# Patient Record
Sex: FEMALE | Race: WHITE | Employment: FULL TIME | URBAN - NONMETROPOLITAN AREA
[De-identification: names, ages, dates, MRNs, and addresses within clinical notes are randomized per-mention and may not be internally consistent; named-entity substitution may affect disease eponyms.]

---

## 2018-02-10 ENCOUNTER — NURSE TRIAGE (OUTPATIENT)
Dept: ADMINISTRATIVE | Age: 53
End: 2018-02-10

## 2018-02-10 RX ORDER — ATORVASTATIN CALCIUM 20 MG/1
20 TABLET, FILM COATED ORAL DAILY
Status: ON HOLD | COMMUNITY
End: 2018-11-15 | Stop reason: HOSPADM

## 2018-02-10 RX ORDER — PANTOPRAZOLE SODIUM 40 MG/10ML
40 INJECTION, POWDER, LYOPHILIZED, FOR SOLUTION INTRAVENOUS DAILY
COMMUNITY
End: 2018-08-22 | Stop reason: CLARIF

## 2018-02-10 RX ORDER — AMIODARONE HYDROCHLORIDE 200 MG/1
200 TABLET ORAL DAILY
COMMUNITY
End: 2018-05-23 | Stop reason: ALTCHOICE

## 2018-02-10 RX ORDER — FUROSEMIDE 40 MG/1
40 TABLET ORAL DAILY
COMMUNITY
End: 2018-08-22 | Stop reason: SDUPTHER

## 2018-02-10 NOTE — TELEPHONE ENCOUNTER
Contacts      Type Contact Phone   02/10/2018 08:59 AM Phone (Incoming) Beckie Sethi (Self)    Advice Only John Nath is calling to say that she has not been feeling well. She is a heart patient with Dr. Lamont Nance. She has a cough and stuffy  nose and is not sure what she can safely take.)     Roberto Biswas RN routed conversation to You 4 minutes ago (9:05 AM)      Roberto Biswas RN routed conversation to You 5 minutes ago (9:04 AM)      Cadence Brice RN 9 minutes ago (8:59 AM)      Care Advice      No Care Advice given for this encounter.

## 2018-02-10 NOTE — TELEPHONE ENCOUNTER
Reason for Disposition   Cold with no complications (all triage questions negative)    Answer Assessment - Initial Assessment Questions  1. ONSET: \"When did the nasal discharge start? \"      During the night    2. AMOUNT: \"How much discharge is there? \"       None yet  3. COUGH: \"Do you have a cough? \" If yes, ask: \"Describe the color of your sputum\" (clear, white, yellow, green)    None productive   4. RESPIRATORY DISTRESS: \"Describe your breathing. \"       Normal moist cough from smoking- has cut down   5. FEVER: \"Do you have a fever? \" If so, ask: \"What is your temperature, how was it measured, and when did it start? \"       None   6. SEVERITY: \"Overall, how bad are you feeling right now? \" (e.g., doesn't interfere with normal activities, staying home from school/work, staying in bed)       Feels like she needs something for her suymptoms- is going to [facility] with people today, wearing mask and washing hands frequently7. OTHER SYMPTOMS: \"Do you have any other symptoms? \" (e.g., sore throat, earache, wheezing, vomiting)     no  8. PREGNANCY: \"Is there any chance you are pregnant? \" \"When was your last menstrual period? \"na     Na    Protocols used: COMMON COLD-ADULT-AH

## 2018-05-07 ENCOUNTER — OFFICE VISIT (OUTPATIENT)
Dept: CARDIOLOGY CLINIC | Age: 53
End: 2018-05-07
Payer: COMMERCIAL

## 2018-05-07 VITALS
HEIGHT: 66 IN | HEART RATE: 60 BPM | WEIGHT: 196 LBS | BODY MASS INDEX: 31.5 KG/M2 | DIASTOLIC BLOOD PRESSURE: 80 MMHG | SYSTOLIC BLOOD PRESSURE: 127 MMHG

## 2018-05-07 DIAGNOSIS — I42.0 DILATED CARDIOMYOPATHY (HCC): ICD-10-CM

## 2018-05-07 DIAGNOSIS — I50.32 CHRONIC DIASTOLIC CONGESTIVE HEART FAILURE (HCC): ICD-10-CM

## 2018-05-07 DIAGNOSIS — I48.0 PAF (PAROXYSMAL ATRIAL FIBRILLATION) (HCC): Primary | ICD-10-CM

## 2018-05-07 PROCEDURE — G8417 CALC BMI ABV UP PARAM F/U: HCPCS | Performed by: INTERNAL MEDICINE

## 2018-05-07 PROCEDURE — 93000 ELECTROCARDIOGRAM COMPLETE: CPT | Performed by: INTERNAL MEDICINE

## 2018-05-07 PROCEDURE — G8427 DOCREV CUR MEDS BY ELIG CLIN: HCPCS | Performed by: INTERNAL MEDICINE

## 2018-05-07 PROCEDURE — 4004F PT TOBACCO SCREEN RCVD TLK: CPT | Performed by: INTERNAL MEDICINE

## 2018-05-07 PROCEDURE — 3017F COLORECTAL CA SCREEN DOC REV: CPT | Performed by: INTERNAL MEDICINE

## 2018-05-07 PROCEDURE — 99204 OFFICE O/P NEW MOD 45 MIN: CPT | Performed by: INTERNAL MEDICINE

## 2018-05-07 RX ORDER — WARFARIN SODIUM 5 MG/1
2.5 TABLET ORAL
Status: ON HOLD | COMMUNITY
End: 2018-11-05

## 2018-05-07 RX ORDER — METOPROLOL SUCCINATE 50 MG/1
50 TABLET, EXTENDED RELEASE ORAL 2 TIMES DAILY
COMMUNITY
End: 2018-08-22 | Stop reason: SDUPTHER

## 2018-05-23 ENCOUNTER — PROCEDURE VISIT (OUTPATIENT)
Dept: CARDIOLOGY CLINIC | Age: 53
End: 2018-05-23
Payer: COMMERCIAL

## 2018-05-23 ENCOUNTER — OFFICE VISIT (OUTPATIENT)
Dept: CARDIOLOGY CLINIC | Age: 53
End: 2018-05-23
Payer: COMMERCIAL

## 2018-05-23 VITALS
HEIGHT: 66 IN | DIASTOLIC BLOOD PRESSURE: 70 MMHG | SYSTOLIC BLOOD PRESSURE: 102 MMHG | HEART RATE: 82 BPM | WEIGHT: 200 LBS | BODY MASS INDEX: 32.14 KG/M2

## 2018-05-23 DIAGNOSIS — I48.0 PAF (PAROXYSMAL ATRIAL FIBRILLATION) (HCC): Primary | Chronic | ICD-10-CM

## 2018-05-23 DIAGNOSIS — Z95.0 PACEMAKER: ICD-10-CM

## 2018-05-23 PROCEDURE — 99204 OFFICE O/P NEW MOD 45 MIN: CPT | Performed by: INTERNAL MEDICINE

## 2018-05-23 PROCEDURE — 93288 INTERROG EVL PM/LDLS PM IP: CPT | Performed by: INTERNAL MEDICINE

## 2018-05-23 PROCEDURE — G8417 CALC BMI ABV UP PARAM F/U: HCPCS | Performed by: INTERNAL MEDICINE

## 2018-05-23 PROCEDURE — 4004F PT TOBACCO SCREEN RCVD TLK: CPT | Performed by: INTERNAL MEDICINE

## 2018-05-23 PROCEDURE — 3017F COLORECTAL CA SCREEN DOC REV: CPT | Performed by: INTERNAL MEDICINE

## 2018-05-23 PROCEDURE — 93000 ELECTROCARDIOGRAM COMPLETE: CPT | Performed by: INTERNAL MEDICINE

## 2018-05-23 PROCEDURE — G8427 DOCREV CUR MEDS BY ELIG CLIN: HCPCS | Performed by: INTERNAL MEDICINE

## 2018-05-23 ASSESSMENT — ENCOUNTER SYMPTOMS
VOMITING: 0
BLURRED VISION: 0
SORE THROAT: 0
LEFT EYE: 0
DIARRHEA: 0
COUGH: 0
ABDOMINAL PAIN: 0
CONSTIPATION: 0
SHORTNESS OF BREATH: 0
NAUSEA: 0
RIGHT EYE: 0
BACK PAIN: 0
WHEEZING: 0
DOUBLE VISION: 0

## 2018-08-10 ENCOUNTER — TELEPHONE (OUTPATIENT)
Dept: CARDIOLOGY CLINIC | Age: 53
End: 2018-08-10

## 2018-08-21 ENCOUNTER — HOSPITAL ENCOUNTER (OUTPATIENT)
Dept: NON INVASIVE DIAGNOSTICS | Age: 53
Discharge: HOME | End: 2018-08-21
Payer: COMMERCIAL

## 2018-08-21 DIAGNOSIS — I48.0 PAF (PAROXYSMAL ATRIAL FIBRILLATION) (HCC): Chronic | ICD-10-CM

## 2018-08-21 LAB
LV EF: 25 %
LVEF MODALITY: NORMAL

## 2018-08-21 PROCEDURE — 93306 TTE W/DOPPLER COMPLETE: CPT

## 2018-08-22 ENCOUNTER — OFFICE VISIT (OUTPATIENT)
Dept: CARDIOLOGY CLINIC | Age: 53
End: 2018-08-22
Payer: COMMERCIAL

## 2018-08-22 VITALS
DIASTOLIC BLOOD PRESSURE: 70 MMHG | HEART RATE: 108 BPM | BODY MASS INDEX: 31.82 KG/M2 | HEIGHT: 66 IN | WEIGHT: 198 LBS | SYSTOLIC BLOOD PRESSURE: 115 MMHG

## 2018-08-22 DIAGNOSIS — I48.0 PAF (PAROXYSMAL ATRIAL FIBRILLATION) (HCC): Primary | Chronic | ICD-10-CM

## 2018-08-22 PROCEDURE — 99214 OFFICE O/P EST MOD 30 MIN: CPT | Performed by: INTERNAL MEDICINE

## 2018-08-22 PROCEDURE — 4004F PT TOBACCO SCREEN RCVD TLK: CPT | Performed by: INTERNAL MEDICINE

## 2018-08-22 PROCEDURE — G8417 CALC BMI ABV UP PARAM F/U: HCPCS | Performed by: INTERNAL MEDICINE

## 2018-08-22 PROCEDURE — G8427 DOCREV CUR MEDS BY ELIG CLIN: HCPCS | Performed by: INTERNAL MEDICINE

## 2018-08-22 PROCEDURE — 93000 ELECTROCARDIOGRAM COMPLETE: CPT | Performed by: INTERNAL MEDICINE

## 2018-08-22 PROCEDURE — 3017F COLORECTAL CA SCREEN DOC REV: CPT | Performed by: INTERNAL MEDICINE

## 2018-08-22 RX ORDER — PANTOPRAZOLE SODIUM 40 MG/1
40 TABLET, DELAYED RELEASE ORAL DAILY
Qty: 90 TABLET | Refills: 3 | Status: ON HOLD | OUTPATIENT
Start: 2018-08-22 | End: 2018-11-15

## 2018-08-22 RX ORDER — FUROSEMIDE 40 MG/1
40 TABLET ORAL DAILY
Qty: 90 TABLET | Refills: 3 | Status: ON HOLD | OUTPATIENT
Start: 2018-08-22 | End: 2018-11-15

## 2018-08-22 RX ORDER — METOPROLOL SUCCINATE 50 MG/1
50 TABLET, EXTENDED RELEASE ORAL 2 TIMES DAILY
Qty: 180 TABLET | Refills: 3 | Status: ON HOLD | OUTPATIENT
Start: 2018-08-22 | End: 2018-09-20

## 2018-08-22 RX ORDER — PANTOPRAZOLE SODIUM 40 MG/1
40 TABLET, DELAYED RELEASE ORAL DAILY
COMMUNITY
End: 2018-08-22 | Stop reason: SDUPTHER

## 2018-08-22 ASSESSMENT — ENCOUNTER SYMPTOMS
BLURRED VISION: 0
BACK PAIN: 0
ABDOMINAL PAIN: 0
LEFT EYE: 0
DIARRHEA: 0
WHEEZING: 0
SORE THROAT: 0
RIGHT EYE: 0
COUGH: 0
VOMITING: 0
DOUBLE VISION: 0
SHORTNESS OF BREATH: 0
CONSTIPATION: 0
NAUSEA: 0

## 2018-08-22 NOTE — PROGRESS NOTES
CARDIOLOGY - ELECTROPHYSIOLOGY  PROGRESS NOTE    Date:   8/22/2018  Patient name: Dwayne Julio  Date of admission:  No admission date for patient encounter. MRN:   [id number]  YOB: 1965  PCP: ALDAIR Rodriguez CNP    Subjective:  I am doing fine       Clinical Changes / Abnormalities:    HPI     05/23/2018: The patient is a 49 y/o female that has had atrial fibrillation for the past two years. She was followed by Dr. Sheryle Medal from [facility].  The patient's initial presentation was for a flu like illness and she was found to be in atrial fibrillation with RVR. The patient was started on amiodarone one year ago and has had multiple cardioversions. She was also started on anticoagulation for stroke risk reduction. The patient was referred to Dr. Kyle Sanchez for further evaluation for possible atrial fibrillation ablation because the patient had a tachycardia induced cardiomyopathy. The patient's ventricular rate has been better controlled since insertion of her pacemaker and with that her EF has improved. ECHO in March showed and EF of 40-45%. The patient was scheduled to have an ablation on 2/2/2018 but it was cancelled secondary to sludge seen in the ROMAN. They were planning on repeating a cardiac CT and if the sludge was gone then plan to do a PVI ablation. The patient and her  wanted to find care closer to home and transferred her care to   Dr. Piper Armstrong. She is being referred to me to discuss further management of her long standing persistent atrial fibrillation. 08/22/2018: The patient is doing well from a cardiac standpoint. The ECHO repeated yesterday shows her EF is now 25%. The patient denies having any heart failure symptoms or palpitations. Past Medical History:      Diagnosis Date    [employer] dual pacer 5/23/2018     Past Surgical History:      Procedure Laterality Date    CARDIAC SURGERY  1970    repair hole in heart pt states was 3 yrs old.     swelling. Left hip: Normal. She exhibits normal range of motion and no swelling. Right knee: Normal. She exhibits normal range of motion and no swelling. Left knee: Normal. She exhibits normal range of motion and no swelling. Right upper arm: Normal.        Left upper arm: Normal.        Right upper leg: Normal.        Left upper leg: Normal.        Right lower leg: Normal.        Left lower leg: Normal.   Lymphadenopathy:        Head (right side): No submandibular adenopathy present. Head (left side): No submandibular adenopathy present. Neurological: She is alert and oriented to person, place, and time. She has normal strength. She displays normal reflexes. No cranial nerve deficit or sensory deficit. Coordination and gait normal.   Skin: Skin is warm and dry. No lesion and no rash noted. She is not diaphoretic. No cyanosis. Nails show no clubbing. Psychiatric: Her speech is normal and behavior is normal. Judgment and thought content normal. Her mood appears not anxious. Her affect is not angry. Cognition and memory are normal. She does not exhibit a depressed mood. Nursing note and vitals reviewed. DIAGNOSTIC STUDIES & LABORATORY DATA:     I have personally reviewed and interpreted the results of the following diagnostic testing  CARDIAC HISTORY:     ECHO:   08/21/2018  Summary   Ejection fraction is visually estimated at 25%.   There was severe global hypokinesis of the left ventricle.   Left Ventricular size is Mildly increased .   Moderately dilated left atrium.   Mild to moderate mitral regurgitation is present. 03/28/2018  3/28/2018 THOMAS:   1. Biatrial enlargment. No LA or RA thrombus. 2. LA appears severely enlarged. Mild spontaneous echo contrast is  present in the left atrial appendage. The left atrial appendage velocity  is severely reduced, 26.8 cm/sec. 3. Left atrial appendage sludge is visualized.  This finding is  independently associated with

## 2018-08-30 ENCOUNTER — OFFICE VISIT (OUTPATIENT)
Dept: CARDIOLOGY CLINIC | Age: 53
End: 2018-08-30
Payer: COMMERCIAL

## 2018-08-30 VITALS
SYSTOLIC BLOOD PRESSURE: 134 MMHG | HEART RATE: 84 BPM | WEIGHT: 197 LBS | DIASTOLIC BLOOD PRESSURE: 72 MMHG | HEIGHT: 66 IN | BODY MASS INDEX: 31.66 KG/M2

## 2018-08-30 DIAGNOSIS — Z95.0 PACEMAKER: ICD-10-CM

## 2018-08-30 DIAGNOSIS — I48.0 PAF (PAROXYSMAL ATRIAL FIBRILLATION) (HCC): Chronic | ICD-10-CM

## 2018-08-30 DIAGNOSIS — I50.32 CHRONIC DIASTOLIC CONGESTIVE HEART FAILURE (HCC): ICD-10-CM

## 2018-08-30 DIAGNOSIS — I10 ESSENTIAL HYPERTENSION: ICD-10-CM

## 2018-08-30 DIAGNOSIS — I48.20 CHRONIC ATRIAL FIBRILLATION (HCC): Primary | ICD-10-CM

## 2018-08-30 DIAGNOSIS — I42.0 DILATED CARDIOMYOPATHY (HCC): ICD-10-CM

## 2018-08-30 PROCEDURE — G8427 DOCREV CUR MEDS BY ELIG CLIN: HCPCS | Performed by: NUCLEAR MEDICINE

## 2018-08-30 PROCEDURE — 4004F PT TOBACCO SCREEN RCVD TLK: CPT | Performed by: NUCLEAR MEDICINE

## 2018-08-30 PROCEDURE — G8417 CALC BMI ABV UP PARAM F/U: HCPCS | Performed by: NUCLEAR MEDICINE

## 2018-08-30 PROCEDURE — 99214 OFFICE O/P EST MOD 30 MIN: CPT | Performed by: NUCLEAR MEDICINE

## 2018-08-30 PROCEDURE — 3017F COLORECTAL CA SCREEN DOC REV: CPT | Performed by: NUCLEAR MEDICINE

## 2018-08-30 RX ORDER — LOSARTAN POTASSIUM 25 MG/1
25 TABLET ORAL DAILY
Qty: 30 TABLET | Refills: 3 | Status: ON HOLD | OUTPATIENT
Start: 2018-08-30 | End: 2018-11-05

## 2018-09-05 ENCOUNTER — OFFICE VISIT (OUTPATIENT)
Dept: CARDIOTHORACIC SURGERY | Age: 53
End: 2018-09-05
Payer: COMMERCIAL

## 2018-09-05 VITALS
HEIGHT: 66 IN | SYSTOLIC BLOOD PRESSURE: 112 MMHG | DIASTOLIC BLOOD PRESSURE: 82 MMHG | HEART RATE: 64 BPM | WEIGHT: 197.8 LBS | BODY MASS INDEX: 31.79 KG/M2

## 2018-09-05 DIAGNOSIS — I42.0 DILATED CARDIOMYOPATHY (HCC): Primary | ICD-10-CM

## 2018-09-05 DIAGNOSIS — I48.19 PERSISTENT ATRIAL FIBRILLATION (HCC): ICD-10-CM

## 2018-09-05 DIAGNOSIS — I34.0 NON-RHEUMATIC MITRAL REGURGITATION: ICD-10-CM

## 2018-09-05 PROCEDURE — 4004F PT TOBACCO SCREEN RCVD TLK: CPT | Performed by: THORACIC SURGERY (CARDIOTHORACIC VASCULAR SURGERY)

## 2018-09-05 PROCEDURE — 99205 OFFICE O/P NEW HI 60 MIN: CPT | Performed by: THORACIC SURGERY (CARDIOTHORACIC VASCULAR SURGERY)

## 2018-09-05 PROCEDURE — G8417 CALC BMI ABV UP PARAM F/U: HCPCS | Performed by: THORACIC SURGERY (CARDIOTHORACIC VASCULAR SURGERY)

## 2018-09-05 PROCEDURE — G8427 DOCREV CUR MEDS BY ELIG CLIN: HCPCS | Performed by: THORACIC SURGERY (CARDIOTHORACIC VASCULAR SURGERY)

## 2018-09-05 PROCEDURE — 3017F COLORECTAL CA SCREEN DOC REV: CPT | Performed by: THORACIC SURGERY (CARDIOTHORACIC VASCULAR SURGERY)

## 2018-09-05 ASSESSMENT — ENCOUNTER SYMPTOMS
ABDOMINAL DISTENTION: 0
EYE DISCHARGE: 0
APNEA: 0
SHORTNESS OF BREATH: 1
ABDOMINAL PAIN: 0
COLOR CHANGE: 0

## 2018-09-05 NOTE — PROGRESS NOTES
[address] [place] [address]  Dept: [phone]  Dept Fax: [phone]: [phone]    Visit Date: 9/5/2018    Ms. Jason Dominguez is a 48 y.o. female  who presented for:  Chief Complaint   Patient presents with    New Patient     Referral from Dr. Malena Rosas Other     PAF    Follow Up After Procedure     ECHO 8/21/18       HPI:   48year old female with presumptive non-ischemic tachycardiomyopathy due to persistent atrial fibrillation with rapid ventricular response, complicated by moderate mitral regurgitation. Symptomatic with severe fatigue and dyspnea on minimal exertion. Denies thromboembolic sequelae. Previously scheduled for percutaneous ablation, but aborted due to spontaneous contrast in left atrium. Note, patient had open ASD repair as a child via a full sternotomy. Current Outpatient Prescriptions:     losartan (COZAAR) 25 MG tablet, Take 1 tablet by mouth daily, Disp: 30 tablet, Rfl: 3    pantoprazole (PROTONIX) 40 MG tablet, Take 1 tablet by mouth daily, Disp: 90 tablet, Rfl: 3    metoprolol succinate (TOPROL XL) 50 MG extended release tablet, Take 1 tablet by mouth 2 times daily, Disp: 180 tablet, Rfl: 3    furosemide (LASIX) 40 MG tablet, Take 1 tablet by mouth daily, Disp: 90 tablet, Rfl: 3    warfarin (COUMADIN) 5 MG tablet, Take 2.5 mg by mouth , Disp: , Rfl:     atorvastatin (LIPITOR) 20 MG tablet, Take 20 mg by mouth daily, Disp: , Rfl:     No Known Allergies    Past Medical History  Isabel Ramirez  has a past medical history of [employer] dual pacer. Social History  Isabel Ramirez  reports that she has been smoking Cigarettes. She started smoking about 24 years ago. She has been smoking about 0.25 packs per day. She has never used smokeless tobacco. She reports that she does not drink alcohol or use drugs. Family History  Isabel Ramirez family history is not on file.     There is no ESV    RV Diastolic Dimension: 3 cm                   Index: 149 ml/73 m^2                   EF Calculated: 23.6 %     LA/Aorta: 1.37      LV Area         LV Length: 7.1 cm         LA volume/Index: 74.7 ml /95J^7   Diastolic: 79.3   cm^2   LV Area   Systolic: 97.3   cm^2     Doppler Measurements & Calculations      MV Peak E-Wave: 115 cm/s AV Peak Velocity: 124  LVOT Peak Velocity: 86.8                            cm/s                   cm/s   MV Peak Gradient: 5.29   AV Peak Gradient: 6.15 LVOT Peak Gradient: 3 mmHg   mmHg                     mmHg                                                   TV Peak E-Wave: 43.2 cm/s   MV Deceleration Time:   183 msec                                        TV Peak Gradient: 0.75                                                   mmHg                            IVRT: 81 msec          TR Velocity:271 cm/s                                                   TR Gradient:29.38 mmHg                                                   PV Peak Velocity: 73.7                            AV DVI (Vmax):0.7      cm/s   MR Velocity: 475 cm/s                           PV Peak Gradient: 2.17                                                   mmHg     [email or web address]  [id number]       Assessment/Plan     1. Dilated cardiomyopathy (HCC)-tachymediated    2. Persistent atrial fibrillation ([address]    3. Non-rheumatic mitral regurgitation      Discussed with patient and father in detail that she unfortunately is not a candidate for a thoracoscopic maze, due to her previous open ASD repair. I proposed an open maze (Gutierrez maze IV, with ablation of both anterior and posterior mitral trigones), mitral valve repair, and placement of LV epicardial lead, via redo sternotomy. Patient desires to proceed. Will non-contrast CT chest, and LHC (scheduled for 9/20). Will schedule case thereafter.     The

## 2018-09-12 NOTE — PROGRESS NOTES
PAT call attempted patient unavailable left message with instructions    NPO after midnight  Bring insurance info and drivers license  Wear comfortable clean clothing  Do not bring jewelry   Shower night before and morning of surgery with a liquid antibacterial soap  Bring medications in original bottles  Follow all instructions give by your physician   needed at discharge  Call [phone] for any questions

## 2018-09-19 ENCOUNTER — PREP FOR PROCEDURE (OUTPATIENT)
Dept: CARDIOLOGY | Age: 53
End: 2018-09-19

## 2018-09-19 ENCOUNTER — TELEPHONE (OUTPATIENT)
Dept: CARDIOLOGY CLINIC | Age: 53
End: 2018-09-19

## 2018-09-19 DIAGNOSIS — Z01.818 PREOP EXAMINATION: Primary | ICD-10-CM

## 2018-09-19 RX ORDER — ASPIRIN 325 MG
325 TABLET ORAL ONCE
Status: CANCELLED | OUTPATIENT
Start: 2018-09-19 | End: 2018-09-19

## 2018-09-19 RX ORDER — SODIUM CHLORIDE 0.9 % (FLUSH) 0.9 %
10 SYRINGE (ML) INJECTION EVERY 12 HOURS SCHEDULED
Status: CANCELLED | OUTPATIENT
Start: 2018-09-19 | End: 2019-09-19

## 2018-09-19 RX ORDER — NITROGLYCERIN 0.4 MG/1
0.4 TABLET SUBLINGUAL EVERY 5 MIN PRN
Status: CANCELLED | OUTPATIENT
Start: 2018-09-19 | End: 2018-09-21

## 2018-09-19 RX ORDER — SODIUM CHLORIDE 9 MG/ML
INJECTION, SOLUTION INTRAVENOUS CONTINUOUS
Status: CANCELLED | OUTPATIENT
Start: 2018-09-19 | End: 2019-09-19

## 2018-09-19 RX ORDER — SODIUM CHLORIDE 0.9 % (FLUSH) 0.9 %
10 SYRINGE (ML) INJECTION PRN
Status: CANCELLED | OUTPATIENT
Start: 2018-09-19 | End: 2019-09-19

## 2018-09-20 ENCOUNTER — HOSPITAL ENCOUNTER (OUTPATIENT)
Dept: INPATIENT UNIT | Age: 53
Discharge: HOME | End: 2018-09-20
Attending: NUCLEAR MEDICINE | Admitting: NUCLEAR MEDICINE
Payer: COMMERCIAL

## 2018-09-20 VITALS
SYSTOLIC BLOOD PRESSURE: 103 MMHG | OXYGEN SATURATION: 96 % | HEIGHT: 66 IN | RESPIRATION RATE: 19 BRPM | TEMPERATURE: 98.1 F | HEART RATE: 87 BPM | BODY MASS INDEX: 31.66 KG/M2 | WEIGHT: 197 LBS | DIASTOLIC BLOOD PRESSURE: 78 MMHG

## 2018-09-20 DIAGNOSIS — I48.0 PAF (PAROXYSMAL ATRIAL FIBRILLATION) (HCC): Chronic | ICD-10-CM

## 2018-09-20 LAB
ABO: NORMAL
ANION GAP SERPL CALCULATED.3IONS-SCNC: 12 MEQ/L (ref 8–16)
ANTIBODY SCREEN: NORMAL
APTT: 43 SECONDS (ref 22–38)
BUN BLDV-MCNC: 19 MG/DL (ref 7–22)
CALCIUM SERPL-MCNC: 9.1 MG/DL (ref 8.5–10.5)
CHLORIDE BLD-SCNC: 102 MEQ/L (ref 98–111)
CO2: 25 MEQ/L (ref 23–33)
CREAT SERPL-MCNC: 1.1 MG/DL (ref 0.4–1.2)
EKG ATRIAL RATE: 178 BPM
EKG Q-T INTERVAL: 450 MS
EKG QRS DURATION: 138 MS
EKG QTC CALCULATION (BAZETT): 541 MS
EKG R AXIS: -19 DEGREES
EKG T AXIS: 114 DEGREES
EKG VENTRICULAR RATE: 87 BPM
ERYTHROCYTE [DISTWIDTH] IN BLOOD BY AUTOMATED COUNT: 15.8 % (ref 11.5–14.5)
ERYTHROCYTE [DISTWIDTH] IN BLOOD BY AUTOMATED COUNT: 50 FL (ref 35–45)
GFR SERPL CREATININE-BSD FRML MDRD: 52 ML/MIN/1.73M2
GLUCOSE BLD-MCNC: 98 MG/DL (ref 70–108)
HCT VFR BLD CALC: 36 % (ref 37–47)
HEMOGLOBIN: 11.1 GM/DL (ref 12–16)
INR BLD: 2.7 (ref 0.85–1.13)
MCH RBC QN AUTO: 26.9 PG (ref 26–33)
MCHC RBC AUTO-ENTMCNC: 30.8 GM/DL (ref 32.2–35.5)
MCV RBC AUTO: 87.2 FL (ref 81–99)
PLATELET # BLD: 225 THOU/MM3 (ref 130–400)
PMV BLD AUTO: 10.7 FL (ref 9.4–12.4)
POTASSIUM REFLEX MAGNESIUM: 3.8 MEQ/L (ref 3.5–5.2)
RBC # BLD: 4.13 MILL/MM3 (ref 4.2–5.4)
RH FACTOR: NORMAL
SODIUM BLD-SCNC: 139 MEQ/L (ref 135–145)
WBC # BLD: 5.9 THOU/MM3 (ref 4.8–10.8)

## 2018-09-20 PROCEDURE — 86850 RBC ANTIBODY SCREEN: CPT

## 2018-09-20 PROCEDURE — 93005 ELECTROCARDIOGRAM TRACING: CPT | Performed by: PHYSICIAN ASSISTANT

## 2018-09-20 PROCEDURE — 93458 L HRT ARTERY/VENTRICLE ANGIO: CPT | Performed by: NUCLEAR MEDICINE

## 2018-09-20 PROCEDURE — 2500000003 HC RX 250 WO HCPCS

## 2018-09-20 PROCEDURE — 36415 COLL VENOUS BLD VENIPUNCTURE: CPT

## 2018-09-20 PROCEDURE — 85610 PROTHROMBIN TIME: CPT

## 2018-09-20 PROCEDURE — 86900 BLOOD TYPING SEROLOGIC ABO: CPT

## 2018-09-20 PROCEDURE — 86901 BLOOD TYPING SEROLOGIC RH(D): CPT

## 2018-09-20 PROCEDURE — 6360000004 HC RX CONTRAST MEDICATION: Performed by: NUCLEAR MEDICINE

## 2018-09-20 PROCEDURE — 85027 COMPLETE CBC AUTOMATED: CPT

## 2018-09-20 PROCEDURE — C1894 INTRO/SHEATH, NON-LASER: HCPCS

## 2018-09-20 PROCEDURE — 99152 MOD SED SAME PHYS/QHP 5/>YRS: CPT | Performed by: NUCLEAR MEDICINE

## 2018-09-20 PROCEDURE — 6360000002 HC RX W HCPCS

## 2018-09-20 PROCEDURE — 80048 BASIC METABOLIC PNL TOTAL CA: CPT

## 2018-09-20 PROCEDURE — C1769 GUIDE WIRE: HCPCS

## 2018-09-20 PROCEDURE — 2709999900 HC NON-CHARGEABLE SUPPLY

## 2018-09-20 PROCEDURE — 85730 THROMBOPLASTIN TIME PARTIAL: CPT

## 2018-09-20 PROCEDURE — 2580000003 HC RX 258: Performed by: PHYSICIAN ASSISTANT

## 2018-09-20 RX ORDER — HYDROXYZINE HYDROCHLORIDE 25 MG/1
25 TABLET, FILM COATED ORAL NIGHTLY PRN
COMMUNITY
End: 2018-11-26 | Stop reason: ALTCHOICE

## 2018-09-20 RX ORDER — ONDANSETRON 2 MG/ML
4 INJECTION INTRAMUSCULAR; INTRAVENOUS EVERY 6 HOURS PRN
Status: DISCONTINUED | OUTPATIENT
Start: 2018-09-20 | End: 2018-09-20 | Stop reason: HOSPADM

## 2018-09-20 RX ORDER — SODIUM CHLORIDE 9 MG/ML
INJECTION, SOLUTION INTRAVENOUS CONTINUOUS
Status: DISCONTINUED | OUTPATIENT
Start: 2018-09-20 | End: 2018-09-20 | Stop reason: HOSPADM

## 2018-09-20 RX ORDER — SODIUM CHLORIDE 9 MG/ML
INJECTION, SOLUTION INTRAVENOUS CONTINUOUS
Status: DISCONTINUED | OUTPATIENT
Start: 2018-09-20 | End: 2018-09-20

## 2018-09-20 RX ORDER — SODIUM CHLORIDE 0.9 % (FLUSH) 0.9 %
10 SYRINGE (ML) INJECTION EVERY 12 HOURS SCHEDULED
Status: DISCONTINUED | OUTPATIENT
Start: 2018-09-20 | End: 2018-09-20

## 2018-09-20 RX ORDER — SODIUM CHLORIDE 0.9 % (FLUSH) 0.9 %
10 SYRINGE (ML) INJECTION PRN
Status: DISCONTINUED | OUTPATIENT
Start: 2018-09-20 | End: 2018-09-20

## 2018-09-20 RX ORDER — SODIUM CHLORIDE 0.9 % (FLUSH) 0.9 %
10 SYRINGE (ML) INJECTION EVERY 12 HOURS SCHEDULED
Status: DISCONTINUED | OUTPATIENT
Start: 2018-09-20 | End: 2018-09-20 | Stop reason: HOSPADM

## 2018-09-20 RX ORDER — SODIUM CHLORIDE 0.9 % (FLUSH) 0.9 %
10 SYRINGE (ML) INJECTION PRN
Status: DISCONTINUED | OUTPATIENT
Start: 2018-09-20 | End: 2018-09-20 | Stop reason: HOSPADM

## 2018-09-20 RX ORDER — ATROPINE SULFATE 0.4 MG/ML
0.5 AMPUL (ML) INJECTION
Status: DISCONTINUED | OUTPATIENT
Start: 2018-09-20 | End: 2018-09-20 | Stop reason: HOSPADM

## 2018-09-20 RX ORDER — METOPROLOL SUCCINATE 50 MG/1
TABLET, EXTENDED RELEASE ORAL
Qty: 180 TABLET | Refills: 3 | Status: ON HOLD | OUTPATIENT
Start: 2018-09-20 | End: 2018-11-05

## 2018-09-20 RX ORDER — NITROGLYCERIN 0.4 MG/1
0.4 TABLET SUBLINGUAL EVERY 5 MIN PRN
Status: DISCONTINUED | OUTPATIENT
Start: 2018-09-20 | End: 2018-09-20 | Stop reason: HOSPADM

## 2018-09-20 RX ORDER — ASPIRIN 325 MG
325 TABLET ORAL ONCE
Status: DISCONTINUED | OUTPATIENT
Start: 2018-09-20 | End: 2018-09-20 | Stop reason: HOSPADM

## 2018-09-20 RX ORDER — PHENOL 1.4 %
1 AEROSOL, SPRAY (ML) MUCOUS MEMBRANE NIGHTLY PRN
COMMUNITY
End: 2018-12-20 | Stop reason: SDUPTHER

## 2018-09-20 RX ORDER — ACETAMINOPHEN 325 MG/1
650 TABLET ORAL EVERY 4 HOURS PRN
Status: DISCONTINUED | OUTPATIENT
Start: 2018-09-20 | End: 2018-09-20 | Stop reason: HOSPADM

## 2018-09-20 RX ADMIN — IOPAMIDOL 75 ML: 755 INJECTION, SOLUTION INTRAVENOUS at 15:26

## 2018-09-20 RX ADMIN — SODIUM CHLORIDE: 9 INJECTION, SOLUTION INTRAVENOUS at 13:40

## 2018-09-20 ASSESSMENT — PAIN SCALES - GENERAL
PAINLEVEL_OUTOF10: 0
PAINLEVEL_OUTOF10: 0

## 2018-09-20 NOTE — PROGRESS NOTES
Assumed care from 2-E staff. Patient awake, alert, and denies discomfort. IV 0.9 NS infusing. Right radial site intact with vasc band, armboard, pillow support. No swelling, firmness, or drainage noted. See post procedure flow sheet.

## 2018-09-20 NOTE — CONSULTS
3: Severe systemic disease or disturbance  Class 4: Severe systemic disorders that are already life threatening. Class 5: Moribund pt with little chances of survival, for more than 24 hours. Mallampati I Airway Classification:   []1 [x]2 []3 []4    [x]Pre-procedure diagnostic studies complete and results available. Comment:    [x]Previous sedation/anesthesia experiences assessed. Comment:    [x]The patient is an appropriate candidate to undergo the planned procedure sedation and anesthesia. (Refer to nursing sedation/analgesia documentation record)  [x]Formulation and discussion of sedation/procedure plan, risks, and expectations with patient and/or responsible adult completed. [x]Patient examined immediately prior to the procedure.  (Refer to nursing sedation/analgesia documentation record)    Ragini Howard MD [facility]  Electronically signed 9/20/2018 at 2:38 PM

## 2018-09-20 NOTE — PLAN OF CARE
Problem: Discharge Planning:  Goal: Discharged to appropriate level of care  Discharged to appropriate level of care  Outcome: Ongoing      Problem: Pain - Acute:  Goal: Pain level will decrease  Pain level will decrease  Outcome: Ongoing      Problem: Tissue Perfusion - Cardiopulmonary, Altered:  Goal: Circulatory function within specified parameters  Circulatory function within specified parameters  Outcome: Ongoing    Goal: Absence of angina  Absence of angina  Outcome: Ongoing    Goal: Hemodynamic stability will improve  Hemodynamic stability will improve  Outcome: Ongoing      Comments: Care plan reviewed with patient and family. Patient and family verbalize understanding of the plan of care and contribute to goal setting.

## 2018-09-21 NOTE — TELEPHONE ENCOUNTER
PT CALLED THE OFFICE.  THE COMPANY IS SENDING HER A NEW COMMUNICATOR AND WHEN SHE GETS IT SHE IS GOING TO SEND

## 2018-09-21 NOTE — TELEPHONE ENCOUNTER
LM to send download or to call office to be checked. She can bring monitor in and we can help her trouble shoot.   Consider dismissal if no download and/or no appt

## 2018-09-24 ENCOUNTER — TELEPHONE (OUTPATIENT)
Dept: CARDIOTHORACIC SURGERY | Age: 53
End: 2018-09-24

## 2018-09-26 ENCOUNTER — OFFICE VISIT (OUTPATIENT)
Dept: CARDIOTHORACIC SURGERY | Age: 53
End: 2018-09-26
Payer: COMMERCIAL

## 2018-09-26 VITALS
OXYGEN SATURATION: 98 % | HEART RATE: 92 BPM | HEIGHT: 66 IN | SYSTOLIC BLOOD PRESSURE: 112 MMHG | DIASTOLIC BLOOD PRESSURE: 60 MMHG | BODY MASS INDEX: 32.11 KG/M2 | WEIGHT: 199.8 LBS

## 2018-09-26 DIAGNOSIS — I48.19 PERSISTENT ATRIAL FIBRILLATION (HCC): Primary | ICD-10-CM

## 2018-09-26 DIAGNOSIS — I42.0 DILATED CARDIOMYOPATHY (HCC): ICD-10-CM

## 2018-09-26 DIAGNOSIS — I34.0 NON-RHEUMATIC MITRAL REGURGITATION: ICD-10-CM

## 2018-09-26 PROCEDURE — G8417 CALC BMI ABV UP PARAM F/U: HCPCS | Performed by: THORACIC SURGERY (CARDIOTHORACIC VASCULAR SURGERY)

## 2018-09-26 PROCEDURE — 3017F COLORECTAL CA SCREEN DOC REV: CPT | Performed by: THORACIC SURGERY (CARDIOTHORACIC VASCULAR SURGERY)

## 2018-09-26 PROCEDURE — G8428 CUR MEDS NOT DOCUMENT: HCPCS | Performed by: THORACIC SURGERY (CARDIOTHORACIC VASCULAR SURGERY)

## 2018-09-26 PROCEDURE — 1036F TOBACCO NON-USER: CPT | Performed by: THORACIC SURGERY (CARDIOTHORACIC VASCULAR SURGERY)

## 2018-09-26 PROCEDURE — 99215 OFFICE O/P EST HI 40 MIN: CPT | Performed by: THORACIC SURGERY (CARDIOTHORACIC VASCULAR SURGERY)

## 2018-09-26 ASSESSMENT — ENCOUNTER SYMPTOMS
SHORTNESS OF BREATH: 1
ABDOMINAL PAIN: 0
COLOR CHANGE: 0
EYE DISCHARGE: 0

## 2018-09-26 NOTE — PROGRESS NOTES
[address] [place] [address]  Dept: [phone]  Dept Fax: [phone]: [phone]    Visit Date: 9/26/2018    Ms. Erik Garcia is a 48 y.o. female  who presented for:  Chief Complaint   Patient presents with    Consultation     afib, discuss MAZE       HPI:   48year old female, s/p open ASD repair as child, presents with dilated cardiomyopathy associated with persistent atrial fibrillation with rapid ventricular response, and resultant moderate mitral regurgitation. Previously evaluated for percutaneous ablation, but found to have ROMAN sludge. Returns post-[facility] showing no CAD. Reports ongoing fatigue and dyspnea on minimal exertion. Current Outpatient Prescriptions:     hydrOXYzine (ATARAX) 25 MG tablet, Take 25 mg by mouth nightly as needed for Itching 1/2-2 tabs, Disp: , Rfl:     Melatonin 10 MG TABS, Take 1 tablet by mouth nightly as needed, Disp: , Rfl:     metoprolol succinate (TOPROL XL) 50 MG extended release tablet, 75mg po bid, Disp: 180 tablet, Rfl: 3    losartan (COZAAR) 25 MG tablet, Take 1 tablet by mouth daily, Disp: 30 tablet, Rfl: 3    pantoprazole (PROTONIX) 40 MG tablet, Take 1 tablet by mouth daily, Disp: 90 tablet, Rfl: 3    furosemide (LASIX) 40 MG tablet, Take 1 tablet by mouth daily, Disp: 90 tablet, Rfl: 3    warfarin (COUMADIN) 5 MG tablet, Take 2.5 mg by mouth , Disp: , Rfl:     atorvastatin (LIPITOR) 20 MG tablet, Take 20 mg by mouth daily, Disp: , Rfl:     No Known Allergies    Past Medical History  Gustavo Moulton  has a past medical history of [employer] dual pacer. Social History  Gustavo Moulton  reports that she quit smoking 10 days ago. Her smoking use included Cigarettes. She started smoking about 24 years ago. She has a 8.75 pack-year smoking history. She has never used smokeless tobacco. She reports that she does not drink alcohol or use drugs.     Family Abdominal: Soft. Bowel sounds are normal. There is no tenderness. Musculoskeletal: Normal range of motion. She exhibits no edema or deformity. Neurological: She is alert and oriented to person, place, and time. She has normal reflexes. Skin: Skin is warm and dry. Psychiatric: She has a normal mood and affect.        Lab Results   Component Value Date    WBC 5.9 09/20/2018    RBC 4.13 09/20/2018    HGB 11.1 09/20/2018    HCT 36.0 09/20/2018    MCV 87.2 09/20/2018    MCH 26.9 09/20/2018    MCHC 30.8 09/20/2018     09/20/2018    MPV 10.7 09/20/2018       Lab Results   Component Value Date     09/20/2018    K 3.8 09/20/2018     09/20/2018    CO2 25 09/20/2018    BUN 19 09/20/2018    CREATININE 1.1 09/20/2018    CALCIUM 9.1 09/20/2018    LABGLOM 52 09/20/2018    GLUCOSE 98 09/20/2018       No results found for: ALKPHOS, ALT, AST, PROT, BILITOT, BILIDIR, IBILI, LABALBU    No results found for: MG    Lab Results   Component Value Date    INR 2.70 (H) 09/20/2018    INR 3.0 (H) 09/19/2018         No results found for: LABA1C    No results found for: TRIG, HDL, LDLCALC, LDLDIRECT, LABVLDL    No results found for: TSH      Testing Reviewed:      I have individually reviewed the below cardiac tests    ECHO:   Results for orders placed during the hospital encounter of 08/21/18   Echo 2D w doppler w color complete    Narrative Transthoracic Echocardiography Report (TTE)     Demographics      Patient Name     Cynthia Chandler Gender                Female      MR #             [id number]    Race                                                      Ethnicity      Account #        [de-identified]    Room Number      Accession Number [id number]    Date of Study         08/21/2018      Date of Birth    1965   Referring Physician   MD Rina Rubin NP      Age              48 year(s)   Sonographer           Freddy Casanova MALGORZATA Interpreting          Cosme Burk MD                                 Physician     Procedure    Type of Study      TTE procedure:ECHOCARDIOGRAM COMPLETE 2D W DOPPLER W COLOR. Procedure Date  Date: 08/21/2018 Start: 03:04 PM    Study Location: Echo Lab  Technical Quality: Adequate visualization    Indications:Atrial fibrillation. Additional Medical History:Cardiomyopathy, Pacemaker, Tobacco use,  Paroxysmal atrial fib, CHF    Patient Status: Routine    Height: 65.75 inches Weight: 208.01 pounds BSA: 2.03 m^2 BMI: 33.83 kg/m^2    BP: 152/92 mmHg     Conclusions      Summary   Ejection fraction is visually estimated at 25%. There was severe global hypokinesis of the left ventricle. Left Ventricular size is Mildly increased . Moderately dilated left atrium. Mild to moderate mitral regurgitation is present. Signature      ----------------------------------------------------------------   Electronically signed by Cosme Burk MD (Interpreting   physician) on 08/21/2018 at 04:14 PM   ----------------------------------------------------------------      Findings      Mitral Valve   Mild to moderate mitral regurgitation is present. Aortic Valve   The aortic valve was trileaflet with normal thickness and cuspal   separation. DOPPLER: Transaortic velocity was within the normal range with   no evidence of aortic stenosis. There was no evidence of aortic   regurgitation. Tricuspid Valve   Mild tricuspid regurgitation visualized. Pulmonic Valve   The pulmonic valve was not well visualized . Trivial pulmonic regurgitation visualized. Left Atrium   Moderately dilated left atrium. Left Ventricle   Ejection fraction is visually estimated at 25%. There was severe global hypokinesis of the left ventricle. Left Ventricular size is Mildly increased .       Right Atrium   Right atrial size was normal.      Right Ventricle   The right ventricular size was normal with normal systolic function and   wall thickness. Pericardial Effusion   The pericardium was normal in appearance with no evidence of a pericardial   effusion. Pleural Effusion   No evidence of pleural effusion. Aorta / Great Vessels   -Aortic root dimension within normal limits.   -The Pulmonary artery is within normal limits. -IVC size is within normal limits with normal respiratory phasic changes.      M-Mode/2D Measurements & Calculations      LV Diastolic    LV Systolic Dimension:    AV Cusp Separation: 2 cmLA   Dimension: 5.8  5.3 cm                    Dimension: 4.1 cmAO Root   cm              LV Volume Diastolic: 377  Dimension: 3 cmLA Area: 23.1   LV FS:8.6 %     ml                        cm^2   LV PW           LV Volume Systolic: 490   Diastolic: 1 cm ml   Septum          LV EDV/LV EDV Index: 375   Diastolic: 1 cm BAYRON/27 M^9VT ESV/LV ESV    RV Diastolic Dimension: 3 cm                   Index: 149 ml/73 m^2                   EF Calculated: 23.6 %     LA/Aorta: 1.37      LV Area         LV Length: 7.1 cm         LA volume/Index: 74.7 ml /62U^4   Diastolic: 75.9   cm^2   LV Area   Systolic: 35.4   cm^2     Doppler Measurements & Calculations      MV Peak E-Wave: 115 cm/s AV Peak Velocity: 124  LVOT Peak Velocity: 86.8                            cm/s                   cm/s   MV Peak Gradient: 5.29   AV Peak Gradient: 6.15 LVOT Peak Gradient: 3 mmHg   mmHg                     mmHg                                                   TV Peak E-Wave: 43.2 cm/s   MV Deceleration Time:   183 msec                                        TV Peak Gradient: 0.75                                                   mmHg                            IVRT: 81 msec          TR Velocity:271 cm/s                                                   TR Gradient:29.38 mmHg                                                   PV Peak Velocity: 73.7                            AV DVI (Vmax):0.7      cm/s   MR Velocity:

## 2018-09-27 ENCOUNTER — PREP FOR PROCEDURE (OUTPATIENT)
Dept: CARDIOLOGY CLINIC | Age: 53
End: 2018-09-27

## 2018-09-27 DIAGNOSIS — Z01.818 PREOP EXAMINATION: Primary | ICD-10-CM

## 2018-09-27 RX ORDER — CHLORHEXIDINE GLUCONATE 0.12 MG/ML
15 RINSE ORAL ONCE
Status: CANCELLED | OUTPATIENT
Start: 2018-09-27 | End: 2018-09-27

## 2018-09-27 RX ORDER — SODIUM CHLORIDE 0.9 % (FLUSH) 0.9 %
10 SYRINGE (ML) INJECTION PRN
Status: CANCELLED | OUTPATIENT
Start: 2018-09-27 | End: 2019-09-27

## 2018-09-27 RX ORDER — SODIUM CHLORIDE 0.9 % (FLUSH) 0.9 %
10 SYRINGE (ML) INJECTION EVERY 12 HOURS SCHEDULED
Status: CANCELLED | OUTPATIENT
Start: 2018-09-27 | End: 2019-09-27

## 2018-09-27 RX ORDER — CHLORHEXIDINE GLUCONATE 4 G/100ML
SOLUTION TOPICAL ONCE
Status: CANCELLED | OUTPATIENT
Start: 2018-09-27 | End: 2018-09-27

## 2018-09-27 RX ORDER — AMIODARONE HYDROCHLORIDE 200 MG/1
200 TABLET ORAL ONCE
Status: CANCELLED | OUTPATIENT
Start: 2018-09-27 | End: 2018-09-27

## 2018-10-01 ENCOUNTER — TELEPHONE (OUTPATIENT)
Dept: CARDIOLOGY CLINIC | Age: 53
End: 2018-10-01

## 2018-10-10 ENCOUNTER — HOSPITAL ENCOUNTER (OUTPATIENT)
Dept: PREADMISSION TESTING | Age: 53
Discharge: HOME | End: 2018-10-14
Payer: COMMERCIAL

## 2018-10-10 ENCOUNTER — HOSPITAL ENCOUNTER (OUTPATIENT)
Dept: GENERAL RADIOLOGY | Age: 53
Discharge: HOME | End: 2018-10-10
Payer: COMMERCIAL

## 2018-10-10 ENCOUNTER — HOSPITAL ENCOUNTER (OUTPATIENT)
Dept: CT IMAGING | Age: 53
Discharge: HOME | End: 2018-10-10
Payer: COMMERCIAL

## 2018-10-10 VITALS
HEART RATE: 102 BPM | BODY MASS INDEX: 31.58 KG/M2 | OXYGEN SATURATION: 98 % | WEIGHT: 196.5 LBS | TEMPERATURE: 96.1 F | HEIGHT: 66 IN | RESPIRATION RATE: 18 BRPM

## 2018-10-10 DIAGNOSIS — Z01.818 PREOP EXAMINATION: ICD-10-CM

## 2018-10-10 DIAGNOSIS — I48.19 PERSISTENT ATRIAL FIBRILLATION (HCC): ICD-10-CM

## 2018-10-10 LAB
ABO: NORMAL
AMORPHOUS: ABNORMAL
ANTIBODY SCREEN: NORMAL
AVERAGE GLUCOSE: 117 MG/DL (ref 70–126)
BACTERIA: ABNORMAL
BILIRUBIN URINE: NEGATIVE
BLOOD, URINE: ABNORMAL
CASTS: ABNORMAL /LPF
CASTS: ABNORMAL /LPF
CHARACTER, URINE: ABNORMAL
COLOR: YELLOW
CRYSTALS: ABNORMAL
EPITHELIAL CELLS, UA: ABNORMAL /HPF
ERYTHROCYTE [DISTWIDTH] IN BLOOD BY AUTOMATED COUNT: 16.5 % (ref 11.5–14.5)
ERYTHROCYTE [DISTWIDTH] IN BLOOD BY AUTOMATED COUNT: 51.7 FL (ref 35–45)
GLUCOSE, URINE: NEGATIVE MG/DL
HBA1C MFR BLD: 5.9 % (ref 4.4–6.4)
HCT VFR BLD CALC: 34.6 % (ref 37–47)
HEMOGLOBIN: 10.7 GM/DL (ref 12–16)
INR BLD: 1.54 (ref 0.85–1.13)
KETONES, URINE: NEGATIVE
LEUKOCYTE EST, POC: ABNORMAL
MCH RBC QN AUTO: 26.8 PG (ref 26–33)
MCHC RBC AUTO-ENTMCNC: 30.9 GM/DL (ref 32.2–35.5)
MCV RBC AUTO: 86.7 FL (ref 81–99)
MISCELLANEOUS LAB TEST RESULT: ABNORMAL
MRSA SCREEN RT-PCR: NEGATIVE
MUCUS: ABNORMAL
NITRITE, URINE: NEGATIVE
PH UA: 5.5
PLATELET # BLD: 245 THOU/MM3 (ref 130–400)
PMV BLD AUTO: 10.9 FL (ref 9.4–12.4)
PROTEIN UA: ABNORMAL MG/DL
RBC # BLD: 3.99 MILL/MM3 (ref 4.2–5.4)
RBC URINE: ABNORMAL /HPF
RENAL EPITHELIAL, UA: ABNORMAL
RH FACTOR: NORMAL
SPECIFIC GRAVITY UA: 1.03 (ref 1–1.03)
UROBILINOGEN, URINE: 0.2 EU/DL
WBC # BLD: 6.9 THOU/MM3 (ref 4.8–10.8)
WBC UA: ABNORMAL /HPF
YEAST: ABNORMAL

## 2018-10-10 PROCEDURE — 85610 PROTHROMBIN TIME: CPT

## 2018-10-10 PROCEDURE — 86850 RBC ANTIBODY SCREEN: CPT

## 2018-10-10 PROCEDURE — 71046 X-RAY EXAM CHEST 2 VIEWS: CPT

## 2018-10-10 PROCEDURE — 87081 CULTURE SCREEN ONLY: CPT

## 2018-10-10 PROCEDURE — 87641 MR-STAPH DNA AMP PROBE: CPT

## 2018-10-10 PROCEDURE — 86901 BLOOD TYPING SEROLOGIC RH(D): CPT

## 2018-10-10 PROCEDURE — 6360000004 HC RX CONTRAST MEDICATION: Performed by: THORACIC SURGERY (CARDIOTHORACIC VASCULAR SURGERY)

## 2018-10-10 PROCEDURE — 36415 COLL VENOUS BLD VENIPUNCTURE: CPT

## 2018-10-10 PROCEDURE — 86923 COMPATIBILITY TEST ELECTRIC: CPT

## 2018-10-10 PROCEDURE — 83036 HEMOGLOBIN GLYCOSYLATED A1C: CPT

## 2018-10-10 PROCEDURE — 86900 BLOOD TYPING SEROLOGIC ABO: CPT

## 2018-10-10 PROCEDURE — 85027 COMPLETE CBC AUTOMATED: CPT

## 2018-10-10 PROCEDURE — 81001 URINALYSIS AUTO W/SCOPE: CPT

## 2018-10-10 PROCEDURE — 71260 CT THORAX DX C+: CPT

## 2018-10-10 RX ADMIN — IOPAMIDOL 85 ML: 755 INJECTION, SOLUTION INTRAVENOUS at 09:05

## 2018-10-11 ENCOUNTER — TELEPHONE (OUTPATIENT)
Dept: CARDIOLOGY CLINIC | Age: 53
End: 2018-10-11

## 2018-10-12 LAB — VRE CULTURE: NORMAL

## 2018-10-14 LAB — MRSA SCREEN: NORMAL

## 2018-10-15 ENCOUNTER — ANESTHESIA (OUTPATIENT)
Dept: OPERATING ROOM | Age: 53
DRG: 163 | End: 2018-10-15
Payer: COMMERCIAL

## 2018-10-15 ENCOUNTER — HOSPITAL ENCOUNTER (INPATIENT)
Age: 53
LOS: 21 days | Discharge: NURSING, REHAB OR LONG-TERM CARE | DRG: 163 | End: 2018-11-05
Attending: THORACIC SURGERY (CARDIOTHORACIC VASCULAR SURGERY) | Admitting: THORACIC SURGERY (CARDIOTHORACIC VASCULAR SURGERY)
Payer: COMMERCIAL

## 2018-10-15 ENCOUNTER — ANESTHESIA EVENT (OUTPATIENT)
Dept: OPERATING ROOM | Age: 53
DRG: 163 | End: 2018-10-15
Payer: COMMERCIAL

## 2018-10-15 ENCOUNTER — APPOINTMENT (OUTPATIENT)
Dept: GENERAL RADIOLOGY | Age: 53
DRG: 163 | End: 2018-10-15
Attending: THORACIC SURGERY (CARDIOTHORACIC VASCULAR SURGERY)
Payer: COMMERCIAL

## 2018-10-15 VITALS
SYSTOLIC BLOOD PRESSURE: 96 MMHG | RESPIRATION RATE: 16 BRPM | TEMPERATURE: 98.6 F | DIASTOLIC BLOOD PRESSURE: 54 MMHG | OXYGEN SATURATION: 100 %

## 2018-10-15 DIAGNOSIS — I48.19 PERSISTENT ATRIAL FIBRILLATION (HCC): Primary | ICD-10-CM

## 2018-10-15 LAB
ACTIVATED CLOTTING TIME: 107 SECONDS (ref 99–130)
ACTIVATED CLOTTING TIME: 145 SECONDS (ref 99–130)
ACTIVATED CLOTTING TIME: 420 SECONDS (ref 99–130)
ACTIVATED CLOTTING TIME: 549 SECONDS (ref 99–130)
ACTIVATED CLOTTING TIME: 644 SECONDS (ref 99–130)
ACTIVATED CLOTTING TIME: 648 SECONDS (ref 99–130)
ACTIVATED CLOTTING TIME: 673 SECONDS (ref 99–130)
ACTIVATED CLOTTING TIME: 688 SECONDS (ref 99–130)
ALLEN TEST: POSITIVE
ALLEN TEST: POSITIVE
ANION GAP SERPL CALCULATED.3IONS-SCNC: 13 MEQ/L (ref 8–16)
APTT: 30.5 SECONDS (ref 22–38)
BASE EXCESS (CALCULATED): -1.8 MMOL/L (ref -2.5–2.5)
BASE EXCESS (CALCULATED): -1.8 MMOL/L (ref -2.5–2.5)
BASE EXCESS (CALCULATED): -8.9 MMOL/L (ref -2.5–2.5)
BASE EXCESS (CALCULATED): 0.9 MMOL/L (ref -2.5–2.5)
BASE EXCESS (CALCULATED): 3 MMOL/L (ref -2.5–2.5)
BASE EXCESS (CALCULATED): 4.3 MMOL/L (ref -2.5–2.5)
BASE EXCESS (CALCULATED): 5.5 MMOL/L (ref -2.5–2.5)
BASE EXCESS MIXED: -3.2 MMOL/L (ref -2–3)
BASE EXCESS MIXED: 2.8 MMOL/L (ref -2–3)
BUN BLDV-MCNC: 17 MG/DL (ref 7–22)
CALCIUM IONIZED SERUM: 0.95 MMOL/L (ref 1.12–1.32)
CALCIUM IONIZED SERUM: 0.96 MMOL/L (ref 1.12–1.32)
CALCIUM IONIZED SERUM: 0.96 MMOL/L (ref 1.12–1.32)
CALCIUM IONIZED SERUM: 0.98 MMOL/L (ref 1.12–1.32)
CALCIUM IONIZED SERUM: 1.15 MMOL/L (ref 1.12–1.32)
CALCIUM IONIZED SERUM: 1.22 MMOL/L (ref 1.12–1.32)
CALCIUM IONIZED: 1.08 MMOL/L (ref 1.12–1.32)
CALCIUM SERPL-MCNC: 7.1 MG/DL (ref 8.5–10.5)
CARTRIDGE COLOR: NORMAL
CHLORIDE BLD-SCNC: 112 MEQ/L (ref 98–111)
CO2: 18 MEQ/L (ref 23–33)
COLLECTED BY:: ABNORMAL
COMMENT: ABNORMAL
CREAT SERPL-MCNC: 1.3 MG/DL (ref 0.4–1.2)
DEVICE: ABNORMAL
ERYTHROCYTE [DISTWIDTH] IN BLOOD BY AUTOMATED COUNT: 16.1 % (ref 11.5–14.5)
ERYTHROCYTE [DISTWIDTH] IN BLOOD BY AUTOMATED COUNT: 16.5 % (ref 11.5–14.5)
ERYTHROCYTE [DISTWIDTH] IN BLOOD BY AUTOMATED COUNT: 51.5 FL (ref 35–45)
ERYTHROCYTE [DISTWIDTH] IN BLOOD BY AUTOMATED COUNT: 51.9 FL (ref 35–45)
FIBRINOGEN: 206 MG/100ML (ref 155–475)
GFR SERPL CREATININE-BSD FRML MDRD: 43 ML/MIN/1.73M2
GLUCOSE BLD-MCNC: 164 MG/DL (ref 70–108)
GLUCOSE, WHOLE BLOOD: 109 MG/DL (ref 70–108)
GLUCOSE, WHOLE BLOOD: 115 MG/DL (ref 70–108)
GLUCOSE, WHOLE BLOOD: 121 MG/DL (ref 70–108)
GLUCOSE, WHOLE BLOOD: 127 MG/DL (ref 70–108)
GLUCOSE, WHOLE BLOOD: 127 MG/DL (ref 70–108)
GLUCOSE, WHOLE BLOOD: 135 MG/DL (ref 70–108)
GLUCOSE, WHOLE BLOOD: 139 MG/DL (ref 70–108)
GLUCOSE, WHOLE BLOOD: 145 MG/DL (ref 70–108)
GLUCOSE, WHOLE BLOOD: 161 MG/DL (ref 70–108)
GLUCOSE, WHOLE BLOOD: 164 MG/DL (ref 70–108)
GLUCOSE, WHOLE BLOOD: 166 MG/DL (ref 70–108)
GLUCOSE, WHOLE BLOOD: 229 MG/DL (ref 70–108)
GLUCOSE, WHOLE BLOOD: 96 MG/DL (ref 70–108)
HCO3, MIXED: 23 MMOL/L (ref 23–28)
HCO3, MIXED: 27 MMOL/L (ref 23–28)
HCO3: 19 MMOL/L (ref 23–28)
HCO3: 23 MMOL/L (ref 23–28)
HCO3: 26 MMOL/L (ref 23–28)
HCO3: 26 MMOL/L (ref 23–28)
HCO3: 27 MMOL/L (ref 23–28)
HCO3: 28 MMOL/L (ref 23–28)
HCO3: 29 MMOL/L (ref 23–28)
HCT VFR BLD CALC: 21.7 % (ref 37–47)
HCT VFR BLD CALC: 24.5 % (ref 37–47)
HCT VFR BLD CALC: 28.6 % (ref 37–47)
HCT VFR BLD CALC: 29.5 % (ref 37–47)
HEMOGLOBIN FINGERSTICK, POC: 6.1 G/DL (ref 12–16)
HEMOGLOBIN FINGERSTICK, POC: 6.8 G/DL (ref 12–16)
HEMOGLOBIN FINGERSTICK, POC: 7.5 G/DL (ref 12–16)
HEMOGLOBIN FINGERSTICK, POC: 7.5 G/DL (ref 12–16)
HEMOGLOBIN: 7 GM/DL (ref 12–16)
HEMOGLOBIN: 7.7 GM/DL (ref 12–16)
HEMOGLOBIN: 8.8 GM/DL (ref 12–16)
HEMOGLOBIN: 9.4 GM/DL (ref 12–16)
IFIO2: 60
IFIO2: 80
INR BLD: 1.46 (ref 0.85–1.13)
MAGNESIUM: 3.1 MG/DL (ref 1.6–2.4)
MCH RBC QN AUTO: 27.2 PG (ref 26–33)
MCH RBC QN AUTO: 27.4 PG (ref 26–33)
MCHC RBC AUTO-ENTMCNC: 30.8 GM/DL (ref 32.2–35.5)
MCHC RBC AUTO-ENTMCNC: 31.4 GM/DL (ref 32.2–35.5)
MCV RBC AUTO: 86.6 FL (ref 81–99)
MCV RBC AUTO: 89.1 FL (ref 81–99)
MODE: ABNORMAL
MODE: ABNORMAL
O2 SAT, MIXED: 44 %
O2 SAT, MIXED: 77 %
O2 SATURATION: 100 %
PATIENT BOLUS: NORMAL
PATIENT HEPARIN CONCENTRATION: 0
PATIENT HEPARIN CONCENTRATION: 3
PATIENT HEPARIN CONCENTRATION: 4
PCO2, MIXED VENOUS: 40 MMHG (ref 41–51)
PCO2, MIXED VENOUS: 46 MMHG (ref 41–51)
PCO2: 33 MMHG (ref 35–45)
PCO2: 39 MMHG (ref 35–45)
PCO2: 39 MMHG (ref 35–45)
PCO2: 42 MMHG (ref 35–45)
PCO2: 45 MMHG (ref 35–45)
PCO2: 47 MMHG (ref 35–45)
PCO2: 61 MMHG (ref 35–45)
PH BLOOD GAS: 7.21 (ref 7.35–7.45)
PH BLOOD GAS: 7.23 (ref 7.35–7.45)
PH BLOOD GAS: 7.38 (ref 7.35–7.45)
PH BLOOD GAS: 7.4 (ref 7.35–7.45)
PH BLOOD GAS: 7.4 (ref 7.35–7.45)
PH BLOOD GAS: 7.48 (ref 7.35–7.45)
PH BLOOD GAS: 7.52 (ref 7.35–7.45)
PH, MIXED: 7.3 (ref 7.31–7.41)
PH, MIXED: 7.44 (ref 7.31–7.41)
PLATELET # BLD: 101 THOU/MM3 (ref 130–400)
PLATELET # BLD: 125 THOU/MM3 (ref 130–400)
PLATELET # BLD: 167 THOU/MM3 (ref 130–400)
PLATELET # BLD: 65 THOU/MM3 (ref 130–400)
PMV BLD AUTO: 10 FL (ref 9.4–12.4)
PMV BLD AUTO: 9.9 FL (ref 9.4–12.4)
PO2 MIXED: 27 MMHG (ref 25–40)
PO2 MIXED: 40 MMHG (ref 25–40)
PO2: 215 MMHG (ref 71–104)
PO2: 263 MMHG (ref 71–104)
PO2: 274 MMHG (ref 71–104)
PO2: 281 MMHG (ref 71–104)
PO2: 303 MMHG (ref 71–104)
PO2: 387 MMHG (ref 71–104)
PO2: 541 MMHG (ref 71–104)
POTASSIUM SERPL-SCNC: 3.4 MEQ/L (ref 3.5–5.2)
POTASSIUM SERPL-SCNC: 3.8 MEQ/L (ref 3.5–5.2)
POTASSIUM, WHOLE BLOOD: 3.4 MEQ/L (ref 3.5–4.9)
POTASSIUM, WHOLE BLOOD: 3.6 MEQ/L (ref 3.5–4.9)
POTASSIUM, WHOLE BLOOD: 3.6 MEQ/L (ref 3.5–4.9)
POTASSIUM, WHOLE BLOOD: 4.8 MEQ/L (ref 3.5–4.9)
POTASSIUM, WHOLE BLOOD: 5 MEQ/L (ref 3.5–4.9)
POTASSIUM, WHOLE BLOOD: 5.7 MEQ/L (ref 3.5–4.9)
PROJECTED HEPARIN CONCENTATION: 3
RANGE: NORMAL
RBC # BLD: 2.83 MILL/MM3 (ref 4.2–5.4)
RBC # BLD: 3.21 MILL/MM3 (ref 4.2–5.4)
SET PEEP: 5 MMHG
SET PEEP: 5 MMHG
SET PRESS SUPP: 10 CMH2O
SET RESPIRATORY RATE: 16 BPM
SET RESPIRATORY RATE: 16 BPM
SET TIDAL VOLUME: 470 ML
SITE: ABNORMAL
SODIUM BLD-SCNC: 143 MEQ/L (ref 135–145)
SODIUM, WHOLE BLOOD: 143 MEQ/L (ref 138–146)
SODIUM, WHOLE BLOOD: 145 MEQ/L (ref 138–146)
SODIUM, WHOLE BLOOD: 146 MEQ/L (ref 138–146)
SOURCE, BLOOD GAS: ABNORMAL
WBC # BLD: 10.6 THOU/MM3 (ref 4.8–10.8)
WBC # BLD: 5.6 THOU/MM3 (ref 4.8–10.8)

## 2018-10-15 PROCEDURE — 51702 INSERT TEMP BLADDER CATH: CPT

## 2018-10-15 PROCEDURE — 6360000002 HC RX W HCPCS: Performed by: THORACIC SURGERY (CARDIOTHORACIC VASCULAR SURGERY)

## 2018-10-15 PROCEDURE — 33259 ABLATE ATRIA W/BYPASS ADD-ON: CPT | Performed by: THORACIC SURGERY (CARDIOTHORACIC VASCULAR SURGERY)

## 2018-10-15 PROCEDURE — 2500000003 HC RX 250 WO HCPCS: Performed by: THORACIC SURGERY (CARDIOTHORACIC VASCULAR SURGERY)

## 2018-10-15 PROCEDURE — 2780000006 HC MISC HEART VALVE: Performed by: THORACIC SURGERY (CARDIOTHORACIC VASCULAR SURGERY)

## 2018-10-15 PROCEDURE — 2780000010 HC IMPLANT OTHER: Performed by: THORACIC SURGERY (CARDIOTHORACIC VASCULAR SURGERY)

## 2018-10-15 PROCEDURE — 3700000000 HC ANESTHESIA ATTENDED CARE: Performed by: THORACIC SURGERY (CARDIOTHORACIC VASCULAR SURGERY)

## 2018-10-15 PROCEDURE — 85520 HEPARIN ASSAY: CPT

## 2018-10-15 PROCEDURE — 2709999900 HC NON-CHARGEABLE SUPPLY

## 2018-10-15 PROCEDURE — 85018 HEMOGLOBIN: CPT

## 2018-10-15 PROCEDURE — 84295 ASSAY OF SERUM SODIUM: CPT

## 2018-10-15 PROCEDURE — 33970 AORTIC CIRCULATION ASSIST: CPT | Performed by: THORACIC SURGERY (CARDIOTHORACIC VASCULAR SURGERY)

## 2018-10-15 PROCEDURE — 02L70ZK OCCLUSION OF LEFT ATRIAL APPENDAGE, OPEN APPROACH: ICD-10-PCS | Performed by: THORACIC SURGERY (CARDIOTHORACIC VASCULAR SURGERY)

## 2018-10-15 PROCEDURE — 2700000000 HC OXYGEN THERAPY PER DAY

## 2018-10-15 PROCEDURE — 02UG0JZ SUPPLEMENT MITRAL VALVE WITH SYNTHETIC SUBSTITUTE, OPEN APPROACH: ICD-10-PCS | Performed by: THORACIC SURGERY (CARDIOTHORACIC VASCULAR SURGERY)

## 2018-10-15 PROCEDURE — 84132 ASSAY OF SERUM POTASSIUM: CPT

## 2018-10-15 PROCEDURE — 2580000003 HC RX 258: Performed by: NURSE PRACTITIONER

## 2018-10-15 PROCEDURE — 3700000001 HC ADD 15 MINUTES (ANESTHESIA): Performed by: THORACIC SURGERY (CARDIOTHORACIC VASCULAR SURGERY)

## 2018-10-15 PROCEDURE — 36600 WITHDRAWAL OF ARTERIAL BLOOD: CPT

## 2018-10-15 PROCEDURE — P9045 ALBUMIN (HUMAN), 5%, 250 ML: HCPCS | Performed by: THORACIC SURGERY (CARDIOTHORACIC VASCULAR SURGERY)

## 2018-10-15 PROCEDURE — 37799 UNLISTED PX VASCULAR SURGERY: CPT

## 2018-10-15 PROCEDURE — C1898 LEAD, PMKR, OTHER THAN TRANS: HCPCS | Performed by: THORACIC SURGERY (CARDIOTHORACIC VASCULAR SURGERY)

## 2018-10-15 PROCEDURE — P9045 ALBUMIN (HUMAN), 5%, 250 ML: HCPCS | Performed by: NURSE ANESTHETIST, CERTIFIED REGISTERED

## 2018-10-15 PROCEDURE — 6370000000 HC RX 637 (ALT 250 FOR IP): Performed by: THORACIC SURGERY (CARDIOTHORACIC VASCULAR SURGERY)

## 2018-10-15 PROCEDURE — 82330 ASSAY OF CALCIUM: CPT

## 2018-10-15 PROCEDURE — C1773 RET DEV, INSERTABLE: HCPCS | Performed by: THORACIC SURGERY (CARDIOTHORACIC VASCULAR SURGERY)

## 2018-10-15 PROCEDURE — 6360000002 HC RX W HCPCS: Performed by: NURSE ANESTHETIST, CERTIFIED REGISTERED

## 2018-10-15 PROCEDURE — 6360000002 HC RX W HCPCS: Performed by: NURSE PRACTITIONER

## 2018-10-15 PROCEDURE — 33259 ABLATE ATRIA W/BYPASS ADD-ON: CPT | Performed by: PHYSICIAN ASSISTANT

## 2018-10-15 PROCEDURE — 2580000003 HC RX 258: Performed by: THORACIC SURGERY (CARDIOTHORACIC VASCULAR SURGERY)

## 2018-10-15 PROCEDURE — 36415 COLL VENOUS BLD VENIPUNCTURE: CPT

## 2018-10-15 PROCEDURE — C1892 INTRO/SHEATH,FIXED,PEEL-AWAY: HCPCS | Performed by: THORACIC SURGERY (CARDIOTHORACIC VASCULAR SURGERY)

## 2018-10-15 PROCEDURE — 71045 X-RAY EXAM CHEST 1 VIEW: CPT

## 2018-10-15 PROCEDURE — 80048 BASIC METABOLIC PNL TOTAL CA: CPT

## 2018-10-15 PROCEDURE — 32551 INSERTION OF CHEST TUBE: CPT

## 2018-10-15 PROCEDURE — 85385 FIBRINOGEN ANTIGEN: CPT

## 2018-10-15 PROCEDURE — 2500000003 HC RX 250 WO HCPCS

## 2018-10-15 PROCEDURE — 2709999900 HC NON-CHARGEABLE SUPPLY: Performed by: THORACIC SURGERY (CARDIOTHORACIC VASCULAR SURGERY)

## 2018-10-15 PROCEDURE — 5A1221Z PERFORMANCE OF CARDIAC OUTPUT, CONTINUOUS: ICD-10-PCS | Performed by: THORACIC SURGERY (CARDIOTHORACIC VASCULAR SURGERY)

## 2018-10-15 PROCEDURE — 6370000000 HC RX 637 (ALT 250 FOR IP): Performed by: NURSE PRACTITIONER

## 2018-10-15 PROCEDURE — 83735 ASSAY OF MAGNESIUM: CPT

## 2018-10-15 PROCEDURE — 2500000003 HC RX 250 WO HCPCS: Performed by: NURSE ANESTHETIST, CERTIFIED REGISTERED

## 2018-10-15 PROCEDURE — 33970 AORTIC CIRCULATION ASSIST: CPT | Performed by: PHYSICIAN ASSISTANT

## 2018-10-15 PROCEDURE — 36620 INSERTION CATHETER ARTERY: CPT

## 2018-10-15 PROCEDURE — P9047 ALBUMIN (HUMAN), 25%, 50ML: HCPCS

## 2018-10-15 PROCEDURE — 2720000010 HC SURG SUPPLY STERILE: Performed by: THORACIC SURGERY (CARDIOTHORACIC VASCULAR SURGERY)

## 2018-10-15 PROCEDURE — 94002 VENT MGMT INPAT INIT DAY: CPT

## 2018-10-15 PROCEDURE — 85730 THROMBOPLASTIN TIME PARTIAL: CPT

## 2018-10-15 PROCEDURE — 2000000000 HC ICU R&B

## 2018-10-15 PROCEDURE — 02580ZZ DESTRUCTION OF CONDUCTION MECHANISM, OPEN APPROACH: ICD-10-PCS | Performed by: THORACIC SURGERY (CARDIOTHORACIC VASCULAR SURGERY)

## 2018-10-15 PROCEDURE — 3600000008 HC SURGERY OHS BASE: Performed by: THORACIC SURGERY (CARDIOTHORACIC VASCULAR SURGERY)

## 2018-10-15 PROCEDURE — 3600000018 HC SURGERY OHS ADDTL 15MIN: Performed by: THORACIC SURGERY (CARDIOTHORACIC VASCULAR SURGERY)

## 2018-10-15 PROCEDURE — 6360000002 HC RX W HCPCS

## 2018-10-15 PROCEDURE — 93503 INSERT/PLACE HEART CATHETER: CPT

## 2018-10-15 PROCEDURE — 85027 COMPLETE CBC AUTOMATED: CPT

## 2018-10-15 PROCEDURE — 33426 REPAIR OF MITRAL VALVE: CPT | Performed by: PHYSICIAN ASSISTANT

## 2018-10-15 PROCEDURE — 85610 PROTHROMBIN TIME: CPT

## 2018-10-15 PROCEDURE — S0028 INJECTION, FAMOTIDINE, 20 MG: HCPCS | Performed by: THORACIC SURGERY (CARDIOTHORACIC VASCULAR SURGERY)

## 2018-10-15 PROCEDURE — 33426 REPAIR OF MITRAL VALVE: CPT | Performed by: THORACIC SURGERY (CARDIOTHORACIC VASCULAR SURGERY)

## 2018-10-15 PROCEDURE — 6370000000 HC RX 637 (ALT 250 FOR IP): Performed by: NURSE ANESTHETIST, CERTIFIED REGISTERED

## 2018-10-15 PROCEDURE — 2580000003 HC RX 258

## 2018-10-15 PROCEDURE — P9016 RBC LEUKOCYTES REDUCED: HCPCS

## 2018-10-15 PROCEDURE — 2580000003 HC RX 258: Performed by: NURSE ANESTHETIST, CERTIFIED REGISTERED

## 2018-10-15 PROCEDURE — 5A02210 ASSISTANCE WITH CARDIAC OUTPUT USING BALLOON PUMP, CONTINUOUS: ICD-10-PCS | Performed by: THORACIC SURGERY (CARDIOTHORACIC VASCULAR SURGERY)

## 2018-10-15 PROCEDURE — 82803 BLOOD GASES ANY COMBINATION: CPT

## 2018-10-15 PROCEDURE — 82947 ASSAY GLUCOSE BLOOD QUANT: CPT

## 2018-10-15 DEVICE — IMPLANTABLE DEVICE: Type: IMPLANTABLE DEVICE | Site: HEART | Status: FUNCTIONAL

## 2018-10-15 RX ORDER — ATORVASTATIN CALCIUM 20 MG/1
20 TABLET, FILM COATED ORAL NIGHTLY
Status: DISCONTINUED | OUTPATIENT
Start: 2018-10-16 | End: 2018-11-05 | Stop reason: HOSPADM

## 2018-10-15 RX ORDER — PROPOFOL 10 MG/ML
INJECTION, EMULSION INTRAVENOUS PRN
Status: DISCONTINUED | OUTPATIENT
Start: 2018-10-15 | End: 2018-10-15 | Stop reason: SDUPTHER

## 2018-10-15 RX ORDER — PROTAMINE SULFATE 10 MG/ML
INJECTION, SOLUTION INTRAVENOUS PRN
Status: DISCONTINUED | OUTPATIENT
Start: 2018-10-15 | End: 2018-10-15 | Stop reason: SDUPTHER

## 2018-10-15 RX ORDER — DEXTROSE MONOHYDRATE 50 MG/ML
100 INJECTION, SOLUTION INTRAVENOUS PRN
Status: DISCONTINUED | OUTPATIENT
Start: 2018-10-15 | End: 2018-10-31

## 2018-10-15 RX ORDER — BISACODYL 10 MG
10 SUPPOSITORY, RECTAL RECTAL DAILY PRN
Status: DISCONTINUED | OUTPATIENT
Start: 2018-10-15 | End: 2018-11-05 | Stop reason: HOSPADM

## 2018-10-15 RX ORDER — POTASSIUM CHLORIDE 29.8 MG/ML
20 INJECTION INTRAVENOUS
Status: DISPENSED | OUTPATIENT
Start: 2018-10-15 | End: 2018-10-15

## 2018-10-15 RX ORDER — SODIUM CHLORIDE 0.9 % (FLUSH) 0.9 %
10 SYRINGE (ML) INJECTION PRN
Status: DISCONTINUED | OUTPATIENT
Start: 2018-10-15 | End: 2018-10-15 | Stop reason: HOSPADM

## 2018-10-15 RX ORDER — M-VIT,TX,IRON,MINS/CALC/FOLIC 27MG-0.4MG
1 TABLET ORAL
Status: DISCONTINUED | OUTPATIENT
Start: 2018-10-16 | End: 2018-10-20

## 2018-10-15 RX ORDER — AMIODARONE HYDROCHLORIDE 200 MG/1
200 TABLET ORAL 2 TIMES DAILY
Status: DISCONTINUED | OUTPATIENT
Start: 2018-10-15 | End: 2018-10-18

## 2018-10-15 RX ORDER — SODIUM CHLORIDE 9 MG/ML
INJECTION, SOLUTION INTRAVENOUS CONTINUOUS PRN
Status: DISCONTINUED | OUTPATIENT
Start: 2018-10-15 | End: 2018-10-15 | Stop reason: SDUPTHER

## 2018-10-15 RX ORDER — SODIUM CHLORIDE 0.9 % (FLUSH) 0.9 %
10 SYRINGE (ML) INJECTION EVERY 12 HOURS SCHEDULED
Status: DISCONTINUED | OUTPATIENT
Start: 2018-10-15 | End: 2018-10-15 | Stop reason: HOSPADM

## 2018-10-15 RX ORDER — CHLORHEXIDINE GLUCONATE 4 G/100ML
SOLUTION TOPICAL ONCE
Status: COMPLETED | OUTPATIENT
Start: 2018-10-15 | End: 2018-10-15

## 2018-10-15 RX ORDER — CHLORHEXIDINE GLUCONATE 0.12 MG/ML
15 RINSE ORAL ONCE
Status: COMPLETED | OUTPATIENT
Start: 2018-10-15 | End: 2018-10-15

## 2018-10-15 RX ORDER — ALBUMIN, HUMAN INJ 5% 5 %
SOLUTION INTRAVENOUS PRN
Status: DISCONTINUED | OUTPATIENT
Start: 2018-10-15 | End: 2018-10-15 | Stop reason: SDUPTHER

## 2018-10-15 RX ORDER — POTASSIUM CHLORIDE 29.8 MG/ML
20 INJECTION INTRAVENOUS PRN
Status: DISCONTINUED | OUTPATIENT
Start: 2018-10-16 | End: 2018-10-31

## 2018-10-15 RX ORDER — FAMOTIDINE 20 MG/1
20 TABLET, FILM COATED ORAL 2 TIMES DAILY
Status: DISCONTINUED | OUTPATIENT
Start: 2018-10-17 | End: 2018-10-16

## 2018-10-15 RX ORDER — METOPROLOL TARTRATE 5 MG/5ML
2.5 INJECTION INTRAVENOUS EVERY 10 MIN PRN
Status: DISCONTINUED | OUTPATIENT
Start: 2018-10-15 | End: 2018-10-31

## 2018-10-15 RX ORDER — ENALAPRILAT 2.5 MG/2ML
0.62 INJECTION INTRAVENOUS
Status: DISCONTINUED | OUTPATIENT
Start: 2018-10-15 | End: 2018-10-31

## 2018-10-15 RX ORDER — SODIUM CHLORIDE 0.9 % (FLUSH) 0.9 %
10 SYRINGE (ML) INJECTION PRN
Status: DISCONTINUED | OUTPATIENT
Start: 2018-10-15 | End: 2018-10-31

## 2018-10-15 RX ORDER — SENNOSIDES 8.8 MG/5ML
10 LIQUID ORAL DAILY PRN
Status: DISCONTINUED | OUTPATIENT
Start: 2018-10-15 | End: 2018-11-05 | Stop reason: HOSPADM

## 2018-10-15 RX ORDER — NICOTINE POLACRILEX 4 MG
15 LOZENGE BUCCAL PRN
Status: DISCONTINUED | OUTPATIENT
Start: 2018-10-15 | End: 2018-10-31

## 2018-10-15 RX ORDER — POTASSIUM CHLORIDE 29.8 MG/ML
20 INJECTION INTRAVENOUS
Status: COMPLETED | OUTPATIENT
Start: 2018-10-15 | End: 2018-10-15

## 2018-10-15 RX ORDER — FENTANYL CITRATE 50 UG/ML
INJECTION, SOLUTION INTRAMUSCULAR; INTRAVENOUS PRN
Status: DISCONTINUED | OUTPATIENT
Start: 2018-10-15 | End: 2018-10-15 | Stop reason: SDUPTHER

## 2018-10-15 RX ORDER — AMIODARONE HYDROCHLORIDE 200 MG/1
200 TABLET ORAL ONCE
Status: COMPLETED | OUTPATIENT
Start: 2018-10-15 | End: 2018-10-15

## 2018-10-15 RX ORDER — ALBUMIN, HUMAN INJ 5% 5 %
25 SOLUTION INTRAVENOUS PRN
Status: DISCONTINUED | OUTPATIENT
Start: 2018-10-15 | End: 2018-10-31

## 2018-10-15 RX ORDER — SODIUM CHLORIDE 9 MG/ML
INJECTION, SOLUTION INTRAVENOUS ONCE
Status: COMPLETED | OUTPATIENT
Start: 2018-10-15 | End: 2018-10-15

## 2018-10-15 RX ORDER — ALBUTEROL SULFATE 90 UG/1
2 AEROSOL, METERED RESPIRATORY (INHALATION) EVERY 6 HOURS PRN
Status: DISCONTINUED | OUTPATIENT
Start: 2018-10-15 | End: 2018-10-16

## 2018-10-15 RX ORDER — SODIUM CHLORIDE 0.9 % (FLUSH) 0.9 %
10 SYRINGE (ML) INJECTION EVERY 12 HOURS SCHEDULED
Status: DISCONTINUED | OUTPATIENT
Start: 2018-10-15 | End: 2018-11-05 | Stop reason: HOSPADM

## 2018-10-15 RX ORDER — DOCUSATE SODIUM 100 MG/1
100 CAPSULE, LIQUID FILLED ORAL 2 TIMES DAILY
Status: DISCONTINUED | OUTPATIENT
Start: 2018-10-16 | End: 2018-10-19 | Stop reason: SDUPTHER

## 2018-10-15 RX ORDER — ACETAMINOPHEN 325 MG/1
650 TABLET ORAL EVERY 4 HOURS PRN
Status: DISCONTINUED | OUTPATIENT
Start: 2018-10-15 | End: 2018-10-21 | Stop reason: SDUPTHER

## 2018-10-15 RX ORDER — OXYCODONE HYDROCHLORIDE 5 MG/1
5 TABLET ORAL EVERY 4 HOURS PRN
Status: DISCONTINUED | OUTPATIENT
Start: 2018-10-15 | End: 2018-11-05 | Stop reason: HOSPADM

## 2018-10-15 RX ORDER — MIDAZOLAM HYDROCHLORIDE 1 MG/ML
INJECTION INTRAMUSCULAR; INTRAVENOUS PRN
Status: DISCONTINUED | OUTPATIENT
Start: 2018-10-15 | End: 2018-10-15 | Stop reason: SDUPTHER

## 2018-10-15 RX ORDER — PROTAMINE SULFATE 10 MG/ML
50 INJECTION, SOLUTION INTRAVENOUS
Status: ACTIVE | OUTPATIENT
Start: 2018-10-15 | End: 2018-10-15

## 2018-10-15 RX ORDER — DEXTROSE MONOHYDRATE 25 G/50ML
12.5 INJECTION, SOLUTION INTRAVENOUS PRN
Status: DISCONTINUED | OUTPATIENT
Start: 2018-10-15 | End: 2018-10-31

## 2018-10-15 RX ORDER — ONDANSETRON 2 MG/ML
4 INJECTION INTRAMUSCULAR; INTRAVENOUS EVERY 6 HOURS PRN
Status: DISCONTINUED | OUTPATIENT
Start: 2018-10-15 | End: 2018-11-05 | Stop reason: HOSPADM

## 2018-10-15 RX ORDER — ACETAMINOPHEN 650 MG/1
650 SUPPOSITORY RECTAL EVERY 4 HOURS PRN
Status: DISCONTINUED | OUTPATIENT
Start: 2018-10-15 | End: 2018-10-31

## 2018-10-15 RX ORDER — SODIUM CHLORIDE 9 MG/ML
INJECTION, SOLUTION INTRAVENOUS CONTINUOUS
Status: DISCONTINUED | OUTPATIENT
Start: 2018-10-15 | End: 2018-10-22

## 2018-10-15 RX ORDER — HEPARIN SODIUM 1000 [USP'U]/ML
INJECTION, SOLUTION INTRAVENOUS; SUBCUTANEOUS PRN
Status: DISCONTINUED | OUTPATIENT
Start: 2018-10-15 | End: 2018-10-15 | Stop reason: SDUPTHER

## 2018-10-15 RX ORDER — MORPHINE SULFATE 2 MG/ML
2 INJECTION, SOLUTION INTRAMUSCULAR; INTRAVENOUS
Status: DISCONTINUED | OUTPATIENT
Start: 2018-10-15 | End: 2018-10-31

## 2018-10-15 RX ORDER — ROCURONIUM BROMIDE 10 MG/ML
INJECTION, SOLUTION INTRAVENOUS PRN
Status: DISCONTINUED | OUTPATIENT
Start: 2018-10-15 | End: 2018-10-15 | Stop reason: SDUPTHER

## 2018-10-15 RX ORDER — OXYCODONE HYDROCHLORIDE 5 MG/1
10 TABLET ORAL EVERY 4 HOURS PRN
Status: DISCONTINUED | OUTPATIENT
Start: 2018-10-15 | End: 2018-11-05 | Stop reason: HOSPADM

## 2018-10-15 RX ADMIN — MIDAZOLAM HYDROCHLORIDE 2 MG: 1 INJECTION, SOLUTION INTRAMUSCULAR; INTRAVENOUS at 07:56

## 2018-10-15 RX ADMIN — METOPROLOL TARTRATE 25 MG: 25 TABLET ORAL at 06:37

## 2018-10-15 RX ADMIN — AMINOCAPROIC ACID 5 G: 250 INJECTION, SOLUTION INTRAVENOUS at 08:46

## 2018-10-15 RX ADMIN — ROCURONIUM BROMIDE 30 MG: 10 INJECTION INTRAVENOUS at 09:37

## 2018-10-15 RX ADMIN — SODIUM BICARBONATE 50 MEQ: 84 INJECTION, SOLUTION INTRAVENOUS at 15:16

## 2018-10-15 RX ADMIN — ROCURONIUM BROMIDE 50 MG: 10 INJECTION INTRAVENOUS at 14:00

## 2018-10-15 RX ADMIN — SODIUM CHLORIDE 0.12 MCG/KG/MIN: 9 INJECTION, SOLUTION INTRAVENOUS at 20:09

## 2018-10-15 RX ADMIN — ROCURONIUM BROMIDE 20 MG: 10 INJECTION INTRAVENOUS at 08:42

## 2018-10-15 RX ADMIN — Medication 2 G: at 19:14

## 2018-10-15 RX ADMIN — SODIUM BICARBONATE 50 MEQ: 84 INJECTION, SOLUTION INTRAVENOUS at 15:14

## 2018-10-15 RX ADMIN — MIDAZOLAM HYDROCHLORIDE 2 MG: 1 INJECTION, SOLUTION INTRAMUSCULAR; INTRAVENOUS at 14:27

## 2018-10-15 RX ADMIN — SODIUM CHLORIDE: 9 INJECTION, SOLUTION INTRAVENOUS at 07:58

## 2018-10-15 RX ADMIN — ALBUMIN (HUMAN) 25 G: 12.5 INJECTION, SOLUTION INTRAVENOUS at 15:00

## 2018-10-15 RX ADMIN — CEFAZOLIN 2000 MG: 1 INJECTION, POWDER, FOR SOLUTION INTRAMUSCULAR; INTRAVENOUS; PARENTERAL at 08:20

## 2018-10-15 RX ADMIN — ALBUMIN (HUMAN) 12.5 G: 12.5 INJECTION, SOLUTION INTRAVENOUS at 13:53

## 2018-10-15 RX ADMIN — HEPARIN SODIUM 32000 UNITS: 1000 INJECTION, SOLUTION INTRAVENOUS; SUBCUTANEOUS at 08:45

## 2018-10-15 RX ADMIN — ROCURONIUM BROMIDE 50 MG: 10 INJECTION INTRAVENOUS at 10:17

## 2018-10-15 RX ADMIN — POTASSIUM CHLORIDE 20 MEQ: 400 INJECTION, SOLUTION INTRAVENOUS at 21:29

## 2018-10-15 RX ADMIN — AMINOCAPROIC ACID 5 G: 250 INJECTION, SOLUTION INTRAVENOUS at 13:07

## 2018-10-15 RX ADMIN — Medication 4 UNITS: at 13:17

## 2018-10-15 RX ADMIN — FENTANYL CITRATE 150 MCG: 50 INJECTION INTRAMUSCULAR; INTRAVENOUS at 08:43

## 2018-10-15 RX ADMIN — Medication 2 UNITS/HR: at 11:46

## 2018-10-15 RX ADMIN — ALBUMIN (HUMAN) 25 G: 12.5 INJECTION, SOLUTION INTRAVENOUS at 16:58

## 2018-10-15 RX ADMIN — FENTANYL CITRATE 150 MCG: 50 INJECTION INTRAMUSCULAR; INTRAVENOUS at 09:15

## 2018-10-15 RX ADMIN — Medication 2 UNITS: at 12:19

## 2018-10-15 RX ADMIN — SODIUM CHLORIDE: 9 INJECTION, SOLUTION INTRAVENOUS at 07:02

## 2018-10-15 RX ADMIN — PHENYLEPHRINE HYDROCHLORIDE 100 MCG: 10 INJECTION INTRAVENOUS at 09:47

## 2018-10-15 RX ADMIN — FENTANYL CITRATE 250 MCG: 50 INJECTION INTRAMUSCULAR; INTRAVENOUS at 14:20

## 2018-10-15 RX ADMIN — FENTANYL CITRATE 250 MCG: 50 INJECTION INTRAMUSCULAR; INTRAVENOUS at 10:17

## 2018-10-15 RX ADMIN — FENTANYL CITRATE 100 MCG: 50 INJECTION INTRAMUSCULAR; INTRAVENOUS at 07:57

## 2018-10-15 RX ADMIN — AMIODARONE HYDROCHLORIDE 200 MG: 200 TABLET ORAL at 06:37

## 2018-10-15 RX ADMIN — HEPARIN SODIUM 10000 UNITS: 1000 INJECTION, SOLUTION INTRAVENOUS; SUBCUTANEOUS at 09:14

## 2018-10-15 RX ADMIN — SODIUM CHLORIDE: 9 INJECTION, SOLUTION INTRAVENOUS at 15:20

## 2018-10-15 RX ADMIN — CHLORHEXIDINE GLUCONATE: 4 SOLUTION TOPICAL at 06:20

## 2018-10-15 RX ADMIN — SODIUM CHLORIDE: 9 INJECTION, SOLUTION INTRAVENOUS at 08:13

## 2018-10-15 RX ADMIN — ROCURONIUM BROMIDE 50 MG: 10 INJECTION INTRAVENOUS at 08:00

## 2018-10-15 RX ADMIN — Medication 15 ML: at 06:38

## 2018-10-15 RX ADMIN — SODIUM CHLORIDE: 9 INJECTION, SOLUTION INTRAVENOUS at 08:45

## 2018-10-15 RX ADMIN — EPINEPHRINE 5 MCG/MIN: 1 INJECTION, SOLUTION INTRAMUSCULAR; INTRAVENOUS; SUBCUTANEOUS at 12:10

## 2018-10-15 RX ADMIN — POTASSIUM CHLORIDE 20 MEQ: 400 INJECTION, SOLUTION INTRAVENOUS at 18:10

## 2018-10-15 RX ADMIN — PROPOFOL 120 MG: 10 INJECTION, EMULSION INTRAVENOUS at 08:00

## 2018-10-15 RX ADMIN — MORPHINE SULFATE 2 MG: 2 INJECTION, SOLUTION INTRAMUSCULAR; INTRAVENOUS at 23:13

## 2018-10-15 RX ADMIN — FENTANYL CITRATE 100 MCG: 50 INJECTION INTRAMUSCULAR; INTRAVENOUS at 09:08

## 2018-10-15 RX ADMIN — MIDAZOLAM HYDROCHLORIDE 1 MG: 1 INJECTION, SOLUTION INTRAMUSCULAR; INTRAVENOUS at 08:11

## 2018-10-15 RX ADMIN — SODIUM CHLORIDE: 9 INJECTION, SOLUTION INTRAVENOUS at 14:00

## 2018-10-15 RX ADMIN — AMINOCAPROIC ACID 1 G/HR: 250 INJECTION, SOLUTION INTRAVENOUS at 13:36

## 2018-10-15 RX ADMIN — ALBUMIN (HUMAN) 12.5 G: 12.5 INJECTION, SOLUTION INTRAVENOUS at 13:13

## 2018-10-15 RX ADMIN — AMIODARONE HYDROCHLORIDE 200 MG: 200 TABLET ORAL at 20:18

## 2018-10-15 RX ADMIN — FAMOTIDINE 20 MG: 10 INJECTION, SOLUTION INTRAVENOUS at 20:17

## 2018-10-15 RX ADMIN — MIDAZOLAM HYDROCHLORIDE 5 MG: 1 INJECTION, SOLUTION INTRAMUSCULAR; INTRAVENOUS at 10:17

## 2018-10-15 RX ADMIN — PROTAMINE SULFATE 250 MG: 10 INJECTION, SOLUTION INTRAVENOUS at 12:56

## 2018-10-15 RX ADMIN — POTASSIUM CHLORIDE 20 MEQ: 400 INJECTION, SOLUTION INTRAVENOUS at 22:35

## 2018-10-15 RX ADMIN — CEFAZOLIN 2000 MG: 1 INJECTION, POWDER, FOR SOLUTION INTRAMUSCULAR; INTRAVENOUS; PARENTERAL at 12:20

## 2018-10-15 RX ADMIN — POTASSIUM CHLORIDE 20 MEQ: 400 INJECTION, SOLUTION INTRAVENOUS at 15:49

## 2018-10-15 ASSESSMENT — PULMONARY FUNCTION TESTS
PIF_VALUE: 23
PIF_VALUE: 27
PIF_VALUE: 22
PIF_VALUE: 24
PIF_VALUE: 1
PIF_VALUE: 1
PIF_VALUE: 19
PIF_VALUE: 1
PIF_VALUE: 25
PIF_VALUE: 23
PIF_VALUE: 22
PIF_VALUE: 22
PIF_VALUE: 1
PIF_VALUE: 25
PIF_VALUE: 1
PIF_VALUE: 1
PIF_VALUE: 6
PIF_VALUE: 21
PIF_VALUE: 22
PIF_VALUE: 26
PIF_VALUE: 1
PIF_VALUE: 22
PIF_VALUE: 25
PIF_VALUE: 21
PIF_VALUE: 21
PIF_VALUE: 1
PIF_VALUE: 27
PIF_VALUE: 24
PIF_VALUE: 20
PIF_VALUE: 21
PIF_VALUE: 21
PIF_VALUE: 20
PIF_VALUE: 25
PIF_VALUE: 22
PIF_VALUE: 19
PIF_VALUE: 27
PIF_VALUE: 21
PIF_VALUE: 23
PIF_VALUE: 24
PIF_VALUE: 20
PIF_VALUE: 26
PIF_VALUE: 4
PIF_VALUE: 24
PIF_VALUE: 1
PIF_VALUE: 26
PIF_VALUE: 28
PIF_VALUE: 20
PIF_VALUE: 22
PIF_VALUE: 1
PIF_VALUE: 1
PIF_VALUE: 21
PIF_VALUE: 22
PIF_VALUE: 1
PIF_VALUE: 27
PIF_VALUE: 25
PIF_VALUE: 1
PIF_VALUE: 27
PIF_VALUE: 1
PIF_VALUE: 22
PIF_VALUE: 27
PIF_VALUE: 30
PIF_VALUE: 3
PIF_VALUE: 18
PIF_VALUE: 1
PIF_VALUE: 1
PIF_VALUE: 26
PIF_VALUE: 24
PIF_VALUE: 1
PIF_VALUE: 12
PIF_VALUE: 27
PIF_VALUE: 25
PIF_VALUE: 23
PIF_VALUE: 1
PIF_VALUE: 23
PIF_VALUE: 26
PIF_VALUE: 1
PIF_VALUE: 1
PIF_VALUE: 18
PIF_VALUE: 31
PIF_VALUE: 20
PIF_VALUE: 22
PIF_VALUE: 1
PIF_VALUE: 22
PIF_VALUE: 1
PIF_VALUE: 23
PIF_VALUE: 21
PIF_VALUE: 23
PIF_VALUE: 27
PIF_VALUE: 22
PIF_VALUE: 21
PIF_VALUE: 25
PIF_VALUE: 21
PIF_VALUE: 1
PIF_VALUE: 27
PIF_VALUE: 5
PIF_VALUE: 27
PIF_VALUE: 1
PIF_VALUE: 1
PIF_VALUE: 32
PIF_VALUE: 1
PIF_VALUE: 22
PIF_VALUE: 1
PIF_VALUE: 1
PIF_VALUE: 23
PIF_VALUE: 1
PIF_VALUE: 23
PIF_VALUE: 22
PIF_VALUE: 27
PIF_VALUE: 25
PIF_VALUE: 21
PIF_VALUE: 22
PIF_VALUE: 20
PIF_VALUE: 1
PIF_VALUE: 24
PIF_VALUE: 1
PIF_VALUE: 19
PIF_VALUE: 27
PIF_VALUE: 24
PIF_VALUE: 23
PIF_VALUE: 1
PIF_VALUE: 1
PIF_VALUE: 26
PIF_VALUE: 20
PIF_VALUE: 1
PIF_VALUE: 1
PIF_VALUE: 22
PIF_VALUE: 26
PIF_VALUE: 23
PIF_VALUE: 26
PIF_VALUE: 1
PIF_VALUE: 19
PIF_VALUE: 22
PIF_VALUE: 21
PIF_VALUE: 1
PIF_VALUE: 27
PIF_VALUE: 19
PIF_VALUE: 1
PIF_VALUE: 25
PIF_VALUE: 20
PIF_VALUE: 22
PIF_VALUE: 1
PIF_VALUE: 1
PIF_VALUE: 22
PIF_VALUE: 1
PIF_VALUE: 22
PIF_VALUE: 23
PIF_VALUE: 22
PIF_VALUE: 20
PIF_VALUE: 24
PIF_VALUE: 1
PIF_VALUE: 22
PIF_VALUE: 23
PIF_VALUE: 22
PIF_VALUE: 1
PIF_VALUE: 22
PIF_VALUE: 1
PIF_VALUE: 2
PIF_VALUE: 1
PIF_VALUE: 25
PIF_VALUE: 23
PIF_VALUE: 21
PIF_VALUE: 20
PIF_VALUE: 1
PIF_VALUE: 25
PIF_VALUE: 22
PIF_VALUE: 1
PIF_VALUE: 1
PIF_VALUE: 26
PIF_VALUE: 22
PIF_VALUE: 1
PIF_VALUE: 1
PIF_VALUE: 23
PIF_VALUE: 22
PIF_VALUE: 1
PIF_VALUE: 4
PIF_VALUE: 23
PIF_VALUE: 27
PIF_VALUE: 1
PIF_VALUE: 25
PIF_VALUE: 20
PIF_VALUE: 27
PIF_VALUE: 20
PIF_VALUE: 20
PIF_VALUE: 22
PIF_VALUE: 30
PIF_VALUE: 21
PIF_VALUE: 23
PIF_VALUE: 22
PIF_VALUE: 19
PIF_VALUE: 1
PIF_VALUE: 25
PIF_VALUE: 27
PIF_VALUE: 1
PIF_VALUE: 27
PIF_VALUE: 1
PIF_VALUE: 21
PIF_VALUE: 19
PIF_VALUE: 1
PIF_VALUE: 20
PIF_VALUE: 19
PIF_VALUE: 1
PIF_VALUE: 22
PIF_VALUE: 20
PIF_VALUE: 24
PIF_VALUE: 1
PIF_VALUE: 18
PIF_VALUE: 27
PIF_VALUE: 19
PIF_VALUE: 4
PIF_VALUE: 1
PIF_VALUE: 19
PIF_VALUE: 28
PIF_VALUE: 1
PIF_VALUE: 1
PIF_VALUE: 21
PIF_VALUE: 1
PIF_VALUE: 19
PIF_VALUE: 23
PIF_VALUE: 21
PIF_VALUE: 1
PIF_VALUE: 24
PIF_VALUE: 21
PIF_VALUE: 6
PIF_VALUE: 19
PIF_VALUE: 1
PIF_VALUE: 25
PIF_VALUE: 24
PIF_VALUE: 24
PIF_VALUE: 19
PIF_VALUE: 22
PIF_VALUE: 30
PIF_VALUE: 25
PIF_VALUE: 22
PIF_VALUE: 25
PIF_VALUE: 20
PIF_VALUE: 30
PIF_VALUE: 20
PIF_VALUE: 26
PIF_VALUE: 22
PIF_VALUE: 1
PIF_VALUE: 27
PIF_VALUE: 26
PIF_VALUE: 23
PIF_VALUE: 1
PIF_VALUE: 25
PIF_VALUE: 1
PIF_VALUE: 25
PIF_VALUE: 22
PIF_VALUE: 1
PIF_VALUE: 19
PIF_VALUE: 21
PIF_VALUE: 1
PIF_VALUE: 1
PIF_VALUE: 25
PIF_VALUE: 21
PIF_VALUE: 21
PIF_VALUE: 22
PIF_VALUE: 19
PIF_VALUE: 1
PIF_VALUE: 29
PIF_VALUE: 1
PIF_VALUE: 19
PIF_VALUE: 1
PIF_VALUE: 23
PIF_VALUE: 1
PIF_VALUE: 1
PIF_VALUE: 29
PIF_VALUE: 28
PIF_VALUE: 19
PIF_VALUE: 2
PIF_VALUE: 1
PIF_VALUE: 22
PIF_VALUE: 26
PIF_VALUE: 23
PIF_VALUE: 1
PIF_VALUE: 19
PIF_VALUE: 23
PIF_VALUE: 1
PIF_VALUE: 1
PIF_VALUE: 19
PIF_VALUE: 19
PIF_VALUE: 25
PIF_VALUE: 20
PIF_VALUE: 23
PIF_VALUE: 3
PIF_VALUE: 27
PIF_VALUE: 20
PIF_VALUE: 1
PIF_VALUE: 22
PIF_VALUE: 22
PIF_VALUE: 1
PIF_VALUE: 22
PIF_VALUE: 1
PIF_VALUE: 25
PIF_VALUE: 20
PIF_VALUE: 21
PIF_VALUE: 22
PIF_VALUE: 24
PIF_VALUE: 1
PIF_VALUE: 21
PIF_VALUE: 1
PIF_VALUE: 24
PIF_VALUE: 1
PIF_VALUE: 19
PIF_VALUE: 29
PIF_VALUE: 1
PIF_VALUE: 1
PIF_VALUE: 31
PIF_VALUE: 24
PIF_VALUE: 23
PIF_VALUE: 22
PIF_VALUE: 19
PIF_VALUE: 21
PIF_VALUE: 22
PIF_VALUE: 25
PIF_VALUE: 1
PIF_VALUE: 20
PIF_VALUE: 1
PIF_VALUE: 1
PIF_VALUE: 21
PIF_VALUE: 20
PIF_VALUE: 22
PIF_VALUE: 21
PIF_VALUE: 1
PIF_VALUE: 20
PIF_VALUE: 22
PIF_VALUE: 1
PIF_VALUE: 1
PIF_VALUE: 25
PIF_VALUE: 21
PIF_VALUE: 26
PIF_VALUE: 22
PIF_VALUE: 22
PIF_VALUE: 20
PIF_VALUE: 23
PIF_VALUE: 22
PIF_VALUE: 25
PIF_VALUE: 26
PIF_VALUE: 19
PIF_VALUE: 23
PIF_VALUE: 2
PIF_VALUE: 21
PIF_VALUE: 23
PIF_VALUE: 1
PIF_VALUE: 20
PIF_VALUE: 1
PIF_VALUE: 20
PIF_VALUE: 1
PIF_VALUE: 22
PIF_VALUE: 20
PIF_VALUE: 27
PIF_VALUE: 22
PIF_VALUE: 1
PIF_VALUE: 1
PIF_VALUE: 20
PIF_VALUE: 22
PIF_VALUE: 31
PIF_VALUE: 1
PIF_VALUE: 22
PIF_VALUE: 24
PIF_VALUE: 1
PIF_VALUE: 20
PIF_VALUE: 1
PIF_VALUE: 20
PIF_VALUE: 24
PIF_VALUE: 26
PIF_VALUE: 26
PIF_VALUE: 22
PIF_VALUE: 1
PIF_VALUE: 23
PIF_VALUE: 23
PIF_VALUE: 20
PIF_VALUE: 23
PIF_VALUE: 28
PIF_VALUE: 22
PIF_VALUE: 23
PIF_VALUE: 21
PIF_VALUE: 21
PIF_VALUE: 22
PIF_VALUE: 24
PIF_VALUE: 2
PIF_VALUE: 1
PIF_VALUE: 26
PIF_VALUE: 22
PIF_VALUE: 23
PIF_VALUE: 23
PIF_VALUE: 26
PIF_VALUE: 1
PIF_VALUE: 20
PIF_VALUE: 20
PIF_VALUE: 21
PIF_VALUE: 1
PIF_VALUE: 1
PIF_VALUE: 27
PIF_VALUE: 20
PIF_VALUE: 24
PIF_VALUE: 24
PIF_VALUE: 27

## 2018-10-15 ASSESSMENT — PAIN SCALES - GENERAL: PAINLEVEL_OUTOF10: 6

## 2018-10-15 ASSESSMENT — PAIN - FUNCTIONAL ASSESSMENT: PAIN_FUNCTIONAL_ASSESSMENT: 0-10

## 2018-10-15 NOTE — ANESTHESIA PROCEDURE NOTES
Procedure Performed: THOMAS     Start Time:        End Time:      Preanesthesia Checklist:  Patient identified, IV assessed, risks and benefits discussed, monitors and equipment assessed, procedure being performed at surgeon's request and anesthesia consent obtained. General Procedure Information  Diagnostic Indications for Echo:  assessment of ascending aorta, assessment of surgical repair, defect repair evaluation, hemodynamic monitoring and assessment of valve function  Physician Requesting Echo: Caitlin Chapman  CPT Code:  [id number]  Location performed:  OR  Intubated  Bite block placed  Heart visualized  Probe Insertion:  Easy  Probe Type:  3D  Modalities:  2D only, color flow mapping, continuous wave Doppler, pulse wave Doppler and M-mode    Echocardiographic and Doppler Measurements    Ventricles    Right Ventricle:  Cavity size dilated. Global function severely impaired. Left Ventricle:  Cavity size dilated. Global Function severely impaired. Ejection Fraction 25%. Ventricular Regional Function:  1- Basal Anteroseptal:  hypokinetic  2- Basal Anterior:  hypokinetic  3- Basal Anterolateral:  hypokinetic  4- Basal Inferolateral:  hypokinetic  5- Basal Inferior:  hypokinetic  6- Basal Inferoseptal:  hypokinetic  7- Mid Anteroseptal:  hypokinetic  8- Mid Anterior:  hypokinetic  9- Mid Anterolateral:  hypokinetic  10- Mid Inferolateral:  hypokinetic  11- Mid Inferior:  hypokinetic  12- Mid Inferoseptal:  hypokinetic  13- Apical Anterior:  hypokinetic  14- Apical Lateral:  hypokinetic  15- Apical Inferior:  hypokinetic  16- Apical Septal:  hypokinetic  17- Mears:  hypokinetic      Valves    Aortic Valve: Annulus normal.  Stenosis not present. Regurgitation none. Leaflets normal.  Leaflet motions normal.      Mitral Valve: Annulus normal.  Stenosis not present. Regurgitation moderate. Leaflets normal.  Leaflet motions normal.      Tricuspid Valve: Annulus normal.  Stenosis not present.

## 2018-10-15 NOTE — OP NOTE
the annuloplasty band. The band was seated, and the annuloplasty sutures were tied. Note that the posterior annulus was verified as completely thawed prior to tying down the corresponding sutures. The valve was verified to be competent. The atriotomy was closed with 4-0 Prolene. A Valsalva maneuver with the right heart filled was performed for de-airing prior to the suture line being tied down. The patient was placed in steep Trendelenburg position with the aortic root vent turned on full. Retrograde cardioplegia was infused and Valsalva maneuvers were administered to assist in de-airing the left heart. The crossclamp was removed. Atrial and ventricular pacing wires were placed. The apex was gently elevated just enough to expose the posterolateral LV. Several attempts were made to place an epicardial LV lead. However, the epicardium was extremely friable, with the sutures pulling through and small hematoma formation under the lead. Given also the successful restoration of sinus rhythm, the attempt to place a LV lead was abandoned. The patient weaned fairly readily from cardiopulmonary bypass with baseline contractility. THOMAS confirmed complete de-airing of the left heart, as well as no residual mitral regurgitation. Given the need for moderate inotropic support, we proceed with placement of an intra-aortic balloon pump via the right common femoral artery, percutaneously using Seldinger technique, with THOMAS verification of final placement. Protamine was administered. The patient was de-cannulated. All sites were verified as hemostatic. Chest tubes were placed in the mediastinum and the pleural cavities. The chest was closed using #7 wire for the sternum, followed by the usual 3-layer soft tissue closure. The left groin was closed in the usual three layers. The patient transferred to the ICU in stable condition, and in normal sinus rhythm without pacing.

## 2018-10-16 ENCOUNTER — APPOINTMENT (OUTPATIENT)
Dept: GENERAL RADIOLOGY | Age: 53
DRG: 163 | End: 2018-10-16
Attending: THORACIC SURGERY (CARDIOTHORACIC VASCULAR SURGERY)
Payer: COMMERCIAL

## 2018-10-16 LAB
ALLEN TEST: ABNORMAL
ALLEN TEST: ABNORMAL
ANION GAP SERPL CALCULATED.3IONS-SCNC: 10 MEQ/L (ref 8–16)
BASE EXCESS (CALCULATED): -3.4 MMOL/L (ref -2.5–2.5)
BASE EXCESS (CALCULATED): -3.8 MMOL/L (ref -2.5–2.5)
BASE EXCESS (CALCULATED): -8 MMOL/L (ref -2.5–2.5)
BUN BLDV-MCNC: 20 MG/DL (ref 7–22)
CALCIUM IONIZED: 1.1 MMOL/L (ref 1.12–1.32)
CALCIUM SERPL-MCNC: 7.4 MG/DL (ref 8.5–10.5)
CHLORIDE BLD-SCNC: 117 MEQ/L (ref 98–111)
CO2: 20 MEQ/L (ref 23–33)
COLLECTED BY:: ABNORMAL
CREAT SERPL-MCNC: 1.5 MG/DL (ref 0.4–1.2)
DEVICE: ABNORMAL
DEVICE: ABNORMAL
EKG ATRIAL RATE: 85 BPM
EKG P AXIS: 90 DEGREES
EKG P-R INTERVAL: 164 MS
EKG Q-T INTERVAL: 486 MS
EKG QRS DURATION: 120 MS
EKG QTC CALCULATION (BAZETT): 578 MS
EKG R AXIS: -7 DEGREES
EKG T AXIS: 131 DEGREES
EKG VENTRICULAR RATE: 85 BPM
ERYTHROCYTE [DISTWIDTH] IN BLOOD BY AUTOMATED COUNT: 17 % (ref 11.5–14.5)
ERYTHROCYTE [DISTWIDTH] IN BLOOD BY AUTOMATED COUNT: 53.1 FL (ref 35–45)
GFR SERPL CREATININE-BSD FRML MDRD: 37 ML/MIN/1.73M2
GLUCOSE BLD-MCNC: 154 MG/DL (ref 70–108)
GLUCOSE, WHOLE BLOOD: 122 MG/DL (ref 70–108)
GLUCOSE, WHOLE BLOOD: 136 MG/DL (ref 70–108)
GLUCOSE, WHOLE BLOOD: 136 MG/DL (ref 70–108)
GLUCOSE, WHOLE BLOOD: 137 MG/DL (ref 70–108)
GLUCOSE, WHOLE BLOOD: 139 MG/DL (ref 70–108)
GLUCOSE, WHOLE BLOOD: 142 MG/DL (ref 70–108)
GLUCOSE, WHOLE BLOOD: 143 MG/DL (ref 70–108)
GLUCOSE, WHOLE BLOOD: 144 MG/DL (ref 70–108)
GLUCOSE, WHOLE BLOOD: 153 MG/DL (ref 70–108)
GLUCOSE, WHOLE BLOOD: 164 MG/DL (ref 70–108)
GLUCOSE, WHOLE BLOOD: 188 MG/DL (ref 70–108)
GLUCOSE, WHOLE BLOOD: 196 MG/DL (ref 70–108)
GLUCOSE, WHOLE BLOOD: 197 MG/DL (ref 70–108)
GLUCOSE, WHOLE BLOOD: 202 MG/DL (ref 70–108)
HCO3: 18 MMOL/L (ref 23–28)
HCO3: 22 MMOL/L (ref 23–28)
HCO3: 22 MMOL/L (ref 23–28)
HCT VFR BLD CALC: 23.7 % (ref 37–47)
HEMOGLOBIN: 7.4 GM/DL (ref 12–16)
IFIO2: 40
IFIO2: 40
MAGNESIUM: 2.6 MG/DL (ref 1.6–2.4)
MCH RBC QN AUTO: 27 PG (ref 26–33)
MCHC RBC AUTO-ENTMCNC: 31.2 GM/DL (ref 32.2–35.5)
MCV RBC AUTO: 86.5 FL (ref 81–99)
MODE: ABNORMAL
O2 SATURATION: 90 %
O2 SATURATION: 93 %
O2 SATURATION: 96 %
PCO2: 36 MMHG (ref 35–45)
PCO2: 38 MMHG (ref 35–45)
PCO2: 44 MMHG (ref 35–45)
PH BLOOD GAS: 7.3 (ref 7.35–7.45)
PH BLOOD GAS: 7.31 (ref 7.35–7.45)
PH BLOOD GAS: 7.37 (ref 7.35–7.45)
PLATELET # BLD: 71 THOU/MM3 (ref 130–400)
PMV BLD AUTO: 11.7 FL (ref 9.4–12.4)
PO2: 63 MMHG (ref 71–104)
PO2: 73 MMHG (ref 71–104)
PO2: 85 MMHG (ref 71–104)
POTASSIUM SERPL-SCNC: 4.1 MEQ/L (ref 3.5–5.2)
POTASSIUM SERPL-SCNC: 4.7 MEQ/L (ref 3.5–5.2)
RBC # BLD: 2.74 MILL/MM3 (ref 4.2–5.4)
SCAN OF BLOOD SMEAR: NORMAL
SET PEEP: 10 MMHG
SET PEEP: 5 MMHG
SET PRESS SUPP: 10 CMH2O
SODIUM BLD-SCNC: 147 MEQ/L (ref 135–145)
SOURCE, BLOOD GAS: ABNORMAL
WBC # BLD: 24.9 THOU/MM3 (ref 4.8–10.8)

## 2018-10-16 PROCEDURE — 99999 PR OFFICE/OUTPT VISIT,PROCEDURE ONLY: CPT | Performed by: PHYSICIAN ASSISTANT

## 2018-10-16 PROCEDURE — 80048 BASIC METABOLIC PNL TOTAL CA: CPT

## 2018-10-16 PROCEDURE — 71045 X-RAY EXAM CHEST 1 VIEW: CPT

## 2018-10-16 PROCEDURE — 93005 ELECTROCARDIOGRAM TRACING: CPT | Performed by: THORACIC SURGERY (CARDIOTHORACIC VASCULAR SURGERY)

## 2018-10-16 PROCEDURE — 2500000003 HC RX 250 WO HCPCS: Performed by: THORACIC SURGERY (CARDIOTHORACIC VASCULAR SURGERY)

## 2018-10-16 PROCEDURE — 94660 CPAP INITIATION&MGMT: CPT

## 2018-10-16 PROCEDURE — 94799 UNLISTED PULMONARY SVC/PX: CPT

## 2018-10-16 PROCEDURE — 6370000000 HC RX 637 (ALT 250 FOR IP): Performed by: THORACIC SURGERY (CARDIOTHORACIC VASCULAR SURGERY)

## 2018-10-16 PROCEDURE — 83735 ASSAY OF MAGNESIUM: CPT

## 2018-10-16 PROCEDURE — 2580000003 HC RX 258: Performed by: THORACIC SURGERY (CARDIOTHORACIC VASCULAR SURGERY)

## 2018-10-16 PROCEDURE — 84132 ASSAY OF SERUM POTASSIUM: CPT

## 2018-10-16 PROCEDURE — 93010 ELECTROCARDIOGRAM REPORT: CPT | Performed by: NUCLEAR MEDICINE

## 2018-10-16 PROCEDURE — 82803 BLOOD GASES ANY COMBINATION: CPT

## 2018-10-16 PROCEDURE — A4614 HAND-HELD PEFR METER: HCPCS

## 2018-10-16 PROCEDURE — 82330 ASSAY OF CALCIUM: CPT

## 2018-10-16 PROCEDURE — 94003 VENT MGMT INPAT SUBQ DAY: CPT

## 2018-10-16 PROCEDURE — 2000000000 HC ICU R&B

## 2018-10-16 PROCEDURE — 82947 ASSAY GLUCOSE BLOOD QUANT: CPT

## 2018-10-16 PROCEDURE — 2709999900 HC NON-CHARGEABLE SUPPLY

## 2018-10-16 PROCEDURE — 6360000002 HC RX W HCPCS: Performed by: THORACIC SURGERY (CARDIOTHORACIC VASCULAR SURGERY)

## 2018-10-16 PROCEDURE — 85027 COMPLETE CBC AUTOMATED: CPT

## 2018-10-16 PROCEDURE — 94640 AIRWAY INHALATION TREATMENT: CPT

## 2018-10-16 PROCEDURE — 2700000000 HC OXYGEN THERAPY PER DAY

## 2018-10-16 PROCEDURE — 37799 UNLISTED PX VASCULAR SURGERY: CPT

## 2018-10-16 PROCEDURE — 36415 COLL VENOUS BLD VENIPUNCTURE: CPT

## 2018-10-16 RX ORDER — FUROSEMIDE 10 MG/ML
40 INJECTION INTRAMUSCULAR; INTRAVENOUS 2 TIMES DAILY
Status: DISCONTINUED | OUTPATIENT
Start: 2018-10-16 | End: 2018-10-17

## 2018-10-16 RX ORDER — IPRATROPIUM BROMIDE AND ALBUTEROL SULFATE 2.5; .5 MG/3ML; MG/3ML
1 SOLUTION RESPIRATORY (INHALATION)
Status: DISCONTINUED | OUTPATIENT
Start: 2018-10-16 | End: 2018-10-19

## 2018-10-16 RX ORDER — FAMOTIDINE 20 MG/1
20 TABLET, FILM COATED ORAL 2 TIMES DAILY
Status: DISCONTINUED | OUTPATIENT
Start: 2018-10-16 | End: 2018-10-18

## 2018-10-16 RX ORDER — POTASSIUM CHLORIDE 29.8 MG/ML
20 INJECTION INTRAVENOUS ONCE
Status: COMPLETED | OUTPATIENT
Start: 2018-10-16 | End: 2018-10-16

## 2018-10-16 RX ORDER — IPRATROPIUM BROMIDE AND ALBUTEROL SULFATE 2.5; .5 MG/3ML; MG/3ML
1 SOLUTION RESPIRATORY (INHALATION) EVERY 4 HOURS PRN
Status: DISCONTINUED | OUTPATIENT
Start: 2018-10-16 | End: 2018-10-19

## 2018-10-16 RX ADMIN — SODIUM CHLORIDE 4.9 UNITS/HR: 9 INJECTION, SOLUTION INTRAVENOUS at 23:54

## 2018-10-16 RX ADMIN — Medication 2 G: at 21:24

## 2018-10-16 RX ADMIN — MULTIPLE VITAMINS W/ MINERALS TAB 1 TABLET: TAB at 18:12

## 2018-10-16 RX ADMIN — Medication 10 ML: at 08:37

## 2018-10-16 RX ADMIN — AMIODARONE HYDROCHLORIDE 200 MG: 200 TABLET ORAL at 21:40

## 2018-10-16 RX ADMIN — IPRATROPIUM BROMIDE AND ALBUTEROL SULFATE 1 AMPULE: .5; 3 SOLUTION RESPIRATORY (INHALATION) at 17:27

## 2018-10-16 RX ADMIN — ATORVASTATIN CALCIUM 20 MG: 20 TABLET, FILM COATED ORAL at 21:40

## 2018-10-16 RX ADMIN — FUROSEMIDE 40 MG: 10 INJECTION, SOLUTION INTRAMUSCULAR; INTRAVENOUS at 17:29

## 2018-10-16 RX ADMIN — METOPROLOL TARTRATE 25 MG: 25 TABLET ORAL at 21:40

## 2018-10-16 RX ADMIN — DOCUSATE SODIUM 100 MG: 100 CAPSULE, LIQUID FILLED ORAL at 21:40

## 2018-10-16 RX ADMIN — IPRATROPIUM BROMIDE AND ALBUTEROL SULFATE 1 AMPULE: .5; 3 SOLUTION RESPIRATORY (INHALATION) at 20:07

## 2018-10-16 RX ADMIN — ASPIRIN 325 MG: 325 TABLET, DELAYED RELEASE ORAL at 11:39

## 2018-10-16 RX ADMIN — AMIODARONE HYDROCHLORIDE 150 MG: 1.5 INJECTION, SOLUTION INTRAVENOUS at 16:19

## 2018-10-16 RX ADMIN — Medication 2 G: at 03:56

## 2018-10-16 RX ADMIN — Medication 10 ML: at 21:41

## 2018-10-16 RX ADMIN — FAMOTIDINE 20 MG: 20 TABLET ORAL at 21:40

## 2018-10-16 RX ADMIN — ACETAMINOPHEN 650 MG: 325 TABLET ORAL at 14:59

## 2018-10-16 RX ADMIN — ACETAMINOPHEN 650 MG: 325 TABLET ORAL at 08:26

## 2018-10-16 RX ADMIN — POTASSIUM CHLORIDE 20 MEQ: 400 INJECTION, SOLUTION INTRAVENOUS at 02:44

## 2018-10-16 RX ADMIN — OXYCODONE HYDROCHLORIDE 5 MG: 5 TABLET ORAL at 14:59

## 2018-10-16 RX ADMIN — AMIODARONE HYDROCHLORIDE 0.5 MG/MIN: 1.8 INJECTION, SOLUTION INTRAVENOUS at 22:33

## 2018-10-16 RX ADMIN — OXYCODONE HYDROCHLORIDE 10 MG: 5 TABLET ORAL at 08:26

## 2018-10-16 RX ADMIN — DOCUSATE SODIUM 100 MG: 100 CAPSULE, LIQUID FILLED ORAL at 08:26

## 2018-10-16 RX ADMIN — FAMOTIDINE 20 MG: 20 TABLET ORAL at 08:40

## 2018-10-16 RX ADMIN — ENOXAPARIN SODIUM 40 MG: 40 INJECTION SUBCUTANEOUS at 11:40

## 2018-10-16 RX ADMIN — OXYCODONE HYDROCHLORIDE 10 MG: 5 TABLET ORAL at 02:07

## 2018-10-16 RX ADMIN — EPINEPHRINE 4 MCG/MIN: 1 INJECTION, SOLUTION, CONCENTRATE INTRAVENOUS at 12:34

## 2018-10-16 RX ADMIN — AMIODARONE HYDROCHLORIDE 1 MG/MIN: 1.8 INJECTION, SOLUTION INTRAVENOUS at 16:30

## 2018-10-16 RX ADMIN — AMIODARONE HYDROCHLORIDE 200 MG: 200 TABLET ORAL at 11:40

## 2018-10-16 RX ADMIN — Medication 2 G: at 11:43

## 2018-10-16 ASSESSMENT — PULMONARY FUNCTION TESTS: PIF_VALUE: 16

## 2018-10-16 ASSESSMENT — PAIN DESCRIPTION - DESCRIPTORS: DESCRIPTORS: DISCOMFORT;SHARP

## 2018-10-16 ASSESSMENT — PAIN DESCRIPTION - FREQUENCY: FREQUENCY: OTHER (COMMENT)

## 2018-10-16 ASSESSMENT — PAIN SCALES - GENERAL
PAINLEVEL_OUTOF10: 4
PAINLEVEL_OUTOF10: 4
PAINLEVEL_OUTOF10: 3
PAINLEVEL_OUTOF10: 10
PAINLEVEL_OUTOF10: 5
PAINLEVEL_OUTOF10: 6
PAINLEVEL_OUTOF10: 6
PAINLEVEL_OUTOF10: 4
PAINLEVEL_OUTOF10: 9

## 2018-10-16 ASSESSMENT — PAIN DESCRIPTION - ORIENTATION: ORIENTATION: MID

## 2018-10-16 ASSESSMENT — PAIN DESCRIPTION - ONSET: ONSET: SUDDEN

## 2018-10-16 ASSESSMENT — PAIN DESCRIPTION - LOCATION: LOCATION: STERNUM

## 2018-10-16 NOTE — PLAN OF CARE
[place] [address]  PHYSICAL THERAPY MISSED TREATMENT NOTE  ACUTE CARE    Date: 10/16/2018  Patient Name: Kori Sargent        MRN: [id number]   : 1965  (48 y.o.)  Gender: female                REASON FOR MISSED TREATMENT:  Missed Treat. Pt cont on bedrest at 1400 per nursing. Pt just had IABP removed ~7 am and per RN needs to remain on bedrest X6-8 hours. Will check back 10-17-18.

## 2018-10-16 NOTE — PROGRESS NOTES
No indications  Volume Expansion Goal: Prevent atelectasis, Absence or improvement in signs of atelectasis, improve respiratory muscle performance  [] Preoperative screening of patients at risk for post-operative complications to obtain baseline flow or volume. [] Restrictive lung defect associated with a dysfunctional diaphragm or involving the respiratory musculature. Example: Patients with inspiratory capacity < 2.5L, patients with neuromuscular disease and patients with spinal cord injury. [x] Presence of pulmonary atelectasis or conditions predisposing to the development of pulmonary atelectasis. Example: Upper/lower abdominal surgery, thoracic surgery, surgery in COPD patients, prolonged bedrest, lack of pain control, presence of thoracic or abdominal binders.   [] Acute chest syndrome in patients with sickle cell disease   [] No indications  Inhaled Medications Goal: Improve respiratory functions in patients with airway disease and decrease WOB  Albuterol Indications  [] Wheezing, diminished, or absent breath sounds associated with a pulmonary disorder  [] Known COPD  [] Peak flow < 80% predicted or personal best for known asthma patients  [] Documented obstructive defect on pulmonary function testing which is reversible   [] Home regimen  [] With a mucolytic to prevent bronchospasm  [x] No indications  Ipratropium Bromide Indications   [] Maintenance treatment associated with COPD, including chronic bronchitis and emphysema  [] Home regimen  Other Medications  [] Home regimen   Oxygenation  Goal: Reverse hypoxemia and improve tissue oxygenation (See oxygen protocol order)  [x] SpO2 < 92% (Check order for SpO2 goal)        [] Home oxygen   [] Cyanosis        [] Chest pain  [] Dyspnea      [] Altered level of consciousness        [] No indications    THERAPIES SELECTED BASED ON ALGORITHMS  Bronchial Hygiene  []Vest  []PD&P []Suction or [place] cough  []Acapella []Metaneb [x]No therapy recommended  []Mechanical Insufflation exsufflation  Volume expansion  [x]Incentive Spirometry  []EZPAP  []Metaneb  []No therapy recommended  Inhaled Medication  []HHN  []MDI  []Two hour continuous  []Metaneb   [x] No therapy recommended  Oxygenation  [x]Nasal cannula  []Mask    []CPAP  []High flow   [] No therapy recommended                  BRONCHODILATOR ASSESSMENT SCORE  Score 0 1 2 3 4   Breath Sounds []  Normal or no wheezing with diminished bases []  End expiratory wheeze or slightly diminished []  Pronounced exp. wheeze []  Insp. & Exp. wheeze []  Absent or near absent   Dyspnea and level of distress   []  None, respiratory rate   12-20, regular pattern []  Only on exertion or increased respiratory rate 21-25 []  Mild dyspnea at rest, irregular breathing pattern respiratory rate 26-30 []  Moderate  use of accessory muscles, prolonged expiration respiratory rate 31-35 []  Severe    use of accessory muscles, respiratory rate   > 35   Peak flow []  > 80% []  70-80% []  60-69% []  50-59% []  <50%  or unable to perform   Total Score []  0-1 []  2-5 []  6-8 []  9-10 []  11-12   Frequency  Every 4 hours PRN for wheezing or increased respiratory distress Three times a day and Q4 PRN for wheezing or increased   respiratory distress Four times a day and Q 4 PRN  for wheezing or increased   respiratory distress Q 4 hours  Contact physician for a continuous treatment and  Iprotropium Bromide if indicated   Reassess Score No need  Every day Every day Every day  After treatment     ASSESSMENT OUTCOMES   Bronchial Hygiene   [] An increased (or decreased) volume of expectorated sputum    [] Improved chest x-ray  [] Patients subjective response (easier clearance of secretions)      [] Improved breath sounds  [] Improvement in gas exchange       [] Return of patient to home regime   [] Improvement in ventilator variables    [] Improvement in patient reported symptoms, such as dyspnea  [] Other: Willy Fear     Volume Expansion  [] Decrease respiratory rate   [] Resolution of fever    [] Normal pulse rate    [] Improved breath sounds   [] Improved chest x-ray     [] Improved arterial oxygenation  [] Return of IC to 75% of preop/predicted goal or an increase to 15 ml/kg of ideal body weight   [] Other:  . Inhaled Medication  [] Improved assessment score   [] Return of patient to home regime  [] Other: . Oxygenation  [] SpO2 greater than or equal to 92%/SpO2 goal based on physician order. [] Reversed signs of hypoxemia and improvement in WOB   [] Return of patient to home regime  [] Other:  . Action Plan Based on Assessment:   []Continue plan as ordered   []Change therapy based on algorithm   []Consult with physician  []Discontinue therapy and RAP (indications no longer present)  []Not enough information available, reassess in 24 hours  []Other: . Comments: Pt placed on CPAP 10 due to not achieving 33% of predicted value on incentive spirometry, continue to encourage IS, wean oxygen as patient tolerates to keep sats greater than 92%.

## 2018-10-16 NOTE — PROGRESS NOTES
[facility] RESPIRATORY ASSESSMENT PROGRAM (RAP)  30 kg and over      Patient Name: Tarik Hayden Room#: 4D-05/005-A : 1965     Admitting diagnosis: Persistent atrial fibrillation (HCC) [I48.1]      ASSESSMENT     Vitals  Pulse: 93   Resp:   24  BP:  109/59  SpO2: 97 %  Temp: 98.4 °F (36.9 °C)  Breath Sounds:  Faint expiratory wheezes, clear and diminished throughout all lung fields  Comment: Will encourage patient to do IS Q1-2WAna    PEF   Predicted: na  Personal Best: na     Inspiratory Capacity:   Preoperative/predictive value: 2400 ml   33% of value: 792 ml      75% of value: 1800 ml   10 ml/kg of IBW: 590 ml   Patient's Actual Inspiratory Capacity: 250 ml    CARE PLAN  All Care Plan selections must be based on the approved algorithms located on line in the Policy and Procedures under Respiratory Assessment Adult Protocol Handbook    INDICATIONS FOR THERAPY BASED ON HISTORY AND ASSESSMENT  Bronchial Hygiene Goal: Improvement in sputum mobilization in patients with ineffective airway clearance. Reverse the atelectasis. [] Atelectasis caused by mucus plugging     [] Chronic mucucillary clearance disorder (example, cystic fibrosis, bronchiectasis, chronic bronchitis)  [] Improve cough effectiveness (example neuromuscular disease, spinal cord injury, etc.)  [] Audible secretions unable to clear on own   [x] No indications  Volume Expansion Goal: Prevent atelectasis, Absence or improvement in signs of atelectasis, improve respiratory muscle performance  [x] Preoperative screening of patients at risk for post-operative complications to obtain baseline flow or volume. [] Restrictive lung defect associated with a dysfunctional diaphragm or involving the respiratory musculature. Example: Patients with inspiratory capacity < 2.5L, patients with neuromuscular disease and patients with spinal cord injury. [] Presence of pulmonary atelectasis or conditions predisposing to the development of pulmonary atelectasis.

## 2018-10-16 NOTE — PLAN OF CARE
membranes. Outcome: Met This Shift  Patient has no apparent skin breakdown this shift. Turn q2h and PRN. Low air loss mattress in place. Will continue to monitor. Comments: Care plan reviewed with patient and daughter. Patient and daughter verbalize understanding of the plan of care and contribute to goal setting.

## 2018-10-16 NOTE — PROGRESS NOTES
Pt extubated and placed on CPAP 10, pt unable to do incentive spirometer at this time and states \"just put the mask on me. \"

## 2018-10-16 NOTE — PLAN OF CARE
Problem: Discharge Planning:  Goal: Discharged to appropriate level of care  Discharged to appropriate level of care   Outcome: Ongoing  IABP removed and primacore weaned off. Pt tolerated well. Pt continues on epi gtt      Problem: Anxiety:  Goal: Level of anxiety will decrease  Level of anxiety will decrease   Outcome: Ongoing  Pt becomes very anxious with any new event such as incentive spirometry, IABP removal, coughing and deep breathing, up in chair. Pt needs much encouragement    Problem: Cardiac Output - Decreased:  Goal: Cardiac output within specified parameters  Cardiac output within specified parameters   Outcome: Ongoing  IABP removed and primacore weaned off. Pt tolerated well. Pt continues on epi gtt    Goal: Hemodynamic stability will improve  Hemodynamic stability will improve   Outcome: Ongoing  IABP removed and primacore weaned off. Pt tolerated well. Pt continues on epi gtt      Problem: Fluid Volume - Imbalance:  Goal: Ability to achieve a balanced intake and output will improve  Ability to achieve a balanced intake and output will improve   Outcome: Ongoing  Pt with adequate urine output however hourly output had decreased throughout the day. Lasix ordered per Dr Lindsey Chaudhry when notified. Goal: Chest tube drainage is within specified parameters  Chest tube drainage is within specified parameters   Outcome: Ongoing  Pt with minimal to moderate chest tube output. Problem: Pain:  Goal: Pain level will decrease  Pain level will decrease    Outcome: Met This Shift  Pt initially c/o of  pain with every movement or reposition, coughing and deep breathing . Pt with decrease in pain after medications. Problem: Pain:  Goal: Pain level will decrease  Pain level will decrease    Outcome: Met This Shift  Pt initially c/o of  pain with every movement or reposition, coughing and deep breathing . Pt with decrease in pain after medications.     Problem: Risk for Impaired Skin Integrity  Goal: Tissue integrity - skin and mucous membranes  Structural intactness and normal physiological function of skin and  mucous membranes. Outcome: Ongoing  Pt with dry mucous membranes. Pt tolerating water intake well and able to take oral supplement this evening. Problem: Nutrition  Goal: Optimal nutrition therapy  Outcome: Ongoing  Pt tolerating water intake well and able to take oral supplement this evening. Problem: DISCHARGE BARRIERS  Goal: Patient's continuum of care needs are met  Outcome: Ongoing  Pt's adl's met. Pt plans to go home with her father and have other family members help with care. Comments: Care plan reviewed with patient and sister. Patient's sister verbalizes understanding of the plan of care and pt contributes to goal setting. Plan of care needs to be reinforced to pt.

## 2018-10-16 NOTE — PROGRESS NOTES
Patient/Family Education    See Adult Nutrition Doc Flowsheet for more detail.      Electronically signed by Joe Carmona RD, LD on 10/16/18 at 2:02 PM    Contact Number: [phone]

## 2018-10-17 ENCOUNTER — APPOINTMENT (OUTPATIENT)
Dept: GENERAL RADIOLOGY | Age: 53
DRG: 163 | End: 2018-10-17
Attending: THORACIC SURGERY (CARDIOTHORACIC VASCULAR SURGERY)
Payer: COMMERCIAL

## 2018-10-17 LAB
ANION GAP SERPL CALCULATED.3IONS-SCNC: 13 MEQ/L (ref 8–16)
ANION GAP SERPL CALCULATED.3IONS-SCNC: 19 MEQ/L (ref 8–16)
BUN BLDV-MCNC: 32 MG/DL (ref 7–22)
BUN BLDV-MCNC: 35 MG/DL (ref 7–22)
CALCIUM IONIZED: 0.97 MMOL/L (ref 1.12–1.32)
CALCIUM SERPL-MCNC: 7.4 MG/DL (ref 8.5–10.5)
CALCIUM SERPL-MCNC: 7.7 MG/DL (ref 8.5–10.5)
CHLORIDE BLD-SCNC: 100 MEQ/L (ref 98–111)
CHLORIDE BLD-SCNC: 103 MEQ/L (ref 98–111)
CO2: 14 MEQ/L (ref 23–33)
CO2: 20 MEQ/L (ref 23–33)
CREAT SERPL-MCNC: 1.9 MG/DL (ref 0.4–1.2)
CREAT SERPL-MCNC: 2.3 MG/DL (ref 0.4–1.2)
EKG ATRIAL RATE: 90 BPM
EKG ATRIAL RATE: 96 BPM
EKG P-R INTERVAL: 240 MS
EKG Q-T INTERVAL: 420 MS
EKG Q-T INTERVAL: 468 MS
EKG QRS DURATION: 142 MS
EKG QRS DURATION: 144 MS
EKG QTC CALCULATION (BAZETT): 513 MS
EKG QTC CALCULATION (BAZETT): 563 MS
EKG R AXIS: -29 DEGREES
EKG R AXIS: -33 DEGREES
EKG T AXIS: 86 DEGREES
EKG T AXIS: 97 DEGREES
EKG VENTRICULAR RATE: 87 BPM
EKG VENTRICULAR RATE: 90 BPM
ERYTHROCYTE [DISTWIDTH] IN BLOOD BY AUTOMATED COUNT: 17.5 % (ref 11.5–14.5)
ERYTHROCYTE [DISTWIDTH] IN BLOOD BY AUTOMATED COUNT: 17.7 % (ref 11.5–14.5)
ERYTHROCYTE [DISTWIDTH] IN BLOOD BY AUTOMATED COUNT: 54.9 FL (ref 35–45)
ERYTHROCYTE [DISTWIDTH] IN BLOOD BY AUTOMATED COUNT: 59.5 FL (ref 35–45)
GFR SERPL CREATININE-BSD FRML MDRD: 22 ML/MIN/1.73M2
GFR SERPL CREATININE-BSD FRML MDRD: 28 ML/MIN/1.73M2
GLUCOSE BLD-MCNC: 100 MG/DL (ref 70–108)
GLUCOSE BLD-MCNC: 105 MG/DL (ref 70–108)
GLUCOSE BLD-MCNC: 211 MG/DL (ref 70–108)
GLUCOSE, WHOLE BLOOD: 100 MG/DL (ref 70–108)
GLUCOSE, WHOLE BLOOD: 117 MG/DL (ref 70–108)
GLUCOSE, WHOLE BLOOD: 121 MG/DL (ref 70–108)
GLUCOSE, WHOLE BLOOD: 125 MG/DL (ref 70–108)
GLUCOSE, WHOLE BLOOD: 133 MG/DL (ref 70–108)
GLUCOSE, WHOLE BLOOD: 171 MG/DL (ref 70–108)
GLUCOSE, WHOLE BLOOD: 172 MG/DL (ref 70–108)
GLUCOSE, WHOLE BLOOD: 178 MG/DL (ref 70–108)
GLUCOSE, WHOLE BLOOD: 180 MG/DL (ref 70–108)
GLUCOSE, WHOLE BLOOD: 191 MG/DL (ref 70–108)
GLUCOSE, WHOLE BLOOD: 41 MG/DL (ref 70–108)
GLUCOSE, WHOLE BLOOD: 44 MG/DL (ref 70–108)
GLUCOSE, WHOLE BLOOD: 81 MG/DL (ref 70–108)
GLUCOSE, WHOLE BLOOD: 91 MG/DL (ref 70–108)
GLUCOSE, WHOLE BLOOD: 98 MG/DL (ref 70–108)
HCT VFR BLD CALC: 25.7 % (ref 37–47)
HCT VFR BLD CALC: 27.2 % (ref 37–47)
HEMOGLOBIN: 8 GM/DL (ref 12–16)
HEMOGLOBIN: 8.2 GM/DL (ref 12–16)
MAGNESIUM: 2.5 MG/DL (ref 1.6–2.4)
MAGNESIUM: 3.1 MG/DL (ref 1.6–2.4)
MCH RBC QN AUTO: 27.2 PG (ref 26–33)
MCH RBC QN AUTO: 27.6 PG (ref 26–33)
MCHC RBC AUTO-ENTMCNC: 29.4 GM/DL (ref 32.2–35.5)
MCHC RBC AUTO-ENTMCNC: 31.9 GM/DL (ref 32.2–35.5)
MCV RBC AUTO: 86.5 FL (ref 81–99)
MCV RBC AUTO: 92.5 FL (ref 81–99)
PLATELET # BLD: 58 THOU/MM3 (ref 130–400)
PLATELET # BLD: 62 THOU/MM3 (ref 130–400)
PMV BLD AUTO: 12.4 FL (ref 9.4–12.4)
PMV BLD AUTO: 12.6 FL (ref 9.4–12.4)
POTASSIUM SERPL-SCNC: 4.5 MEQ/L (ref 3.5–5.2)
POTASSIUM SERPL-SCNC: 5 MEQ/L (ref 3.5–5.2)
POTASSIUM SERPL-SCNC: 6.3 MEQ/L (ref 3.5–5.2)
RBC # BLD: 2.94 MILL/MM3 (ref 4.2–5.4)
RBC # BLD: 2.97 MILL/MM3 (ref 4.2–5.4)
SODIUM BLD-SCNC: 133 MEQ/L (ref 135–145)
SODIUM BLD-SCNC: 136 MEQ/L (ref 135–145)
WBC # BLD: 18.4 THOU/MM3 (ref 4.8–10.8)
WBC # BLD: 25.8 THOU/MM3 (ref 4.8–10.8)

## 2018-10-17 PROCEDURE — 2500000003 HC RX 250 WO HCPCS: Performed by: THORACIC SURGERY (CARDIOTHORACIC VASCULAR SURGERY)

## 2018-10-17 PROCEDURE — 82330 ASSAY OF CALCIUM: CPT

## 2018-10-17 PROCEDURE — 93010 ELECTROCARDIOGRAM REPORT: CPT | Performed by: NUCLEAR MEDICINE

## 2018-10-17 PROCEDURE — C1751 CATH, INF, PER/CENT/MIDLINE: HCPCS

## 2018-10-17 PROCEDURE — 2580000003 HC RX 258: Performed by: THORACIC SURGERY (CARDIOTHORACIC VASCULAR SURGERY)

## 2018-10-17 PROCEDURE — 31500 INSERT EMERGENCY AIRWAY: CPT | Performed by: INTERNAL MEDICINE

## 2018-10-17 PROCEDURE — 6370000000 HC RX 637 (ALT 250 FOR IP): Performed by: THORACIC SURGERY (CARDIOTHORACIC VASCULAR SURGERY)

## 2018-10-17 PROCEDURE — 2709999900 HC NON-CHARGEABLE SUPPLY

## 2018-10-17 PROCEDURE — 2580000003 HC RX 258: Performed by: INTERNAL MEDICINE

## 2018-10-17 PROCEDURE — 36415 COLL VENOUS BLD VENIPUNCTURE: CPT

## 2018-10-17 PROCEDURE — 83735 ASSAY OF MAGNESIUM: CPT

## 2018-10-17 PROCEDURE — 93005 ELECTROCARDIOGRAM TRACING: CPT | Performed by: THORACIC SURGERY (CARDIOTHORACIC VASCULAR SURGERY)

## 2018-10-17 PROCEDURE — 5A1955Z RESPIRATORY VENTILATION, GREATER THAN 96 CONSECUTIVE HOURS: ICD-10-PCS | Performed by: THORACIC SURGERY (CARDIOTHORACIC VASCULAR SURGERY)

## 2018-10-17 PROCEDURE — 99291 CRITICAL CARE FIRST HOUR: CPT | Performed by: INTERNAL MEDICINE

## 2018-10-17 PROCEDURE — 82947 ASSAY GLUCOSE BLOOD QUANT: CPT

## 2018-10-17 PROCEDURE — 97166 OT EVAL MOD COMPLEX 45 MIN: CPT

## 2018-10-17 PROCEDURE — 85027 COMPLETE CBC AUTOMATED: CPT

## 2018-10-17 PROCEDURE — 6360000002 HC RX W HCPCS: Performed by: INTERNAL MEDICINE

## 2018-10-17 PROCEDURE — G8987 SELF CARE CURRENT STATUS: HCPCS

## 2018-10-17 PROCEDURE — 80048 BASIC METABOLIC PNL TOTAL CA: CPT

## 2018-10-17 PROCEDURE — 2000000000 HC ICU R&B

## 2018-10-17 PROCEDURE — 32551 INSERTION OF CHEST TUBE: CPT

## 2018-10-17 PROCEDURE — 0BH18EZ INSERTION OF ENDOTRACHEAL AIRWAY INTO TRACHEA, VIA NATURAL OR ARTIFICIAL OPENING ENDOSCOPIC: ICD-10-PCS | Performed by: THORACIC SURGERY (CARDIOTHORACIC VASCULAR SURGERY)

## 2018-10-17 PROCEDURE — 94660 CPAP INITIATION&MGMT: CPT

## 2018-10-17 PROCEDURE — 2500000003 HC RX 250 WO HCPCS: Performed by: INTERNAL MEDICINE

## 2018-10-17 PROCEDURE — 71045 X-RAY EXAM CHEST 1 VIEW: CPT

## 2018-10-17 PROCEDURE — 6360000002 HC RX W HCPCS: Performed by: THORACIC SURGERY (CARDIOTHORACIC VASCULAR SURGERY)

## 2018-10-17 PROCEDURE — 36592 COLLECT BLOOD FROM PICC: CPT

## 2018-10-17 PROCEDURE — 36620 INSERTION CATHETER ARTERY: CPT

## 2018-10-17 PROCEDURE — 94640 AIRWAY INHALATION TREATMENT: CPT

## 2018-10-17 PROCEDURE — 6370000000 HC RX 637 (ALT 250 FOR IP): Performed by: INTERNAL MEDICINE

## 2018-10-17 PROCEDURE — 93005 ELECTROCARDIOGRAM TRACING: CPT | Performed by: INTERNAL MEDICINE

## 2018-10-17 PROCEDURE — 82948 REAGENT STRIP/BLOOD GLUCOSE: CPT

## 2018-10-17 PROCEDURE — 99292 CRITICAL CARE ADDL 30 MIN: CPT | Performed by: INTERNAL MEDICINE

## 2018-10-17 PROCEDURE — G8988 SELF CARE GOAL STATUS: HCPCS

## 2018-10-17 PROCEDURE — 97110 THERAPEUTIC EXERCISES: CPT

## 2018-10-17 PROCEDURE — 84132 ASSAY OF SERUM POTASSIUM: CPT

## 2018-10-17 PROCEDURE — 2700000000 HC OXYGEN THERAPY PER DAY

## 2018-10-17 PROCEDURE — 94003 VENT MGMT INPAT SUBQ DAY: CPT

## 2018-10-17 RX ORDER — HALOPERIDOL 5 MG/ML
1 INJECTION INTRAMUSCULAR ONCE
Status: COMPLETED | OUTPATIENT
Start: 2018-10-17 | End: 2018-10-17

## 2018-10-17 RX ORDER — SODIUM POLYSTYRENE SULFONATE 15 G/60ML
30 SUSPENSION ORAL; RECTAL ONCE
Status: COMPLETED | OUTPATIENT
Start: 2018-10-17 | End: 2018-10-17

## 2018-10-17 RX ORDER — MIDODRINE HYDROCHLORIDE 10 MG/1
10 TABLET ORAL
Status: DISCONTINUED | OUTPATIENT
Start: 2018-10-17 | End: 2018-10-29

## 2018-10-17 RX ORDER — CHLORHEXIDINE GLUCONATE 0.12 MG/ML
15 RINSE ORAL 2 TIMES DAILY
Status: DISCONTINUED | OUTPATIENT
Start: 2018-10-17 | End: 2018-10-24

## 2018-10-17 RX ORDER — FUROSEMIDE 10 MG/ML
60 INJECTION INTRAMUSCULAR; INTRAVENOUS ONCE
Status: COMPLETED | OUTPATIENT
Start: 2018-10-17 | End: 2018-10-17

## 2018-10-17 RX ADMIN — ACETAMINOPHEN 650 MG: 325 TABLET ORAL at 00:21

## 2018-10-17 RX ADMIN — VASOPRESSIN 0.04 UNITS/MIN: 20 INJECTION INTRAVENOUS at 11:01

## 2018-10-17 RX ADMIN — ACETAMINOPHEN 650 MG: 325 TABLET ORAL at 05:58

## 2018-10-17 RX ADMIN — EPINEPHRINE 9 MCG/MIN: 1 INJECTION, SOLUTION, CONCENTRATE INTRAVENOUS at 14:19

## 2018-10-17 RX ADMIN — IPRATROPIUM BROMIDE AND ALBUTEROL SULFATE 1 AMPULE: .5; 3 SOLUTION RESPIRATORY (INHALATION) at 17:25

## 2018-10-17 RX ADMIN — MIDODRINE HYDROCHLORIDE 10 MG: 10 TABLET ORAL at 14:54

## 2018-10-17 RX ADMIN — ONDANSETRON HYDROCHLORIDE 4 MG: 2 SOLUTION INTRAMUSCULAR; INTRAVENOUS at 08:48

## 2018-10-17 RX ADMIN — Medication 15 ML: at 20:25

## 2018-10-17 RX ADMIN — DOCUSATE SODIUM 100 MG: 100 CAPSULE, LIQUID FILLED ORAL at 08:49

## 2018-10-17 RX ADMIN — SODIUM CHLORIDE 0.12 MCG/KG/MIN: 9 INJECTION, SOLUTION INTRAVENOUS at 19:49

## 2018-10-17 RX ADMIN — FAMOTIDINE 20 MG: 20 TABLET ORAL at 22:16

## 2018-10-17 RX ADMIN — ACETAMINOPHEN 650 MG: 325 TABLET ORAL at 17:29

## 2018-10-17 RX ADMIN — DEXTROSE MONOHYDRATE 12 G: 500 INJECTION PARENTERAL at 08:46

## 2018-10-17 RX ADMIN — MIDODRINE HYDROCHLORIDE 10 MG: 10 TABLET ORAL at 17:31

## 2018-10-17 RX ADMIN — CALCIUM GLUCONATE 2 G: 98 INJECTION, SOLUTION INTRAVENOUS at 14:14

## 2018-10-17 RX ADMIN — AMIODARONE HYDROCHLORIDE 200 MG: 200 TABLET ORAL at 20:25

## 2018-10-17 RX ADMIN — VASOPRESSIN 0.04 UNITS/MIN: 20 INJECTION INTRAVENOUS at 17:46

## 2018-10-17 RX ADMIN — ATORVASTATIN CALCIUM 20 MG: 20 TABLET, FILM COATED ORAL at 20:25

## 2018-10-17 RX ADMIN — SODIUM POLYSTYRENE SULFONATE 30 G: 15 SUSPENSION ORAL; RECTAL at 14:14

## 2018-10-17 RX ADMIN — SODIUM BICARBONATE: 84 INJECTION, SOLUTION INTRAVENOUS at 17:10

## 2018-10-17 RX ADMIN — OXYCODONE HYDROCHLORIDE 5 MG: 5 TABLET ORAL at 05:58

## 2018-10-17 RX ADMIN — Medication 10 ML: at 08:48

## 2018-10-17 RX ADMIN — IPRATROPIUM BROMIDE AND ALBUTEROL SULFATE 1 AMPULE: .5; 3 SOLUTION RESPIRATORY (INHALATION) at 10:35

## 2018-10-17 RX ADMIN — MULTIPLE VITAMINS W/ MINERALS TAB 1 TABLET: TAB at 08:53

## 2018-10-17 RX ADMIN — IPRATROPIUM BROMIDE AND ALBUTEROL SULFATE 1 AMPULE: .5; 3 SOLUTION RESPIRATORY (INHALATION) at 20:38

## 2018-10-17 RX ADMIN — IPRATROPIUM BROMIDE AND ALBUTEROL SULFATE 1 AMPULE: .5; 3 SOLUTION RESPIRATORY (INHALATION) at 14:02

## 2018-10-17 RX ADMIN — IPRATROPIUM BROMIDE AND ALBUTEROL SULFATE 1 AMPULE: .5; 3 SOLUTION RESPIRATORY (INHALATION) at 05:45

## 2018-10-17 RX ADMIN — Medication 15 ML: at 17:27

## 2018-10-17 RX ADMIN — SODIUM CHLORIDE: 9 INJECTION, SOLUTION INTRAVENOUS at 14:21

## 2018-10-17 RX ADMIN — HALOPERIDOL LACTATE 1 MG: 5 INJECTION INTRAMUSCULAR at 22:07

## 2018-10-17 RX ADMIN — OXYCODONE HYDROCHLORIDE 5 MG: 5 TABLET ORAL at 00:21

## 2018-10-17 RX ADMIN — EPINEPHRINE 7 MCG/MIN: 1 INJECTION, SOLUTION, CONCENTRATE INTRAVENOUS at 01:14

## 2018-10-17 RX ADMIN — FUROSEMIDE 60 MG: 10 INJECTION, SOLUTION INTRAMUSCULAR; INTRAVENOUS at 20:25

## 2018-10-17 RX ADMIN — AMIODARONE HYDROCHLORIDE 200 MG: 200 TABLET ORAL at 08:53

## 2018-10-17 RX ADMIN — FAMOTIDINE 20 MG: 20 TABLET ORAL at 08:49

## 2018-10-17 RX ADMIN — Medication 2 G: at 04:03

## 2018-10-17 RX ADMIN — Medication 10 ML: at 20:25

## 2018-10-17 RX ADMIN — Medication 3 UNITS: at 17:37

## 2018-10-17 ASSESSMENT — PAIN SCALES - WONG BAKER
WONGBAKER_NUMERICALRESPONSE: 4
WONGBAKER_NUMERICALRESPONSE: 4

## 2018-10-17 ASSESSMENT — PAIN SCALES - GENERAL
PAINLEVEL_OUTOF10: 6
PAINLEVEL_OUTOF10: 3
PAINLEVEL_OUTOF10: 4
PAINLEVEL_OUTOF10: 6
PAINLEVEL_OUTOF10: 4
PAINLEVEL_OUTOF10: 6
PAINLEVEL_OUTOF10: 5
PAINLEVEL_OUTOF10: 5
PAINLEVEL_OUTOF10: 6

## 2018-10-17 ASSESSMENT — PAIN DESCRIPTION - ORIENTATION
ORIENTATION: MID
ORIENTATION: MID

## 2018-10-17 ASSESSMENT — PAIN DESCRIPTION - ONSET: ONSET: ON-GOING

## 2018-10-17 ASSESSMENT — PAIN DESCRIPTION - LOCATION
LOCATION: STERNUM
LOCATION: CHEST;STERNUM

## 2018-10-17 ASSESSMENT — PULMONARY FUNCTION TESTS
PIF_VALUE: 23
PIF_VALUE: 21
PIF_VALUE: 22
PIF_VALUE: 17
PIF_VALUE: 24

## 2018-10-17 ASSESSMENT — PAIN DESCRIPTION - DESCRIPTORS: DESCRIPTORS: ACHING;CONSTANT;SHARP

## 2018-10-17 ASSESSMENT — PAIN DESCRIPTION - FREQUENCY: FREQUENCY: OTHER (COMMENT)

## 2018-10-17 NOTE — PROGRESS NOTES
[facility]  ICU 4D  EVALUATION    Time:  Time In: 0730  Time Out: 0800  Timed Code Treatment Minutes: 15 Minutes  Minutes: 30          Date: 10/17/2018  Patient Name: Juan Padgett,   Gender: female      MRN: [id number]  : 1965  (48 y.o.)  Referring Practitioner: Dr. Jaime Maya  Diagnosis: persistant A-fib  Additional Pertinent Hx: 48y.o. year-old female who presents for redo sternotomy, mitral valve repair, maze procedure, placement of left ventricular epicardial lead. sx on 10/15/18    Restrictions/Precautions:  Surgical Protocols, Fall Risk         Sternal Precautions: No Pushing, No Pulling, 5# Lifting Restrictions  Other position/activity restrictions: s/predo sternotomy, mitral valve repair, maze procedure, placement of left ventricular epicardial lead on 10/15/18; chest tubes, de león arterial line       Past Medical History:   Diagnosis Date    [employer] dual pacer 2018    Hyperlipidemia      Past Surgical History:   Procedure Laterality Date    CARDIAC CATHETERIZATION  10/2018    CARDIAC SURGERY  1970    repair hole in heart pt states was 3 yrs old.     COLONOSCOPY      PACEMAKER INSERTION  2017    TUBAL LIGATION             Subjective  Chart Reviewed: Yes (orders, progress notes)  Patient assessed for rehabilitation services?: Yes  Family / Caregiver Present: No    Subjective: encouragement required    General:       Vision: Within Functional Limits    Hearing: Within functional limits         Pain:  Pain Assessment  Patient Currently in Pain: Yes (incision)  Pain Level: 6       Social/Functional History:  Lives With: Family (will be staying at dad's house )  Type of Home: House  Home Layout: Two level (only bathroom is on 2nd level)  Home Access: Stairs to enter with rails (2)  Home Equipment:  (BSC)     Bathroom Toilet: Bedside commode       ADL Assistance: Independent  Homemaking Assistance: Independent       Ambulation limited or restricted  AM-PAC Inpatient Daily Activity Raw Score: 12  AM-PAC Inpatient ADL T-Scale Score : 30.6  ADL Inpatient CMS 0-100% Score: 66.57  ADL Inpatient CMS G-Code Modifier : CL

## 2018-10-17 NOTE — CONSULTS
Assessment and Plan:        1. Acute respiratory failure: Patient required intubation 10/17/18 secondary to cardiomyopathy with poor cerebral perfusion and altered mental status. She was intubated without difficulty. Continue with mechanical ventilator support with lung protection strategies. 2. Pulmonary edema: Diurese. Treat cardiomyopathy. 3. Cardiomyopathy: Ejection fraction 25%. Currently weaning epinephrine to off. On vasopressin. Look to initiate milrinone as tolerated. Diurese. Eventual ACE inhibitor therapy. 4. Acute renal failure: Creatinine elevating associated with increasing metabolic acidosis. Initiate bicarbonate drip to buffer the acidemia. This should also help with the hyperkalemia. Try and wean off epinephrine to preserve renal function. Presently holding on ACE inhibitor therapy. 5. Atrial fibrillation: Status post maze procedure. Positive pacemaker. Currently in sinus rhythm. Chronic right bundle branch block. On amiodarone. Post atrial clipping. Intermittently paced. 6. Mitral regurgitation: Was severe to very severe. Status post mitral valve repair 10/15/18.  7. Anion gap positive metabolic acidosis: Trending. Bicarbonate drip. 8. Hyperkalemia: Bumex. Bicarbonate drip. Kayexalate. 9. Thrombocytopenia: Trending upward. Continue to monitor. 10. Hyperglycemia: Sliding scale insulin. CC:  Respiratory failure  HPI: Patient is a 53-year-old white female reformed smoker since September 2018. She carries a diagnosis of COPD though has no pulmonary function tests available. She has a history of dual pacemaker placement remotely. She has a history of atrial fibrillation with rapid ventricular response and moderate mitral regurgitation with dilated left atrium. She underwent cardiac ablation remotely but had persistent atrial fibrillation given the enlarged atrium. She underwent cardiac catheterization 9/20/18 which showed no coronary artery disease.   She had echocardiogram completed August 21, 2018 which showed an ejection fraction of 25% with severe global hypokinesis and left atrial enlargement. She had moderate mitral regurgitation. Patient was admitted on 10/15/18 and underwent redo sternotomy, mitral valve repair, maze procedure, and placement of left ventricular epicardial lead. Given her cardiomyopathy, she temporarily required femoral intra-aortic balloon pump. Patient was extubated 10/16/18. On 10/17/18, patient was sitting in a chair after working with physical therapy and occupational therapy. She had done very well with therapy with marching in place. Upon sitting in the chair, she became very lethargic and unarousable. She was lifted from the chair and placed in the bed by the medical staff including me. She was very lethargic and had a very faint pulse. Given her altered mental status, she was intubated 10/17/18. She was continued on epinephrine drip and vasopressin was added. Blood pressure did respond to pressure intervention. She was given a dose of Bumex secondary to hyperkalemia. For further details, please review the assessment and plan. ROS: Awake, mechanical ventilator. Complains of chest pain with coughing. Complains of groin tenderness. No fever. No shortness of breath. Positive anxiety. PMH:  Per HPI  SHX:  Reformed smoker since September 16, 2018. FHX: Hyperlipidemia  Allergies: No known drug allergies  Medications:  Amiodarone 200 mg twice a day. Aspirin 325 mg a day. Atorvastatin 20 mg daily at bedtime. Lovenox 40 mg subcutaneous once a day. Epinephrine drip. Pepcid 20 mg twice a day. Sliding scale insulin. Midodrine 10 mg 3 times a day. Milrinone. Vasopressin. Vital Signs: T: 98.8 P: 88 RR: 19 B/P: 148/76: FiO2: 90%: O2 Sat: 100%: I/O: 1020/345. Pulmonary artery diastolic pressure 26. Mean pulmonary artery pressure 33. CVP 21. Cardiac output 3.9. Cardiac index 2. SVR 3029.   General:   [place]

## 2018-10-18 ENCOUNTER — APPOINTMENT (OUTPATIENT)
Dept: GENERAL RADIOLOGY | Age: 53
DRG: 163 | End: 2018-10-18
Attending: THORACIC SURGERY (CARDIOTHORACIC VASCULAR SURGERY)
Payer: COMMERCIAL

## 2018-10-18 ENCOUNTER — APPOINTMENT (OUTPATIENT)
Dept: CT IMAGING | Age: 53
DRG: 163 | End: 2018-10-18
Attending: THORACIC SURGERY (CARDIOTHORACIC VASCULAR SURGERY)
Payer: COMMERCIAL

## 2018-10-18 LAB
ABO: NORMAL
ALLEN TEST: NORMAL
ANION GAP SERPL CALCULATED.3IONS-SCNC: 14 MEQ/L (ref 8–16)
ANION GAP SERPL CALCULATED.3IONS-SCNC: 15 MEQ/L (ref 8–16)
ANION GAP SERPL CALCULATED.3IONS-SCNC: 16 MEQ/L (ref 8–16)
ANTIBODY SCREEN: NORMAL
BASE EXCESS (CALCULATED): -0.3 MMOL/L (ref -2.5–2.5)
BASE EXCESS MIXED: -2.6 MMOL/L (ref -2–3)
BASOPHILIA: ABNORMAL
BASOPHILIA: ABNORMAL
BASOPHILS # BLD: 0 %
BASOPHILS # BLD: 0 %
BASOPHILS ABSOLUTE: 0 THOU/MM3 (ref 0–0.1)
BASOPHILS ABSOLUTE: 0 THOU/MM3 (ref 0–0.1)
BUN BLDV-MCNC: 52 MG/DL (ref 7–22)
BUN BLDV-MCNC: 54 MG/DL (ref 7–22)
BUN BLDV-MCNC: 55 MG/DL (ref 7–22)
CALCIUM IONIZED: 0.96 MMOL/L (ref 1.12–1.32)
CALCIUM IONIZED: 1.02 MMOL/L (ref 1.12–1.32)
CALCIUM SERPL-MCNC: 7.4 MG/DL (ref 8.5–10.5)
CALCIUM SERPL-MCNC: 7.6 MG/DL (ref 8.5–10.5)
CALCIUM SERPL-MCNC: 7.8 MG/DL (ref 8.5–10.5)
CHLORIDE BLD-SCNC: 97 MEQ/L (ref 98–111)
CHLORIDE BLD-SCNC: 98 MEQ/L (ref 98–111)
CHLORIDE BLD-SCNC: 98 MEQ/L (ref 98–111)
CO2: 22 MEQ/L (ref 23–33)
CO2: 22 MEQ/L (ref 23–33)
CO2: 23 MEQ/L (ref 23–33)
COLLECTED BY:: ABNORMAL
COLLECTED BY:: NORMAL
CREAT SERPL-MCNC: 2.8 MG/DL (ref 0.4–1.2)
CREAT SERPL-MCNC: 3 MG/DL (ref 0.4–1.2)
CREAT SERPL-MCNC: 3.5 MG/DL (ref 0.4–1.2)
CRENATED RBC'S: ABNORMAL
DEVICE: ABNORMAL
DEVICE: NORMAL
DIFFERENTIAL TYPE: ABNORMAL
EKG ATRIAL RATE: 107 BPM
EKG ATRIAL RATE: 134 BPM
EKG P-R INTERVAL: 88 MS
EKG Q-T INTERVAL: 394 MS
EKG Q-T INTERVAL: 398 MS
EKG QRS DURATION: 138 MS
EKG QRS DURATION: 144 MS
EKG QTC CALCULATION (BAZETT): 526 MS
EKG QTC CALCULATION (BAZETT): 588 MS
EKG R AXIS: -19 DEGREES
EKG R AXIS: -29 DEGREES
EKG T AXIS: 78 DEGREES
EKG T AXIS: 84 DEGREES
EKG VENTRICULAR RATE: 105 BPM
EKG VENTRICULAR RATE: 134 BPM
EOSINOPHIL # BLD: 0 %
EOSINOPHIL # BLD: 2 %
EOSINOPHILS ABSOLUTE: 0 THOU/MM3 (ref 0–0.4)
EOSINOPHILS ABSOLUTE: 0.2 THOU/MM3 (ref 0–0.4)
ERYTHROCYTE [DISTWIDTH] IN BLOOD BY AUTOMATED COUNT: 16.2 % (ref 11.5–14.5)
ERYTHROCYTE [DISTWIDTH] IN BLOOD BY AUTOMATED COUNT: 17.1 % (ref 11.5–14.5)
ERYTHROCYTE [DISTWIDTH] IN BLOOD BY AUTOMATED COUNT: 47.8 FL (ref 35–45)
ERYTHROCYTE [DISTWIDTH] IN BLOOD BY AUTOMATED COUNT: 52.3 FL (ref 35–45)
FIO2, MIXED VENOUS: 70
GFR SERPL CREATININE-BSD FRML MDRD: 14 ML/MIN/1.73M2
GFR SERPL CREATININE-BSD FRML MDRD: 16 ML/MIN/1.73M2
GFR SERPL CREATININE-BSD FRML MDRD: 18 ML/MIN/1.73M2
GLUCOSE BLD-MCNC: 128 MG/DL (ref 70–108)
GLUCOSE BLD-MCNC: 145 MG/DL (ref 70–108)
GLUCOSE BLD-MCNC: 161 MG/DL (ref 70–108)
GLUCOSE, WHOLE BLOOD: 116 MG/DL (ref 70–108)
GLUCOSE, WHOLE BLOOD: 119 MG/DL (ref 70–108)
GLUCOSE, WHOLE BLOOD: 144 MG/DL (ref 70–108)
HCO3, MIXED: 22 MMOL/L (ref 23–28)
HCO3: 24 MMOL/L (ref 23–28)
HCT VFR BLD CALC: 17.8 % (ref 37–47)
HCT VFR BLD CALC: 26.7 % (ref 37–47)
HCT VFR BLD CALC: 27.5 % (ref 37–47)
HEMOGLOBIN: 5.7 GM/DL (ref 12–16)
HEMOGLOBIN: 9.4 GM/DL (ref 12–16)
HEMOGLOBIN: 9.5 GM/DL (ref 12–16)
IFIO2: 70
LV EF: 23 %
LVEF MODALITY: NORMAL
LYMPHOCYTES # BLD: 4 %
LYMPHOCYTES # BLD: 6 %
LYMPHOCYTES ABSOLUTE: 0.5 THOU/MM3 (ref 1–4.8)
LYMPHOCYTES ABSOLUTE: 0.7 THOU/MM3 (ref 1–4.8)
MAGNESIUM: 2.6 MG/DL (ref 1.6–2.4)
MCH RBC QN AUTO: 26.8 PG (ref 26–33)
MCH RBC QN AUTO: 28 PG (ref 26–33)
MCHC RBC AUTO-ENTMCNC: 32 GM/DL (ref 32.2–35.5)
MCHC RBC AUTO-ENTMCNC: 34.5 GM/DL (ref 32.2–35.5)
MCV RBC AUTO: 81.1 FL (ref 81–99)
MCV RBC AUTO: 83.6 FL (ref 81–99)
METAMYELOCYTES: 2 %
MODE: ABNORMAL
MODE: NORMAL
MONOCYTES # BLD: 0 %
MONOCYTES # BLD: 2 %
MONOCYTES ABSOLUTE: 0 THOU/MM3 (ref 0.4–1.3)
MONOCYTES ABSOLUTE: 0.2 THOU/MM3 (ref 0.4–1.3)
NUCLEATED RED BLOOD CELLS: 0 /100 WBC
NUCLEATED RED BLOOD CELLS: 0 /100 WBC
O2 SAT, MIXED: 44 %
O2 SATURATION: 97 %
PATHOLOGIST REVIEW: ABNORMAL
PATHOLOGIST REVIEW: ABNORMAL
PCO2, MIXED VENOUS: 39 MMHG (ref 41–51)
PCO2: 38 MMHG (ref 35–45)
PH BLOOD GAS: 7.42 (ref 7.35–7.45)
PH, MIXED: 7.37 (ref 7.31–7.41)
PIP: 27 CMH2O
PIP: 27 CMH2O
PLATELET # BLD: 43 THOU/MM3 (ref 130–400)
PLATELET # BLD: 48 THOU/MM3 (ref 130–400)
PMV BLD AUTO: 12.6 FL (ref 9.4–12.4)
PMV BLD AUTO: 12.9 FL (ref 9.4–12.4)
PO2 MIXED: 25 MMHG (ref 25–40)
PO2: 84 MMHG (ref 71–104)
POTASSIUM SERPL-SCNC: 3.9 MEQ/L (ref 3.5–5.2)
POTASSIUM SERPL-SCNC: 4.7 MEQ/L (ref 3.5–5.2)
POTASSIUM SERPL-SCNC: 4.7 MEQ/L (ref 3.5–5.2)
RBC # BLD: 2.13 MILL/MM3 (ref 4.2–5.4)
RBC # BLD: 3.39 MILL/MM3 (ref 4.2–5.4)
RH FACTOR: NORMAL
SEG NEUTROPHILS: 92 %
SEG NEUTROPHILS: 92 %
SEGMENTED NEUTROPHILS ABSOLUTE COUNT: 11.4 THOU/MM3 (ref 1.8–7.7)
SEGMENTED NEUTROPHILS ABSOLUTE COUNT: 11.4 THOU/MM3 (ref 1.8–7.7)
SET PEEP: 12 MMHG
SET PEEP: 12 MMHG
SET RESPIRATORY RATE: 12 BPM
SET RESPIRATORY RATE: 12 BPM
SITE: ABNORMAL
SODIUM BLD-SCNC: 134 MEQ/L (ref 135–145)
SODIUM BLD-SCNC: 135 MEQ/L (ref 135–145)
SODIUM BLD-SCNC: 136 MEQ/L (ref 135–145)
SOURCE, BLOOD GAS: NORMAL
TOXIC GRANULATION: PRESENT
WBC # BLD: 12.4 THOU/MM3 (ref 4.8–10.8)
WBC # BLD: 12.4 THOU/MM3 (ref 4.8–10.8)

## 2018-10-18 PROCEDURE — 36592 COLLECT BLOOD FROM PICC: CPT

## 2018-10-18 PROCEDURE — 2709999900 HC NON-CHARGEABLE SUPPLY

## 2018-10-18 PROCEDURE — 94640 AIRWAY INHALATION TREATMENT: CPT

## 2018-10-18 PROCEDURE — 2500000003 HC RX 250 WO HCPCS

## 2018-10-18 PROCEDURE — 6360000002 HC RX W HCPCS

## 2018-10-18 PROCEDURE — 6360000002 HC RX W HCPCS: Performed by: THORACIC SURGERY (CARDIOTHORACIC VASCULAR SURGERY)

## 2018-10-18 PROCEDURE — 99291 CRITICAL CARE FIRST HOUR: CPT | Performed by: INTERNAL MEDICINE

## 2018-10-18 PROCEDURE — 94770 HC ETCO2 MONITOR DAILY: CPT

## 2018-10-18 PROCEDURE — 2500000003 HC RX 250 WO HCPCS: Performed by: INTERNAL MEDICINE

## 2018-10-18 PROCEDURE — 93005 ELECTROCARDIOGRAM TRACING: CPT | Performed by: THORACIC SURGERY (CARDIOTHORACIC VASCULAR SURGERY)

## 2018-10-18 PROCEDURE — 93010 ELECTROCARDIOGRAM REPORT: CPT | Performed by: NUCLEAR MEDICINE

## 2018-10-18 PROCEDURE — 82947 ASSAY GLUCOSE BLOOD QUANT: CPT

## 2018-10-18 PROCEDURE — 36415 COLL VENOUS BLD VENIPUNCTURE: CPT

## 2018-10-18 PROCEDURE — 36430 TRANSFUSION BLD/BLD COMPNT: CPT

## 2018-10-18 PROCEDURE — 2500000003 HC RX 250 WO HCPCS: Performed by: THORACIC SURGERY (CARDIOTHORACIC VASCULAR SURGERY)

## 2018-10-18 PROCEDURE — P9016 RBC LEUKOCYTES REDUCED: HCPCS

## 2018-10-18 PROCEDURE — 83735 ASSAY OF MAGNESIUM: CPT

## 2018-10-18 PROCEDURE — 37799 UNLISTED PX VASCULAR SURGERY: CPT

## 2018-10-18 PROCEDURE — 86900 BLOOD TYPING SEROLOGIC ABO: CPT

## 2018-10-18 PROCEDURE — 2000000000 HC ICU R&B

## 2018-10-18 PROCEDURE — 93306 TTE W/DOPPLER COMPLETE: CPT

## 2018-10-18 PROCEDURE — 6360000002 HC RX W HCPCS: Performed by: INTERNAL MEDICINE

## 2018-10-18 PROCEDURE — 2580000003 HC RX 258

## 2018-10-18 PROCEDURE — 94003 VENT MGMT INPAT SUBQ DAY: CPT

## 2018-10-18 PROCEDURE — 2580000003 HC RX 258: Performed by: INTERNAL MEDICINE

## 2018-10-18 PROCEDURE — 86022 PLATELET ANTIBODIES: CPT

## 2018-10-18 PROCEDURE — 85014 HEMATOCRIT: CPT

## 2018-10-18 PROCEDURE — 85025 COMPLETE CBC W/AUTO DIFF WBC: CPT

## 2018-10-18 PROCEDURE — 82803 BLOOD GASES ANY COMBINATION: CPT

## 2018-10-18 PROCEDURE — 2580000003 HC RX 258: Performed by: THORACIC SURGERY (CARDIOTHORACIC VASCULAR SURGERY)

## 2018-10-18 PROCEDURE — 74176 CT ABD & PELVIS W/O CONTRAST: CPT

## 2018-10-18 PROCEDURE — 86923 COMPATIBILITY TEST ELECTRIC: CPT

## 2018-10-18 PROCEDURE — 82330 ASSAY OF CALCIUM: CPT

## 2018-10-18 PROCEDURE — 6360000002 HC RX W HCPCS: Performed by: NURSE PRACTITIONER

## 2018-10-18 PROCEDURE — 85018 HEMOGLOBIN: CPT

## 2018-10-18 PROCEDURE — 71045 X-RAY EXAM CHEST 1 VIEW: CPT

## 2018-10-18 PROCEDURE — 86850 RBC ANTIBODY SCREEN: CPT

## 2018-10-18 PROCEDURE — 6370000000 HC RX 637 (ALT 250 FOR IP): Performed by: THORACIC SURGERY (CARDIOTHORACIC VASCULAR SURGERY)

## 2018-10-18 PROCEDURE — 80048 BASIC METABOLIC PNL TOTAL CA: CPT

## 2018-10-18 PROCEDURE — 86901 BLOOD TYPING SEROLOGIC RH(D): CPT

## 2018-10-18 PROCEDURE — 2700000000 HC OXYGEN THERAPY PER DAY

## 2018-10-18 PROCEDURE — 2500000003 HC RX 250 WO HCPCS: Performed by: NURSE PRACTITIONER

## 2018-10-18 PROCEDURE — 93005 ELECTROCARDIOGRAM TRACING: CPT | Performed by: INTERNAL MEDICINE

## 2018-10-18 PROCEDURE — 6370000000 HC RX 637 (ALT 250 FOR IP): Performed by: INTERNAL MEDICINE

## 2018-10-18 RX ORDER — BUMETANIDE 0.25 MG/ML
2 INJECTION, SOLUTION INTRAMUSCULAR; INTRAVENOUS ONCE
Status: COMPLETED | OUTPATIENT
Start: 2018-10-18 | End: 2018-10-18

## 2018-10-18 RX ORDER — 0.9 % SODIUM CHLORIDE 0.9 %
250 INTRAVENOUS SOLUTION INTRAVENOUS ONCE
Status: COMPLETED | OUTPATIENT
Start: 2018-10-18 | End: 2018-10-18

## 2018-10-18 RX ORDER — FUROSEMIDE 10 MG/ML
40 INJECTION INTRAMUSCULAR; INTRAVENOUS ONCE
Status: COMPLETED | OUTPATIENT
Start: 2018-10-18 | End: 2018-10-18

## 2018-10-18 RX ORDER — 0.9 % SODIUM CHLORIDE 0.9 %
250 INTRAVENOUS SOLUTION INTRAVENOUS ONCE
Status: DISCONTINUED | OUTPATIENT
Start: 2018-10-18 | End: 2018-10-29

## 2018-10-18 RX ORDER — DIPHENHYDRAMINE HYDROCHLORIDE 50 MG/ML
50 INJECTION INTRAMUSCULAR; INTRAVENOUS NIGHTLY PRN
Status: DISCONTINUED | OUTPATIENT
Start: 2018-10-18 | End: 2018-11-05 | Stop reason: HOSPADM

## 2018-10-18 RX ORDER — POTASSIUM CHLORIDE 29.8 MG/ML
40 INJECTION INTRAVENOUS ONCE
Status: DISCONTINUED | OUTPATIENT
Start: 2018-10-18 | End: 2018-10-18 | Stop reason: SDUPTHER

## 2018-10-18 RX ORDER — POTASSIUM CHLORIDE 29.8 MG/ML
20 INJECTION INTRAVENOUS
Status: COMPLETED | OUTPATIENT
Start: 2018-10-18 | End: 2018-10-18

## 2018-10-18 RX ORDER — FAMOTIDINE 20 MG/1
20 TABLET, FILM COATED ORAL DAILY
Status: DISCONTINUED | OUTPATIENT
Start: 2018-10-18 | End: 2018-11-05 | Stop reason: HOSPADM

## 2018-10-18 RX ORDER — LORAZEPAM 2 MG/ML
2 INJECTION INTRAMUSCULAR
Status: DISCONTINUED | OUTPATIENT
Start: 2018-10-18 | End: 2018-10-18

## 2018-10-18 RX ORDER — FENTANYL CITRATE 50 UG/ML
INJECTION, SOLUTION INTRAMUSCULAR; INTRAVENOUS
Status: COMPLETED
Start: 2018-10-18 | End: 2018-10-18

## 2018-10-18 RX ORDER — POTASSIUM CHLORIDE 29.8 MG/ML
20 INJECTION INTRAVENOUS
Status: DISCONTINUED | OUTPATIENT
Start: 2018-10-18 | End: 2018-10-18 | Stop reason: SINTOL

## 2018-10-18 RX ORDER — DIPHENHYDRAMINE HYDROCHLORIDE 50 MG/ML
50 INJECTION INTRAMUSCULAR; INTRAVENOUS ONCE
Status: COMPLETED | OUTPATIENT
Start: 2018-10-18 | End: 2018-10-18

## 2018-10-18 RX ORDER — BUMETANIDE 0.25 MG/ML
INJECTION, SOLUTION INTRAMUSCULAR; INTRAVENOUS
Status: COMPLETED
Start: 2018-10-18 | End: 2018-10-18

## 2018-10-18 RX ORDER — FENTANYL CITRATE 50 UG/ML
100 INJECTION, SOLUTION INTRAMUSCULAR; INTRAVENOUS ONCE
Status: COMPLETED | OUTPATIENT
Start: 2018-10-18 | End: 2018-10-18

## 2018-10-18 RX ORDER — SODIUM CHLORIDE 0.9 % (FLUSH) 0.9 %
10 SYRINGE (ML) INJECTION PRN
Status: DISCONTINUED | OUTPATIENT
Start: 2018-10-18 | End: 2018-11-05 | Stop reason: HOSPADM

## 2018-10-18 RX ORDER — LORAZEPAM 2 MG/ML
INJECTION INTRAMUSCULAR
Status: COMPLETED
Start: 2018-10-18 | End: 2018-10-18

## 2018-10-18 RX ORDER — FENTANYL CITRATE 50 UG/ML
50 INJECTION, SOLUTION INTRAMUSCULAR; INTRAVENOUS
Status: DISCONTINUED | OUTPATIENT
Start: 2018-10-18 | End: 2018-10-31

## 2018-10-18 RX ORDER — LORAZEPAM 2 MG/ML
2 INJECTION INTRAMUSCULAR
Status: DISCONTINUED | OUTPATIENT
Start: 2018-10-18 | End: 2018-11-05 | Stop reason: HOSPADM

## 2018-10-18 RX ADMIN — AMIODARONE HYDROCHLORIDE 150 MG: 1.5 INJECTION, SOLUTION INTRAVENOUS at 04:19

## 2018-10-18 RX ADMIN — DIPHENHYDRAMINE HYDROCHLORIDE 50 MG: 50 INJECTION, SOLUTION INTRAMUSCULAR; INTRAVENOUS at 04:02

## 2018-10-18 RX ADMIN — SODIUM BICARBONATE: 84 INJECTION, SOLUTION INTRAVENOUS at 07:54

## 2018-10-18 RX ADMIN — Medication 10 ML: at 22:55

## 2018-10-18 RX ADMIN — FENTANYL CITRATE 100 MCG: 50 INJECTION, SOLUTION INTRAMUSCULAR; INTRAVENOUS at 11:30

## 2018-10-18 RX ADMIN — IPRATROPIUM BROMIDE AND ALBUTEROL SULFATE 1 AMPULE: .5; 3 SOLUTION RESPIRATORY (INHALATION) at 04:15

## 2018-10-18 RX ADMIN — DEXMEDETOMIDINE 1.4 MCG/KG/HR: 100 INJECTION, SOLUTION, CONCENTRATE INTRAVENOUS at 12:17

## 2018-10-18 RX ADMIN — MIDODRINE HYDROCHLORIDE 10 MG: 10 TABLET ORAL at 12:20

## 2018-10-18 RX ADMIN — FUROSEMIDE 40 MG: 10 INJECTION, SOLUTION INTRAMUSCULAR; INTRAVENOUS at 07:10

## 2018-10-18 RX ADMIN — POTASSIUM CHLORIDE 20 MEQ: 400 INJECTION, SOLUTION INTRAVENOUS at 09:12

## 2018-10-18 RX ADMIN — EPINEPHRINE 10 MCG/MIN: 1 INJECTION, SOLUTION, CONCENTRATE INTRAVENOUS at 06:51

## 2018-10-18 RX ADMIN — DEXMEDETOMIDINE 1.4 MCG/KG/HR: 100 INJECTION, SOLUTION, CONCENTRATE INTRAVENOUS at 15:50

## 2018-10-18 RX ADMIN — BUMETANIDE 2 MG: 0.25 INJECTION INTRAMUSCULAR; INTRAVENOUS at 11:48

## 2018-10-18 RX ADMIN — Medication 10 ML: at 22:57

## 2018-10-18 RX ADMIN — BUMETANIDE 0.2 MG/HR: 0.25 INJECTION INTRAMUSCULAR; INTRAVENOUS at 20:02

## 2018-10-18 RX ADMIN — LORAZEPAM 2 MG: 2 INJECTION INTRAMUSCULAR; INTRAVENOUS at 22:51

## 2018-10-18 RX ADMIN — IPRATROPIUM BROMIDE AND ALBUTEROL SULFATE 1 AMPULE: .5; 3 SOLUTION RESPIRATORY (INHALATION) at 20:41

## 2018-10-18 RX ADMIN — MORPHINE SULFATE 2 MG: 2 INJECTION, SOLUTION INTRAMUSCULAR; INTRAVENOUS at 18:07

## 2018-10-18 RX ADMIN — IPRATROPIUM BROMIDE AND ALBUTEROL SULFATE 1 AMPULE: .5; 3 SOLUTION RESPIRATORY (INHALATION) at 08:38

## 2018-10-18 RX ADMIN — AMIODARONE HYDROCHLORIDE 0.5 MG/MIN: 1.8 INJECTION, SOLUTION INTRAVENOUS at 20:29

## 2018-10-18 RX ADMIN — FENTANYL CITRATE 100 MCG: 50 INJECTION INTRAMUSCULAR; INTRAVENOUS at 11:30

## 2018-10-18 RX ADMIN — DEXMEDETOMIDINE 0.8 MCG/HR: 100 INJECTION, SOLUTION, CONCENTRATE INTRAVENOUS at 08:23

## 2018-10-18 RX ADMIN — DEXMEDETOMIDINE 1.4 MCG/KG/HR: 100 INJECTION, SOLUTION, CONCENTRATE INTRAVENOUS at 22:30

## 2018-10-18 RX ADMIN — ATORVASTATIN CALCIUM 20 MG: 20 TABLET, FILM COATED ORAL at 21:30

## 2018-10-18 RX ADMIN — INSULIN LISPRO 2 UNITS: 100 INJECTION, SOLUTION INTRAVENOUS; SUBCUTANEOUS at 00:23

## 2018-10-18 RX ADMIN — AMIODARONE HYDROCHLORIDE 0.5 MG/MIN: 1.8 INJECTION, SOLUTION INTRAVENOUS at 09:19

## 2018-10-18 RX ADMIN — FENTANYL CITRATE 50 MCG: 50 INJECTION INTRAMUSCULAR; INTRAVENOUS at 22:50

## 2018-10-18 RX ADMIN — IPRATROPIUM BROMIDE AND ALBUTEROL SULFATE 1 AMPULE: .5; 3 SOLUTION RESPIRATORY (INHALATION) at 22:42

## 2018-10-18 RX ADMIN — DEXMEDETOMIDINE 0.2 MCG/KG/HR: 100 INJECTION, SOLUTION, CONCENTRATE INTRAVENOUS at 00:19

## 2018-10-18 RX ADMIN — Medication 10 ML: at 20:00

## 2018-10-18 RX ADMIN — VASOPRESSIN 0.04 UNITS/MIN: 20 INJECTION INTRAVENOUS at 03:55

## 2018-10-18 RX ADMIN — Medication 15 ML: at 21:26

## 2018-10-18 RX ADMIN — IPRATROPIUM BROMIDE AND ALBUTEROL SULFATE 1 AMPULE: .5; 3 SOLUTION RESPIRATORY (INHALATION) at 16:29

## 2018-10-18 RX ADMIN — VASOPRESSIN 0.04 UNITS/MIN: 20 INJECTION INTRAVENOUS at 11:38

## 2018-10-18 RX ADMIN — FENTANYL CITRATE 50 MCG: 50 INJECTION INTRAMUSCULAR; INTRAVENOUS at 19:53

## 2018-10-18 RX ADMIN — BUMETANIDE 2 MG: 0.25 INJECTION, SOLUTION INTRAMUSCULAR; INTRAVENOUS at 11:48

## 2018-10-18 RX ADMIN — FAMOTIDINE 20 MG: 20 TABLET ORAL at 07:53

## 2018-10-18 RX ADMIN — MORPHINE SULFATE 2 MG: 2 INJECTION, SOLUTION INTRAMUSCULAR; INTRAVENOUS at 04:02

## 2018-10-18 RX ADMIN — FENTANYL CITRATE 50 MCG: 50 INJECTION INTRAMUSCULAR; INTRAVENOUS at 13:19

## 2018-10-18 RX ADMIN — POTASSIUM CHLORIDE 20 MEQ: 400 INJECTION, SOLUTION INTRAVENOUS at 08:31

## 2018-10-18 RX ADMIN — MIDODRINE HYDROCHLORIDE 10 MG: 10 TABLET ORAL at 07:49

## 2018-10-18 RX ADMIN — DEXMEDETOMIDINE 1.4 MCG/KG/HR: 100 INJECTION, SOLUTION, CONCENTRATE INTRAVENOUS at 18:26

## 2018-10-18 RX ADMIN — Medication 15 ML: at 07:53

## 2018-10-18 RX ADMIN — IPRATROPIUM BROMIDE AND ALBUTEROL SULFATE 1 AMPULE: .5; 3 SOLUTION RESPIRATORY (INHALATION) at 13:15

## 2018-10-18 RX ADMIN — EPINEPHRINE 5 MCG/MIN: 1 INJECTION, SOLUTION, CONCENTRATE INTRAVENOUS at 21:43

## 2018-10-18 RX ADMIN — MULTIPLE VITAMINS W/ MINERALS TAB 1 TABLET: TAB at 07:49

## 2018-10-18 RX ADMIN — MIDODRINE HYDROCHLORIDE 10 MG: 10 TABLET ORAL at 16:50

## 2018-10-18 RX ADMIN — FENTANYL CITRATE 50 MCG: 50 INJECTION INTRAMUSCULAR; INTRAVENOUS at 16:47

## 2018-10-18 RX ADMIN — CALCIUM GLUCONATE 1.5 G: 98 INJECTION, SOLUTION INTRAVENOUS at 16:52

## 2018-10-18 RX ADMIN — LORAZEPAM 2 MG: 2 INJECTION INTRAMUSCULAR; INTRAVENOUS at 20:13

## 2018-10-18 RX ADMIN — SODIUM CHLORIDE 250 ML: 9 INJECTION, SOLUTION INTRAVENOUS at 06:53

## 2018-10-18 ASSESSMENT — PULMONARY FUNCTION TESTS
PIF_VALUE: 33
PIF_VALUE: 34
PIF_VALUE: 14
PIF_VALUE: 34
PIF_VALUE: 31
PIF_VALUE: 33
PIF_VALUE: 23
PIF_VALUE: 33
PIF_VALUE: 33
PIF_VALUE: 34
PIF_VALUE: 15
PIF_VALUE: 34
PIF_VALUE: 31
PIF_VALUE: 32
PIF_VALUE: 32
PIF_VALUE: 19

## 2018-10-18 ASSESSMENT — PAIN SCALES - WONG BAKER
WONGBAKER_NUMERICALRESPONSE: 4

## 2018-10-18 ASSESSMENT — PAIN SCALES - GENERAL
PAINLEVEL_OUTOF10: 4
PAINLEVEL_OUTOF10: 9
PAINLEVEL_OUTOF10: 6
PAINLEVEL_OUTOF10: 4
PAINLEVEL_OUTOF10: 10

## 2018-10-18 NOTE — PLAN OF CARE
Problem: Discharge Planning:  Goal: Discharged to appropriate level of care  Discharged to appropriate level of care   Outcome: Not Met This Shift  Pt continues appropriate for ICU care      Problem: Anxiety:  Goal: Level of anxiety will decrease  Level of anxiety will decrease   Outcome: Met This Shift  Pt is calm and cooperative with vent    Problem: Cardiac Output - Decreased:  Goal: Cardiac output within specified parameters  Cardiac output within specified parameters   Outcome: Ongoing  Pt with decreased urine output. Goal: Hemodynamic stability will improve  Hemodynamic stability will improve   Outcome: Ongoing  Urine output decreased, bowel sounds hypoactive    Problem: Fluid Volume - Imbalance:  Goal: Ability to achieve a balanced intake and output will improve  Ability to achieve a balanced intake and output will improve   Outcome: Ongoing  Urine output low  Goal: Chest tube drainage is within specified parameters  Chest tube drainage is within specified parameters   Outcome: Met This Shift  Chest tube output 50ml per each tube during day shift    Problem: Pain:  Goal: Pain level will decrease  Pain level will decrease    Outcome: Met This Shift  Pt  With minimal pain that decreased with tylenol given. Problem: Pain:  Goal: Pain level will decrease  Pain level will decrease    Outcome: Met This Shift  Pt  With minimal pain that decreased with tylenol given. Problem: Risk for Impaired Skin Integrity  Goal: Tissue integrity - skin and mucous membranes  Structural intactness and normal physiological function of skin and  mucous membranes. Outcome: Ongoing  Pt with limited movement at times during day shift due to procedures and instability. No new skin breakdown noted. Problem: Nutrition  Goal: Optimal nutrition therapy  Outcome: Ongoing  Bowel sounds hypoactive. Tube feeding started at 1730 this evening.     Problem: DISCHARGE BARRIERS  Goal: Patient's continuum of care needs are met  Outcome:

## 2018-10-18 NOTE — PROGRESS NOTES
High    Nutrition Interventions:   Continue NPO, Continue current Tube Feeding  Continued Inpatient Monitoring, Education Needed, Coordination of Care (plan cardiac education)    Nutrition Evaluation:   · Evaluation: Progressing toward goals   · Goals: TF will provide % nutrient needs while intubated   · Monitoring: NPO Status, TF Intake, TF Tolerance, Skin Integrity, Wound Healing, Weight, Pertinent Labs, Patient/Family Education    See Adult Nutrition Doc Flowsheet for more detail.      Electronically signed by Josie Murrieta RD, LD on 10/18/18 at 2:13 PM    Contact Number: [phone]

## 2018-10-18 NOTE — PROGRESS NOTES
Pharmacy Renal Adjustment    Eric Chang is a 48 y.o. female. Pharmacy renally adjust the following medications per P&T approved policy: Pepcid and Lovenox    Recent Labs      10/17/18   1020  10/18/18   0405   BUN  35*  52*       Recent Labs      10/17/18   1020  10/18/18   0405   CREATININE  2.3*  2.8*       Estimated Creatinine Clearance: 28 mL/min (A) (based on SCr of 2.8 mg/dL (H)).     Height:   Ht Readings from Last 1 Encounters:   10/15/18 5' 6\" (1.676 m)     Weight:  Wt Readings from Last 1 Encounters:   10/17/18 220 lb 6.4 oz (100 kg)         Plan: Adjustments based on renal function:  -Decrease Pepcid 20 mg BID to Pepcid 20 mg daily  -Decrease Lovenox 40 mg daily to Lovenox 30 mg daily    Sanket ShoemakerD, BCPS  10/18/2018 7:34 AM

## 2018-10-18 NOTE — PLAN OF CARE
Problem: Nutrition  Goal: Optimal nutrition therapy  Outcome: Ongoing  Nutrition Problem: Inadequate oral intake  Intervention: Food and/or Nutrient Delivery: Continue NPO, Continue current Tube Feeding  Nutritional Goals: TF will provide % nutrient needs while intubated

## 2018-10-19 ENCOUNTER — APPOINTMENT (OUTPATIENT)
Dept: GENERAL RADIOLOGY | Age: 53
DRG: 163 | End: 2018-10-19
Attending: THORACIC SURGERY (CARDIOTHORACIC VASCULAR SURGERY)
Payer: COMMERCIAL

## 2018-10-19 ENCOUNTER — ANESTHESIA EVENT (OUTPATIENT)
Dept: ICU | Age: 53
DRG: 163 | End: 2018-10-19
Payer: COMMERCIAL

## 2018-10-19 ENCOUNTER — ANESTHESIA (OUTPATIENT)
Dept: ICU | Age: 53
DRG: 163 | End: 2018-10-19
Payer: COMMERCIAL

## 2018-10-19 LAB
ANION GAP SERPL CALCULATED.3IONS-SCNC: 15 MEQ/L (ref 8–16)
ANION GAP SERPL CALCULATED.3IONS-SCNC: 15 MEQ/L (ref 8–16)
ANION GAP SERPL CALCULATED.3IONS-SCNC: 16 MEQ/L (ref 8–16)
BUN BLDV-MCNC: 56 MG/DL (ref 7–22)
BUN BLDV-MCNC: 57 MG/DL (ref 7–22)
BUN BLDV-MCNC: 57 MG/DL (ref 7–22)
CALCIUM IONIZED: 1.01 MMOL/L (ref 1.12–1.32)
CALCIUM SERPL-MCNC: 7.8 MG/DL (ref 8.5–10.5)
CALCIUM SERPL-MCNC: 8 MG/DL (ref 8.5–10.5)
CALCIUM SERPL-MCNC: 8 MG/DL (ref 8.5–10.5)
CHLORIDE BLD-SCNC: 95 MEQ/L (ref 98–111)
CHLORIDE BLD-SCNC: 96 MEQ/L (ref 98–111)
CHLORIDE BLD-SCNC: 97 MEQ/L (ref 98–111)
CO2: 23 MEQ/L (ref 23–33)
CO2: 24 MEQ/L (ref 23–33)
CO2: 26 MEQ/L (ref 23–33)
CREAT SERPL-MCNC: 3.3 MG/DL (ref 0.4–1.2)
CREAT SERPL-MCNC: 3.3 MG/DL (ref 0.4–1.2)
CREAT SERPL-MCNC: 3.4 MG/DL (ref 0.4–1.2)
EKG ATRIAL RATE: 108 BPM
EKG Q-T INTERVAL: 410 MS
EKG QRS DURATION: 134 MS
EKG QTC CALCULATION (BAZETT): 549 MS
EKG R AXIS: -6 DEGREES
EKG T AXIS: 60 DEGREES
EKG VENTRICULAR RATE: 108 BPM
ERYTHROCYTE [DISTWIDTH] IN BLOOD BY AUTOMATED COUNT: 16.5 % (ref 11.5–14.5)
ERYTHROCYTE [DISTWIDTH] IN BLOOD BY AUTOMATED COUNT: 48.5 FL (ref 35–45)
GFR SERPL CREATININE-BSD FRML MDRD: 14 ML/MIN/1.73M2
GFR SERPL CREATININE-BSD FRML MDRD: 15 ML/MIN/1.73M2
GFR SERPL CREATININE-BSD FRML MDRD: 15 ML/MIN/1.73M2
GLUCOSE BLD-MCNC: 124 MG/DL (ref 70–108)
GLUCOSE BLD-MCNC: 128 MG/DL (ref 70–108)
GLUCOSE BLD-MCNC: 159 MG/DL (ref 70–108)
GLUCOSE, WHOLE BLOOD: 124 MG/DL (ref 70–108)
GLUCOSE, WHOLE BLOOD: 125 MG/DL (ref 70–108)
GLUCOSE, WHOLE BLOOD: 138 MG/DL (ref 70–108)
GLUCOSE, WHOLE BLOOD: 154 MG/DL (ref 70–108)
HCT VFR BLD CALC: 25.6 % (ref 37–47)
HCT VFR BLD CALC: 26.5 % (ref 37–47)
HEMOGLOBIN: 8.9 GM/DL (ref 12–16)
HEMOGLOBIN: 9.1 GM/DL (ref 12–16)
MAGNESIUM: 2.5 MG/DL (ref 1.6–2.4)
MCH RBC QN AUTO: 28.3 PG (ref 26–33)
MCHC RBC AUTO-ENTMCNC: 34.8 GM/DL (ref 32.2–35.5)
MCV RBC AUTO: 81.3 FL (ref 81–99)
PLATELET # BLD: 35 THOU/MM3 (ref 130–400)
PMV BLD AUTO: 12.4 FL (ref 9.4–12.4)
POTASSIUM SERPL-SCNC: 4.5 MEQ/L (ref 3.5–5.2)
POTASSIUM SERPL-SCNC: 4.5 MEQ/L (ref 3.5–5.2)
POTASSIUM SERPL-SCNC: 4.8 MEQ/L (ref 3.5–5.2)
RBC # BLD: 3.15 MILL/MM3 (ref 4.2–5.4)
SODIUM BLD-SCNC: 135 MEQ/L (ref 135–145)
SODIUM BLD-SCNC: 136 MEQ/L (ref 135–145)
SODIUM BLD-SCNC: 136 MEQ/L (ref 135–145)
WBC # BLD: 10.9 THOU/MM3 (ref 4.8–10.8)

## 2018-10-19 PROCEDURE — 99291 CRITICAL CARE FIRST HOUR: CPT | Performed by: INTERNAL MEDICINE

## 2018-10-19 PROCEDURE — 2500000003 HC RX 250 WO HCPCS: Performed by: INTERNAL MEDICINE

## 2018-10-19 PROCEDURE — 85014 HEMATOCRIT: CPT

## 2018-10-19 PROCEDURE — 6360000002 HC RX W HCPCS: Performed by: THORACIC SURGERY (CARDIOTHORACIC VASCULAR SURGERY)

## 2018-10-19 PROCEDURE — 31500 INSERT EMERGENCY AIRWAY: CPT | Performed by: ANESTHESIOLOGY

## 2018-10-19 PROCEDURE — 2000000000 HC ICU R&B

## 2018-10-19 PROCEDURE — 93010 ELECTROCARDIOGRAM REPORT: CPT | Performed by: NUCLEAR MEDICINE

## 2018-10-19 PROCEDURE — 82330 ASSAY OF CALCIUM: CPT

## 2018-10-19 PROCEDURE — 6370000000 HC RX 637 (ALT 250 FOR IP): Performed by: THORACIC SURGERY (CARDIOTHORACIC VASCULAR SURGERY)

## 2018-10-19 PROCEDURE — 82947 ASSAY GLUCOSE BLOOD QUANT: CPT

## 2018-10-19 PROCEDURE — 2580000003 HC RX 258: Performed by: INTERNAL MEDICINE

## 2018-10-19 PROCEDURE — 36415 COLL VENOUS BLD VENIPUNCTURE: CPT

## 2018-10-19 PROCEDURE — 2500000003 HC RX 250 WO HCPCS: Performed by: THORACIC SURGERY (CARDIOTHORACIC VASCULAR SURGERY)

## 2018-10-19 PROCEDURE — 6360000002 HC RX W HCPCS: Performed by: INTERNAL MEDICINE

## 2018-10-19 PROCEDURE — 83735 ASSAY OF MAGNESIUM: CPT

## 2018-10-19 PROCEDURE — 80048 BASIC METABOLIC PNL TOTAL CA: CPT

## 2018-10-19 PROCEDURE — 92960 CARDIOVERSION ELECTRIC EXT: CPT | Performed by: INTERNAL MEDICINE

## 2018-10-19 PROCEDURE — 2709999900 HC NON-CHARGEABLE SUPPLY

## 2018-10-19 PROCEDURE — 36592 COLLECT BLOOD FROM PICC: CPT

## 2018-10-19 PROCEDURE — 6370000000 HC RX 637 (ALT 250 FOR IP): Performed by: INTERNAL MEDICINE

## 2018-10-19 PROCEDURE — 2500000003 HC RX 250 WO HCPCS: Performed by: NURSE PRACTITIONER

## 2018-10-19 PROCEDURE — 36620 INSERTION CATHETER ARTERY: CPT

## 2018-10-19 PROCEDURE — 2580000003 HC RX 258: Performed by: THORACIC SURGERY (CARDIOTHORACIC VASCULAR SURGERY)

## 2018-10-19 PROCEDURE — 71045 X-RAY EXAM CHEST 1 VIEW: CPT

## 2018-10-19 PROCEDURE — 2700000000 HC OXYGEN THERAPY PER DAY

## 2018-10-19 PROCEDURE — 94770 HC ETCO2 MONITOR DAILY: CPT

## 2018-10-19 PROCEDURE — 85018 HEMOGLOBIN: CPT

## 2018-10-19 PROCEDURE — 94640 AIRWAY INHALATION TREATMENT: CPT

## 2018-10-19 PROCEDURE — 85027 COMPLETE CBC AUTOMATED: CPT

## 2018-10-19 PROCEDURE — 6360000002 HC RX W HCPCS

## 2018-10-19 PROCEDURE — 5A2204Z RESTORATION OF CARDIAC RHYTHM, SINGLE: ICD-10-PCS | Performed by: INTERNAL MEDICINE

## 2018-10-19 PROCEDURE — 93005 ELECTROCARDIOGRAM TRACING: CPT | Performed by: INTERNAL MEDICINE

## 2018-10-19 PROCEDURE — 94003 VENT MGMT INPAT SUBQ DAY: CPT

## 2018-10-19 RX ORDER — MIDAZOLAM HYDROCHLORIDE 1 MG/ML
INJECTION INTRAMUSCULAR; INTRAVENOUS
Status: COMPLETED
Start: 2018-10-19 | End: 2018-10-19

## 2018-10-19 RX ORDER — ADENOSINE 3 MG/ML
6 INJECTION, SOLUTION INTRAVENOUS ONCE
Status: COMPLETED | OUTPATIENT
Start: 2018-10-19 | End: 2018-10-19

## 2018-10-19 RX ORDER — ADENOSINE 3 MG/ML
INJECTION, SOLUTION INTRAVENOUS
Status: COMPLETED
Start: 2018-10-19 | End: 2018-10-19

## 2018-10-19 RX ORDER — MIDAZOLAM HYDROCHLORIDE 1 MG/ML
5 INJECTION INTRAMUSCULAR; INTRAVENOUS ONCE
Status: COMPLETED | OUTPATIENT
Start: 2018-10-19 | End: 2018-10-19

## 2018-10-19 RX ADMIN — MIDAZOLAM HYDROCHLORIDE 5 MG: 1 INJECTION INTRAMUSCULAR; INTRAVENOUS at 08:36

## 2018-10-19 RX ADMIN — AMIODARONE HYDROCHLORIDE 0.5 MG/MIN: 1.8 INJECTION, SOLUTION INTRAVENOUS at 08:46

## 2018-10-19 RX ADMIN — IPRATROPIUM BROMIDE 0.5 MG: 0.5 SOLUTION RESPIRATORY (INHALATION) at 16:42

## 2018-10-19 RX ADMIN — LORAZEPAM 2 MG: 2 INJECTION INTRAMUSCULAR; INTRAVENOUS at 14:00

## 2018-10-19 RX ADMIN — FAMOTIDINE 20 MG: 20 TABLET ORAL at 08:22

## 2018-10-19 RX ADMIN — MIDAZOLAM HYDROCHLORIDE 5 MG: 2 INJECTION, SOLUTION INTRAMUSCULAR; INTRAVENOUS at 08:36

## 2018-10-19 RX ADMIN — MULTIPLE VITAMINS W/ MINERALS TAB 1 TABLET: TAB at 11:58

## 2018-10-19 RX ADMIN — ENOXAPARIN SODIUM 30 MG: 30 INJECTION SUBCUTANEOUS at 08:21

## 2018-10-19 RX ADMIN — FENTANYL CITRATE 50 MCG: 50 INJECTION INTRAMUSCULAR; INTRAVENOUS at 14:44

## 2018-10-19 RX ADMIN — Medication 17.6 MG: at 22:04

## 2018-10-19 RX ADMIN — ATORVASTATIN CALCIUM 20 MG: 20 TABLET, FILM COATED ORAL at 21:38

## 2018-10-19 RX ADMIN — DOCUSATE SODIUM 100 MG: 50 LIQUID ORAL at 22:04

## 2018-10-19 RX ADMIN — Medication 15 ML: at 21:38

## 2018-10-19 RX ADMIN — SODIUM BICARBONATE: 84 INJECTION, SOLUTION INTRAVENOUS at 08:52

## 2018-10-19 RX ADMIN — FENTANYL CITRATE 50 MCG: 50 INJECTION INTRAMUSCULAR; INTRAVENOUS at 08:22

## 2018-10-19 RX ADMIN — INSULIN LISPRO 3 UNITS: 100 INJECTION, SOLUTION INTRAVENOUS; SUBCUTANEOUS at 06:13

## 2018-10-19 RX ADMIN — OXYCODONE HYDROCHLORIDE 10 MG: 5 TABLET ORAL at 21:38

## 2018-10-19 RX ADMIN — DEXMEDETOMIDINE 1.4 MCG/KG/HR: 100 INJECTION, SOLUTION, CONCENTRATE INTRAVENOUS at 18:32

## 2018-10-19 RX ADMIN — DEXMEDETOMIDINE 1.4 MCG/KG/HR: 100 INJECTION, SOLUTION, CONCENTRATE INTRAVENOUS at 01:51

## 2018-10-19 RX ADMIN — IPRATROPIUM BROMIDE AND ALBUTEROL SULFATE 1 AMPULE: .5; 3 SOLUTION RESPIRATORY (INHALATION) at 12:18

## 2018-10-19 RX ADMIN — ACETAMINOPHEN 650 MG: 325 TABLET ORAL at 21:38

## 2018-10-19 RX ADMIN — DEXMEDETOMIDINE 1.4 MCG/KG/HR: 100 INJECTION, SOLUTION, CONCENTRATE INTRAVENOUS at 21:37

## 2018-10-19 RX ADMIN — MIDODRINE HYDROCHLORIDE 10 MG: 10 TABLET ORAL at 11:59

## 2018-10-19 RX ADMIN — FENTANYL CITRATE 50 MCG: 50 INJECTION INTRAMUSCULAR; INTRAVENOUS at 02:00

## 2018-10-19 RX ADMIN — ADENOSINE 6 MG: 3 INJECTION, SOLUTION INTRAVENOUS at 12:45

## 2018-10-19 RX ADMIN — FENTANYL CITRATE 50 MCG: 50 INJECTION INTRAMUSCULAR; INTRAVENOUS at 11:41

## 2018-10-19 RX ADMIN — FENTANYL CITRATE 50 MCG: 50 INJECTION INTRAMUSCULAR; INTRAVENOUS at 04:33

## 2018-10-19 RX ADMIN — AMIODARONE HYDROCHLORIDE 150 MG: 1.5 INJECTION, SOLUTION INTRAVENOUS at 13:22

## 2018-10-19 RX ADMIN — Medication 10 ML: at 04:34

## 2018-10-19 RX ADMIN — Medication 15 ML: at 08:21

## 2018-10-19 RX ADMIN — METOPROLOL TARTRATE 2.5 MG: 1 INJECTION, SOLUTION INTRAVENOUS at 13:02

## 2018-10-19 RX ADMIN — IPRATROPIUM BROMIDE AND ALBUTEROL SULFATE 1 AMPULE: .5; 3 SOLUTION RESPIRATORY (INHALATION) at 03:53

## 2018-10-19 RX ADMIN — DEXMEDETOMIDINE 1.4 MCG/KG/HR: 100 INJECTION, SOLUTION, CONCENTRATE INTRAVENOUS at 08:45

## 2018-10-19 RX ADMIN — Medication 10 ML: at 21:38

## 2018-10-19 RX ADMIN — IPRATROPIUM BROMIDE 0.5 MG: 0.5 SOLUTION RESPIRATORY (INHALATION) at 19:55

## 2018-10-19 RX ADMIN — ASPIRIN 325 MG: 325 TABLET, DELAYED RELEASE ORAL at 08:22

## 2018-10-19 RX ADMIN — SODIUM CHLORIDE 0.25 MCG/KG/MIN: 9 INJECTION, SOLUTION INTRAVENOUS at 08:45

## 2018-10-19 RX ADMIN — Medication 10 ML: at 02:00

## 2018-10-19 RX ADMIN — DEXMEDETOMIDINE 1.4 MCG/KG/HR: 100 INJECTION, SOLUTION, CONCENTRATE INTRAVENOUS at 05:09

## 2018-10-19 RX ADMIN — MIDODRINE HYDROCHLORIDE 10 MG: 10 TABLET ORAL at 16:47

## 2018-10-19 RX ADMIN — DEXMEDETOMIDINE 1.4 MCG/KG/HR: 100 INJECTION, SOLUTION, CONCENTRATE INTRAVENOUS at 15:35

## 2018-10-19 RX ADMIN — Medication 10 ML: at 02:04

## 2018-10-19 RX ADMIN — DEXMEDETOMIDINE 1.4 MCG/KG/HR: 100 INJECTION, SOLUTION, CONCENTRATE INTRAVENOUS at 11:59

## 2018-10-19 RX ADMIN — SODIUM CHLORIDE: 9 INJECTION, SOLUTION INTRAVENOUS at 07:31

## 2018-10-19 RX ADMIN — EPINEPHRINE 3 MCG/MIN: 1 INJECTION, SOLUTION, CONCENTRATE INTRAVENOUS at 18:18

## 2018-10-19 RX ADMIN — IPRATROPIUM BROMIDE AND ALBUTEROL SULFATE 1 AMPULE: .5; 3 SOLUTION RESPIRATORY (INHALATION) at 07:46

## 2018-10-19 RX ADMIN — MIDODRINE HYDROCHLORIDE 10 MG: 10 TABLET ORAL at 09:32

## 2018-10-19 RX ADMIN — AMIODARONE HYDROCHLORIDE 0.5 MG/MIN: 1.8 INJECTION, SOLUTION INTRAVENOUS at 21:42

## 2018-10-19 ASSESSMENT — PULMONARY FUNCTION TESTS
PIF_VALUE: 5
PIF_VALUE: 5
PIF_VALUE: 26
PIF_VALUE: 25
PIF_VALUE: 5
PIF_VALUE: 27
PIF_VALUE: 30
PIF_VALUE: 31
PIF_VALUE: 5
PIF_VALUE: 26
PIF_VALUE: 22
PIF_VALUE: 29
PIF_VALUE: 22

## 2018-10-19 ASSESSMENT — PAIN SCALES - GENERAL
PAINLEVEL_OUTOF10: 7
PAINLEVEL_OUTOF10: 4
PAINLEVEL_OUTOF10: 7
PAINLEVEL_OUTOF10: 4
PAINLEVEL_OUTOF10: 7

## 2018-10-19 NOTE — PROGRESS NOTES
[id number] received from previous RN. Pt alert/anxious/restless, states \"yes\" to pain, also states \"I'm going to die\" repeatedly. Wide-eyed and appears terrified. TLC given, daughter here reassuring pt as well. Face red, gagging and coughing when gets worked up trying to talk around her ETT. Precedex cont at 1.4 mcgs, medicated with Fentanyl prn. Will reassess for use of Ativan if ineffective. Able to follow commands x4 exts prior to medicating. 2013-Medicated with Ativan prn d/t cont agitation and anxiety. Cont with coughing and gagging, and resultant facial reddening, PA pressures elevated with all of this as well. 2240-Pt again restless and fearful, shakes head \"yes\" to pain, much gagging on ETT, not redirectable with verbal cues or reassurance, medicated for pain and agitation with Fentanyl and Ativan. Precedex cont at 1.4 mcgs as well.

## 2018-10-19 NOTE — PROGRESS NOTES
epinephrine, midodrine:      Reviewed with ICU Staff     Seen with multidisciplinary ICU team     Rebeca Rutherford MD

## 2018-10-19 NOTE — FLOWSHEET NOTE
1419 Dr. Cecile Thornton at bedside for cardioversion. 5mg Versed given. 50J synchronized cardioverted. HR decreased to 108. EKG ordered.

## 2018-10-19 NOTE — PROGRESS NOTES
CT/CV Surgery Progress Note    10/19/2018 10:22 AM    Surgeon:  Dr. Fariba Razo     Procedure:    Redo sternotomy, Mitral valve repair, Left atrial Maze procedure          POD #:     4      Remains intubated,does arouse and follow commands  Precedex gtt  Epi 5mcq/min  Amio 0.5mg/min  Milrinone 0.25mcq/kg/min  Aflutter at 130-140, cardioverted yesterday to a-fib rate of 100  Bumex gtt  Hgb stable  Vent FiO2 60%, Peep 12, not weaning vent at this time        Vital Signs: /68   Pulse 106   Temp 98.8 °F (37.1 °C) (Core)   Resp 20   Ht 5' 6\" (1.676 m)   Wt 227 lb 1.2 oz (103 kg)   SpO2 97%   BMI 36.65 kg/m²    Temp (24hrs), Av.9 °F (37.2 °C), Min:98.4 °F (36.9 °C), Max:100 °F (37.8 °C)      Weight: 227 lb 1.2 oz (103 kg)       PULSE OXIMETRY RANGE: SpO2  Av.8 %  Min: 92 %  Max: 99 %  SUPPLEMENTAL O2: O2 Flow Rate (L/min): 5 L/min     Labs:   CBC:     Recent Labs      10/18/18   0405  10/18/18   1417  10/18/18   2205  10/19/18   0150  10/19/18   0605   WBC  12.4*  12.4*   --    --   10.9*   HGB  5.7*  9.5*  9.4*  9.1*  8.9*   HCT  17.8*  27.5*  26.7*  26.5*  25.6*   MCV  83.6  81.1   --    --   81.3   PLT  48*  43*   --    --   35*     BMP: Recent Labs      10/17/18   1020   10/18/18   0405   10/18/18   2205  10/19/18   0150  10/19/18   0605   NA  133*   --   135   < >  136  135  136   K  6.3*   < >  3.9   < >  4.7  4.8  4.5   CL  100   --   98   < >  98  96*  97*   CO2  14*   --   23   < >  22*  23  24   BUN  35*   --   52*   < >  55*  56*  57*   CREATININE  2.3*   --   2.8*   < >  3.5*  3.3*  3.4*   MG  3.1*   --   2.6*   --    --    --   2.5*    < > = values in this interval not displayed. Last HgA1C:    Lab Results   Component Value Date    LABA1C 5.9 10/10/2018     PT/INR:No results for input(s): PROTIME, INR in the last 72 hours. APTT: No results for input(s): APTT in the last 72 hours.         Intake/Output Summary (Last 24 hours) at 10/19/18 1022  Last data filed at 10/19/18 0500   Gross per 24 hour   Intake          5535.45 ml   Output             2620 ml   Net          2915.45 ml       Scheduled Meds:    sodium chloride  250 mL Intravenous Once    enoxaparin  30 mg Subcutaneous Daily    famotidine  20 mg Oral Daily    insulin lispro  0-18 Units Subcutaneous Q6H    midodrine  10 mg Oral TID WC    chlorhexidine  15 mL Mouth/Throat BID    ipratropium-albuterol  1 ampule Inhalation Q4H WA    sodium chloride flush  10 mL Intravenous 2 times per day    calcium replacement protocol   Other RX Placeholder    docusate sodium  100 mg Oral BID    mupirocin   Topical Once    therapeutic multivitamin-minerals  1 tablet Oral Daily with breakfast    atorvastatin  20 mg Oral Nightly    aspirin  325 mg Oral Daily       Review of Systems    Exam:   General Appearance: intubated, sedated on precedex, follows commands  Cardiovascular: a-flutter, pericardial friction rub  Pulmonary/Chest: rhonchi  Abdomen: soft, non-tender, non-distended, normal bowel sounds, no bruits,   Extremities: no cyanosis, clubbing.  +3-4 edema upper extremities  Pulses: Radial, DP, and PT pulses intact. Right Leg: DP: present 1+, PT: present 1+                   Left Leg: DP: present 1+, PT: present 1+   Skin: warm and dry, no rash or erythema. Sternotomy incision clean and dry  Head: normocephalic and atraumatic  Eyes: pupils equal, round, and reactive to light  Neck: supple and non-tender without mass, no thyromegaly, no JVD   CXR clear expanded lung fields but right lower lung field interstitial infiltrates. Assessment:     Patient Active Problem List   Diagnosis    PAF (paroxysmal atrial fibrillation) ([facility])    Chronic diastolic congestive heart failure ([address]    Dilated cardiomyopathy ([facility])-tachymediated    [employer] dual pacer    Persistent atrial fibrillation ([address]    Acute hypoxemic respiratory failure ([address]                 Plan: 10/19/18  1. Dr. Pichardo Speaks to cardiovert  2. Continue Bumex  3.  Vent management per Dr. Arias Sol  4. Thrombocytopenia, plt ct.  35    The plan of care was discussed in detail with Dr. Susan Grant, APRN - CNP

## 2018-10-20 ENCOUNTER — APPOINTMENT (OUTPATIENT)
Dept: GENERAL RADIOLOGY | Age: 53
DRG: 163 | End: 2018-10-20
Attending: THORACIC SURGERY (CARDIOTHORACIC VASCULAR SURGERY)
Payer: COMMERCIAL

## 2018-10-20 LAB
ANION GAP SERPL CALCULATED.3IONS-SCNC: 16 MEQ/L (ref 8–16)
ANION GAP SERPL CALCULATED.3IONS-SCNC: 18 MEQ/L (ref 8–16)
BUN BLDV-MCNC: 62 MG/DL (ref 7–22)
BUN BLDV-MCNC: 64 MG/DL (ref 7–22)
CALCIUM SERPL-MCNC: 8.1 MG/DL (ref 8.5–10.5)
CALCIUM SERPL-MCNC: 8.1 MG/DL (ref 8.5–10.5)
CHLORIDE BLD-SCNC: 93 MEQ/L (ref 98–111)
CHLORIDE BLD-SCNC: 95 MEQ/L (ref 98–111)
CO2: 28 MEQ/L (ref 23–33)
CO2: 29 MEQ/L (ref 23–33)
CREAT SERPL-MCNC: 3.6 MG/DL (ref 0.4–1.2)
CREAT SERPL-MCNC: 3.9 MG/DL (ref 0.4–1.2)
ERYTHROCYTE [DISTWIDTH] IN BLOOD BY AUTOMATED COUNT: 16.6 % (ref 11.5–14.5)
ERYTHROCYTE [DISTWIDTH] IN BLOOD BY AUTOMATED COUNT: 48.4 FL (ref 35–45)
GFR SERPL CREATININE-BSD FRML MDRD: 12 ML/MIN/1.73M2
GFR SERPL CREATININE-BSD FRML MDRD: 13 ML/MIN/1.73M2
GLUCOSE BLD-MCNC: 120 MG/DL (ref 70–108)
GLUCOSE BLD-MCNC: 134 MG/DL (ref 70–108)
GLUCOSE, WHOLE BLOOD: 104 MG/DL (ref 70–108)
GLUCOSE, WHOLE BLOOD: 116 MG/DL (ref 70–108)
GLUCOSE, WHOLE BLOOD: 120 MG/DL (ref 70–108)
HCT VFR BLD CALC: 25.6 % (ref 37–47)
HEMOGLOBIN: 8.9 GM/DL (ref 12–16)
MAGNESIUM: 2.3 MG/DL (ref 1.6–2.4)
MCH RBC QN AUTO: 27.8 PG (ref 26–33)
MCHC RBC AUTO-ENTMCNC: 34.8 GM/DL (ref 32.2–35.5)
MCV RBC AUTO: 80 FL (ref 81–99)
PLATELET # BLD: 43 THOU/MM3 (ref 130–400)
PMV BLD AUTO: 12.9 FL (ref 9.4–12.4)
POTASSIUM SERPL-SCNC: 4.2 MEQ/L (ref 3.5–5.2)
POTASSIUM SERPL-SCNC: 4.8 MEQ/L (ref 3.5–5.2)
RBC # BLD: 3.2 MILL/MM3 (ref 4.2–5.4)
SODIUM BLD-SCNC: 139 MEQ/L (ref 135–145)
SODIUM BLD-SCNC: 140 MEQ/L (ref 135–145)
WBC # BLD: 8 THOU/MM3 (ref 4.8–10.8)

## 2018-10-20 PROCEDURE — 2709999900 HC NON-CHARGEABLE SUPPLY

## 2018-10-20 PROCEDURE — 94770 HC ETCO2 MONITOR DAILY: CPT

## 2018-10-20 PROCEDURE — 6360000002 HC RX W HCPCS: Performed by: THORACIC SURGERY (CARDIOTHORACIC VASCULAR SURGERY)

## 2018-10-20 PROCEDURE — 87086 URINE CULTURE/COLONY COUNT: CPT

## 2018-10-20 PROCEDURE — 2500000003 HC RX 250 WO HCPCS: Performed by: THORACIC SURGERY (CARDIOTHORACIC VASCULAR SURGERY)

## 2018-10-20 PROCEDURE — 2580000003 HC RX 258: Performed by: THORACIC SURGERY (CARDIOTHORACIC VASCULAR SURGERY)

## 2018-10-20 PROCEDURE — 83735 ASSAY OF MAGNESIUM: CPT

## 2018-10-20 PROCEDURE — 6360000002 HC RX W HCPCS: Performed by: INTERNAL MEDICINE

## 2018-10-20 PROCEDURE — 6370000000 HC RX 637 (ALT 250 FOR IP): Performed by: THORACIC SURGERY (CARDIOTHORACIC VASCULAR SURGERY)

## 2018-10-20 PROCEDURE — 94003 VENT MGMT INPAT SUBQ DAY: CPT

## 2018-10-20 PROCEDURE — 87070 CULTURE OTHR SPECIMN AEROBIC: CPT

## 2018-10-20 PROCEDURE — 2500000003 HC RX 250 WO HCPCS: Performed by: NURSE PRACTITIONER

## 2018-10-20 PROCEDURE — 85027 COMPLETE CBC AUTOMATED: CPT

## 2018-10-20 PROCEDURE — 80048 BASIC METABOLIC PNL TOTAL CA: CPT

## 2018-10-20 PROCEDURE — 82947 ASSAY GLUCOSE BLOOD QUANT: CPT

## 2018-10-20 PROCEDURE — 94640 AIRWAY INHALATION TREATMENT: CPT

## 2018-10-20 PROCEDURE — 94645 CONT INHLJ TX EACH ADDL HOUR: CPT

## 2018-10-20 PROCEDURE — 71045 X-RAY EXAM CHEST 1 VIEW: CPT

## 2018-10-20 PROCEDURE — 6370000000 HC RX 637 (ALT 250 FOR IP): Performed by: INTERNAL MEDICINE

## 2018-10-20 PROCEDURE — 2000000000 HC ICU R&B

## 2018-10-20 PROCEDURE — 36415 COLL VENOUS BLD VENIPUNCTURE: CPT

## 2018-10-20 PROCEDURE — 94644 CONT INHLJ TX 1ST HOUR: CPT

## 2018-10-20 PROCEDURE — 87205 SMEAR GRAM STAIN: CPT

## 2018-10-20 PROCEDURE — 36592 COLLECT BLOOD FROM PICC: CPT

## 2018-10-20 PROCEDURE — 2700000000 HC OXYGEN THERAPY PER DAY

## 2018-10-20 RX ORDER — FUROSEMIDE 10 MG/ML
INJECTION INTRAMUSCULAR; INTRAVENOUS
Status: DISPENSED
Start: 2018-10-20 | End: 2018-10-20

## 2018-10-20 RX ORDER — ASPIRIN 325 MG
325 TABLET ORAL DAILY
Status: DISCONTINUED | OUTPATIENT
Start: 2018-10-21 | End: 2018-11-05 | Stop reason: HOSPADM

## 2018-10-20 RX ADMIN — ACETAMINOPHEN 650 MG: 325 TABLET ORAL at 22:08

## 2018-10-20 RX ADMIN — DOCUSATE SODIUM 100 MG: 50 LIQUID ORAL at 22:08

## 2018-10-20 RX ADMIN — MIDODRINE HYDROCHLORIDE 10 MG: 10 TABLET ORAL at 16:35

## 2018-10-20 RX ADMIN — BUMETANIDE 0.2 MG/HR: 0.25 INJECTION INTRAMUSCULAR; INTRAVENOUS at 09:12

## 2018-10-20 RX ADMIN — LORAZEPAM 2 MG: 2 INJECTION INTRAMUSCULAR; INTRAVENOUS at 22:09

## 2018-10-20 RX ADMIN — Medication 15 ML: at 08:58

## 2018-10-20 RX ADMIN — FAMOTIDINE 20 MG: 20 TABLET ORAL at 09:03

## 2018-10-20 RX ADMIN — EPOPROSTENOL 50 NG/KG/MIN: 1.5 INJECTION, POWDER, LYOPHILIZED, FOR SOLUTION INTRAVENOUS at 11:01

## 2018-10-20 RX ADMIN — IPRATROPIUM BROMIDE 0.5 MG: 0.5 SOLUTION RESPIRATORY (INHALATION) at 08:10

## 2018-10-20 RX ADMIN — DEXMEDETOMIDINE 1.4 MCG/KG/HR: 100 INJECTION, SOLUTION, CONCENTRATE INTRAVENOUS at 13:41

## 2018-10-20 RX ADMIN — DEXMEDETOMIDINE 1.2 MCG/KG/HR: 100 INJECTION, SOLUTION, CONCENTRATE INTRAVENOUS at 16:28

## 2018-10-20 RX ADMIN — MAGNESIUM HYDROXIDE 30 ML: 400 SUSPENSION ORAL at 22:08

## 2018-10-20 RX ADMIN — OXYCODONE HYDROCHLORIDE 5 MG: 5 TABLET ORAL at 09:03

## 2018-10-20 RX ADMIN — MIDODRINE HYDROCHLORIDE 10 MG: 10 TABLET ORAL at 09:03

## 2018-10-20 RX ADMIN — Medication 10 ML: at 09:03

## 2018-10-20 RX ADMIN — EPINEPHRINE 4 MCG/MIN: 1 INJECTION, SOLUTION, CONCENTRATE INTRAVENOUS at 13:43

## 2018-10-20 RX ADMIN — ACETAMINOPHEN 650 MG: 325 TABLET ORAL at 17:00

## 2018-10-20 RX ADMIN — MORPHINE SULFATE 2 MG: 2 INJECTION, SOLUTION INTRAMUSCULAR; INTRAVENOUS at 11:39

## 2018-10-20 RX ADMIN — ASPIRIN 325 MG: 325 TABLET, DELAYED RELEASE ORAL at 09:02

## 2018-10-20 RX ADMIN — AMIODARONE HYDROCHLORIDE 0.5 MG/MIN: 1.8 INJECTION, SOLUTION INTRAVENOUS at 09:11

## 2018-10-20 RX ADMIN — DEXMEDETOMIDINE 1.4 MCG/KG/HR: 100 INJECTION, SOLUTION, CONCENTRATE INTRAVENOUS at 00:52

## 2018-10-20 RX ADMIN — Medication 17.6 MG: at 22:08

## 2018-10-20 RX ADMIN — DEXMEDETOMIDINE 1.4 MCG/KG/HR: 100 INJECTION, SOLUTION, CONCENTRATE INTRAVENOUS at 08:11

## 2018-10-20 RX ADMIN — MIDODRINE HYDROCHLORIDE 10 MG: 10 TABLET ORAL at 11:56

## 2018-10-20 RX ADMIN — Medication 15 ML: at 22:08

## 2018-10-20 RX ADMIN — Medication 10 ML: at 22:09

## 2018-10-20 RX ADMIN — MULTIPLE VITAMINS W/ MINERALS TAB 1 TABLET: TAB at 09:03

## 2018-10-20 RX ADMIN — DEXMEDETOMIDINE 1.4 MCG/KG/HR: 100 INJECTION, SOLUTION, CONCENTRATE INTRAVENOUS at 10:49

## 2018-10-20 RX ADMIN — EPOPROSTENOL 50 NG/KG/MIN: 1.5 INJECTION, POWDER, LYOPHILIZED, FOR SOLUTION INTRAVENOUS at 18:45

## 2018-10-20 RX ADMIN — DEXMEDETOMIDINE 1 MCG/KG/HR: 100 INJECTION, SOLUTION, CONCENTRATE INTRAVENOUS at 20:59

## 2018-10-20 RX ADMIN — OXYCODONE HYDROCHLORIDE 10 MG: 5 TABLET ORAL at 22:08

## 2018-10-20 RX ADMIN — DEXMEDETOMIDINE 1.4 MCG/KG/HR: 100 INJECTION, SOLUTION, CONCENTRATE INTRAVENOUS at 04:54

## 2018-10-20 RX ADMIN — Medication 2 PUFF: at 16:30

## 2018-10-20 RX ADMIN — PIPERACILLIN SODIUM,TAZOBACTAM SODIUM 2.25 G: 2; .25 INJECTION, POWDER, FOR SOLUTION INTRAVENOUS at 22:09

## 2018-10-20 RX ADMIN — ATORVASTATIN CALCIUM 20 MG: 20 TABLET, FILM COATED ORAL at 22:08

## 2018-10-20 RX ADMIN — DOCUSATE SODIUM 100 MG: 50 LIQUID ORAL at 09:06

## 2018-10-20 RX ADMIN — AMIODARONE HYDROCHLORIDE 0.5 MG/MIN: 1.8 INJECTION, SOLUTION INTRAVENOUS at 21:51

## 2018-10-20 RX ADMIN — OXYCODONE HYDROCHLORIDE 5 MG: 5 TABLET ORAL at 17:00

## 2018-10-20 RX ADMIN — Medication 2 PUFF: at 20:08

## 2018-10-20 ASSESSMENT — PULMONARY FUNCTION TESTS
PIF_VALUE: 29
PIF_VALUE: 28
PIF_VALUE: 28
PIF_VALUE: 24
PIF_VALUE: 21
PIF_VALUE: 34

## 2018-10-20 ASSESSMENT — PAIN SCALES - WONG BAKER
WONGBAKER_NUMERICALRESPONSE: 0

## 2018-10-20 ASSESSMENT — PAIN SCALES - GENERAL
PAINLEVEL_OUTOF10: 2
PAINLEVEL_OUTOF10: 4
PAINLEVEL_OUTOF10: 4

## 2018-10-20 NOTE — PROGRESS NOTES
Aerogen nebulizer function checked. Nebulizer is in upright position and aerosolization present. Rate of administration of Epoprostenol is 5.93 ml/hour. No issues seen with administration. Back-up Aerogen Solo nebulizer remains at bedside.

## 2018-10-20 NOTE — PROGRESS NOTES
mg, 10 mL, Oral, Daily PRN  ondansetron (ZOFRAN) injection 4 mg, 4 mg, Intravenous, Q6H PRN  enalaprilat (VASOTEC) injection 0.625 mg, 0.625 mg, Intravenous, Q1H PRN  metoprolol (LOPRESSOR) injection 2.5 mg, 2.5 mg, Intravenous, Q10 Min PRN  mupirocin (BACTROBAN) 2 % ointment, , Topical, Once  therapeutic multivitamin-minerals 1 tablet, 1 tablet, Oral, Daily with breakfast  atorvastatin (LIPITOR) tablet 20 mg, 20 mg, Oral, Nightly  aspirin EC tablet 325 mg, 325 mg, Oral, Daily  albumin human 5 % bottle 25 g, 25 g, Intravenous, PRN  glucose (GLUTOSE) 40 % oral gel 15 g, 15 g, Oral, PRN  dextrose 50 % solution 12.5 g, 12.5 g, Intravenous, PRN  glucagon (rDNA) injection 1 mg, 1 mg, Intramuscular, PRN  dextrose 5 % solution, 100 mL/hr, Intravenous, PRN  EPINEPHrine PF 5 mg in dextrose 5 % 250 mL infusion, 1 mcg/min, Intravenous, Continuous    ASSESSMENT AND PLAN    Stable critical. Awaiting for Veletri response.

## 2018-10-20 NOTE — PLAN OF CARE
Problem: Impaired respiratory status  Goal: Will be able to breathe spontaneously, without ventilator support  Will be able to breathe spontaneously, without ventilator support    Outcome: Ongoing  VENTILATOR LIBERATION PROTOCOL    PRE-TRIAL PATIENT ASSESSMENT - COMPLETED AT 0520    Ventilatory Assessment:    PARAMETER CRITERIA FOR WEANING   Reversal  of the acute disease process that prompted intubation: No At least partial or complete reversal   FiO2 : 70 % FIO2 less than or equal to 50%     PEEP less than or equal to 8 cm H2O   Hemodynamic stability: No Dopamine or Dobutamine at 5 mcg/kg/minute or less   Adequate correction of electrolytes, Hgb, and HCT: Yes Within lab range   Neurologic stability: No Ability to cough, voluntarily initiate ventilator effort, cooperate with pulmonary toilet measures     ABG:   Lab Results   Component Value Date    PH 7.42 10/18/2018    PO2 84 10/18/2018    PCO2 38 10/18/2018    HCO3 24 10/18/2018    O2SAT 97 10/18/2018     HGB/WBC:  Lab Results   Component Value Date    HGB 8.9 (L) 10/19/2018    WBC 10.9 (H) 10/19/2018         Vital Signs:    PARAMETER CRITERIA FOR WEANING Meets Criteria   Pulse: 134 Within patient's normal limits / stable Yes   Resp: 15 Less than 35 Yes   BP: (!) 120/94 Within patient's normal limits / minimal pressors (Hemodynamically Stable) No   SpO2: 98 % Greater than or equal to 90% Yes   Temp: 100 °F (37.8 °C) Less than 38. 5oC / 101. 3oF Yes    Sedation/paralytic weaned No     []    Based on this assessment and the Ventilator Liberation Protocol, this patient IS being placed on a Spontaneous Breathing Trial (SBT) at this time. [x]    Based on this assessment and the Ventilator Liberation Protocol, this patient IS NOT being placed on a Spontaneous Breathing Trial (SBT) at this time. COMMENTS:  Pt on PEEP of 12.

## 2018-10-21 ENCOUNTER — APPOINTMENT (OUTPATIENT)
Dept: GENERAL RADIOLOGY | Age: 53
DRG: 163 | End: 2018-10-21
Attending: THORACIC SURGERY (CARDIOTHORACIC VASCULAR SURGERY)
Payer: COMMERCIAL

## 2018-10-21 LAB
ANION GAP SERPL CALCULATED.3IONS-SCNC: 14 MEQ/L (ref 8–16)
BUN BLDV-MCNC: 67 MG/DL (ref 7–22)
CALCIUM SERPL-MCNC: 8.2 MG/DL (ref 8.5–10.5)
CHLORIDE BLD-SCNC: 93 MEQ/L (ref 98–111)
CO2: 31 MEQ/L (ref 23–33)
CREAT SERPL-MCNC: 3.7 MG/DL (ref 0.4–1.2)
ERYTHROCYTE [DISTWIDTH] IN BLOOD BY AUTOMATED COUNT: 17.2 % (ref 11.5–14.5)
ERYTHROCYTE [DISTWIDTH] IN BLOOD BY AUTOMATED COUNT: 50.3 FL (ref 35–45)
GFR SERPL CREATININE-BSD FRML MDRD: 13 ML/MIN/1.73M2
GLUCOSE BLD-MCNC: 156 MG/DL (ref 70–108)
GLUCOSE, WHOLE BLOOD: 125 MG/DL (ref 70–108)
GLUCOSE, WHOLE BLOOD: 137 MG/DL (ref 70–108)
GLUCOSE, WHOLE BLOOD: 140 MG/DL (ref 70–108)
GLUCOSE, WHOLE BLOOD: 146 MG/DL (ref 70–108)
GRAM STAIN RESULT: ABNORMAL
HCT VFR BLD CALC: 25.4 % (ref 37–47)
HEMOGLOBIN: 8.8 GM/DL (ref 12–16)
MAGNESIUM: 2.2 MG/DL (ref 1.6–2.4)
MCH RBC QN AUTO: 28 PG (ref 26–33)
MCHC RBC AUTO-ENTMCNC: 34.6 GM/DL (ref 32.2–35.5)
MCV RBC AUTO: 80.9 FL (ref 81–99)
ORGANISM: ABNORMAL
PLATELET # BLD: 48 THOU/MM3 (ref 130–400)
PMV BLD AUTO: 11.2 FL (ref 9.4–12.4)
POTASSIUM SERPL-SCNC: 3.6 MEQ/L (ref 3.5–5.2)
POTASSIUM SERPL-SCNC: 4.2 MEQ/L (ref 3.5–5.2)
RBC # BLD: 3.14 MILL/MM3 (ref 4.2–5.4)
RESPIRATORY CULTURE: ABNORMAL
RESPIRATORY CULTURE: ABNORMAL
SODIUM BLD-SCNC: 138 MEQ/L (ref 135–145)
WBC # BLD: 7.6 THOU/MM3 (ref 4.8–10.8)

## 2018-10-21 PROCEDURE — 83735 ASSAY OF MAGNESIUM: CPT

## 2018-10-21 PROCEDURE — 36415 COLL VENOUS BLD VENIPUNCTURE: CPT

## 2018-10-21 PROCEDURE — 84132 ASSAY OF SERUM POTASSIUM: CPT

## 2018-10-21 PROCEDURE — 71045 X-RAY EXAM CHEST 1 VIEW: CPT

## 2018-10-21 PROCEDURE — 94640 AIRWAY INHALATION TREATMENT: CPT

## 2018-10-21 PROCEDURE — 36592 COLLECT BLOOD FROM PICC: CPT

## 2018-10-21 PROCEDURE — 2700000000 HC OXYGEN THERAPY PER DAY

## 2018-10-21 PROCEDURE — 85027 COMPLETE CBC AUTOMATED: CPT

## 2018-10-21 PROCEDURE — 2500000003 HC RX 250 WO HCPCS: Performed by: NURSE PRACTITIONER

## 2018-10-21 PROCEDURE — 2000000000 HC ICU R&B

## 2018-10-21 PROCEDURE — 94003 VENT MGMT INPAT SUBQ DAY: CPT

## 2018-10-21 PROCEDURE — 6360000002 HC RX W HCPCS: Performed by: THORACIC SURGERY (CARDIOTHORACIC VASCULAR SURGERY)

## 2018-10-21 PROCEDURE — 6360000002 HC RX W HCPCS: Performed by: INTERNAL MEDICINE

## 2018-10-21 PROCEDURE — 6370000000 HC RX 637 (ALT 250 FOR IP): Performed by: THORACIC SURGERY (CARDIOTHORACIC VASCULAR SURGERY)

## 2018-10-21 PROCEDURE — 2580000003 HC RX 258: Performed by: THORACIC SURGERY (CARDIOTHORACIC VASCULAR SURGERY)

## 2018-10-21 PROCEDURE — 2709999900 HC NON-CHARGEABLE SUPPLY

## 2018-10-21 PROCEDURE — 6370000000 HC RX 637 (ALT 250 FOR IP)

## 2018-10-21 PROCEDURE — 6370000000 HC RX 637 (ALT 250 FOR IP): Performed by: INTERNAL MEDICINE

## 2018-10-21 PROCEDURE — 80048 BASIC METABOLIC PNL TOTAL CA: CPT

## 2018-10-21 PROCEDURE — 94645 CONT INHLJ TX EACH ADDL HOUR: CPT

## 2018-10-21 PROCEDURE — 94770 HC ETCO2 MONITOR DAILY: CPT

## 2018-10-21 PROCEDURE — 2500000003 HC RX 250 WO HCPCS: Performed by: THORACIC SURGERY (CARDIOTHORACIC VASCULAR SURGERY)

## 2018-10-21 PROCEDURE — 94760 N-INVAS EAR/PLS OXIMETRY 1: CPT

## 2018-10-21 PROCEDURE — 82947 ASSAY GLUCOSE BLOOD QUANT: CPT

## 2018-10-21 RX ORDER — ACETAMINOPHEN 160 MG/5ML
SOLUTION ORAL
Status: COMPLETED
Start: 2018-10-21 | End: 2018-10-21

## 2018-10-21 RX ORDER — ACETAMINOPHEN 160 MG/5ML
650 SOLUTION ORAL EVERY 4 HOURS PRN
Status: DISCONTINUED | OUTPATIENT
Start: 2018-10-21 | End: 2018-11-05 | Stop reason: HOSPADM

## 2018-10-21 RX ORDER — FLUCONAZOLE 2 MG/ML
200 INJECTION, SOLUTION INTRAVENOUS EVERY 24 HOURS
Status: DISCONTINUED | OUTPATIENT
Start: 2018-10-21 | End: 2018-10-24

## 2018-10-21 RX ADMIN — POTASSIUM CHLORIDE 20 MEQ: 400 INJECTION, SOLUTION INTRAVENOUS at 10:37

## 2018-10-21 RX ADMIN — Medication 2 PUFF: at 07:38

## 2018-10-21 RX ADMIN — MIDODRINE HYDROCHLORIDE 10 MG: 10 TABLET ORAL at 11:52

## 2018-10-21 RX ADMIN — Medication 30 ML: at 07:58

## 2018-10-21 RX ADMIN — Medication 2 PUFF: at 12:23

## 2018-10-21 RX ADMIN — DOCUSATE SODIUM 100 MG: 50 LIQUID ORAL at 07:57

## 2018-10-21 RX ADMIN — AMIODARONE HYDROCHLORIDE 0.5 MG/MIN: 1.8 INJECTION, SOLUTION INTRAVENOUS at 07:54

## 2018-10-21 RX ADMIN — DEXMEDETOMIDINE 0.9 MCG/KG/HR: 100 INJECTION, SOLUTION, CONCENTRATE INTRAVENOUS at 01:51

## 2018-10-21 RX ADMIN — MORPHINE SULFATE 2 MG: 2 INJECTION, SOLUTION INTRAMUSCULAR; INTRAVENOUS at 17:14

## 2018-10-21 RX ADMIN — INSULIN LISPRO 3 UNITS: 100 INJECTION, SOLUTION INTRAVENOUS; SUBCUTANEOUS at 06:14

## 2018-10-21 RX ADMIN — PIPERACILLIN SODIUM,TAZOBACTAM SODIUM 2.25 G: 2; .25 INJECTION, POWDER, FOR SOLUTION INTRAVENOUS at 11:38

## 2018-10-21 RX ADMIN — POTASSIUM CHLORIDE 20 MEQ: 400 INJECTION, SOLUTION INTRAVENOUS at 07:14

## 2018-10-21 RX ADMIN — Medication 17.6 MG: at 19:49

## 2018-10-21 RX ADMIN — MIDODRINE HYDROCHLORIDE 10 MG: 10 TABLET ORAL at 16:40

## 2018-10-21 RX ADMIN — ACETAMINOPHEN 650 MG: 160 SOLUTION ORAL at 21:04

## 2018-10-21 RX ADMIN — MIDODRINE HYDROCHLORIDE 10 MG: 10 TABLET ORAL at 07:57

## 2018-10-21 RX ADMIN — Medication 2 PUFF: at 19:48

## 2018-10-21 RX ADMIN — PIPERACILLIN SODIUM,TAZOBACTAM SODIUM 2.25 G: 2; .25 INJECTION, POWDER, FOR SOLUTION INTRAVENOUS at 04:02

## 2018-10-21 RX ADMIN — FAMOTIDINE 20 MG: 20 TABLET ORAL at 07:57

## 2018-10-21 RX ADMIN — INSULIN LISPRO 3 UNITS: 100 INJECTION, SOLUTION INTRAVENOUS; SUBCUTANEOUS at 13:21

## 2018-10-21 RX ADMIN — Medication 10 ML: at 19:51

## 2018-10-21 RX ADMIN — DEXMEDETOMIDINE 0.7 MCG/KG/HR: 100 INJECTION, SOLUTION, CONCENTRATE INTRAVENOUS at 07:48

## 2018-10-21 RX ADMIN — PIPERACILLIN SODIUM,TAZOBACTAM SODIUM 2.25 G: 2; .25 INJECTION, POWDER, FOR SOLUTION INTRAVENOUS at 23:42

## 2018-10-21 RX ADMIN — Medication 15 ML: at 07:49

## 2018-10-21 RX ADMIN — DEXMEDETOMIDINE 0.8 MCG/KG/HR: 100 INJECTION, SOLUTION, CONCENTRATE INTRAVENOUS at 19:49

## 2018-10-21 RX ADMIN — EPOPROSTENOL 50 NG/KG/MIN: 1.5 INJECTION, POWDER, LYOPHILIZED, FOR SOLUTION INTRAVENOUS at 11:09

## 2018-10-21 RX ADMIN — OXYCODONE HYDROCHLORIDE 10 MG: 5 TABLET ORAL at 19:50

## 2018-10-21 RX ADMIN — ACETAMINOPHEN 650 MG: 160 SOLUTION ORAL at 16:40

## 2018-10-21 RX ADMIN — OXYCODONE HYDROCHLORIDE 10 MG: 5 TABLET ORAL at 06:09

## 2018-10-21 RX ADMIN — AMIODARONE HYDROCHLORIDE 0.5 MG/MIN: 1.8 INJECTION, SOLUTION INTRAVENOUS at 19:48

## 2018-10-21 RX ADMIN — ACETAMINOPHEN 650 MG: 160 SOLUTION ORAL at 10:50

## 2018-10-21 RX ADMIN — ATORVASTATIN CALCIUM 20 MG: 20 TABLET, FILM COATED ORAL at 19:50

## 2018-10-21 RX ADMIN — EPOPROSTENOL 50 NG/KG/MIN: 1.5 INJECTION, POWDER, LYOPHILIZED, FOR SOLUTION INTRAVENOUS at 03:01

## 2018-10-21 RX ADMIN — Medication 15 ML: at 19:50

## 2018-10-21 RX ADMIN — Medication 2 PUFF: at 15:59

## 2018-10-21 RX ADMIN — DOCUSATE SODIUM 100 MG: 50 LIQUID ORAL at 19:50

## 2018-10-21 RX ADMIN — FLUCONAZOLE 200 MG: 2 INJECTION, SOLUTION INTRAVENOUS at 20:24

## 2018-10-21 RX ADMIN — EPOPROSTENOL 50 NG/KG/MIN: 1.5 INJECTION, POWDER, LYOPHILIZED, FOR SOLUTION INTRAVENOUS at 19:28

## 2018-10-21 RX ADMIN — EPINEPHRINE 5 MCG/MIN: 1 INJECTION, SOLUTION, CONCENTRATE INTRAVENOUS at 07:55

## 2018-10-21 RX ADMIN — SODIUM CHLORIDE: 9 INJECTION, SOLUTION INTRAVENOUS at 13:34

## 2018-10-21 RX ADMIN — LORAZEPAM 2 MG: 2 INJECTION INTRAMUSCULAR; INTRAVENOUS at 22:19

## 2018-10-21 RX ADMIN — DEXMEDETOMIDINE 0.6 MCG/KG/HR: 100 INJECTION, SOLUTION, CONCENTRATE INTRAVENOUS at 13:34

## 2018-10-21 RX ADMIN — MAGNESIUM HYDROXIDE 30 ML: 400 SUSPENSION ORAL at 19:50

## 2018-10-21 RX ADMIN — Medication 10 ML: at 07:58

## 2018-10-21 ASSESSMENT — PAIN SCALES - WONG BAKER
WONGBAKER_NUMERICALRESPONSE: 0
WONGBAKER_NUMERICALRESPONSE: 0

## 2018-10-21 ASSESSMENT — PULMONARY FUNCTION TESTS
PIF_VALUE: 25
PIF_VALUE: 27
PIF_VALUE: 27
PIF_VALUE: 29
PIF_VALUE: 29
PIF_VALUE: 28
PIF_VALUE: 26

## 2018-10-21 ASSESSMENT — PAIN SCALES - GENERAL
PAINLEVEL_OUTOF10: 0
PAINLEVEL_OUTOF10: 0
PAINLEVEL_OUTOF10: 4
PAINLEVEL_OUTOF10: 0

## 2018-10-21 NOTE — PROGRESS NOTES
Min PRN  mupirocin (BACTROBAN) 2 % ointment, , Topical, Once  atorvastatin (LIPITOR) tablet 20 mg, 20 mg, Oral, Nightly  albumin human 5 % bottle 25 g, 25 g, Intravenous, PRN  glucose (GLUTOSE) 40 % oral gel 15 g, 15 g, Oral, PRN  dextrose 50 % solution 12.5 g, 12.5 g, Intravenous, PRN  glucagon (rDNA) injection 1 mg, 1 mg, Intramuscular, PRN  dextrose 5 % solution, 100 mL/hr, Intravenous, PRN  EPINEPHrine PF 5 mg in dextrose 5 % 250 mL infusion, 1 mcg/min, Intravenous, Continuous    ASSESSMENT AND PLAN  Improving interstitial pattern, stable hemodynamics on current tx, still many Kg's over preop wt. , still responding to Bumex, Milrinone off, NSR, T 101.8 on Zosyn,tan yellow secretions on ETT sent for culture.     P: Continue current tx

## 2018-10-21 NOTE — PROGRESS NOTES
Epoprostenol Subsequent Assessment    Epoprostenol  continues to be aerosolized via the Alaris syringe pump to the aerogen nebulizer. Medication syringe did not need changed. Aerogen nebulizer did not need changed. Syringe tubing did not need changed. Medication syringe and tubing are protected from light. Aerogen nebulizer was functioning properly. Filters were changed at this check. No problems seen with expiratory resistance. Extra Solo nebulizer remains at bedside for back-up. Assessment was completed. Every 4 hour Assessment Hemodynamic Measurement if available   Heart Rate 141 beats per minute Mean Pulmonary Artery Pressure  36  mm Hg   Blood Pressure 107/61 mm Hg Cardiac Output  5.29  l/min   PaO2/ FiO2 ratio N/A  Cardiac Index 2.67  l/min/m2    AutoPeep N/A cmH2O Central Venous Pressure (CVP) 20 mm Hg   Notify pharmacy for new syringe: Tran Bowles RRT already notified pharmacy. Syringe will need changed on Date: 10/21 Time: 0300     Aerogen Solo Nebulizer will need changed on Date: 10/27 Time: 1100 , 7 days after initiation or last change.     Aerogen tubing will need changed on  Date: 10/24 Time: 1100 , 96 hours after initiation or last change.        Epoprostenol is notbeing weaned. Rate of administration is 5.93 ml/hour.

## 2018-10-21 NOTE — PROGRESS NOTES
Epoprostenol Subsequent Assessment    Epoprostenol  continues to be aerosolized via the Alaris syringe pump to the aerogen nebulizer. Medication syringe did not need changed. Aerogen nebulizer did not need changed. Syringe tubing did not need changed. Medication syringe and tubing are protected from light. Aerogen nebulizer was functioning properly. Filters were changed at this check. No problems seen with expiratory resistance. Extra Solo nebulizer remains at bedside for back-up. Assessment was completed. Every 4 hour Assessment Hemodynamic Measurement if available   Heart Rate 140 beats per minute Mean Pulmonary Artery Pressure  38  mm Hg   Blood Pressure 90/48 mm Hg Cardiac Output  5  l/min   PaO2/ FiO2 ratio N/A ratio Cardiac Index 2.5  l/min/m2    AutoPeep N/A cmH2O Central Venous Pressure (CVP) 26 mm Hg     Notify pharmacy for new syringe on Date: 10/12/18 Time: 0900, 2 hour prior to medication running out or room air expiration. Syringe will need changed on Date: 10/21/18 Time: [id number] [place] will need changed on Date: 10/27/18 Time: 1100 , 7 days after initiation or last change. Aerogen tubing will need changed on  Date: 10/24 Time: 1100 , 96 hours after initiation or last change. Epoprostenol is notbeing weaned. Rate of administration is 5.93 ml/hour.

## 2018-10-22 ENCOUNTER — APPOINTMENT (OUTPATIENT)
Dept: GENERAL RADIOLOGY | Age: 53
DRG: 163 | End: 2018-10-22
Attending: THORACIC SURGERY (CARDIOTHORACIC VASCULAR SURGERY)
Payer: COMMERCIAL

## 2018-10-22 ENCOUNTER — APPOINTMENT (OUTPATIENT)
Dept: INTERVENTIONAL RADIOLOGY/VASCULAR | Age: 53
DRG: 163 | End: 2018-10-22
Attending: THORACIC SURGERY (CARDIOTHORACIC VASCULAR SURGERY)
Payer: COMMERCIAL

## 2018-10-22 LAB
ALLEN TEST: ABNORMAL
ANION GAP SERPL CALCULATED.3IONS-SCNC: 19 MEQ/L (ref 8–16)
BASE EXCESS (CALCULATED): 10 MMOL/L (ref -2.5–2.5)
BUN BLDV-MCNC: 77 MG/DL (ref 7–22)
CALCIUM SERPL-MCNC: 8.4 MG/DL (ref 8.5–10.5)
CHLORIDE BLD-SCNC: 94 MEQ/L (ref 98–111)
CO2: 29 MEQ/L (ref 23–33)
COLLECTED BY:: ABNORMAL
CREAT SERPL-MCNC: 3.7 MG/DL (ref 0.4–1.2)
DEVICE: ABNORMAL
ERYTHROCYTE [DISTWIDTH] IN BLOOD BY AUTOMATED COUNT: 17.7 % (ref 11.5–14.5)
ERYTHROCYTE [DISTWIDTH] IN BLOOD BY AUTOMATED COUNT: 53.6 FL (ref 35–45)
GFR SERPL CREATININE-BSD FRML MDRD: 13 ML/MIN/1.73M2
GLUCOSE BLD-MCNC: 159 MG/DL (ref 70–108)
GLUCOSE, WHOLE BLOOD: 101 MG/DL (ref 70–108)
GLUCOSE, WHOLE BLOOD: 148 MG/DL (ref 70–108)
GLUCOSE, WHOLE BLOOD: 156 MG/DL (ref 70–108)
GLUCOSE, WHOLE BLOOD: 178 MG/DL (ref 70–108)
HCO3: 35 MMOL/L (ref 23–28)
HCT VFR BLD CALC: 27.5 % (ref 37–47)
HEMOGLOBIN: 9 GM/DL (ref 12–16)
HEPARIN ASSOCIATED AB DETECTION: NORMAL
IFIO2: 40
MAGNESIUM: 2.4 MG/DL (ref 1.6–2.4)
MCH RBC QN AUTO: 27.4 PG (ref 26–33)
MCHC RBC AUTO-ENTMCNC: 32.7 GM/DL (ref 32.2–35.5)
MCV RBC AUTO: 83.8 FL (ref 81–99)
MODE: ABNORMAL
O2 SATURATION: 95 %
PCO2: 50 MMHG (ref 35–45)
PH BLOOD GAS: 7.46 (ref 7.35–7.45)
PIP: 25 CMH2O
PLATELET # BLD: 64 THOU/MM3 (ref 130–400)
PMV BLD AUTO: 11.7 FL (ref 9.4–12.4)
PO2: 71 MMHG (ref 71–104)
POTASSIUM SERPL-SCNC: 3.5 MEQ/L (ref 3.5–5.2)
RBC # BLD: 3.28 MILL/MM3 (ref 4.2–5.4)
SEROTONIN RELEASING ASSAY: NORMAL
SET PEEP: 12 MMHG
SET PRESS SUPP: 13 CMH2O
SET RESPIRATORY RATE: 12 BPM
SODIUM BLD-SCNC: 142 MEQ/L (ref 135–145)
SOURCE, BLOOD GAS: ABNORMAL
URINE CULTURE, ROUTINE: NORMAL
WBC # BLD: 14.2 THOU/MM3 (ref 4.8–10.8)

## 2018-10-22 PROCEDURE — C1751 CATH, INF, PER/CENT/MIDLINE: HCPCS

## 2018-10-22 PROCEDURE — 2709999900 HC NON-CHARGEABLE SUPPLY

## 2018-10-22 PROCEDURE — 37799 UNLISTED PX VASCULAR SURGERY: CPT

## 2018-10-22 PROCEDURE — 2580000003 HC RX 258: Performed by: THORACIC SURGERY (CARDIOTHORACIC VASCULAR SURGERY)

## 2018-10-22 PROCEDURE — 99291 CRITICAL CARE FIRST HOUR: CPT | Performed by: INTERNAL MEDICINE

## 2018-10-22 PROCEDURE — 92961 CARDIOVERSION ELECTRIC INT: CPT | Performed by: THORACIC SURGERY (CARDIOTHORACIC VASCULAR SURGERY)

## 2018-10-22 PROCEDURE — 83735 ASSAY OF MAGNESIUM: CPT

## 2018-10-22 PROCEDURE — 82803 BLOOD GASES ANY COMBINATION: CPT

## 2018-10-22 PROCEDURE — 71045 X-RAY EXAM CHEST 1 VIEW: CPT

## 2018-10-22 PROCEDURE — 2500000003 HC RX 250 WO HCPCS: Performed by: THORACIC SURGERY (CARDIOTHORACIC VASCULAR SURGERY)

## 2018-10-22 PROCEDURE — 77001 FLUOROGUIDE FOR VEIN DEVICE: CPT | Performed by: RADIOLOGY

## 2018-10-22 PROCEDURE — 2500000003 HC RX 250 WO HCPCS: Performed by: NURSE PRACTITIONER

## 2018-10-22 PROCEDURE — 36415 COLL VENOUS BLD VENIPUNCTURE: CPT

## 2018-10-22 PROCEDURE — B548ZZA ULTRASONOGRAPHY OF SUPERIOR VENA CAVA, GUIDANCE: ICD-10-PCS | Performed by: RADIOLOGY

## 2018-10-22 PROCEDURE — 94770 HC ETCO2 MONITOR DAILY: CPT

## 2018-10-22 PROCEDURE — 74018 RADEX ABDOMEN 1 VIEW: CPT

## 2018-10-22 PROCEDURE — 94640 AIRWAY INHALATION TREATMENT: CPT

## 2018-10-22 PROCEDURE — 82947 ASSAY GLUCOSE BLOOD QUANT: CPT

## 2018-10-22 PROCEDURE — 94003 VENT MGMT INPAT SUBQ DAY: CPT

## 2018-10-22 PROCEDURE — 94645 CONT INHLJ TX EACH ADDL HOUR: CPT

## 2018-10-22 PROCEDURE — 6360000002 HC RX W HCPCS: Performed by: THORACIC SURGERY (CARDIOTHORACIC VASCULAR SURGERY)

## 2018-10-22 PROCEDURE — C1769 GUIDE WIRE: HCPCS

## 2018-10-22 PROCEDURE — 85027 COMPLETE CBC AUTOMATED: CPT

## 2018-10-22 PROCEDURE — 2000000000 HC ICU R&B

## 2018-10-22 PROCEDURE — 2500000003 HC RX 250 WO HCPCS

## 2018-10-22 PROCEDURE — 2700000000 HC OXYGEN THERAPY PER DAY

## 2018-10-22 PROCEDURE — 6360000002 HC RX W HCPCS

## 2018-10-22 PROCEDURE — 6370000000 HC RX 637 (ALT 250 FOR IP): Performed by: INTERNAL MEDICINE

## 2018-10-22 PROCEDURE — 02HV33Z INSERTION OF INFUSION DEVICE INTO SUPERIOR VENA CAVA, PERCUTANEOUS APPROACH: ICD-10-PCS | Performed by: RADIOLOGY

## 2018-10-22 PROCEDURE — 76937 US GUIDE VASCULAR ACCESS: CPT | Performed by: RADIOLOGY

## 2018-10-22 PROCEDURE — 80048 BASIC METABOLIC PNL TOTAL CA: CPT

## 2018-10-22 PROCEDURE — 36556 INSERT NON-TUNNEL CV CATH: CPT | Performed by: RADIOLOGY

## 2018-10-22 PROCEDURE — 6370000000 HC RX 637 (ALT 250 FOR IP): Performed by: THORACIC SURGERY (CARDIOTHORACIC VASCULAR SURGERY)

## 2018-10-22 PROCEDURE — 36600 WITHDRAWAL OF ARTERIAL BLOOD: CPT

## 2018-10-22 RX ORDER — SILDENAFIL CITRATE 20 MG/1
20 TABLET ORAL 3 TIMES DAILY
Status: DISCONTINUED | OUTPATIENT
Start: 2018-10-22 | End: 2018-10-29

## 2018-10-22 RX ORDER — PROPOFOL 10 MG/ML
INJECTION, EMULSION INTRAVENOUS
Status: DISPENSED
Start: 2018-10-22 | End: 2018-10-22

## 2018-10-22 RX ORDER — BUMETANIDE 0.25 MG/ML
2 INJECTION, SOLUTION INTRAMUSCULAR; INTRAVENOUS 2 TIMES DAILY
Status: DISCONTINUED | OUTPATIENT
Start: 2018-10-22 | End: 2018-10-23

## 2018-10-22 RX ORDER — BUMETANIDE 0.25 MG/ML
1 INJECTION, SOLUTION INTRAMUSCULAR; INTRAVENOUS DAILY
Status: DISCONTINUED | OUTPATIENT
Start: 2018-10-22 | End: 2018-10-22

## 2018-10-22 RX ADMIN — MORPHINE SULFATE 2 MG: 2 INJECTION, SOLUTION INTRAMUSCULAR; INTRAVENOUS at 11:54

## 2018-10-22 RX ADMIN — EPINEPHRINE 5 MCG/MIN: 1 INJECTION, SOLUTION, CONCENTRATE INTRAVENOUS at 01:00

## 2018-10-22 RX ADMIN — MIDODRINE HYDROCHLORIDE 10 MG: 10 TABLET ORAL at 11:23

## 2018-10-22 RX ADMIN — ATORVASTATIN CALCIUM 20 MG: 20 TABLET, FILM COATED ORAL at 20:53

## 2018-10-22 RX ADMIN — FLUCONAZOLE 200 MG: 2 INJECTION, SOLUTION INTRAVENOUS at 20:53

## 2018-10-22 RX ADMIN — EPOPROSTENOL 50 NG/KG/MIN: 1.5 INJECTION, POWDER, LYOPHILIZED, FOR SOLUTION INTRAVENOUS at 03:30

## 2018-10-22 RX ADMIN — Medication 15 ML: at 11:24

## 2018-10-22 RX ADMIN — Medication 15 ML: at 20:06

## 2018-10-22 RX ADMIN — INSULIN LISPRO 3 UNITS: 100 INJECTION, SOLUTION INTRAVENOUS; SUBCUTANEOUS at 00:11

## 2018-10-22 RX ADMIN — PIPERACILLIN SODIUM,TAZOBACTAM SODIUM 2.25 G: 2; .25 INJECTION, POWDER, FOR SOLUTION INTRAVENOUS at 08:54

## 2018-10-22 RX ADMIN — Medication 30 ML: at 11:24

## 2018-10-22 RX ADMIN — AMIODARONE HYDROCHLORIDE 0.5 MG/MIN: 1.8 INJECTION, SOLUTION INTRAVENOUS at 08:32

## 2018-10-22 RX ADMIN — DEXMEDETOMIDINE 1 MCG/KG/HR: 100 INJECTION, SOLUTION, CONCENTRATE INTRAVENOUS at 18:43

## 2018-10-22 RX ADMIN — EPOPROSTENOL 50 NG/KG/MIN: 1.5 INJECTION, POWDER, LYOPHILIZED, FOR SOLUTION INTRAVENOUS at 11:25

## 2018-10-22 RX ADMIN — SILDENAFIL 20 MG: 20 TABLET ORAL at 20:53

## 2018-10-22 RX ADMIN — EPOPROSTENOL 50 NG/KG/MIN: 1.5 INJECTION, POWDER, LYOPHILIZED, FOR SOLUTION INTRAVENOUS at 19:45

## 2018-10-22 RX ADMIN — DEXMEDETOMIDINE 1 MCG/KG/HR: 100 INJECTION, SOLUTION, CONCENTRATE INTRAVENOUS at 22:59

## 2018-10-22 RX ADMIN — Medication 10 ML: at 11:24

## 2018-10-22 RX ADMIN — Medication 10 ML: at 20:58

## 2018-10-22 RX ADMIN — INSULIN LISPRO 3 UNITS: 100 INJECTION, SOLUTION INTRAVENOUS; SUBCUTANEOUS at 19:08

## 2018-10-22 RX ADMIN — FAMOTIDINE 20 MG: 20 TABLET ORAL at 11:23

## 2018-10-22 RX ADMIN — VASOPRESSIN 0.04 UNITS/MIN: 20 INJECTION INTRAVENOUS at 12:45

## 2018-10-22 RX ADMIN — MIDODRINE HYDROCHLORIDE 10 MG: 10 TABLET ORAL at 16:32

## 2018-10-22 RX ADMIN — MORPHINE SULFATE 2 MG: 2 INJECTION, SOLUTION INTRAMUSCULAR; INTRAVENOUS at 19:50

## 2018-10-22 RX ADMIN — BUMETANIDE 2 MG: 0.25 INJECTION INTRAMUSCULAR; INTRAVENOUS at 20:57

## 2018-10-22 RX ADMIN — DEXMEDETOMIDINE 0.8 MCG/KG/HR: 100 INJECTION, SOLUTION, CONCENTRATE INTRAVENOUS at 01:00

## 2018-10-22 RX ADMIN — BUMETANIDE 0.2 MG/HR: 0.25 INJECTION INTRAMUSCULAR; INTRAVENOUS at 12:47

## 2018-10-22 RX ADMIN — ONDANSETRON HYDROCHLORIDE 4 MG: 2 SOLUTION INTRAMUSCULAR; INTRAVENOUS at 08:28

## 2018-10-22 RX ADMIN — SILDENAFIL 20 MG: 20 TABLET ORAL at 14:14

## 2018-10-22 RX ADMIN — VASOPRESSIN 0.04 UNITS/MIN: 20 INJECTION INTRAVENOUS at 20:05

## 2018-10-22 RX ADMIN — INSULIN LISPRO 2 UNITS: 100 INJECTION, SOLUTION INTRAVENOUS; SUBCUTANEOUS at 06:55

## 2018-10-22 RX ADMIN — DEXMEDETOMIDINE 1 MCG/KG/HR: 100 INJECTION, SOLUTION, CONCENTRATE INTRAVENOUS at 11:52

## 2018-10-22 RX ADMIN — PIPERACILLIN SODIUM,TAZOBACTAM SODIUM 2.25 G: 2; .25 INJECTION, POWDER, FOR SOLUTION INTRAVENOUS at 16:24

## 2018-10-22 RX ADMIN — Medication 2 PUFF: at 08:42

## 2018-10-22 RX ADMIN — AMIODARONE HYDROCHLORIDE 0.5 MG/MIN: 1.8 INJECTION, SOLUTION INTRAVENOUS at 20:45

## 2018-10-22 ASSESSMENT — PAIN SCALES - GENERAL: PAINLEVEL_OUTOF10: 8

## 2018-10-22 ASSESSMENT — PULMONARY FUNCTION TESTS
PIF_VALUE: 28
PIF_VALUE: 26
PIF_VALUE: 32
PIF_VALUE: 28
PIF_VALUE: 32

## 2018-10-22 NOTE — PROGRESS NOTES
fibrillation: Status post maze procedure.  Positive pacemaker. Right bundle branch block. On amiodarone.  Post atrial clipping.    9. Mitral regurgitation: Was severe to very severe.  Status post mitral valve repair 10/15/18. 10. Thrombocytopenia:  Continue to monitor.  Trending downward.  Related to consumption from bleeding.  Peripheral blood smear shows no schistocytes. HIT panel low probability. 11. Hyperglycemia: Sliding scale insulin.    12. Hyperkalemia: Bumex. S/P Bicarbonate drip.  Kayexalate.  Resolved.    13. Anion gap positive metabolic acidosis: S/P Bicarbonate drip.  Resolved.     CC:  Respiratory failure  HPI: Patient is a 53-year-old white female reformed smoker since September 2018.  She carries a diagnosis of COPD though has no pulmonary function tests available. Kasandra Nguyen has a history of dual pacemaker placement remotely. Kasandra Nguyen has a history of atrial fibrillation with rapid ventricular response and moderate mitral regurgitation with dilated left atrium.  She underwent cardiac ablation remotely but had persistent atrial fibrillation given the enlarged atrium.  She underwent cardiac catheterization 9/20/18 which showed no coronary artery disease.  She had echocardiogram completed August 21, 2018 which showed an ejection fraction of 25% with severe global hypokinesis and left atrial enlargement.  She had moderate mitral regurgitation.   Patient was admitted on 10/15/18 and underwent redo sternotomy, mitral valve repair, maze procedure, and placement of left ventricular epicardial lead. Gifty Flack her cardiomyopathy, she temporarily required femoral intra-aortic balloon pump.  Patient was extubated 10/16/18.  On 10/17/18, patient was sitting in a chair after working with physical therapy and occupational therapy.  She had done very well with therapy with marching in place.  Upon sitting in the chair, she became very lethargic and unarousable.  She was lifted from the chair and placed in the bed by the Mitral valve repair shows no evidence of stenosis or regurgitation. · CXR shows improved pulmonary edema. Possible left hilar infiltrate or resolving edema. · Sodium 142, potassium 3.5, chloride 94, bicarb 29, BUN 77, creatinine 3.7, glucose 159. White blood cell count 14.2, hemoglobin 9, platelets 64. .     CC time 40 minutes.  Time was discontiguous. Electronically signed by Cristino Maldonado M.D.

## 2018-10-22 NOTE — PROGRESS NOTES
Cardiovascular Surgery Progress Note      Alert, intubated  CI 3, on epi 4 + inhalational epoprostenol  Sputum culture + fungus  CXR bilateral infiltrates  Cr 3.7  -Cardioverted into sinus tachycardia  -Continue amiodarone at 0.5  -Restart vasopressin, wean epi for SBP > 110  -IR to place CVC, then d/c swan  -Continue aggressive diuresis

## 2018-10-22 NOTE — PROGRESS NOTES
Epoprostenol Subsequent Assessment    Epoprostenol  continues to be aerosolized via the Alaris syringe pump to the aerogen nebulizer. Medication syringe did need changed. Aerogen nebulizer did not need changed. Syringe tubing did not need changed. Medication syringe and tubing are protected from light. Aerogen nebulizer was functioning properly. Filters were changed at this check. No problems seen with expiratory resistance. Extra Solo nebulizer remains at bedside for back-up. Assessment was completed. Every 4 hour Assessment Hemodynamic Measurement if available   Heart Rate 94 beats per minute Mean Pulmonary Artery Pressure  39  mm Hg   Blood Pressure 109/49 mm Hg Cardiac Output  N/A  l/min   PaO2/ FiO2 ratio N/A ratio Cardiac Index N/A  l/min/m2    AutoPeep N/A cmH2O Central Venous Pressure (CVP) 26 mm Hg     Notify pharmacy for new syringe on Date: 10/22/18 Time: 0153, 2 hour prior to medication running out or room air expiration. Syringe will need changed on Date: 10/22/18 Time: [address] will need changed on Date: 10/27/18 Time: 1100 , 7 days after initiation or last change. Aerogen tubing will need changed on  Date: 10/24/18 Time: 1100 , 96 hours after initiation or last change. Epoprostenol is notbeing weaned. Rate of administration is 5.93 ml/hour.

## 2018-10-22 NOTE — PROGRESS NOTES
Epoprostenol Subsequent Assessment    Epoprostenol  continues to be aerosolized via the Alaris syringe pump to the aerogen nebulizer. Medication syringe did not need changed. Aerogen nebulizer did not need changed. Syringe tubing did not need changed. Medication syringe and tubing are protected from light. Aerogen nebulizer was functioning properly. Filters were changed at this check. No problems seen with expiratory resistance. Extra Solo nebulizer remains at bedside for back-up. Assessment was completed. Every 4 hour Assessment Hemodynamic Measurement if available   Heart Rate 131 beats per minute Mean Pulmonary Artery Pressure  32  mm Hg   Blood Pressure 105/55 mm Hg Cardiac Output  6.21  l/min   PaO2/ FiO2 ratio N/A ratio Cardiac Index 3.14  l/min/m2    AutoPeep 0 cmH2O Central Venous Pressure (CVP) 21 mm Hg     Notify pharmacy for new syringe on Date: 10/22/18 Time: 8639, 2 hour prior to medication running out or room air expiration. Syringe will need changed on Date: 10/22/18 Time: [address] will need changed on Date: 10/27/18 Time: 1100 , 7 days after initiation or last change. Aerogen tubing will need changed on  Date: 10/24/18 Time: 1100 , 96 hours after initiation or last change. Epoprostenol is not being weaned. Rate of administration is 5.93 ml/hour.

## 2018-10-22 NOTE — PLAN OF CARE
Problem: Discharge Planning:  Goal: Discharged to appropriate level of care  Discharged to appropriate level of care   Remains in ICU on ventilator and pressor support. Problem: Anxiety:  Goal: Level of anxiety will decrease  Level of anxiety will decrease   Outcome: Ongoing  Remains on Precedex gtt. Intermittent bouts of anxiety but easily calmed    Problem: Cardiac Output - Decreased:  Goal: Cardiac output within specified parameters  Cardiac output within specified parameters   Outcome: Ongoing  CO/CI improving. Intermittent need for Epinephrine. Continues Vasopressin infusing. Goal: Hemodynamic stability will improve  Hemodynamic stability will improve   Outcome: Not Met This Shift  Continues on Epinephrine and Vasopressin    Problem: Fluid Volume - Imbalance:  Goal: Ability to achieve a balanced intake and output will improve  Ability to achieve a balanced intake and output will improve   Outcome: Ongoing  Bumex gtt discontinued. Goal: Chest tube drainage is within specified parameters  Chest tube drainage is within specified parameters   Outcome: Completed Date Met: 10/22/18  Chest tubes d/c today    Problem: Pain:  Goal: Control of acute pain  Control of acute pain   Outcome: Met This Shift  Pain adequately controlled with prescribed medication. Problem: Risk for Impaired Skin Integrity  Goal: Tissue integrity - skin and mucous membranes  Structural intactness and normal physiological function of skin and  mucous membranes. Outcome: Ongoing  Frequent oral care provided. Pt turned q2h and prn. Incontinent multiple times of stool. Skin cleansed and dried. Problem: Nutrition  Goal: Optimal nutrition therapy  Outcome: Ongoing  NPO at this time due to nausea and vomiting this AM.    Problem: Falls - Risk of:  Goal: Will remain free from falls  Will remain free from falls   No falls this shift.     Problem: Restraint Use - Nonviolent/Non-Self-Destructive Behavior:  Goal: Absence of restraint indications  Absence of restraint indications   Outcome: Ongoing  Pt makes purposeful attempts at times to reach for ETT. Restraints remain for airway protection. Goal: Absence of restraint-related injury  Absence of restraint-related injury   No evidence of restraint related injury. Comments: Care plan reviewed with patient and family. Patients family verbalizes understanding of the plan of care and contribute to goal setting. Patient nods head in understanding.

## 2018-10-22 NOTE — PLAN OF CARE
Problem: Discharge Planning:  Goal: Discharged to appropriate level of care  Discharged to appropriate level of care   Outcome: Ongoing  Pt will remain on ICU until appropriate to go to stepdown unit.      Problem: Anxiety:  Goal: Level of anxiety will decrease  Level of anxiety will decrease   Outcome: Ongoing  Pt still having some anxiety at times. See MAR for interventions.      Problem: Cardiac Output - Decreased:  Goal: Cardiac output within specified parameters  Cardiac output within specified parameters   Outcome: Ongoing      10/20/18 0003   [place]   PAP 41/30   PAP (Mean) 36 mmHg   CVP (Mean) 285 mmHg   CO (l/min) 4.9 l/min   CI (l/min/m2) 2.5 l/min/m2   SVRI (dyne*sec)/cm5 1600 (dyne*sec)/cm5   SVR (dyne*sec)/cm5 808 (dyne*sec)/cm5   PVR (dyne*sec)/cm5 82 (dyne*sec)/cm5   PVRI (dyne*sec)/cm5 163 (dyne*sec)/cm5   LVSWI 11.1   RVSWI 3.17   SV (ml) 38.5 ml        Goal: Hemodynamic stability will improve  Hemodynamic stability will improve   Outcome: Met This Shift        Problem: Fluid Volume - Imbalance:  Goal: Ability to achieve a balanced intake and output will improve  Ability to achieve a balanced intake and output will improve   Outcome: Ongoing  Pt weight is 102.2 kg, this is an increase from previous. Goal: Chest tube drainage is within specified parameters  Chest tube drainage is within specified parameters   Outcome: Met This Shift  Minimal output this shift.      Problem: Pain:  Goal: Pain level will decrease  Pain level will decrease    Outcome: Ongoing  See CPOT scores. Goal: Control of acute pain  Control of acute pain   Outcome: Ongoing        Problem: Pain:  Goal: Pain level will decrease  Pain level will decrease    Outcome: Ongoing  See CPOT scores.      Problem: Risk for Impaired Skin Integrity  Goal: Tissue integrity - skin and mucous membranes  Structural intactness and normal physiological function of skin and  mucous membranes.    Outcome: Ongoing  Pt is turned and repositioned at

## 2018-10-22 NOTE — PROGRESS NOTES
PHASE 1 CARDIAC REHABILITATION   CABG, AVR, MVR, TVR, AMI, PCI's  Exercise Physiologists    Missed txt, pt being seen by respiratory.

## 2018-10-23 ENCOUNTER — APPOINTMENT (OUTPATIENT)
Dept: GENERAL RADIOLOGY | Age: 53
DRG: 163 | End: 2018-10-23
Attending: THORACIC SURGERY (CARDIOTHORACIC VASCULAR SURGERY)
Payer: COMMERCIAL

## 2018-10-23 LAB
ALBUMIN SERPL-MCNC: 2.7 G/DL (ref 3.5–5.1)
ALLEN TEST: ABNORMAL
ALP BLD-CCNC: 328 U/L (ref 38–126)
ALT SERPL-CCNC: 17 U/L (ref 11–66)
ANION GAP SERPL CALCULATED.3IONS-SCNC: 16 MEQ/L (ref 8–16)
ANION GAP SERPL CALCULATED.3IONS-SCNC: 17 MEQ/L (ref 8–16)
AST SERPL-CCNC: 34 U/L (ref 5–40)
BASE EXCESS (CALCULATED): 10.9 MMOL/L (ref -2.5–2.5)
BASE EXCESS (CALCULATED): 6.5 MMOL/L (ref -2.5–2.5)
BASE EXCESS (CALCULATED): 7 MMOL/L (ref -2.5–2.5)
BILIRUB SERPL-MCNC: 0.4 MG/DL (ref 0.3–1.2)
BILIRUBIN DIRECT: < 0.2 MG/DL (ref 0–0.3)
BUN BLDV-MCNC: 79 MG/DL (ref 7–22)
BUN BLDV-MCNC: 80 MG/DL (ref 7–22)
CALCIUM SERPL-MCNC: 8.2 MG/DL (ref 8.5–10.5)
CALCIUM SERPL-MCNC: 8.3 MG/DL (ref 8.5–10.5)
CHLORIDE BLD-SCNC: 92 MEQ/L (ref 98–111)
CHLORIDE BLD-SCNC: 94 MEQ/L (ref 98–111)
CO2: 29 MEQ/L (ref 23–33)
CO2: 30 MEQ/L (ref 23–33)
COLLECTED BY:: ABNORMAL
CREAT SERPL-MCNC: 3.9 MG/DL (ref 0.4–1.2)
CREAT SERPL-MCNC: 3.9 MG/DL (ref 0.4–1.2)
DEVICE: ABNORMAL
ERYTHROCYTE [DISTWIDTH] IN BLOOD BY AUTOMATED COUNT: 17.7 % (ref 11.5–14.5)
ERYTHROCYTE [DISTWIDTH] IN BLOOD BY AUTOMATED COUNT: 53.1 FL (ref 35–45)
GFR SERPL CREATININE-BSD FRML MDRD: 12 ML/MIN/1.73M2
GFR SERPL CREATININE-BSD FRML MDRD: 12 ML/MIN/1.73M2
GLUCOSE BLD-MCNC: 126 MG/DL (ref 70–108)
GLUCOSE BLD-MCNC: 166 MG/DL (ref 70–108)
GLUCOSE, WHOLE BLOOD: 132 MG/DL (ref 70–108)
GLUCOSE, WHOLE BLOOD: 144 MG/DL (ref 70–108)
GLUCOSE, WHOLE BLOOD: 148 MG/DL (ref 70–108)
GLUCOSE, WHOLE BLOOD: 152 MG/DL (ref 70–108)
HCO3: 32 MMOL/L (ref 23–28)
HCO3: 33 MMOL/L (ref 23–28)
HCO3: 36 MMOL/L (ref 23–28)
HCT VFR BLD CALC: 24 % (ref 37–47)
HEMOGLOBIN: 8 GM/DL (ref 12–16)
IFIO2: 40
IFIO2: 40
IFIO2: 85
MAGNESIUM: 2.2 MG/DL (ref 1.6–2.4)
MCH RBC QN AUTO: 27.4 PG (ref 26–33)
MCHC RBC AUTO-ENTMCNC: 33.3 GM/DL (ref 32.2–35.5)
MCV RBC AUTO: 82.2 FL (ref 81–99)
MODE: ABNORMAL
O2 SATURATION: 100 %
O2 SATURATION: 97 %
O2 SATURATION: 98 %
PCO2 (TEMP CORRECTED): 50 (ref 35–45)
PCO2: 48 MMHG (ref 35–45)
PCO2: 52 MMHG (ref 35–45)
PCO2: 53 MMHG (ref 35–45)
PH (TEMPERATURE CORRECTED): 7.47 (ref 7.35–7.45)
PH BLOOD GAS: 7.39 (ref 7.35–7.45)
PH BLOOD GAS: 7.41 (ref 7.35–7.45)
PH BLOOD GAS: 7.48 (ref 7.35–7.45)
PIP: 23 CMH2O
PIP: 29 CMH2O
PLATELET # BLD: 72 THOU/MM3 (ref 130–400)
PMV BLD AUTO: 14 FL (ref 9.4–12.4)
PO2 (TEMP CORRECTED): 445 (ref 71–104)
PO2: 102 MMHG (ref 71–104)
PO2: 440 MMHG (ref 71–104)
PO2: 94 MMHG (ref 71–104)
POTASSIUM SERPL-SCNC: 3.4 MEQ/L (ref 3.5–5.2)
POTASSIUM SERPL-SCNC: 3.5 MEQ/L (ref 3.5–5.2)
POTASSIUM SERPL-SCNC: 4 MEQ/L (ref 3.5–5.2)
RBC # BLD: 2.92 MILL/MM3 (ref 4.2–5.4)
SCAN OF BLOOD SMEAR: NORMAL
SET PEEP: 12 MMHG
SET PEEP: 5 MMHG
SET PEEP: 6 MMHG
SET PRESS SUPP: 14 CMH2O
SET RESPIRATORY RATE: 12 BPM
SET RESPIRATORY RATE: 12 BPM
SODIUM BLD-SCNC: 138 MEQ/L (ref 135–145)
SODIUM BLD-SCNC: 140 MEQ/L (ref 135–145)
SOURCE, BLOOD GAS: ABNORMAL
TEMPERATURE: 37.8
TOTAL PROTEIN: 5.9 G/DL (ref 6.1–8)
WBC # BLD: 14.1 THOU/MM3 (ref 4.8–10.8)

## 2018-10-23 PROCEDURE — 84132 ASSAY OF SERUM POTASSIUM: CPT

## 2018-10-23 PROCEDURE — 2580000003 HC RX 258: Performed by: THORACIC SURGERY (CARDIOTHORACIC VASCULAR SURGERY)

## 2018-10-23 PROCEDURE — 83735 ASSAY OF MAGNESIUM: CPT

## 2018-10-23 PROCEDURE — 71045 X-RAY EXAM CHEST 1 VIEW: CPT

## 2018-10-23 PROCEDURE — 94645 CONT INHLJ TX EACH ADDL HOUR: CPT

## 2018-10-23 PROCEDURE — 82803 BLOOD GASES ANY COMBINATION: CPT

## 2018-10-23 PROCEDURE — 2500000003 HC RX 250 WO HCPCS: Performed by: THORACIC SURGERY (CARDIOTHORACIC VASCULAR SURGERY)

## 2018-10-23 PROCEDURE — 36592 COLLECT BLOOD FROM PICC: CPT

## 2018-10-23 PROCEDURE — 2000000000 HC ICU R&B

## 2018-10-23 PROCEDURE — 6360000002 HC RX W HCPCS: Performed by: THORACIC SURGERY (CARDIOTHORACIC VASCULAR SURGERY)

## 2018-10-23 PROCEDURE — 6370000000 HC RX 637 (ALT 250 FOR IP): Performed by: INTERNAL MEDICINE

## 2018-10-23 PROCEDURE — 2709999900 HC NON-CHARGEABLE SUPPLY

## 2018-10-23 PROCEDURE — 82947 ASSAY GLUCOSE BLOOD QUANT: CPT

## 2018-10-23 PROCEDURE — 37799 UNLISTED PX VASCULAR SURGERY: CPT

## 2018-10-23 PROCEDURE — 99291 CRITICAL CARE FIRST HOUR: CPT | Performed by: INTERNAL MEDICINE

## 2018-10-23 PROCEDURE — 2500000003 HC RX 250 WO HCPCS: Performed by: NURSE PRACTITIONER

## 2018-10-23 PROCEDURE — 6370000000 HC RX 637 (ALT 250 FOR IP): Performed by: THORACIC SURGERY (CARDIOTHORACIC VASCULAR SURGERY)

## 2018-10-23 PROCEDURE — 85027 COMPLETE CBC AUTOMATED: CPT

## 2018-10-23 PROCEDURE — 82248 BILIRUBIN DIRECT: CPT

## 2018-10-23 PROCEDURE — P9046 ALBUMIN (HUMAN), 25%, 20 ML: HCPCS | Performed by: THORACIC SURGERY (CARDIOTHORACIC VASCULAR SURGERY)

## 2018-10-23 PROCEDURE — 94799 UNLISTED PULMONARY SVC/PX: CPT

## 2018-10-23 PROCEDURE — 80053 COMPREHEN METABOLIC PANEL: CPT

## 2018-10-23 PROCEDURE — 2700000000 HC OXYGEN THERAPY PER DAY

## 2018-10-23 PROCEDURE — 80048 BASIC METABOLIC PNL TOTAL CA: CPT

## 2018-10-23 PROCEDURE — 36591 DRAW BLOOD OFF VENOUS DEVICE: CPT

## 2018-10-23 PROCEDURE — 94003 VENT MGMT INPAT SUBQ DAY: CPT

## 2018-10-23 PROCEDURE — 36415 COLL VENOUS BLD VENIPUNCTURE: CPT

## 2018-10-23 PROCEDURE — 94770 HC ETCO2 MONITOR DAILY: CPT

## 2018-10-23 RX ORDER — POTASSIUM CHLORIDE 29.8 MG/ML
20 INJECTION INTRAVENOUS ONCE
Status: COMPLETED | OUTPATIENT
Start: 2018-10-23 | End: 2018-10-23

## 2018-10-23 RX ORDER — POTASSIUM CHLORIDE 20MEQ/15ML
20 LIQUID (ML) ORAL ONCE
Status: DISCONTINUED | OUTPATIENT
Start: 2018-10-23 | End: 2018-10-23 | Stop reason: SDUPTHER

## 2018-10-23 RX ORDER — POTASSIUM CHLORIDE 20MEQ/15ML
20 LIQUID (ML) ORAL 2 TIMES DAILY
Status: DISCONTINUED | OUTPATIENT
Start: 2018-10-23 | End: 2018-10-23 | Stop reason: SDUPTHER

## 2018-10-23 RX ORDER — POTASSIUM CHLORIDE 29.8 MG/ML
20 INJECTION INTRAVENOUS
Status: COMPLETED | OUTPATIENT
Start: 2018-10-23 | End: 2018-10-23

## 2018-10-23 RX ORDER — BUMETANIDE 0.25 MG/ML
1 INJECTION, SOLUTION INTRAMUSCULAR; INTRAVENOUS 2 TIMES DAILY
Status: DISCONTINUED | OUTPATIENT
Start: 2018-10-23 | End: 2018-10-24

## 2018-10-23 RX ORDER — ALPRAZOLAM 0.5 MG/1
0.5 TABLET ORAL EVERY 4 HOURS PRN
Status: DISCONTINUED | OUTPATIENT
Start: 2018-10-23 | End: 2018-11-05 | Stop reason: HOSPADM

## 2018-10-23 RX ORDER — ALBUMIN (HUMAN) 12.5 G/50ML
25 SOLUTION INTRAVENOUS 2 TIMES DAILY
Status: DISCONTINUED | OUTPATIENT
Start: 2018-10-23 | End: 2018-10-24

## 2018-10-23 RX ADMIN — ACETAZOLAMIDE 250 MG: 500 INJECTION, POWDER, LYOPHILIZED, FOR SOLUTION INTRAVENOUS at 18:26

## 2018-10-23 RX ADMIN — ACETAMINOPHEN 650 MG: 160 SOLUTION ORAL at 20:21

## 2018-10-23 RX ADMIN — EPOPROSTENOL 50 NG/KG/MIN: 1.5 INJECTION, POWDER, LYOPHILIZED, FOR SOLUTION INTRAVENOUS at 12:32

## 2018-10-23 RX ADMIN — DEXMEDETOMIDINE 1 MCG/KG/HR: 100 INJECTION, SOLUTION, CONCENTRATE INTRAVENOUS at 03:40

## 2018-10-23 RX ADMIN — MORPHINE SULFATE 2 MG: 2 INJECTION, SOLUTION INTRAMUSCULAR; INTRAVENOUS at 04:49

## 2018-10-23 RX ADMIN — POTASSIUM CHLORIDE 20 MEQ: 29.8 INJECTION, SOLUTION INTRAVENOUS at 10:27

## 2018-10-23 RX ADMIN — SILDENAFIL 20 MG: 20 TABLET ORAL at 15:09

## 2018-10-23 RX ADMIN — FLUCONAZOLE 200 MG: 2 INJECTION, SOLUTION INTRAVENOUS at 20:47

## 2018-10-23 RX ADMIN — VASOPRESSIN 0.04 UNITS/MIN: 20 INJECTION INTRAVENOUS at 22:02

## 2018-10-23 RX ADMIN — ALBUMIN (HUMAN) 25 G: 0.25 INJECTION, SOLUTION INTRAVENOUS at 09:13

## 2018-10-23 RX ADMIN — ACETAMINOPHEN 650 MG: 160 SOLUTION ORAL at 09:22

## 2018-10-23 RX ADMIN — POTASSIUM CHLORIDE 20 MEQ: 400 INJECTION, SOLUTION INTRAVENOUS at 18:26

## 2018-10-23 RX ADMIN — VASOPRESSIN 0.04 UNITS/MIN: 20 INJECTION INTRAVENOUS at 05:09

## 2018-10-23 RX ADMIN — SILDENAFIL 20 MG: 20 TABLET ORAL at 20:33

## 2018-10-23 RX ADMIN — INSULIN LISPRO 3 UNITS: 100 INJECTION, SOLUTION INTRAVENOUS; SUBCUTANEOUS at 06:12

## 2018-10-23 RX ADMIN — ALPRAZOLAM 0.5 MG: 0.5 TABLET ORAL at 08:12

## 2018-10-23 RX ADMIN — ATORVASTATIN CALCIUM 20 MG: 20 TABLET, FILM COATED ORAL at 20:33

## 2018-10-23 RX ADMIN — PIPERACILLIN SODIUM,TAZOBACTAM SODIUM 2.25 G: 2; .25 INJECTION, POWDER, FOR SOLUTION INTRAVENOUS at 07:58

## 2018-10-23 RX ADMIN — EPOPROSTENOL 50 NG/KG/MIN: 1.5 INJECTION, POWDER, LYOPHILIZED, FOR SOLUTION INTRAVENOUS at 03:57

## 2018-10-23 RX ADMIN — INSULIN LISPRO 3 UNITS: 100 INJECTION, SOLUTION INTRAVENOUS; SUBCUTANEOUS at 12:38

## 2018-10-23 RX ADMIN — MIDODRINE HYDROCHLORIDE 10 MG: 10 TABLET ORAL at 09:22

## 2018-10-23 RX ADMIN — AMIODARONE HYDROCHLORIDE 0.5 MG/MIN: 1.8 INJECTION, SOLUTION INTRAVENOUS at 20:44

## 2018-10-23 RX ADMIN — OXYCODONE HYDROCHLORIDE 5 MG: 5 TABLET ORAL at 11:17

## 2018-10-23 RX ADMIN — PIPERACILLIN SODIUM,TAZOBACTAM SODIUM 2.25 G: 2; .25 INJECTION, POWDER, FOR SOLUTION INTRAVENOUS at 16:05

## 2018-10-23 RX ADMIN — Medication 30 ML: at 09:22

## 2018-10-23 RX ADMIN — Medication 10 ML: at 09:17

## 2018-10-23 RX ADMIN — Medication 15 ML: at 08:12

## 2018-10-23 RX ADMIN — ACETAMINOPHEN 650 MG: 160 SOLUTION ORAL at 05:02

## 2018-10-23 RX ADMIN — Medication 10 ML: at 20:37

## 2018-10-23 RX ADMIN — AMIODARONE HYDROCHLORIDE 0.5 MG/MIN: 1.8 INJECTION, SOLUTION INTRAVENOUS at 08:04

## 2018-10-23 RX ADMIN — MIDODRINE HYDROCHLORIDE 10 MG: 10 TABLET ORAL at 11:55

## 2018-10-23 RX ADMIN — VASOPRESSIN 0.04 UNITS/MIN: 20 INJECTION INTRAVENOUS at 12:46

## 2018-10-23 RX ADMIN — BUMETANIDE 1 MG: 0.25 INJECTION INTRAMUSCULAR; INTRAVENOUS at 21:34

## 2018-10-23 RX ADMIN — ALBUMIN (HUMAN) 25 G: 0.25 INJECTION, SOLUTION INTRAVENOUS at 20:26

## 2018-10-23 RX ADMIN — PIPERACILLIN SODIUM,TAZOBACTAM SODIUM 2.25 G: 2; .25 INJECTION, POWDER, FOR SOLUTION INTRAVENOUS at 00:34

## 2018-10-23 RX ADMIN — POTASSIUM CHLORIDE 20 MEQ: 29.8 INJECTION, SOLUTION INTRAVENOUS at 09:39

## 2018-10-23 RX ADMIN — ACETAMINOPHEN 650 MG: 160 SOLUTION ORAL at 15:09

## 2018-10-23 RX ADMIN — POTASSIUM CHLORIDE 20 MEQ: 400 INJECTION, SOLUTION INTRAVENOUS at 16:50

## 2018-10-23 RX ADMIN — ACETAZOLAMIDE 250 MG: 500 INJECTION, POWDER, LYOPHILIZED, FOR SOLUTION INTRAVENOUS at 09:22

## 2018-10-23 RX ADMIN — DEXMEDETOMIDINE 1 MCG/KG/HR: 100 INJECTION, SOLUTION, CONCENTRATE INTRAVENOUS at 17:32

## 2018-10-23 RX ADMIN — MORPHINE SULFATE 2 MG: 2 INJECTION, SOLUTION INTRAMUSCULAR; INTRAVENOUS at 00:48

## 2018-10-23 RX ADMIN — MIDODRINE HYDROCHLORIDE 10 MG: 10 TABLET ORAL at 16:52

## 2018-10-23 RX ADMIN — FAMOTIDINE 20 MG: 20 TABLET ORAL at 08:12

## 2018-10-23 RX ADMIN — INSULIN LISPRO 3 UNITS: 100 INJECTION, SOLUTION INTRAVENOUS; SUBCUTANEOUS at 16:59

## 2018-10-23 RX ADMIN — OXYCODONE HYDROCHLORIDE 5 MG: 5 TABLET ORAL at 16:52

## 2018-10-23 RX ADMIN — DEXMEDETOMIDINE 1 MCG/KG/HR: 100 INJECTION, SOLUTION, CONCENTRATE INTRAVENOUS at 07:58

## 2018-10-23 RX ADMIN — ALPRAZOLAM 0.5 MG: 0.5 TABLET ORAL at 15:09

## 2018-10-23 RX ADMIN — POTASSIUM CHLORIDE 20 MEQ: 29.8 INJECTION, SOLUTION INTRAVENOUS at 23:19

## 2018-10-23 RX ADMIN — Medication 15 ML: at 20:45

## 2018-10-23 RX ADMIN — DEXMEDETOMIDINE 1 MCG/KG/HR: 100 INJECTION, SOLUTION, CONCENTRATE INTRAVENOUS at 12:45

## 2018-10-23 RX ADMIN — DEXMEDETOMIDINE 1 MCG/KG/HR: 100 INJECTION, SOLUTION, CONCENTRATE INTRAVENOUS at 22:02

## 2018-10-23 RX ADMIN — SILDENAFIL 20 MG: 20 TABLET ORAL at 08:16

## 2018-10-23 RX ADMIN — BUMETANIDE 1 MG: 0.25 INJECTION INTRAMUSCULAR; INTRAVENOUS at 09:43

## 2018-10-23 RX ADMIN — EPINEPHRINE 5 MCG/MIN: 1 INJECTION, SOLUTION, CONCENTRATE INTRAVENOUS at 02:54

## 2018-10-23 ASSESSMENT — PULMONARY FUNCTION TESTS
PIF_VALUE: 30
PIF_VALUE: 26
PIF_VALUE: 23
PIF_VALUE: 24
PIF_VALUE: 31

## 2018-10-23 ASSESSMENT — PAIN SCALES - GENERAL
PAINLEVEL_OUTOF10: 4
PAINLEVEL_OUTOF10: 2
PAINLEVEL_OUTOF10: 0
PAINLEVEL_OUTOF10: 2
PAINLEVEL_OUTOF10: 0
PAINLEVEL_OUTOF10: 4

## 2018-10-23 NOTE — PROGRESS NOTES
was sitting in a chair after working with physical therapy and occupational therapy. Stacy Araujo had done very well with therapy with marching in place. Echo Smaller sitting in the chair, she became very lethargic and unarousable.  She was lifted from the chair and placed in the bed by the medical staff including me. Stacy Araujo was very lethargic and had a very faint pulse.  Given her altered mental status, she was intubated 10/17/18. Stacy Araujo was continued on epinephrine drip and vasopressin was added.  Blood pressure did respond to pressure intervention. She was given a dose of Bumex secondary to hyperkalemia. For further details, please review the assessment and plan. ROS:  Complains of SOB and anxiety. No chest pain. PMH:  Per HPI  SHX:  Reformed smoker since September 16, 2018. Anderson Elizabeth known drug allergies  Medications:  per the [facility]  Vital Signs: T: 100.4: P: 138: RR: 24: B/P: 124/76: FiO2: 100%: O2 Sat: 100%: I/O: 3220/2950  General:   Acutely ill-appearing overweight white female. HEENT:  normocephalic and atraumatic.  No scleral icterus. Neck: supple.  No JVD. Lungs: Bilateral crackles with faint wheeze.  No retractions  Cardiac:  Tachycardia. Abdomen: soft.  Nontender. Extremities:  No clubbing, cyanosis x 4.  Trace lower extremity edema bilaterally. Vasculature: capillary refill < 3 seconds. Skin: Pale and cool to the touch. .  Psych:  Anxious on mechanical ventilator. Lymph:  No supraclavicular adenopathy. Neurologic:  No focal deficit.  Patient is following commands and moving all extremities without deficit.     Data: (All radiographs, tracings, PFTs, and imaging are personally viewed and interpreted unless otherwise noted). · Telemetry shows SVT rate 143. .  · Echocardiogram report 10/18/18:  Moderately dilated left ventricle with severely reduced systolic function.  Ejection fraction 20-25%.  Severe global hypokinesis.  Paradoxical septal motion.  Right ventricle with moderate to

## 2018-10-23 NOTE — PLAN OF CARE
intact. Comments: Care plan reviewed with patient and sister. Sister verbalizes understanding of the plan of care and contribute to goal setting.

## 2018-10-23 NOTE — PROGRESS NOTES
1910-  Patient waved me in the room. ET tube filled with tan frothy secretions, affecting her breathing  Suctioned times 3, able to clear most.  Secretions have markedly increased over the past 4 hours.

## 2018-10-23 NOTE — PROGRESS NOTES
Cardiovascular Surgery Progress Note      Intubated, alert   Remains on epi 5, vasopressin 0.04, flolan  Continues in atrial flutter  PO2 >400 on FIO2 85%/PEEP 12--SaO2 not correlating  CXR clearing  Metabolic alkalosis  BUN/Cr 80/3.9  -Bumex decreased, albumin 25% before bumex doses  -Add acetazolamide  -Wean PEEP  -Continue amiodarone; stabilization of rhythm contingent on recovery from acute illness  -Check LFTs

## 2018-10-23 NOTE — PROGRESS NOTES
Epoprostenol Subsequent Assessment    Epoprostenol  continues to be aerosolized via the Alaris syringe pump to the aerogen nebulizer. Medication syringe did not need changed. Aerogen nebulizer did not need changed. Syringe tubing did not need changed. Medication syringe and tubing are protected from light. Aerogen nebulizer was functioning properly. Filters were changed at this check. No problems seen with expiratory resistance. Extra Solo nebulizer remains at bedside for back-up. Assessment was completed. Every 4 hour Assessment Hemodynamic Measurement if available   Heart Rate 113 beats per minute Mean Pulmonary Artery Pressure  64  mm Hg   Blood Pressure 100/50 mm Hg Cardiac Output  N/A  l/min   PaO2/ FiO2 ratio N/A ratio Cardiac Index N/A  l/min/m2    AutoPeep 0.3 cmH2O Central Venous Pressure (CVP) N/A mm Hg       Aerogen Solo Nebulizer will need changed on Date: 10/27/2018 Time: 1100 , 7 days after initiation or last change. Aerogen tubing will need changed on  Date: 10/24/2018 Time: 1100 , 96 hours after initiation or last change. Epoprostenol isbeing weaned. Rate of administration is 2.97ml/hour. Rate changes was verified by CRYSTAL Blackwood CRT and KINGSLEY Recio RRT-ACCS, RRT.

## 2018-10-23 NOTE — PROGRESS NOTES
Epoprostenol Subsequent Assessment    Epoprostenol  continues to be aerosolized via the Alaris syringe pump to the aerogen nebulizer. Medication syringe did not need changed. Aerogen nebulizer did not need changed. Syringe tubing did not need changed. Medication syringe and tubing are protected from light. Aerogen nebulizer was functioning properly. Filters were changed at this check. No problems seen with expiratory resistance. Extra Solo nebulizer remains at bedside for back-up. Assessment was completed. Every 4 hour Assessment Hemodynamic Measurement if available   Heart Rate 117 beats per minute Mean Pulmonary Artery Pressure  N/A  mm Hg   Blood Pressure 92/66 mm Hg Cardiac Output  N/A  l/min   PaO2/ FiO2 ratio N/A ratio Cardiac Index N/A  l/min/m2    AutoPeep N/A cmH2O Central Venous Pressure (CVP) N/A mm Hg     Notify pharmacy for new syringe on Date: 10/23 Time: 2300, 2 hour prior to medication running out or room air expiration. Syringe will need changed on Date: 10/24 Time: 0100    Aerogen Solo Nebulizer will need changed on Date: 10/27 Time: 1100 , 7 days after initiation or last change. Aerogen tubing will need changed on  Date: 10/24 Time: 1100 , 96 hours after initiation or last change. Epoprostenol is notbeing weaned. Rate of administration is 2.97 ml/hour.

## 2018-10-23 NOTE — PROGRESS NOTES
48-  Updated Dr. Mena Naylor on weaning efforts. Currently on CPAP, api down to 1-2 mcg/min. Dr. Edwin Mueller OK to extubate now. Orders to cut veletri in half and keep on the vent tonight.

## 2018-10-24 ENCOUNTER — APPOINTMENT (OUTPATIENT)
Dept: INTERVENTIONAL RADIOLOGY/VASCULAR | Age: 53
DRG: 163 | End: 2018-10-24
Attending: THORACIC SURGERY (CARDIOTHORACIC VASCULAR SURGERY)
Payer: COMMERCIAL

## 2018-10-24 ENCOUNTER — APPOINTMENT (OUTPATIENT)
Dept: GENERAL RADIOLOGY | Age: 53
DRG: 163 | End: 2018-10-24
Attending: THORACIC SURGERY (CARDIOTHORACIC VASCULAR SURGERY)
Payer: COMMERCIAL

## 2018-10-24 LAB
ABO: NORMAL
ALLEN TEST: ABNORMAL
ALLEN TEST: ABNORMAL
ANION GAP SERPL CALCULATED.3IONS-SCNC: 16 MEQ/L (ref 8–16)
ANTIBODY SCREEN: NORMAL
BASE EXCESS (CALCULATED): 2.1 MMOL/L (ref -2.5–2.5)
BASE EXCESS (CALCULATED): 2.7 MMOL/L (ref -2.5–2.5)
BASE EXCESS (CALCULATED): 3.7 MMOL/L (ref -2.5–2.5)
BUN BLDV-MCNC: 83 MG/DL (ref 7–22)
CALCIUM SERPL-MCNC: 8.1 MG/DL (ref 8.5–10.5)
CHLORIDE BLD-SCNC: 93 MEQ/L (ref 98–111)
CO2: 28 MEQ/L (ref 23–33)
COLLECTED BY:: ABNORMAL
CREAT SERPL-MCNC: 3.9 MG/DL (ref 0.4–1.2)
DEVICE: ABNORMAL
ERYTHROCYTE [DISTWIDTH] IN BLOOD BY AUTOMATED COUNT: 18 % (ref 11.5–14.5)
ERYTHROCYTE [DISTWIDTH] IN BLOOD BY AUTOMATED COUNT: 18.5 % (ref 11.5–14.5)
ERYTHROCYTE [DISTWIDTH] IN BLOOD BY AUTOMATED COUNT: 55.4 FL (ref 35–45)
ERYTHROCYTE [DISTWIDTH] IN BLOOD BY AUTOMATED COUNT: 55.8 FL (ref 35–45)
GFR SERPL CREATININE-BSD FRML MDRD: 12 ML/MIN/1.73M2
GLUCOSE BLD-MCNC: 100 MG/DL (ref 70–108)
GLUCOSE BLD-MCNC: 115 MG/DL (ref 70–108)
GLUCOSE, WHOLE BLOOD: 122 MG/DL (ref 70–108)
GLUCOSE, WHOLE BLOOD: 148 MG/DL (ref 70–108)
GLUCOSE, WHOLE BLOOD: 90 MG/DL (ref 70–108)
HCO3: 28 MMOL/L (ref 23–28)
HCO3: 28 MMOL/L (ref 23–28)
HCO3: 29 MMOL/L (ref 23–28)
HCT VFR BLD CALC: 20.4 % (ref 37–47)
HCT VFR BLD CALC: 22 % (ref 37–47)
HCT VFR BLD CALC: 27.6 % (ref 37–47)
HEMOGLOBIN: 6.7 GM/DL (ref 12–16)
HEMOGLOBIN: 7.2 GM/DL (ref 12–16)
HEMOGLOBIN: 8.9 GM/DL (ref 12–16)
IFIO2: 30
IFIO2: 40
IFIO2: 40
MAGNESIUM: 2.3 MG/DL (ref 1.6–2.4)
MCH RBC QN AUTO: 27.1 PG (ref 26–33)
MCH RBC QN AUTO: 27.6 PG (ref 26–33)
MCHC RBC AUTO-ENTMCNC: 32.2 GM/DL (ref 32.2–35.5)
MCHC RBC AUTO-ENTMCNC: 32.8 GM/DL (ref 32.2–35.5)
MCV RBC AUTO: 82.6 FL (ref 81–99)
MCV RBC AUTO: 85.7 FL (ref 81–99)
MODE: ABNORMAL
MODE: ABNORMAL
O2 SATURATION: 100 %
O2 SATURATION: 94 %
O2 SATURATION: 97 %
PCO2: 48 MMHG (ref 35–45)
PCO2: 49 MMHG (ref 35–45)
PCO2: 53 MMHG (ref 35–45)
PH BLOOD GAS: 7.34 (ref 7.35–7.45)
PH BLOOD GAS: 7.38 (ref 7.35–7.45)
PH BLOOD GAS: 7.39 (ref 7.35–7.45)
PLATELET # BLD: 72 THOU/MM3 (ref 130–400)
PLATELET # BLD: 94 THOU/MM3 (ref 130–400)
PMV BLD AUTO: 14 FL (ref 9.4–12.4)
PO2: 362 MMHG (ref 71–104)
PO2: 78 MMHG (ref 71–104)
PO2: 98 MMHG (ref 71–104)
POTASSIUM SERPL-SCNC: 4.1 MEQ/L (ref 3.5–5.2)
RBC # BLD: 2.47 MILL/MM3 (ref 4.2–5.4)
RBC # BLD: 3.22 MILL/MM3 (ref 4.2–5.4)
RH FACTOR: NORMAL
SET PEEP: 5 MMHG
SET PRESS SUPP: 10 CMH2O
SET PRESS SUPP: 14 CMH2O
SET PRESS SUPP: 6 CMH2O
SODIUM BLD-SCNC: 137 MEQ/L (ref 135–145)
SOURCE, BLOOD GAS: ABNORMAL
UREA NITROGEN, UR: 499 MG/DL
WBC # BLD: 14.5 THOU/MM3 (ref 4.8–10.8)
WBC # BLD: 9.3 THOU/MM3 (ref 4.8–10.8)

## 2018-10-24 PROCEDURE — C1769 GUIDE WIRE: HCPCS

## 2018-10-24 PROCEDURE — 5A1D70Z PERFORMANCE OF URINARY FILTRATION, INTERMITTENT, LESS THAN 6 HOURS PER DAY: ICD-10-PCS | Performed by: INTERNAL MEDICINE

## 2018-10-24 PROCEDURE — 83735 ASSAY OF MAGNESIUM: CPT

## 2018-10-24 PROCEDURE — 94645 CONT INHLJ TX EACH ADDL HOUR: CPT

## 2018-10-24 PROCEDURE — 83516 IMMUNOASSAY NONANTIBODY: CPT

## 2018-10-24 PROCEDURE — 36591 DRAW BLOOD OFF VENOUS DEVICE: CPT

## 2018-10-24 PROCEDURE — 6370000000 HC RX 637 (ALT 250 FOR IP): Performed by: THORACIC SURGERY (CARDIOTHORACIC VASCULAR SURGERY)

## 2018-10-24 PROCEDURE — G0257 UNSCHED DIALYSIS ESRD PT HOS: HCPCS

## 2018-10-24 PROCEDURE — C1752 CATH,HEMODIALYSIS,SHORT-TERM: HCPCS

## 2018-10-24 PROCEDURE — 6370000000 HC RX 637 (ALT 250 FOR IP): Performed by: INTERNAL MEDICINE

## 2018-10-24 PROCEDURE — 6360000002 HC RX W HCPCS: Performed by: THORACIC SURGERY (CARDIOTHORACIC VASCULAR SURGERY)

## 2018-10-24 PROCEDURE — 2500000003 HC RX 250 WO HCPCS: Performed by: THORACIC SURGERY (CARDIOTHORACIC VASCULAR SURGERY)

## 2018-10-24 PROCEDURE — 71045 X-RAY EXAM CHEST 1 VIEW: CPT

## 2018-10-24 PROCEDURE — 36430 TRANSFUSION BLD/BLD COMPNT: CPT

## 2018-10-24 PROCEDURE — 2580000003 HC RX 258: Performed by: INTERNAL MEDICINE

## 2018-10-24 PROCEDURE — 2709999900 HC NON-CHARGEABLE SUPPLY

## 2018-10-24 PROCEDURE — 36600 WITHDRAWAL OF ARTERIAL BLOOD: CPT

## 2018-10-24 PROCEDURE — 99292 CRITICAL CARE ADDL 30 MIN: CPT | Performed by: INTERNAL MEDICINE

## 2018-10-24 PROCEDURE — 85014 HEMATOCRIT: CPT

## 2018-10-24 PROCEDURE — 86255 FLUORESCENT ANTIBODY SCREEN: CPT

## 2018-10-24 PROCEDURE — 6360000002 HC RX W HCPCS: Performed by: INTERNAL MEDICINE

## 2018-10-24 PROCEDURE — 86900 BLOOD TYPING SEROLOGIC ABO: CPT

## 2018-10-24 PROCEDURE — 77001 FLUOROGUIDE FOR VEIN DEVICE: CPT

## 2018-10-24 PROCEDURE — 2500000003 HC RX 250 WO HCPCS: Performed by: NURSE PRACTITIONER

## 2018-10-24 PROCEDURE — 86901 BLOOD TYPING SEROLOGIC RH(D): CPT

## 2018-10-24 PROCEDURE — C1894 INTRO/SHEATH, NON-LASER: HCPCS

## 2018-10-24 PROCEDURE — 36592 COLLECT BLOOD FROM PICC: CPT

## 2018-10-24 PROCEDURE — 84540 ASSAY OF URINE/UREA-N: CPT

## 2018-10-24 PROCEDURE — 94660 CPAP INITIATION&MGMT: CPT

## 2018-10-24 PROCEDURE — P9046 ALBUMIN (HUMAN), 25%, 20 ML: HCPCS | Performed by: THORACIC SURGERY (CARDIOTHORACIC VASCULAR SURGERY)

## 2018-10-24 PROCEDURE — 82784 ASSAY IGA/IGD/IGG/IGM EACH: CPT

## 2018-10-24 PROCEDURE — 82948 REAGENT STRIP/BLOOD GLUCOSE: CPT

## 2018-10-24 PROCEDURE — 82947 ASSAY GLUCOSE BLOOD QUANT: CPT

## 2018-10-24 PROCEDURE — 86850 RBC ANTIBODY SCREEN: CPT

## 2018-10-24 PROCEDURE — 87086 URINE CULTURE/COLONY COUNT: CPT

## 2018-10-24 PROCEDURE — 86705 HEP B CORE ANTIBODY IGM: CPT

## 2018-10-24 PROCEDURE — 36556 INSERT NON-TUNNEL CV CATH: CPT

## 2018-10-24 PROCEDURE — 86923 COMPATIBILITY TEST ELECTRIC: CPT

## 2018-10-24 PROCEDURE — 2000000000 HC ICU R&B

## 2018-10-24 PROCEDURE — 36556 INSERT NON-TUNNEL CV CATH: CPT | Performed by: INTERNAL MEDICINE

## 2018-10-24 PROCEDURE — 2700000000 HC OXYGEN THERAPY PER DAY

## 2018-10-24 PROCEDURE — 76937 US GUIDE VASCULAR ACCESS: CPT

## 2018-10-24 PROCEDURE — 5A2204Z RESTORATION OF CARDIAC RHYTHM, SINGLE: ICD-10-PCS | Performed by: THORACIC SURGERY (CARDIOTHORACIC VASCULAR SURGERY)

## 2018-10-24 PROCEDURE — C1751 CATH, INF, PER/CENT/MIDLINE: HCPCS

## 2018-10-24 PROCEDURE — 6360000002 HC RX W HCPCS

## 2018-10-24 PROCEDURE — 2580000003 HC RX 258: Performed by: THORACIC SURGERY (CARDIOTHORACIC VASCULAR SURGERY)

## 2018-10-24 PROCEDURE — 87340 HEPATITIS B SURFACE AG IA: CPT

## 2018-10-24 PROCEDURE — 82803 BLOOD GASES ANY COMBINATION: CPT

## 2018-10-24 PROCEDURE — 85027 COMPLETE CBC AUTOMATED: CPT

## 2018-10-24 PROCEDURE — 05HN33Z INSERTION OF INFUSION DEVICE INTO LEFT INTERNAL JUGULAR VEIN, PERCUTANEOUS APPROACH: ICD-10-PCS | Performed by: RADIOLOGY

## 2018-10-24 PROCEDURE — P9016 RBC LEUKOCYTES REDUCED: HCPCS

## 2018-10-24 PROCEDURE — 99253 IP/OBS CNSLTJ NEW/EST LOW 45: CPT | Performed by: INTERNAL MEDICINE

## 2018-10-24 PROCEDURE — 99291 CRITICAL CARE FIRST HOUR: CPT | Performed by: INTERNAL MEDICINE

## 2018-10-24 PROCEDURE — 86038 ANTINUCLEAR ANTIBODIES: CPT

## 2018-10-24 PROCEDURE — 92961 CARDIOVERSION ELECTRIC INT: CPT | Performed by: THORACIC SURGERY (CARDIOTHORACIC VASCULAR SURGERY)

## 2018-10-24 PROCEDURE — B544ZZA ULTRASONOGRAPHY OF LEFT JUGULAR VEINS, GUIDANCE: ICD-10-PCS | Performed by: RADIOLOGY

## 2018-10-24 PROCEDURE — 87040 BLOOD CULTURE FOR BACTERIA: CPT

## 2018-10-24 PROCEDURE — 86706 HEP B SURFACE ANTIBODY: CPT

## 2018-10-24 PROCEDURE — 2500000003 HC RX 250 WO HCPCS: Performed by: INTERNAL MEDICINE

## 2018-10-24 PROCEDURE — 85018 HEMOGLOBIN: CPT

## 2018-10-24 PROCEDURE — 80048 BASIC METABOLIC PNL TOTAL CA: CPT

## 2018-10-24 PROCEDURE — 94003 VENT MGMT INPAT SUBQ DAY: CPT

## 2018-10-24 PROCEDURE — 37799 UNLISTED PX VASCULAR SURGERY: CPT

## 2018-10-24 RX ORDER — 0.9 % SODIUM CHLORIDE 0.9 %
250 INTRAVENOUS SOLUTION INTRAVENOUS ONCE
Status: COMPLETED | OUTPATIENT
Start: 2018-10-24 | End: 2018-10-24

## 2018-10-24 RX ORDER — PROPOFOL 10 MG/ML
INJECTION, EMULSION INTRAVENOUS CONTINUOUS PRN
Status: COMPLETED | OUTPATIENT
Start: 2018-10-24 | End: 2018-10-24

## 2018-10-24 RX ORDER — LORAZEPAM 2 MG/ML
1 INJECTION INTRAMUSCULAR ONCE
Status: COMPLETED | OUTPATIENT
Start: 2018-10-24 | End: 2018-10-24

## 2018-10-24 RX ORDER — BUMETANIDE 0.25 MG/ML
1 INJECTION, SOLUTION INTRAMUSCULAR; INTRAVENOUS ONCE
Status: COMPLETED | OUTPATIENT
Start: 2018-10-24 | End: 2018-10-24

## 2018-10-24 RX ORDER — 0.9 % SODIUM CHLORIDE 0.9 %
250 INTRAVENOUS SOLUTION INTRAVENOUS ONCE
Status: COMPLETED | OUTPATIENT
Start: 2018-10-24 | End: 2018-10-25

## 2018-10-24 RX ADMIN — PIPERACILLIN SODIUM,TAZOBACTAM SODIUM 2.25 G: 2; .25 INJECTION, POWDER, FOR SOLUTION INTRAVENOUS at 17:50

## 2018-10-24 RX ADMIN — MIDODRINE HYDROCHLORIDE 10 MG: 10 TABLET ORAL at 08:07

## 2018-10-24 RX ADMIN — AMIODARONE HYDROCHLORIDE 0.5 MG/MIN: 1.8 INJECTION, SOLUTION INTRAVENOUS at 20:56

## 2018-10-24 RX ADMIN — ACETAMINOPHEN 650 MG: 160 SOLUTION ORAL at 05:45

## 2018-10-24 RX ADMIN — MORPHINE SULFATE 2 MG: 2 INJECTION, SOLUTION INTRAMUSCULAR; INTRAVENOUS at 03:52

## 2018-10-24 RX ADMIN — BUMETANIDE 1 MG: 0.25 INJECTION INTRAMUSCULAR; INTRAVENOUS at 13:27

## 2018-10-24 RX ADMIN — AMIODARONE HYDROCHLORIDE 0.5 MG/MIN: 1.8 INJECTION, SOLUTION INTRAVENOUS at 09:56

## 2018-10-24 RX ADMIN — DEXMEDETOMIDINE 1.2 MCG/KG/HR: 100 INJECTION, SOLUTION, CONCENTRATE INTRAVENOUS at 06:42

## 2018-10-24 RX ADMIN — SODIUM CHLORIDE 250 ML: 9 INJECTION, SOLUTION INTRAVENOUS at 06:37

## 2018-10-24 RX ADMIN — BUMETANIDE 1 MG: 0.25 INJECTION INTRAMUSCULAR; INTRAVENOUS at 09:51

## 2018-10-24 RX ADMIN — PROPOFOL 30 MG: 10 INJECTION, EMULSION INTRAVENOUS at 08:38

## 2018-10-24 RX ADMIN — Medication 10 ML: at 08:09

## 2018-10-24 RX ADMIN — ACETAMINOPHEN 650 MG: 160 SOLUTION ORAL at 09:19

## 2018-10-24 RX ADMIN — SODIUM CHLORIDE 250 ML: 9 INJECTION, SOLUTION INTRAVENOUS at 18:19

## 2018-10-24 RX ADMIN — LORAZEPAM 1 MG: 2 INJECTION INTRAMUSCULAR; INTRAVENOUS at 17:40

## 2018-10-24 RX ADMIN — ALBUMIN (HUMAN) 25 G: 0.25 INJECTION, SOLUTION INTRAVENOUS at 09:11

## 2018-10-24 RX ADMIN — DEXMEDETOMIDINE 0.3 MCG/KG/HR: 100 INJECTION, SOLUTION, CONCENTRATE INTRAVENOUS at 23:16

## 2018-10-24 RX ADMIN — ACETAZOLAMIDE 250 MG: 500 INJECTION, POWDER, LYOPHILIZED, FOR SOLUTION INTRAVENOUS at 01:41

## 2018-10-24 RX ADMIN — ONDANSETRON HYDROCHLORIDE 4 MG: 2 SOLUTION INTRAMUSCULAR; INTRAVENOUS at 13:18

## 2018-10-24 RX ADMIN — EPOPROSTENOL 25 NG/KG/MIN: 1.5 INJECTION, POWDER, LYOPHILIZED, FOR SOLUTION INTRAVENOUS at 00:41

## 2018-10-24 RX ADMIN — ALPRAZOLAM 0.5 MG: 0.5 TABLET ORAL at 02:48

## 2018-10-24 RX ADMIN — PIPERACILLIN SODIUM,TAZOBACTAM SODIUM 2.25 G: 2; .25 INJECTION, POWDER, FOR SOLUTION INTRAVENOUS at 00:24

## 2018-10-24 RX ADMIN — Medication 15 ML: at 08:09

## 2018-10-24 RX ADMIN — Medication 30 ML: at 08:08

## 2018-10-24 RX ADMIN — SILDENAFIL 20 MG: 20 TABLET ORAL at 08:07

## 2018-10-24 RX ADMIN — ALPRAZOLAM 0.5 MG: 0.5 TABLET ORAL at 08:07

## 2018-10-24 RX ADMIN — INSULIN LISPRO 3 UNITS: 100 INJECTION, SOLUTION INTRAVENOUS; SUBCUTANEOUS at 00:32

## 2018-10-24 RX ADMIN — DEXMEDETOMIDINE 1.2 MCG/KG/HR: 100 INJECTION, SOLUTION, CONCENTRATE INTRAVENOUS at 02:46

## 2018-10-24 RX ADMIN — FAMOTIDINE 20 MG: 20 TABLET ORAL at 08:08

## 2018-10-24 RX ADMIN — MICAFUNGIN SODIUM 50 MG: 20 INJECTION, POWDER, LYOPHILIZED, FOR SOLUTION INTRAVENOUS at 11:59

## 2018-10-24 RX ADMIN — VASOPRESSIN 0.04 UNITS/MIN: 20 INJECTION INTRAVENOUS at 07:25

## 2018-10-24 RX ADMIN — PIPERACILLIN SODIUM,TAZOBACTAM SODIUM 2.25 G: 2; .25 INJECTION, POWDER, FOR SOLUTION INTRAVENOUS at 08:08

## 2018-10-24 ASSESSMENT — PULMONARY FUNCTION TESTS
PIF_VALUE: 23
PIF_VALUE: 25
PIF_VALUE: 12
PIF_VALUE: 24
PIF_VALUE: 13
PIF_VALUE: 21
PIF_VALUE: 14
PIF_VALUE: 15
PIF_VALUE: 14

## 2018-10-24 ASSESSMENT — PAIN SCALES - GENERAL
PAINLEVEL_OUTOF10: 2
PAINLEVEL_OUTOF10: 0
PAINLEVEL_OUTOF10: 4
PAINLEVEL_OUTOF10: 3
PAINLEVEL_OUTOF10: 0
PAINLEVEL_OUTOF10: 2

## 2018-10-24 NOTE — CONSULTS
Nephrology Consult Note  Patient's Name: Sascha Valdovinos  5:32 PM  10/24/2018    Nephrologist: Glynn Hobbs    Reason for Consult:  Management of Hemodialysis. Acute Kidney injury     Requesting Physician:  ALDAIR Alex CNP    Chief Complaint:  Shortness of breath  Assessment  1-Acute kidney injury due to renal hypoperfusion from hypotension  2-Volume overload  3-S/P mitral valve annuloplasty post operative day # 9  4. Normocytic anemia   5. Cardiomyopathy with low EF  6. Tricuspid regurgitation  7. Hypotension  8. [facility]     Plan  1-I discussed my thoughts with the patient and family and they understood   2-Addressed their questions  3-Reviewed Ct head report   4. Reviewed CT thorax report Reviewed echo report   5. Reviewed chest xray report   6. Hemodialysis with ultrafiltration tonight   7. Discussed with dialysis staff  8. Discussed with ICU staff  9. Discussed with Critical Care  10. Discontinue bumetanide  11. Resume pressor agent while on hemodialysis at least for tonight    History Obtained From:  Patient, staff and medical record. History of Present Ilness: Sascha Valdovinos is a 48 y.o. female with history of childhood atrial septal defect, dyslipidemia, cardiac arrhythmia, mitral regurgitation, tricuspid regurgitation who was admitted for elective redo of previously repaired childhood atrial septal defect as well as repair of severe mitral regurgitation with mitral annuloplasty done on 15 October 2018. Patient was extubated the next day after operation. However she was reintubated the second day. She did self extubate at one point but was quickly reintubated. At the time of admission, serum creatinine was 1.3 mg/dL. It has been progressively been rising since then and today it is 3.9 mg/dL with a BUN of 83 mg/dL. Her blood pressure has been low. She had been on  pressor agents. She also has fluid overload with lower extremity edema and pulmonary vascular congestion.   We therefore were asked to see her for renal replacement therapy and ultrafiltration . She is awake and alert. She has been short of breath. She is needing oxygen mask and becomes quickly hypoxic without it. Past Medical History:   Diagnosis Date    [employer] dual pacer 5/23/2018    Hyperlipidemia        Past Surgical History:   Procedure Laterality Date    CARDIAC CATHETERIZATION  10/2018    CARDIAC SURGERY  1970    repair hole in heart pt states was 3 yrs old.  COLONOSCOPY      PACEMAKER INSERTION  05/01/2017    [place] OFFICE/OUTPT VISIT,PROCEDURE ONLY N/A 10/15/2018    REDO STERNOTOMY, BI-ATRIAL MAZE PROCEDURE, MITRAL REPAIR, PLACEMENT OF INTRA AORTIC BALLOON PUMP performed by Des Puri MD at [address]       Family History   Problem Relation Age of Onset    Emphysema Father     High Cholesterol Father         reports that she quit smoking about 5 weeks ago. Her smoking use included Cigarettes. She started smoking about 24 years ago. She has a 8.75 pack-year smoking history. She has never used smokeless tobacco. She reports that she does not drink alcohol or use drugs. Allergies:  Patient has no known allergies.     Current Medications:      0.9 % sodium chloride bolus Once   micafungin (MYCAMINE) 50 mg in sodium chloride 0.9 % 100 mL IVPB Daily   0.9 % sodium chloride bolus Once   ALPRAZolam (XANAX) tablet 0.5 mg Q4H PRN   bumetanide (BUMEX) injection 1 mg BID   albumin human 25 % solution 25 g BID   potassium (CARDIAC) replacement protocol RX Placeholder   sildenafil (REVATIO) tablet 20 mg TID   acetaminophen (TYLENOL) 160 MG/5ML solution 650 mg Q4H PRN   aspirin tablet 158 mg Daily   CERTAVITE/ANTIOXIDANTS solution 30 mL Daily   piperacillin-tazobactam (ZOSYN) 2.25 g in dextrose 5 % 50 mL IVPB (mini-bag) Q8H   docusate (COLACE) 50 MG/5ML liquid 100 mg BID   sodium chloride flush 0.9 % injection 10 mL PRN   0.9 % sodium chloride bolus Once   famotidine (PEPCID) tablet 20 mg Daily   amiodarone edema  Gastrointestinal: negative  Genitourinary:negative  Integument/breast: negative  Hematologic/lymphatic: negative  Musculoskeletal:negative  Neurological: negative  Behavioral/Psych: negative  Endocrine: negative  Allergic/Immunologic: negative    Physical exam:   Constitutional:  Well-developed middle-aged lady in no distress. Vitals:   Vitals:    10/24/18 1745   BP:    Pulse: 122   Resp: 20   Temp: 99.7 °F (37.6 °C)   SpO2:        Skin: no rash, turgor is normal  Heent:  Pupils are reactive to light and accommodation. Throat is clear. Neck is supple. No JVD. No adenopathy. Neck: no bruits or jvd noted  Cardiovascular:  S1, S2 without murmur or rubs  Respiratory: Bibasilar crackles  Abdomen:  Soft. Good bowel sounds. No palpable mass. No tenderness to palpation. No abdominal bruit. Ext: 2+ lower extremity edema  Psychiatric: mood and affect appropriate  Musculoskeletal:  Rom, muscular strength intact  CNS: Very awake and very alert. Well oriented. Normal speech. Motor strength is good. No obvious focal deficit.     Data:   Labs:  Lab Results   Component Value Date     10/24/2018     10/23/2018     10/23/2018    K 4.1 10/24/2018    K 4.0 10/23/2018    K 3.5 10/23/2018    CL 93 (L) 10/24/2018    CO2 28 10/24/2018    CO2 30 10/23/2018    CO2 29 10/23/2018    CREATININE 3.9 (HH) 10/24/2018    CREATININE 3.9 (HH) 10/23/2018    CREATININE 3.9 (HH) 10/23/2018    BUN 83 (H) 10/24/2018    BUN 79 (H) 10/23/2018    BUN 80 (H) 10/23/2018    GLUCOSE 100 10/24/2018    GLUCOSE 126 (H) 10/23/2018    GLUCOSE 166 (H) 10/23/2018    WBC 9.3 10/24/2018    WBC 14.1 (H) 10/23/2018    WBC 14.2 (H) 10/22/2018    HGB 7.2 (L) 10/24/2018    HGB 6.7 (LL) 10/24/2018    HGB 8.0 (L) 10/23/2018    HCT 22.0 (L) 10/24/2018    HCT 20.4 (LL) 10/24/2018    HCT 24.0 (L) 10/23/2018    MCV 82.6 10/24/2018    PLT 72 (L) 10/24/2018     {Labs Reviewed    Imaging:  CXR results: Diagnostic images reports are

## 2018-10-24 NOTE — PROCEDURES
DATE: 10/24/18    PATIENT: Nick Turner    MRN: [id number]     Acct: [de-identified]    PREOP DIAGNOSIS: Atrial flutter    Postop diagnosis: Atrial flutter    Procedure:  Synchronized cardioversion    Informed consent was obtained from the patient. A time out was performed. Continuous monitoring of telemetry, blood pressure and pulse oximetry was instituted. The ventilator settings were maintained. Propofol 50 mg was administered. The patient was cardioverted with a single synchronized 200 J shock into normal sinus rhythm. The patient recovered from sedation without complication.

## 2018-10-24 NOTE — PROGRESS NOTES
Epoprostenol Subsequent Assessment    Epoprostenol  continues to be aerosolized via the Alaris syringe pump to the aerogen nebulizer. Medication syringe did not need changed. Aerogen nebulizer did not need changed. Syringe tubing did not need changed. Medication syringe and tubing are protected from light. Aerogen nebulizer was functioning properly. Filters were changed at this check. No problems seen with expiratory resistance. Extra Solo nebulizer remains at bedside for back-up. Assessment was completed. Every 4 hour Assessment Hemodynamic Measurement if available   Heart Rate 107 beats per minute Mean Pulmonary Artery Pressure  NA  mm Hg   Blood Pressure 102/50 mm Hg Cardiac Output  N/A  l/min   PaO2/ FiO2 ratio 245 ratio Cardiac Index N/A  l/min/m2    AutoPeep N/A and 0 cmH2O Central Venous Pressure (CVP) N/A mm Hg     Notify pharmacy for new syringe on Date: 10/24/18 Time: 0800, 2 hour prior to medication running out or room air expiration.  Syringe will need changed on Date: 10/25/18 Time: 0100    Dr Moreno Sanchez ordered DC OF taryn

## 2018-10-24 NOTE — PROGRESS NOTES
seconds. Skin: Pale and cool to the touch. .  Psych:  Awake and actively involved with the environment. Lymph:  No supraclavicular adenopathy. Neurologic:  No focal deficit.  Patient is following commands and moving all extremities without deficit.     Data: (All radiographs, tracings, PFTs, and imaging are personally viewed and interpreted unless otherwise noted). · Echocardiogram report 10/18/18: Moderately dilated left ventricle with severely reduced systolic function.  Ejection fraction 20-25%.  Severe global hypokinesis.  Paradoxical septal motion.  Right ventricle with moderate to severely reduced systolic function and wall thickness.  Septal flattening in systole suggests right ventricular pressure overload.  Severe tricuspid regurgitation.  Estimated right ventricular systolic pressure 31.  Mitral valve repair shows no evidence of stenosis or regurgitation. · Chest x-ray shows pulmonary edema vs alveolar hemorrhage. · Telemetry sinus rhythm. · Sodium 137, potassium 4.1, chloride 98, bicarb 28, BUN 83, creatinine 3.9, glucose 122. White blood cell count 9.3, hemoglobin 6.7, platelets 72.     CC time 80 minutes.  Time was discontiguous. Case discussed with Dr. Blaze Henry. Electronically signed by Calvin Eastman M.D.

## 2018-10-25 ENCOUNTER — APPOINTMENT (OUTPATIENT)
Dept: GENERAL RADIOLOGY | Age: 53
DRG: 163 | End: 2018-10-25
Attending: THORACIC SURGERY (CARDIOTHORACIC VASCULAR SURGERY)
Payer: COMMERCIAL

## 2018-10-25 LAB
ALLEN TEST: ABNORMAL
ALLEN TEST: ABNORMAL
ANION GAP SERPL CALCULATED.3IONS-SCNC: 19 MEQ/L (ref 8–16)
BASE EXCESS (CALCULATED): 2.1 MMOL/L (ref -2.5–2.5)
BASE EXCESS (CALCULATED): 2.7 MMOL/L (ref -2.5–2.5)
BASE EXCESS (CALCULATED): 3.8 MMOL/L (ref -2.5–2.5)
BUN BLDV-MCNC: 52 MG/DL (ref 7–22)
CALCIUM SERPL-MCNC: 8.5 MG/DL (ref 8.5–10.5)
CHLORIDE BLD-SCNC: 92 MEQ/L (ref 98–111)
CO2: 26 MEQ/L (ref 23–33)
COLLECTED BY:: ABNORMAL
CREAT SERPL-MCNC: 3 MG/DL (ref 0.4–1.2)
DEVICE: ABNORMAL
ERYTHROCYTE [DISTWIDTH] IN BLOOD BY AUTOMATED COUNT: 18 % (ref 11.5–14.5)
ERYTHROCYTE [DISTWIDTH] IN BLOOD BY AUTOMATED COUNT: 52.5 FL (ref 35–45)
GFR SERPL CREATININE-BSD FRML MDRD: 16 ML/MIN/1.73M2
GLUCOSE BLD-MCNC: 87 MG/DL (ref 70–108)
GLUCOSE BLD-MCNC: 89 MG/DL (ref 70–108)
GLUCOSE, WHOLE BLOOD: 90 MG/DL (ref 70–108)
GLUCOSE, WHOLE BLOOD: 97 MG/DL (ref 70–108)
HBV SURFACE AB TITR SER: NEGATIVE {TITER}
HCO3: 29 MMOL/L (ref 23–28)
HCO3: 30 MMOL/L (ref 23–28)
HCO3: 30 MMOL/L (ref 23–28)
HCT VFR BLD CALC: 25.3 % (ref 37–47)
HEMOGLOBIN: 8.4 GM/DL (ref 12–16)
HEPATITIS B CORE IGM ANTIBODY: NEGATIVE
HEPATITIS B SURFACE ANTIGEN: NEGATIVE
IFIO2: 40
IFIO2: 40
IFIO2: 60
IGG: 830 MG/DL (ref 700–1600)
MAGNESIUM: 2.2 MG/DL (ref 1.6–2.4)
MCH RBC QN AUTO: 27.5 PG (ref 26–33)
MCHC RBC AUTO-ENTMCNC: 33.2 GM/DL (ref 32.2–35.5)
MCV RBC AUTO: 82.7 FL (ref 81–99)
O2 SATURATION: 100 %
O2 SATURATION: 97 %
O2 SATURATION: 98 %
PCO2: 52 MMHG (ref 35–45)
PCO2: 54 MMHG (ref 35–45)
PCO2: 57 MMHG (ref 35–45)
PH BLOOD GAS: 7.32 (ref 7.35–7.45)
PH BLOOD GAS: 7.33 (ref 7.35–7.45)
PH BLOOD GAS: 7.37 (ref 7.35–7.45)
PLATELET # BLD: 103 THOU/MM3 (ref 130–400)
PMV BLD AUTO: 13.3 FL (ref 9.4–12.4)
PO2: 111 MMHG (ref 71–104)
PO2: 278 MMHG (ref 71–104)
PO2: 95 MMHG (ref 71–104)
POTASSIUM SERPL-SCNC: 4 MEQ/L (ref 3.5–5.2)
RBC # BLD: 3.06 MILL/MM3 (ref 4.2–5.4)
SET PEEP: 5 MMHG
SET PRESS SUPP: 6 CMH2O
SODIUM BLD-SCNC: 137 MEQ/L (ref 135–145)
SOURCE, BLOOD GAS: ABNORMAL
WBC # BLD: 12.8 THOU/MM3 (ref 4.8–10.8)

## 2018-10-25 PROCEDURE — 6360000002 HC RX W HCPCS: Performed by: THORACIC SURGERY (CARDIOTHORACIC VASCULAR SURGERY)

## 2018-10-25 PROCEDURE — 2709999900 HC NON-CHARGEABLE SUPPLY

## 2018-10-25 PROCEDURE — 99232 SBSQ HOSP IP/OBS MODERATE 35: CPT | Performed by: INTERNAL MEDICINE

## 2018-10-25 PROCEDURE — 82947 ASSAY GLUCOSE BLOOD QUANT: CPT

## 2018-10-25 PROCEDURE — 2500000003 HC RX 250 WO HCPCS: Performed by: THORACIC SURGERY (CARDIOTHORACIC VASCULAR SURGERY)

## 2018-10-25 PROCEDURE — 2580000003 HC RX 258: Performed by: THORACIC SURGERY (CARDIOTHORACIC VASCULAR SURGERY)

## 2018-10-25 PROCEDURE — 36600 WITHDRAWAL OF ARTERIAL BLOOD: CPT

## 2018-10-25 PROCEDURE — 2580000003 HC RX 258: Performed by: INTERNAL MEDICINE

## 2018-10-25 PROCEDURE — 83010 ASSAY OF HAPTOGLOBIN QUANT: CPT

## 2018-10-25 PROCEDURE — 94760 N-INVAS EAR/PLS OXIMETRY 1: CPT

## 2018-10-25 PROCEDURE — 94660 CPAP INITIATION&MGMT: CPT

## 2018-10-25 PROCEDURE — 37799 UNLISTED PX VASCULAR SURGERY: CPT

## 2018-10-25 PROCEDURE — 83735 ASSAY OF MAGNESIUM: CPT

## 2018-10-25 PROCEDURE — 2500000003 HC RX 250 WO HCPCS: Performed by: NURSE PRACTITIONER

## 2018-10-25 PROCEDURE — 36592 COLLECT BLOOD FROM PICC: CPT

## 2018-10-25 PROCEDURE — 6370000000 HC RX 637 (ALT 250 FOR IP): Performed by: INTERNAL MEDICINE

## 2018-10-25 PROCEDURE — 2000000000 HC ICU R&B

## 2018-10-25 PROCEDURE — 6370000000 HC RX 637 (ALT 250 FOR IP): Performed by: THORACIC SURGERY (CARDIOTHORACIC VASCULAR SURGERY)

## 2018-10-25 PROCEDURE — 36415 COLL VENOUS BLD VENIPUNCTURE: CPT

## 2018-10-25 PROCEDURE — 6360000002 HC RX W HCPCS: Performed by: INTERNAL MEDICINE

## 2018-10-25 PROCEDURE — 71045 X-RAY EXAM CHEST 1 VIEW: CPT

## 2018-10-25 PROCEDURE — 82948 REAGENT STRIP/BLOOD GLUCOSE: CPT

## 2018-10-25 PROCEDURE — 80048 BASIC METABOLIC PNL TOTAL CA: CPT

## 2018-10-25 PROCEDURE — 94640 AIRWAY INHALATION TREATMENT: CPT

## 2018-10-25 PROCEDURE — 92610 EVALUATE SWALLOWING FUNCTION: CPT

## 2018-10-25 PROCEDURE — 85027 COMPLETE CBC AUTOMATED: CPT

## 2018-10-25 PROCEDURE — 2700000000 HC OXYGEN THERAPY PER DAY

## 2018-10-25 PROCEDURE — 90935 HEMODIALYSIS ONE EVALUATION: CPT

## 2018-10-25 PROCEDURE — 99291 CRITICAL CARE FIRST HOUR: CPT | Performed by: INTERNAL MEDICINE

## 2018-10-25 PROCEDURE — 6370000000 HC RX 637 (ALT 250 FOR IP): Performed by: FAMILY MEDICINE

## 2018-10-25 PROCEDURE — 82803 BLOOD GASES ANY COMBINATION: CPT

## 2018-10-25 RX ORDER — ALBUTEROL SULFATE 2.5 MG/3ML
5 SOLUTION RESPIRATORY (INHALATION) EVERY 4 HOURS PRN
Status: DISCONTINUED | OUTPATIENT
Start: 2018-10-25 | End: 2018-11-05 | Stop reason: HOSPADM

## 2018-10-25 RX ORDER — BUDESONIDE 0.5 MG/2ML
0.5 INHALANT ORAL 2 TIMES DAILY
Status: DISCONTINUED | OUTPATIENT
Start: 2018-10-25 | End: 2018-10-29

## 2018-10-25 RX ORDER — FORMOTEROL FUMARATE 20 UG/2ML
20 SOLUTION RESPIRATORY (INHALATION) 2 TIMES DAILY
Status: DISCONTINUED | OUTPATIENT
Start: 2018-10-25 | End: 2018-10-29

## 2018-10-25 RX ORDER — IPRATROPIUM BROMIDE AND ALBUTEROL SULFATE 2.5; .5 MG/3ML; MG/3ML
1 SOLUTION RESPIRATORY (INHALATION) ONCE
Status: COMPLETED | OUTPATIENT
Start: 2018-10-25 | End: 2018-10-25

## 2018-10-25 RX ADMIN — FORMOTEROL FUMARATE DIHYDRATE 20 MCG: 20 SOLUTION RESPIRATORY (INHALATION) at 20:57

## 2018-10-25 RX ADMIN — BUDESONIDE 500 MCG: 0.5 INHALANT RESPIRATORY (INHALATION) at 20:57

## 2018-10-25 RX ADMIN — ALPRAZOLAM 0.5 MG: 0.5 TABLET ORAL at 09:28

## 2018-10-25 RX ADMIN — MIDODRINE HYDROCHLORIDE 10 MG: 10 TABLET ORAL at 08:21

## 2018-10-25 RX ADMIN — PIPERACILLIN SODIUM,TAZOBACTAM SODIUM 2.25 G: 2; .25 INJECTION, POWDER, FOR SOLUTION INTRAVENOUS at 15:35

## 2018-10-25 RX ADMIN — FENTANYL CITRATE 50 MCG: 50 INJECTION INTRAMUSCULAR; INTRAVENOUS at 15:27

## 2018-10-25 RX ADMIN — MORPHINE SULFATE 2 MG: 2 INJECTION, SOLUTION INTRAMUSCULAR; INTRAVENOUS at 06:21

## 2018-10-25 RX ADMIN — IPRATROPIUM BROMIDE AND ALBUTEROL SULFATE 1 AMPULE: .5; 3 SOLUTION RESPIRATORY (INHALATION) at 05:58

## 2018-10-25 RX ADMIN — PIPERACILLIN SODIUM,TAZOBACTAM SODIUM 2.25 G: 2; .25 INJECTION, POWDER, FOR SOLUTION INTRAVENOUS at 08:13

## 2018-10-25 RX ADMIN — BUDESONIDE 500 MCG: 0.5 INHALANT RESPIRATORY (INHALATION) at 08:33

## 2018-10-25 RX ADMIN — FORMOTEROL FUMARATE DIHYDRATE 20 MCG: 20 SOLUTION RESPIRATORY (INHALATION) at 08:33

## 2018-10-25 RX ADMIN — ALBUTEROL SULFATE 5 MG: 2.5 SOLUTION RESPIRATORY (INHALATION) at 08:32

## 2018-10-25 RX ADMIN — DEXMEDETOMIDINE 0.2 MCG/KG/HR: 100 INJECTION, SOLUTION, CONCENTRATE INTRAVENOUS at 12:03

## 2018-10-25 RX ADMIN — SILDENAFIL 20 MG: 20 TABLET ORAL at 08:21

## 2018-10-25 RX ADMIN — Medication 10 ML: at 08:25

## 2018-10-25 RX ADMIN — MICAFUNGIN SODIUM 50 MG: 20 INJECTION, POWDER, LYOPHILIZED, FOR SOLUTION INTRAVENOUS at 09:28

## 2018-10-25 RX ADMIN — MORPHINE SULFATE 2 MG: 2 INJECTION, SOLUTION INTRAMUSCULAR; INTRAVENOUS at 23:38

## 2018-10-25 RX ADMIN — OXYCODONE HYDROCHLORIDE 5 MG: 5 TABLET ORAL at 08:21

## 2018-10-25 RX ADMIN — AMIODARONE HYDROCHLORIDE 0.5 MG/MIN: 1.8 INJECTION, SOLUTION INTRAVENOUS at 08:13

## 2018-10-25 RX ADMIN — FENTANYL CITRATE 50 MCG: 50 INJECTION INTRAMUSCULAR; INTRAVENOUS at 19:01

## 2018-10-25 RX ADMIN — AMIODARONE HYDROCHLORIDE 0.5 MG/MIN: 1.8 INJECTION, SOLUTION INTRAVENOUS at 21:49

## 2018-10-25 RX ADMIN — FAMOTIDINE 20 MG: 20 TABLET ORAL at 08:21

## 2018-10-25 RX ADMIN — ALBUTEROL SULFATE 5 MG: 2.5 SOLUTION RESPIRATORY (INHALATION) at 20:57

## 2018-10-25 RX ADMIN — ASPIRIN 325 MG: 325 TABLET ORAL at 08:21

## 2018-10-25 RX ADMIN — FENTANYL CITRATE 50 MCG: 50 INJECTION INTRAMUSCULAR; INTRAVENOUS at 21:45

## 2018-10-25 RX ADMIN — PIPERACILLIN SODIUM,TAZOBACTAM SODIUM 2.25 G: 2; .25 INJECTION, POWDER, FOR SOLUTION INTRAVENOUS at 00:44

## 2018-10-25 ASSESSMENT — PAIN SCALES - GENERAL
PAINLEVEL_OUTOF10: 0
PAINLEVEL_OUTOF10: 10
PAINLEVEL_OUTOF10: 4
PAINLEVEL_OUTOF10: 4
PAINLEVEL_OUTOF10: 9
PAINLEVEL_OUTOF10: 6
PAINLEVEL_OUTOF10: 6
PAINLEVEL_OUTOF10: 10

## 2018-10-25 ASSESSMENT — PAIN SCALES - WONG BAKER: WONGBAKER_NUMERICALRESPONSE: 4

## 2018-10-25 ASSESSMENT — PULMONARY FUNCTION TESTS: PIF_VALUE: 13

## 2018-10-25 NOTE — FLOWSHEET NOTE
10/25/18 1302 10/25/18 1640   Vital Signs   /78 116/66   Temp 97.9 °F (36.6 °C) 99.7 °F (37.6 °C)   Pulse 122 135   Weight 227 lb 11.8 oz (103.3 kg) 222 lb 10.6 oz (101 kg)   Weight Method Bed scale Bed scale   Percent Weight Change 1.97 -2.23   Post-Hemodialysis Assessment   Post-Treatment Procedures --  Blood returned;Catheter Capped, clamped with Saline x2 ports   Machine Disinfection Process --  Acid/Vinegar Clean;Heat Disinfect; Exterior Machine Disinfection   Rinseback Volume (ml) --  400 ml   Total Liters Processed (l/min) --  57 l/min   Dialyzer Clearance --  Clear   Duration of Treatment (minutes) --  180 minutes   Heparin amount administered during treatment (units) --  0 units   Hemodialysis Intake (ml) --  400 ml   Hemodialysis Output (ml) --  2900 ml   NET Removed (ml) --  2500 ml   Tolerated Treatment --  Good        10/25/18 1302 10/25/18 1640   Vital Signs   /78 116/66   Temp 97.9 °F (36.6 °C) 99.7 °F (37.6 °C)   Pulse 122 135   Weight 227 lb 11.8 oz (103.3 kg) 222 lb 10.6 oz (101 kg)   Weight Method Bed scale Bed scale   Percent Weight Change 1.97 -2.23   Post-Hemodialysis Assessment   Post-Treatment Procedures --  Blood returned;Catheter Capped, clamped with Saline x2 ports   Machine Disinfection Process --  Acid/Vinegar Clean;Heat Disinfect; Exterior Machine Disinfection   Rinseback Volume (ml) --  400 ml   Total Liters Processed (l/min) --  57 l/min   Dialyzer Clearance --  Clear   Duration of Treatment (minutes) --  180 minutes   Heparin amount administered during treatment (units) --  0 units   Hemodialysis Intake (ml) --  400 ml   Hemodialysis Output (ml) --  2900 ml   NET Removed (ml) --  2500 ml   Tolerated Treatment --  Good        10/25/18 1302 10/25/18 1640   Vital Signs   /78 116/66   Temp 97.9 °F (36.6 °C) 99.7 °F (37.6 °C)   Pulse 122 135   Weight 227 lb 11.8 oz (103.3 kg) 222 lb 10.6 oz (101 kg)   Weight Method Bed scale Bed scale   Percent Weight Change 1.97 -2.23 Post-Hemodialysis Assessment   Post-Treatment Procedures --  Blood returned;Catheter Capped, clamped with Saline x2 ports   Machine Disinfection Process --  Acid/Vinegar Clean;Heat Disinfect; Exterior Machine Disinfection   Rinseback Volume (ml) --  400 ml   Total Liters Processed (l/min) --  57 l/min   Dialyzer Clearance --  Clear   Duration of Treatment (minutes) --  180 minutes   Heparin amount administered during treatment (units) --  0 units   Hemodialysis Intake (ml) --  400 ml   Hemodialysis Output (ml) --  2900 ml   NET Removed (ml) --  2500 ml   Tolerated Treatment --  Good   stable 3 hour treatment. 2.5 liters net uf. Report given to nurse. Tachycardia throughout.

## 2018-10-25 NOTE — PROGRESS NOTES
indicated to further assess    [x] Is NOT indicated at this time; Will recommend as appropriate. DIET RECOMMENDATIONS:  Strict NPO -Plan repeat BSE 10/26       EDUCATION:   Learner: [x]Patient [x] Significant other [] Son/Daughter [] Parent     [] Other:   Education: [x] Reviewed results and recommendations of this evaluation     [x] Reviewed diet and strategies     [x] Reviewed signs, symptoms and risk of aspiration     [] Demonstrated how to thick liquid appropriately. [x] Reviewed goals and Plan of Care     [] OTHER:   Method: [x] Discussion [x] Demonstration [] Hand-out     [] OTHER:   Evaluation of Education:     [] Verbalizes understanding [] Demonstrates with assistance     [] Demonstrates without assistance [x]Needs further instruction     [x] No evidence of learning  [] Family not present    PATIENT GOALS: [] Pt did not state. Will further assess during treatment.      [] Return to the least restricted diet possible     [x] Return to previous level of function     [] OTHER:    PLAN / TREATMENT RECOMMENDATIONS:  [x] Repeat BSE 10/26AM     SHORT TERM GOALS:  Short-term Goals  Timeframe for Short-term Goals: 3 days  Goal 1: Patient will complete repeat BSE 10/26 to determine safety of oral intake or need for alternate means of nutrition       LONG TERM GOALS:  No LTGs due to [address]

## 2018-10-25 NOTE — PLAN OF CARE
Problem: Discharge Planning:  Goal: Discharged to appropriate level of care  Discharged to appropriate level of care   Outcome: Ongoing  Patients situation is being evaluated and assessed by  and social work. Discharge instructions will be given at appropiate time. Problem: Anxiety:  Goal: Level of anxiety will decrease  Level of anxiety will decrease   Outcome: Ongoing  Pt remains anxious at times. Problem: Cardiac Output - Decreased:  Goal: Cardiac output within specified parameters  Cardiac output within specified parameters   Outcome: Ongoing  Cardiology following  Goal: Hemodynamic stability will improve  Hemodynamic stability will improve   Outcome: Ongoing  Vitals:    10/24/18 2031 10/24/18 2101 10/24/18 2131 10/24/18 2230   BP: 111/83 132/83 (!) 126/98 (!) 105/57   Pulse: 124 126 127 126   Resp: 25 24 28    Temp:  99.5 °F (37.5 °C)  98.3 °F (36.8 °C)   TempSrc:  Core     SpO2: (!) 83%  90%    Weight:    223 lb 5.2 oz (101.3 kg)   Height:           Problem: Fluid Volume - Imbalance:  Goal: Ability to achieve a balanced intake and output will improve  Ability to achieve a balanced intake and output will improve   Outcome: Ongoing  Pt has been fluid overloaded. Round of hemodialysis completed at bedside. 2L taken off. Pt tolerated well. NPO diet status of right now. Problem: Pain:  Goal: Pain level will decrease  Pain level will decrease    Outcome: Ongoing  Pt pain free at this time. PRN medication available for pain management. Problem: Pain:  Goal: Pain level will decrease  Pain level will decrease    Outcome: Ongoing  Pt pain free at this time. PRN medication available for pain management. Problem: Risk for Impaired Skin Integrity  Goal: Tissue integrity - skin and mucous membranes  Structural intactness and normal physiological function of skin and  mucous membranes. Outcome: Ongoing  No new skin breakdown noted. See skin assessment.  Repositioning patient q2h and

## 2018-10-25 NOTE — PROGRESS NOTES
infusion Stopped (10/24/18 0917)       CBC:   Recent Labs      10/24/18   0400  10/24/18   1122  10/24/18   2005  10/25/18   0425   WBC  9.3   --   14.5*  12.8*   HGB  6.7*  7.2*  8.9*  8.4*   PLT  72*   --   94*  103*     CMP:  Recent Labs      10/23/18   1441  10/23/18   2200  10/24/18   0400  10/25/18   0425   NA  138   --   137  137   K  3.5  4.0  4.1  4.0   CL  92*   --   93*  92*   CO2  30   --   28  26   BUN  79*   --   83*  52*   CREATININE  3.9*   --   3.9*  3.0*   GLUCOSE  126*   --   100  87   CALCIUM  8.3*   --   8.1*  8.5   LABGLOM  12*   --   12*  16*     Troponin: No results for input(s): TROPONINI in the last 72 hours. BNP: No results for input(s): BNP in the last 72 hours. INR: No results for input(s): INR in the last 72 hours. Lipids: No results for input(s): CHOL, LDLDIRECT, TRIG, HDL, AMYLASE, LIPASE in the last 72 hours. Liver: Recent Labs      10/23/18   1023   AST  34   ALT  17   ALKPHOS  328*   PROT  5.9*   LABALBU  2.7*   BILITOT  0.4     Iron:  No results for input(s): IRONS, FERRITIN in the last 72 hours. Invalid input(s): LABIRONS    Objective:   Vitals: BP 94/67   Pulse 111   Temp 99.7 °F (37.6 °C) (Oral)   Resp 18   Ht 5' 6\" (1.676 m)   Wt 223 lb 5.2 oz (101.3 kg)   SpO2 (!) 89%   BMI 36.05 kg/m²    Wt Readings from Last 3 Encounters:   10/24/18 223 lb 5.2 oz (101.3 kg)   10/10/18 196 lb 8 oz (89.1 kg)   09/26/18 199 lb 12.8 oz (90.6 kg)      24HR INTAKE/OUTPUT:    Intake/Output Summary (Last 24 hours) at 10/25/18 0624  Last data filed at 10/25/18 0401   Gross per 24 hour   Intake         22634.55 ml   Output             3360 ml   Net          7473.55 ml       Constitutional:  Alert, awake, no apparent distress   Skin:normal   HEENT:Pupils are reactive . Throat is clear with intact oxygen mask  Neck:supple with no thyromegally  Cardiovascular:  S1, S2 without murmur  Respiratory:  Expiratory wheezed with decreased sound   Abdomen: +bs, soft, non temder  Ext: + LE

## 2018-10-26 ENCOUNTER — APPOINTMENT (OUTPATIENT)
Dept: GENERAL RADIOLOGY | Age: 53
DRG: 163 | End: 2018-10-26
Attending: THORACIC SURGERY (CARDIOTHORACIC VASCULAR SURGERY)
Payer: COMMERCIAL

## 2018-10-26 LAB
ALLEN TEST: ABNORMAL
ALLEN TEST: NORMAL
ANA SCREEN: NORMAL
ANION GAP SERPL CALCULATED.3IONS-SCNC: 19 MEQ/L (ref 8–16)
BASE EXCESS (CALCULATED): -1.7 MMOL/L (ref -2.5–2.5)
BASE EXCESS (CALCULATED): 0.8 MMOL/L (ref -2.5–2.5)
BASOPHILS # BLD: 0.1 %
BASOPHILS ABSOLUTE: 0 THOU/MM3 (ref 0–0.1)
BUN BLDV-MCNC: 46 MG/DL (ref 7–22)
CALCIUM SERPL-MCNC: 9 MG/DL (ref 8.5–10.5)
CHLORIDE BLD-SCNC: 93 MEQ/L (ref 98–111)
CO2: 26 MEQ/L (ref 23–33)
COLLECTED BY:: ABNORMAL
COLLECTED BY:: NORMAL
CREAT SERPL-MCNC: 2.7 MG/DL (ref 0.4–1.2)
DEVICE: ABNORMAL
DEVICE: NORMAL
EOSINOPHIL # BLD: 0.4 %
EOSINOPHILS ABSOLUTE: 0 THOU/MM3 (ref 0–0.4)
ERYTHROCYTE [DISTWIDTH] IN BLOOD BY AUTOMATED COUNT: 18.4 % (ref 11.5–14.5)
ERYTHROCYTE [DISTWIDTH] IN BLOOD BY AUTOMATED COUNT: 18.7 % (ref 11.5–14.5)
ERYTHROCYTE [DISTWIDTH] IN BLOOD BY AUTOMATED COUNT: 56.4 FL (ref 35–45)
ERYTHROCYTE [DISTWIDTH] IN BLOOD BY AUTOMATED COUNT: 56.8 FL (ref 35–45)
GFR SERPL CREATININE-BSD FRML MDRD: 19 ML/MIN/1.73M2
GLUCOSE BLD-MCNC: 151 MG/DL (ref 70–108)
GLUCOSE BLD-MCNC: 87 MG/DL (ref 70–108)
GLUCOSE BLD-MCNC: 99 MG/DL (ref 70–108)
HCO3: 24 MMOL/L (ref 23–28)
HCO3: 26 MMOL/L (ref 23–28)
HCT VFR BLD CALC: 26.2 % (ref 37–47)
HCT VFR BLD CALC: 26.9 % (ref 37–47)
HEMOGLOBIN: 8.7 GM/DL (ref 12–16)
HEMOGLOBIN: 8.7 GM/DL (ref 12–16)
IFIO2: 40
IFIO2: 40
IMMATURE GRANS (ABS): 0.13 THOU/MM3 (ref 0–0.07)
IMMATURE GRANULOCYTES: 1.3 %
LYMPHOCYTES # BLD: 6.9 %
LYMPHOCYTES ABSOLUTE: 0.7 THOU/MM3 (ref 1–4.8)
MAGNESIUM: 2.4 MG/DL (ref 1.6–2.4)
MCH RBC QN AUTO: 27.6 PG (ref 26–33)
MCH RBC QN AUTO: 27.7 PG (ref 26–33)
MCHC RBC AUTO-ENTMCNC: 32.3 GM/DL (ref 32.2–35.5)
MCHC RBC AUTO-ENTMCNC: 33.2 GM/DL (ref 32.2–35.5)
MCV RBC AUTO: 83.4 FL (ref 81–99)
MCV RBC AUTO: 85.4 FL (ref 81–99)
MONOCYTES # BLD: 5.3 %
MONOCYTES ABSOLUTE: 0.5 THOU/MM3 (ref 0.4–1.3)
NEUTROPHIL CYTOPLASMIC AB IGG: < 1
NUCLEATED RED BLOOD CELLS: 0 /100 WBC
O2 SATURATION: 98 %
O2 SATURATION: 98 %
PCO2: 41 MMHG (ref 35–45)
PCO2: 45 MMHG (ref 35–45)
PH BLOOD GAS: 7.37 (ref 7.35–7.45)
PH BLOOD GAS: 7.38 (ref 7.35–7.45)
PLATELET # BLD: 183 THOU/MM3 (ref 130–400)
PLATELET # BLD: 183 THOU/MM3 (ref 130–400)
PMV BLD AUTO: 12.4 FL (ref 9.4–12.4)
PMV BLD AUTO: 12.8 FL (ref 9.4–12.4)
PO2: 103 MMHG (ref 71–104)
PO2: 115 MMHG (ref 71–104)
POTASSIUM SERPL-SCNC: 3.6 MEQ/L (ref 3.5–5.2)
RBC # BLD: 3.14 MILL/MM3 (ref 4.2–5.4)
RBC # BLD: 3.15 MILL/MM3 (ref 4.2–5.4)
SEG NEUTROPHILS: 86 %
SEGMENTED NEUTROPHILS ABSOLUTE COUNT: 8.9 THOU/MM3 (ref 1.8–7.7)
SODIUM BLD-SCNC: 138 MEQ/L (ref 135–145)
SOURCE, BLOOD GAS: ABNORMAL
SOURCE, BLOOD GAS: NORMAL
URINE CULTURE, ROUTINE: NORMAL
WBC # BLD: 10.1 THOU/MM3 (ref 4.8–10.8)
WBC # BLD: 10.3 THOU/MM3 (ref 4.8–10.8)

## 2018-10-26 PROCEDURE — 90935 HEMODIALYSIS ONE EVALUATION: CPT

## 2018-10-26 PROCEDURE — 99232 SBSQ HOSP IP/OBS MODERATE 35: CPT | Performed by: INTERNAL MEDICINE

## 2018-10-26 PROCEDURE — 37799 UNLISTED PX VASCULAR SURGERY: CPT

## 2018-10-26 PROCEDURE — 2700000000 HC OXYGEN THERAPY PER DAY

## 2018-10-26 PROCEDURE — 82803 BLOOD GASES ANY COMBINATION: CPT

## 2018-10-26 PROCEDURE — 6360000002 HC RX W HCPCS: Performed by: THORACIC SURGERY (CARDIOTHORACIC VASCULAR SURGERY)

## 2018-10-26 PROCEDURE — 83735 ASSAY OF MAGNESIUM: CPT

## 2018-10-26 PROCEDURE — 2709999900 HC NON-CHARGEABLE SUPPLY

## 2018-10-26 PROCEDURE — 85027 COMPLETE CBC AUTOMATED: CPT

## 2018-10-26 PROCEDURE — 6360000002 HC RX W HCPCS: Performed by: INTERNAL MEDICINE

## 2018-10-26 PROCEDURE — 6370000000 HC RX 637 (ALT 250 FOR IP): Performed by: NURSE PRACTITIONER

## 2018-10-26 PROCEDURE — 92610 EVALUATE SWALLOWING FUNCTION: CPT

## 2018-10-26 PROCEDURE — 82948 REAGENT STRIP/BLOOD GLUCOSE: CPT

## 2018-10-26 PROCEDURE — 2580000003 HC RX 258: Performed by: INTERNAL MEDICINE

## 2018-10-26 PROCEDURE — 2580000003 HC RX 258: Performed by: THORACIC SURGERY (CARDIOTHORACIC VASCULAR SURGERY)

## 2018-10-26 PROCEDURE — 36592 COLLECT BLOOD FROM PICC: CPT

## 2018-10-26 PROCEDURE — 80048 BASIC METABOLIC PNL TOTAL CA: CPT

## 2018-10-26 PROCEDURE — 71045 X-RAY EXAM CHEST 1 VIEW: CPT

## 2018-10-26 PROCEDURE — 2500000003 HC RX 250 WO HCPCS: Performed by: NURSE PRACTITIONER

## 2018-10-26 PROCEDURE — 2000000000 HC ICU R&B

## 2018-10-26 PROCEDURE — 85025 COMPLETE CBC W/AUTO DIFF WBC: CPT

## 2018-10-26 PROCEDURE — 94761 N-INVAS EAR/PLS OXIMETRY MLT: CPT

## 2018-10-26 PROCEDURE — 94640 AIRWAY INHALATION TREATMENT: CPT

## 2018-10-26 PROCEDURE — 6370000000 HC RX 637 (ALT 250 FOR IP): Performed by: THORACIC SURGERY (CARDIOTHORACIC VASCULAR SURGERY)

## 2018-10-26 PROCEDURE — 6370000000 HC RX 637 (ALT 250 FOR IP): Performed by: INTERNAL MEDICINE

## 2018-10-26 PROCEDURE — 99291 CRITICAL CARE FIRST HOUR: CPT | Performed by: INTERNAL MEDICINE

## 2018-10-26 RX ORDER — AMIODARONE HYDROCHLORIDE 200 MG/1
200 TABLET ORAL 2 TIMES DAILY
Status: DISCONTINUED | OUTPATIENT
Start: 2018-10-26 | End: 2018-11-05 | Stop reason: HOSPADM

## 2018-10-26 RX ORDER — MULTIVITAMIN WITH FOLIC ACID 400 MCG
1 TABLET ORAL DAILY
Status: DISCONTINUED | OUTPATIENT
Start: 2018-10-27 | End: 2018-11-05 | Stop reason: HOSPADM

## 2018-10-26 RX ORDER — DOCUSATE SODIUM 100 MG/1
100 CAPSULE, LIQUID FILLED ORAL DAILY
Status: DISCONTINUED | OUTPATIENT
Start: 2018-10-26 | End: 2018-11-05 | Stop reason: HOSPADM

## 2018-10-26 RX ORDER — CARVEDILOL 3.12 MG/1
3.12 TABLET ORAL 2 TIMES DAILY WITH MEALS
Status: DISCONTINUED | OUTPATIENT
Start: 2018-10-26 | End: 2018-10-27

## 2018-10-26 RX ADMIN — ATORVASTATIN CALCIUM 20 MG: 20 TABLET, FILM COATED ORAL at 22:30

## 2018-10-26 RX ADMIN — ALBUTEROL SULFATE 5 MG: 2.5 SOLUTION RESPIRATORY (INHALATION) at 20:02

## 2018-10-26 RX ADMIN — DOCUSATE SODIUM 100 MG: 100 CAPSULE, LIQUID FILLED ORAL at 21:39

## 2018-10-26 RX ADMIN — Medication 10 ML: at 00:10

## 2018-10-26 RX ADMIN — SILDENAFIL 20 MG: 20 TABLET ORAL at 11:19

## 2018-10-26 RX ADMIN — MICAFUNGIN SODIUM 50 MG: 20 INJECTION, POWDER, LYOPHILIZED, FOR SOLUTION INTRAVENOUS at 10:00

## 2018-10-26 RX ADMIN — OXYCODONE HYDROCHLORIDE 10 MG: 5 TABLET ORAL at 21:39

## 2018-10-26 RX ADMIN — AMIODARONE HYDROCHLORIDE 0.5 MG/MIN: 1.8 INJECTION, SOLUTION INTRAVENOUS at 10:03

## 2018-10-26 RX ADMIN — MIDODRINE HYDROCHLORIDE 10 MG: 10 TABLET ORAL at 17:41

## 2018-10-26 RX ADMIN — FORMOTEROL FUMARATE DIHYDRATE 20 MCG: 20 SOLUTION RESPIRATORY (INHALATION) at 07:43

## 2018-10-26 RX ADMIN — AMIODARONE HYDROCHLORIDE 200 MG: 200 TABLET ORAL at 14:52

## 2018-10-26 RX ADMIN — PIPERACILLIN SODIUM,TAZOBACTAM SODIUM 2.25 G: 2; .25 INJECTION, POWDER, FOR SOLUTION INTRAVENOUS at 00:10

## 2018-10-26 RX ADMIN — ALPRAZOLAM 0.5 MG: 0.5 TABLET ORAL at 21:39

## 2018-10-26 RX ADMIN — INSULIN LISPRO 3 UNITS: 100 INJECTION, SOLUTION INTRAVENOUS; SUBCUTANEOUS at 17:42

## 2018-10-26 RX ADMIN — ALBUTEROL SULFATE 5 MG: 2.5 SOLUTION RESPIRATORY (INHALATION) at 07:43

## 2018-10-26 RX ADMIN — BUDESONIDE 500 MCG: 0.5 INHALANT RESPIRATORY (INHALATION) at 20:08

## 2018-10-26 RX ADMIN — BUDESONIDE 500 MCG: 0.5 INHALANT RESPIRATORY (INHALATION) at 07:43

## 2018-10-26 RX ADMIN — MIDODRINE HYDROCHLORIDE 10 MG: 10 TABLET ORAL at 11:19

## 2018-10-26 RX ADMIN — CARVEDILOL 3.12 MG: 3.12 TABLET, FILM COATED ORAL at 21:39

## 2018-10-26 RX ADMIN — PIPERACILLIN SODIUM,TAZOBACTAM SODIUM 2.25 G: 2; .25 INJECTION, POWDER, FOR SOLUTION INTRAVENOUS at 18:30

## 2018-10-26 RX ADMIN — Medication 10 ML: at 07:40

## 2018-10-26 RX ADMIN — FAMOTIDINE 20 MG: 20 TABLET ORAL at 11:20

## 2018-10-26 RX ADMIN — SILDENAFIL 20 MG: 20 TABLET ORAL at 22:30

## 2018-10-26 RX ADMIN — Medication 10 ML: at 22:31

## 2018-10-26 RX ADMIN — FORMOTEROL FUMARATE DIHYDRATE 20 MCG: 20 SOLUTION RESPIRATORY (INHALATION) at 19:57

## 2018-10-26 RX ADMIN — ASPIRIN 325 MG: 325 TABLET ORAL at 11:20

## 2018-10-26 RX ADMIN — SILDENAFIL 20 MG: 20 TABLET ORAL at 14:51

## 2018-10-26 RX ADMIN — OXYCODONE HYDROCHLORIDE 5 MG: 5 TABLET ORAL at 11:20

## 2018-10-26 RX ADMIN — PIPERACILLIN SODIUM,TAZOBACTAM SODIUM 2.25 G: 2; .25 INJECTION, POWDER, FOR SOLUTION INTRAVENOUS at 11:18

## 2018-10-26 ASSESSMENT — PAIN SCALES - GENERAL
PAINLEVEL_OUTOF10: 4
PAINLEVEL_OUTOF10: 6

## 2018-10-26 NOTE — PROGRESS NOTES
[address] ICU 4D  Bedside Swallowing Evaluation      SLP Individual Minutes  Time In: 0591  Time Out: 2693  Minutes: 10          Date: 10/26/2018  Patient Name: Melisa Lawton      CSN: [id number]   : 1965  (48 y.o.)  Gender: female   Referring Physician:  Johanny Mayfield MD  Diagnosis: Persistent artrial fibrillation   Secondary Diagnosis:  Dysphagia  History of Present Illness/Injury: Patient admitted to [address] [place] [address] with above dx. See physician H&P for full report. JARON Amezquita reports patient intubated x3 with self extubation 1/3 intubations. Patient most recently extubated 10/24. ST consulted d/t poor vocal quality and s/s of aspiration with ice chips and PO medication with appleasuce. Initial swallow eval 10/25 with recommendation for pt to remain NPO. ST to complete swallow reevaluation and determine safety of PO diet. Past Medical History:   Diagnosis Date   [employer] dual pacer 2018    Hyperlipidemia        Pain:  0/10    Current Diet: NPO     Respiratory Status: [x] Nasal Cannula      Behavioral Observation: [x] Alert [] Oriented [] Confused [x] Lethargic      [] Dysarthric [x] Limited Direction Following [] Agitated       ORAL MECHANISM EVALUATION:         Comments:  Facial / Labial [x]WFL [] Impaired []DNT    Lingual [x]WFL [] Impaired []DNT    Dentition []WFL [x] Impaired []DNT    Velum []WFL [] Impaired [x]DNT Unable to visualize    Vocal Quality []WFL [x] Impaired []DNT Raspy, reduced vocal intensity    Sensation []WFL [] Impaired [x]DNT    Cough [x]WFL [] Impaired []DNT    Other: []WFL [] Impaired []DNT    Other: []WFL [] Impaired []DNT        PATIENT WAS EVALUATED USING:  Ice chips, thin liquids via straw, applesauce     ORAL PHASE: [x] WFL     PHARYNGEAL PHASE: [x] WFL: Pharyngeal phase appears WFLs, but cannot completely rule out pharyngeal phase deficits from a bedside swallow evaluation alone.     SIGNS AND SYMPTOMS OF LARYNGEAL PENETRATION /

## 2018-10-26 NOTE — PROGRESS NOTES
CT/CV Surgery Progress Note    10/26/2018 9:41 AM    Surgeon:  Dr. Benny Mason     Procedure:  Redo sternotomy, AVR, mitral valve repair, left atrial maze procedure            POD #:   11        Awake, alert, resting in bed. Follows all commands  40% high flow O2, sats 97%  Hemodynamically stable  No pressor support, On Midodrine   a-flutter  Hemodialysis today,  3L removed  Starting PO today, Puree        Vital Signs: /67   Pulse 136   Temp 99 °F (37.2 °C) (Core)   Resp 13   Ht 5' 6\" (1.676 m)   Wt 221 lb 1.9 oz (100.3 kg)   SpO2 100%   BMI 35.69 kg/m²    Temp (24hrs), Av.5 °F (37.5 °C), Min:97.9 °F (36.6 °C), Max:100.6 °F (38.1 °C)      Weight: 221 lb 1.9 oz (100.3 kg)       PULSE OXIMETRY RANGE: SpO2  Av.3 %  Min: 86 %  Max: 100 %  SUPPLEMENTAL O2: O2 Flow Rate (L/min): 40 L/min     Labs:   CBC:     Recent Labs      10/24/18   2005  10/25/18   0425  10/26/18   0715   WBC  14.5*  12.8*  10.1   HGB  8.9*  8.4*  8.7*   HCT  27.6*  25.3*  26.9*   MCV  85.7  82.7  85.4   PLT  94*  103*  183     BMP: Recent Labs      10/24/18   0400  10/25/18   0425  10/26/18   0715   NA  137  137  138   K  4.1  4.0  3.6   CL  93*  92*  93*   CO2  28  26  26   BUN  83*  52*  46*   CREATININE  3.9*  3.0*  2.7*   MG  2.3  2.2  2.4     Last HgA1C:    Lab Results   Component Value Date    LABA1C 5.9 10/10/2018     PT/INR:No results for input(s): PROTIME, INR in the last 72 hours. APTT: No results for input(s): APTT in the last 72 hours.         Intake/Output Summary (Last 24 hours) at 10/26/18 0975  Last data filed at 10/26/18 0530   Gross per 24 hour   Intake             1145 ml   Output             3450 ml   Net            -2305 ml       Scheduled Meds:    tiotropium  18 mcg Inhalation Daily    formoterol  20 mcg Nebulization BID    budesonide  0.5 mg Nebulization BID    micafungin  50 mg Intravenous Daily    potassium (CARDIAC) replacement protocol   Other RX Placeholder    sildenafil  20 mg Oral TID   

## 2018-10-26 NOTE — PLAN OF CARE
Problem: Anxiety:  Goal: Level of anxiety will decrease  Level of anxiety will decrease   Outcome: Ongoing  Pt calm and cooperative with care. Without signs or symptoms of anxiety at this time,    Problem: Cardiac Output - Decreased:  Goal: Cardiac output within specified parameters  Cardiac output within specified parameters   Outcome: Ongoing  Not on vasopressors at this time. capillary refill brisk. Skim remains warm, moist, and pink. Problem: Pain:  Goal: Pain level will decrease  Pain level will decrease    Outcome: Ongoing  Denies pain at this time. Problem: Pain:  Goal: Pain level will decrease  Pain level will decrease    Outcome: Ongoing  Denies pain at this time. Problem: Risk for Impaired Skin Integrity  Goal: Tissue integrity - skin and mucous membranes  Structural intactness and normal physiological function of skin and  mucous membranes. Outcome: Ongoing  Pt turned every two hours and PRN. Heels and elbows floated via pillows. Problem: Falls - Risk of:  Goal: Will remain free from falls  Will remain free from falls   Outcome: Ongoing  Without fall thus far. Falling star in place. [place] Fall Scale completed.

## 2018-10-26 NOTE — PROGRESS NOTES
Assessment and Plan:        1. Acute respiratory failure: Patient required intubation 10/17/18 secondary to cardiomyopathy with poor cerebral perfusion and altered mental status.  She was intubated without difficulty. Patient extubated to BiPAP on 10/24/18.  Weaned from BiPAP 10/25/18. Still on high flow O2.  2. Pulmonary edema: Diurese.  Treat cardiomyopathy.  With issues of renal failure and anemia, alveolar hemorrhage syndrome is a possibility.  Capillaritis screen. Haptoglobin level pending. 3. ALI:  Awaiting results for capillaritis screening. Continue with calluses with ongoing fluid removal.  Does not appear to be infectious in nature. Does not appear to represent ARDS. More likely alveolar hemorrhage versus pulmonary edema. 4. Cardiomyopathy: Ejection fraction 20-25%.  Fortunately patient has significant right ventricular dysfunction on top of left ventricular dysfunction.  Continue telemetry monitoring pulmonary artery diastolic pressure and cardiac parameters with [place].  MARIA DOLORES ratio(pulmonary artery pulsatility index) was 0.52 which indicated right ventricular dysfunction.  Right ventricular dysfunction also noted on repeat echocardiogram.  Management of cardiomyopathy per cardiothoracic surgery.  Patient requires time for stunned myocardium to recover. S/P Epoprostenol. On sildenafil to decrease pulmonary artery pressures. Continue his Bumex. Initiate afterload reduction if blood pressure permits.  Slow improvement.   5. Anemia: Significant drop in hemoglobin treated with 3 UPRBC 10/18/19.  No retroperitoneal hemorrhage on CT.  Hemoglobin trending downward.  In the setting of pulmonary edema, recurrent anemia, and renal failure, must be concerned regarding alveolar hemorrhage associated with capillaritis.  Patient was on epoprostenol which can have an alveolar hemorrhage associated with it.  This medication was discontinued.  Obtain generalized vasculitis screen with [facility], [facility], and [facility].  She underwent cardiac catheterization 9/20/18 which showed no coronary artery disease.  She had echocardiogram completed August 21, 2018 which showed an ejection fraction of 25% with severe global hypokinesis and left atrial enlargement.  She had moderate mitral regurgitation. Patient was admitted on 10/15/18 and underwent redo sternotomy, mitral valve repair, maze procedure, and placement of left ventricular epicardial lead. Nikki Abbot her cardiomyopathy, she temporarily required femoral intra-aortic balloon pump.  Patient was extubated 10/16/18.  On 10/17/18, patient was sitting in a chair after working with physical therapy and occupational therapy.  She had done very well with therapy with marching in place.  Upon sitting in the chair, she became very lethargic and unarousable.  She was lifted from the chair and placed in the bed by the medical staff including me. Todd Mcnulty was very lethargic and had a very faint pulse.  Given her altered mental status, she was intubated 10/17/18. Todd Mcnulty was continued on epinephrine drip and vasopressin was added.  Blood pressure did respond to pressure intervention. She was started on a Bumex drip with improved UOP.  Transitioned to scheduled Bumex.  Extubated 10/24/18. Erlin Marte has issues with anemia given rise to possible alveolar hemorrhage vs hemolysis. Epoprostenol discontinued.  Capillaritis screen obtained. Given the volume overload status, dialysis initiated 10/24/18. For further details, please review the assessment and plan. ROS: Patient complains of thirst.  Dyspnea with activity. No chest pain. PMH:  Per HPI  SHX:  Reformed smoker since September 16, 2018. Martha Nearing known drug allergies  Medications:  per the [facility]  Vital Signs: T: 99: P: 136: RR: 21: B/P: 113/84: FiO2: 40%: O2 Sat: 100%: I/O: 1145/3450  General:   Acutely ill-appearing overweight white female. HEENT:  normocephalic and atraumatic.  No scleral icterus.   Shama Carson

## 2018-10-27 ENCOUNTER — APPOINTMENT (OUTPATIENT)
Dept: GENERAL RADIOLOGY | Age: 53
DRG: 163 | End: 2018-10-27
Attending: THORACIC SURGERY (CARDIOTHORACIC VASCULAR SURGERY)
Payer: COMMERCIAL

## 2018-10-27 LAB
ANION GAP SERPL CALCULATED.3IONS-SCNC: 11 MEQ/L (ref 8–16)
ANION GAP SERPL CALCULATED.3IONS-SCNC: 14 MEQ/L (ref 8–16)
BUN BLDV-MCNC: 40 MG/DL (ref 7–22)
CALCIUM SERPL-MCNC: 8.7 MG/DL (ref 8.5–10.5)
CHLORIDE BLD-SCNC: 95 MEQ/L (ref 98–111)
CHLORIDE BLD-SCNC: 96 MEQ/L (ref 98–111)
CO2: 29 MEQ/L (ref 23–33)
CO2: 29 MEQ/L (ref 23–33)
CREAT SERPL-MCNC: 2.3 MG/DL (ref 0.4–1.2)
ERYTHROCYTE [DISTWIDTH] IN BLOOD BY AUTOMATED COUNT: 18.6 % (ref 11.5–14.5)
ERYTHROCYTE [DISTWIDTH] IN BLOOD BY AUTOMATED COUNT: 55.8 FL (ref 35–45)
GFR SERPL CREATININE-BSD FRML MDRD: 22 ML/MIN/1.73M2
GLOMERULAR BASEMENT MEMBRANE ANTIBODY: NORMAL
GLUCOSE BLD-MCNC: 110 MG/DL (ref 70–108)
GLUCOSE BLD-MCNC: 131 MG/DL (ref 70–108)
GLUCOSE BLD-MCNC: 99 MG/DL (ref 70–108)
HAPTOGLOBIN: 406 MG/DL (ref 30–200)
HCT VFR BLD CALC: 26.8 % (ref 37–47)
HEMOGLOBIN: 8.7 GM/DL (ref 12–16)
MAGNESIUM: 2.3 MG/DL (ref 1.6–2.4)
MCH RBC QN AUTO: 27.3 PG (ref 26–33)
MCHC RBC AUTO-ENTMCNC: 32.5 GM/DL (ref 32.2–35.5)
MCV RBC AUTO: 84 FL (ref 81–99)
PLATELET # BLD: 223 THOU/MM3 (ref 130–400)
PMV BLD AUTO: 12 FL (ref 9.4–12.4)
POTASSIUM REFLEX MAGNESIUM: 3.7 MEQ/L (ref 3.5–5.2)
POTASSIUM SERPL-SCNC: 3.3 MEQ/L (ref 3.5–5.2)
RBC # BLD: 3.19 MILL/MM3 (ref 4.2–5.4)
SODIUM BLD-SCNC: 136 MEQ/L (ref 135–145)
SODIUM BLD-SCNC: 138 MEQ/L (ref 135–145)
WBC # BLD: 10 THOU/MM3 (ref 4.8–10.8)

## 2018-10-27 PROCEDURE — 71045 X-RAY EXAM CHEST 1 VIEW: CPT

## 2018-10-27 PROCEDURE — 2500000003 HC RX 250 WO HCPCS: Performed by: THORACIC SURGERY (CARDIOTHORACIC VASCULAR SURGERY)

## 2018-10-27 PROCEDURE — 6370000000 HC RX 637 (ALT 250 FOR IP): Performed by: NURSE PRACTITIONER

## 2018-10-27 PROCEDURE — 6370000000 HC RX 637 (ALT 250 FOR IP): Performed by: INTERNAL MEDICINE

## 2018-10-27 PROCEDURE — 6360000002 HC RX W HCPCS: Performed by: THORACIC SURGERY (CARDIOTHORACIC VASCULAR SURGERY)

## 2018-10-27 PROCEDURE — 2709999900 HC NON-CHARGEABLE SUPPLY

## 2018-10-27 PROCEDURE — 80051 ELECTROLYTE PANEL: CPT

## 2018-10-27 PROCEDURE — 99232 SBSQ HOSP IP/OBS MODERATE 35: CPT | Performed by: INTERNAL MEDICINE

## 2018-10-27 PROCEDURE — 6360000002 HC RX W HCPCS: Performed by: INTERNAL MEDICINE

## 2018-10-27 PROCEDURE — 82948 REAGENT STRIP/BLOOD GLUCOSE: CPT

## 2018-10-27 PROCEDURE — L8000 MASTECTOMY BRA: HCPCS

## 2018-10-27 PROCEDURE — 80048 BASIC METABOLIC PNL TOTAL CA: CPT

## 2018-10-27 PROCEDURE — 6370000000 HC RX 637 (ALT 250 FOR IP): Performed by: THORACIC SURGERY (CARDIOTHORACIC VASCULAR SURGERY)

## 2018-10-27 PROCEDURE — 90935 HEMODIALYSIS ONE EVALUATION: CPT

## 2018-10-27 PROCEDURE — 83735 ASSAY OF MAGNESIUM: CPT

## 2018-10-27 PROCEDURE — 2580000003 HC RX 258: Performed by: THORACIC SURGERY (CARDIOTHORACIC VASCULAR SURGERY)

## 2018-10-27 PROCEDURE — 85027 COMPLETE CBC AUTOMATED: CPT

## 2018-10-27 PROCEDURE — 94760 N-INVAS EAR/PLS OXIMETRY 1: CPT

## 2018-10-27 PROCEDURE — P9045 ALBUMIN (HUMAN), 5%, 250 ML: HCPCS | Performed by: INTERNAL MEDICINE

## 2018-10-27 PROCEDURE — 2000000000 HC ICU R&B

## 2018-10-27 PROCEDURE — 2700000000 HC OXYGEN THERAPY PER DAY

## 2018-10-27 PROCEDURE — 94640 AIRWAY INHALATION TREATMENT: CPT

## 2018-10-27 PROCEDURE — P9045 ALBUMIN (HUMAN), 5%, 250 ML: HCPCS | Performed by: THORACIC SURGERY (CARDIOTHORACIC VASCULAR SURGERY)

## 2018-10-27 RX ORDER — ACETAMINOPHEN 325 MG/1
650 TABLET ORAL EVERY 4 HOURS PRN
Status: DISCONTINUED | OUTPATIENT
Start: 2018-10-27 | End: 2018-11-05 | Stop reason: HOSPADM

## 2018-10-27 RX ORDER — CARVEDILOL 6.25 MG/1
12.5 TABLET ORAL 2 TIMES DAILY WITH MEALS
Status: DISCONTINUED | OUTPATIENT
Start: 2018-10-27 | End: 2018-10-27

## 2018-10-27 RX ORDER — ALBUMIN, HUMAN INJ 5% 5 %
12.5 SOLUTION INTRAVENOUS ONCE
Status: COMPLETED | OUTPATIENT
Start: 2018-10-27 | End: 2018-10-27

## 2018-10-27 RX ORDER — CARVEDILOL 6.25 MG/1
6.25 TABLET ORAL 2 TIMES DAILY WITH MEALS
Status: DISCONTINUED | OUTPATIENT
Start: 2018-10-27 | End: 2018-10-27

## 2018-10-27 RX ADMIN — ACETAMINOPHEN 650 MG: 325 TABLET ORAL at 13:15

## 2018-10-27 RX ADMIN — FORMOTEROL FUMARATE DIHYDRATE 20 MCG: 20 SOLUTION RESPIRATORY (INHALATION) at 08:19

## 2018-10-27 RX ADMIN — ATORVASTATIN CALCIUM 20 MG: 20 TABLET, FILM COATED ORAL at 21:40

## 2018-10-27 RX ADMIN — SILDENAFIL 20 MG: 20 TABLET ORAL at 21:40

## 2018-10-27 RX ADMIN — CARVEDILOL 6.25 MG: 6.25 TABLET, FILM COATED ORAL at 11:30

## 2018-10-27 RX ADMIN — OXYCODONE HYDROCHLORIDE 5 MG: 5 TABLET ORAL at 12:52

## 2018-10-27 RX ADMIN — AMIODARONE HYDROCHLORIDE 150 MG: 1.5 INJECTION, SOLUTION INTRAVENOUS at 09:16

## 2018-10-27 RX ADMIN — MIDODRINE HYDROCHLORIDE 10 MG: 10 TABLET ORAL at 11:30

## 2018-10-27 RX ADMIN — AMIODARONE HYDROCHLORIDE 200 MG: 200 TABLET ORAL at 12:53

## 2018-10-27 RX ADMIN — ALBUTEROL SULFATE 5 MG: 2.5 SOLUTION RESPIRATORY (INHALATION) at 00:09

## 2018-10-27 RX ADMIN — ALBUTEROL SULFATE 5 MG: 2.5 SOLUTION RESPIRATORY (INHALATION) at 04:32

## 2018-10-27 RX ADMIN — MIDODRINE HYDROCHLORIDE 10 MG: 10 TABLET ORAL at 16:13

## 2018-10-27 RX ADMIN — ASPIRIN 325 MG: 325 TABLET ORAL at 12:53

## 2018-10-27 RX ADMIN — Medication 10 ML: at 21:40

## 2018-10-27 RX ADMIN — BUDESONIDE 500 MCG: 0.5 INHALANT RESPIRATORY (INHALATION) at 08:27

## 2018-10-27 RX ADMIN — BUDESONIDE 500 MCG: 0.5 INHALANT RESPIRATORY (INHALATION) at 19:42

## 2018-10-27 RX ADMIN — AMIODARONE HYDROCHLORIDE 200 MG: 200 TABLET ORAL at 21:40

## 2018-10-27 RX ADMIN — SILDENAFIL 20 MG: 20 TABLET ORAL at 12:53

## 2018-10-27 RX ADMIN — FAMOTIDINE 20 MG: 20 TABLET ORAL at 12:55

## 2018-10-27 RX ADMIN — FORMOTEROL FUMARATE DIHYDRATE 20 MCG: 20 SOLUTION RESPIRATORY (INHALATION) at 19:42

## 2018-10-27 RX ADMIN — THERA TABS 1 TABLET: TAB at 12:53

## 2018-10-27 RX ADMIN — DOCUSATE SODIUM 100 MG: 100 CAPSULE, LIQUID FILLED ORAL at 13:15

## 2018-10-27 RX ADMIN — ALBUTEROL SULFATE 5 MG: 2.5 SOLUTION RESPIRATORY (INHALATION) at 12:32

## 2018-10-27 RX ADMIN — TIOTROPIUM BROMIDE 18 MCG: 18 CAPSULE ORAL; RESPIRATORY (INHALATION) at 08:17

## 2018-10-27 RX ADMIN — Medication 10 ML: at 09:17

## 2018-10-27 RX ADMIN — ALBUMIN (HUMAN) 12.5 G: 12.5 INJECTION, SOLUTION INTRAVENOUS at 10:48

## 2018-10-27 ASSESSMENT — PAIN DESCRIPTION - PAIN TYPE: TYPE: SURGICAL PAIN

## 2018-10-27 ASSESSMENT — PAIN SCALES - GENERAL
PAINLEVEL_OUTOF10: 6
PAINLEVEL_OUTOF10: 3
PAINLEVEL_OUTOF10: 6
PAINLEVEL_OUTOF10: 3

## 2018-10-27 ASSESSMENT — PAIN DESCRIPTION - LOCATION: LOCATION: CHEST;STERNUM

## 2018-10-27 ASSESSMENT — PAIN DESCRIPTION - ORIENTATION: ORIENTATION: MID

## 2018-10-27 NOTE — PLAN OF CARE
Outcome: Met This Shift  Pt without falls   Goal: Absence of physical injury  Absence of physical injury   Outcome: Met This Shift  Pt without injury      Comments: Care plan reviewed with patient and fiance. Patient's fiance verbalizes understanding of the plan of care and contributes to goal setting. Pt cooperative with plan of care.

## 2018-10-27 NOTE — PLAN OF CARE
Problem: Impaired respiratory status  Goal: Clear lung sounds  Outcome: Not Met This Shift  Patient receiving prn treatments for wheezing tolerating well.   Patient continues on vapotherm

## 2018-10-28 ENCOUNTER — APPOINTMENT (OUTPATIENT)
Dept: GENERAL RADIOLOGY | Age: 53
DRG: 163 | End: 2018-10-28
Attending: THORACIC SURGERY (CARDIOTHORACIC VASCULAR SURGERY)
Payer: COMMERCIAL

## 2018-10-28 LAB
ANION GAP SERPL CALCULATED.3IONS-SCNC: 13 MEQ/L (ref 8–16)
BUN BLDV-MCNC: 39 MG/DL (ref 7–22)
CALCIUM SERPL-MCNC: 8.5 MG/DL (ref 8.5–10.5)
CHLORIDE BLD-SCNC: 98 MEQ/L (ref 98–111)
CO2: 28 MEQ/L (ref 23–33)
CREAT SERPL-MCNC: 2.1 MG/DL (ref 0.4–1.2)
ERYTHROCYTE [DISTWIDTH] IN BLOOD BY AUTOMATED COUNT: 18.4 % (ref 11.5–14.5)
ERYTHROCYTE [DISTWIDTH] IN BLOOD BY AUTOMATED COUNT: 57.5 FL (ref 35–45)
GFR SERPL CREATININE-BSD FRML MDRD: 25 ML/MIN/1.73M2
GLUCOSE BLD-MCNC: 105 MG/DL (ref 70–108)
GLUCOSE BLD-MCNC: 106 MG/DL (ref 70–108)
GLUCOSE BLD-MCNC: 109 MG/DL (ref 70–108)
GLUCOSE BLD-MCNC: 111 MG/DL (ref 70–108)
HCT VFR BLD CALC: 28.3 % (ref 37–47)
HEMOGLOBIN: 8.9 GM/DL (ref 12–16)
MAGNESIUM: 2.2 MG/DL (ref 1.6–2.4)
MCH RBC QN AUTO: 27.5 PG (ref 26–33)
MCHC RBC AUTO-ENTMCNC: 31.4 GM/DL (ref 32.2–35.5)
MCV RBC AUTO: 87.3 FL (ref 81–99)
PLATELET # BLD: 257 THOU/MM3 (ref 130–400)
PMV BLD AUTO: 11.9 FL (ref 9.4–12.4)
POTASSIUM SERPL-SCNC: 3.2 MEQ/L (ref 3.5–5.2)
RBC # BLD: 3.24 MILL/MM3 (ref 4.2–5.4)
SODIUM BLD-SCNC: 139 MEQ/L (ref 135–145)
WBC # BLD: 9.2 THOU/MM3 (ref 4.8–10.8)

## 2018-10-28 PROCEDURE — 97110 THERAPEUTIC EXERCISES: CPT

## 2018-10-28 PROCEDURE — 99232 SBSQ HOSP IP/OBS MODERATE 35: CPT | Performed by: INTERNAL MEDICINE

## 2018-10-28 PROCEDURE — 6370000000 HC RX 637 (ALT 250 FOR IP): Performed by: INTERNAL MEDICINE

## 2018-10-28 PROCEDURE — 2709999900 HC NON-CHARGEABLE SUPPLY

## 2018-10-28 PROCEDURE — 80048 BASIC METABOLIC PNL TOTAL CA: CPT

## 2018-10-28 PROCEDURE — G8987 SELF CARE CURRENT STATUS: HCPCS

## 2018-10-28 PROCEDURE — 2000000000 HC ICU R&B

## 2018-10-28 PROCEDURE — 2580000003 HC RX 258: Performed by: THORACIC SURGERY (CARDIOTHORACIC VASCULAR SURGERY)

## 2018-10-28 PROCEDURE — 82948 REAGENT STRIP/BLOOD GLUCOSE: CPT

## 2018-10-28 PROCEDURE — 6370000000 HC RX 637 (ALT 250 FOR IP): Performed by: NURSE PRACTITIONER

## 2018-10-28 PROCEDURE — 6370000000 HC RX 637 (ALT 250 FOR IP): Performed by: THORACIC SURGERY (CARDIOTHORACIC VASCULAR SURGERY)

## 2018-10-28 PROCEDURE — 2700000000 HC OXYGEN THERAPY PER DAY

## 2018-10-28 PROCEDURE — 97168 OT RE-EVAL EST PLAN CARE: CPT

## 2018-10-28 PROCEDURE — 6360000002 HC RX W HCPCS: Performed by: INTERNAL MEDICINE

## 2018-10-28 PROCEDURE — 71045 X-RAY EXAM CHEST 1 VIEW: CPT

## 2018-10-28 PROCEDURE — G8988 SELF CARE GOAL STATUS: HCPCS

## 2018-10-28 PROCEDURE — 83735 ASSAY OF MAGNESIUM: CPT

## 2018-10-28 PROCEDURE — 94640 AIRWAY INHALATION TREATMENT: CPT

## 2018-10-28 PROCEDURE — 85027 COMPLETE CBC AUTOMATED: CPT

## 2018-10-28 RX ORDER — POTASSIUM CHLORIDE 20 MEQ/1
40 TABLET, EXTENDED RELEASE ORAL ONCE
Status: COMPLETED | OUTPATIENT
Start: 2018-10-28 | End: 2018-10-28

## 2018-10-28 RX ADMIN — FORMOTEROL FUMARATE DIHYDRATE 20 MCG: 20 SOLUTION RESPIRATORY (INHALATION) at 19:52

## 2018-10-28 RX ADMIN — THERA TABS 1 TABLET: TAB at 08:44

## 2018-10-28 RX ADMIN — ASPIRIN 325 MG: 325 TABLET ORAL at 08:44

## 2018-10-28 RX ADMIN — DOCUSATE SODIUM 100 MG: 100 CAPSULE, LIQUID FILLED ORAL at 08:50

## 2018-10-28 RX ADMIN — BUDESONIDE 500 MCG: 0.5 INHALANT RESPIRATORY (INHALATION) at 19:57

## 2018-10-28 RX ADMIN — Medication 10 ML: at 20:23

## 2018-10-28 RX ADMIN — SILDENAFIL 20 MG: 20 TABLET ORAL at 20:23

## 2018-10-28 RX ADMIN — OXYCODONE HYDROCHLORIDE 5 MG: 5 TABLET ORAL at 02:17

## 2018-10-28 RX ADMIN — FAMOTIDINE 20 MG: 20 TABLET ORAL at 08:44

## 2018-10-28 RX ADMIN — OXYCODONE HYDROCHLORIDE 5 MG: 5 TABLET ORAL at 14:35

## 2018-10-28 RX ADMIN — FORMOTEROL FUMARATE DIHYDRATE 20 MCG: 20 SOLUTION RESPIRATORY (INHALATION) at 10:14

## 2018-10-28 RX ADMIN — AMIODARONE HYDROCHLORIDE 200 MG: 200 TABLET ORAL at 20:23

## 2018-10-28 RX ADMIN — SILDENAFIL 20 MG: 20 TABLET ORAL at 08:44

## 2018-10-28 RX ADMIN — AMIODARONE HYDROCHLORIDE 200 MG: 200 TABLET ORAL at 08:44

## 2018-10-28 RX ADMIN — MIDODRINE HYDROCHLORIDE 10 MG: 10 TABLET ORAL at 17:07

## 2018-10-28 RX ADMIN — ALBUTEROL SULFATE 5 MG: 2.5 SOLUTION RESPIRATORY (INHALATION) at 10:15

## 2018-10-28 RX ADMIN — ALBUTEROL SULFATE 5 MG: 2.5 SOLUTION RESPIRATORY (INHALATION) at 19:43

## 2018-10-28 RX ADMIN — BUDESONIDE 500 MCG: 0.5 INHALANT RESPIRATORY (INHALATION) at 10:14

## 2018-10-28 RX ADMIN — TIOTROPIUM BROMIDE 18 MCG: 18 CAPSULE ORAL; RESPIRATORY (INHALATION) at 10:13

## 2018-10-28 RX ADMIN — POTASSIUM CHLORIDE 40 MEQ: 20 TABLET, EXTENDED RELEASE ORAL at 08:50

## 2018-10-28 RX ADMIN — Medication 10 ML: at 08:53

## 2018-10-28 RX ADMIN — MIDODRINE HYDROCHLORIDE 10 MG: 10 TABLET ORAL at 08:44

## 2018-10-28 RX ADMIN — ACETAMINOPHEN 650 MG: 325 TABLET ORAL at 20:22

## 2018-10-28 RX ADMIN — ATORVASTATIN CALCIUM 20 MG: 20 TABLET, FILM COATED ORAL at 20:23

## 2018-10-28 RX ADMIN — SILDENAFIL 20 MG: 20 TABLET ORAL at 14:35

## 2018-10-28 RX ADMIN — MIDODRINE HYDROCHLORIDE 10 MG: 10 TABLET ORAL at 12:25

## 2018-10-28 ASSESSMENT — PAIN SCALES - GENERAL
PAINLEVEL_OUTOF10: 2
PAINLEVEL_OUTOF10: 0
PAINLEVEL_OUTOF10: 6
PAINLEVEL_OUTOF10: 0
PAINLEVEL_OUTOF10: 10
PAINLEVEL_OUTOF10: 0
PAINLEVEL_OUTOF10: 0
PAINLEVEL_OUTOF10: 5
PAINLEVEL_OUTOF10: 5
PAINLEVEL_OUTOF10: 4

## 2018-10-28 ASSESSMENT — PAIN DESCRIPTION - LOCATION
LOCATION: CHEST;STERNUM
LOCATION: CHEST;STERNUM

## 2018-10-28 ASSESSMENT — PAIN DESCRIPTION - FREQUENCY
FREQUENCY: CONTINUOUS
FREQUENCY: CONTINUOUS

## 2018-10-28 ASSESSMENT — PAIN DESCRIPTION - ORIENTATION
ORIENTATION: MID
ORIENTATION: MID;RIGHT;LOWER

## 2018-10-28 ASSESSMENT — PAIN DESCRIPTION - DESCRIPTORS
DESCRIPTORS: ACHING;CONSTANT
DESCRIPTORS: ACHING;CONSTANT

## 2018-10-28 ASSESSMENT — PAIN DESCRIPTION - PAIN TYPE
TYPE: SURGICAL PAIN
TYPE: SURGICAL PAIN

## 2018-10-28 NOTE — PLAN OF CARE
Problem: Discharge Planning:  Goal: Discharged to appropriate level of care  Discharged to appropriate level of care   Outcome: Ongoing  Patient remains in icu- continue to follow. Problem: Anxiety:  Goal: Level of anxiety will decrease  Level of anxiety will decrease   Outcome: Ongoing  Patient denies complaints of anxiety    Problem: Cardiac Output - Decreased:  Goal: Hemodynamic stability will improve  Hemodynamic stability will improve   Outcome: Ongoing  BP remains 100's, HR remains 120-130's - continue oral amiodarone. Problem: Fluid Volume - Imbalance:  Goal: Ability to achieve a balanced intake and output will improve  Ability to achieve a balanced intake and output will improve   Outcome: Ongoing  Continue to monitor fluid volume status - no plan for dialysis today, 10/28/2018    Problem: Pain:  Goal: Pain level will decrease  Pain level will decrease    Outcome: Ongoing  Continue to assess pain and treat as per orders and patient request    Problem: Pain:  Goal: Pain level will decrease  Pain level will decrease    Outcome: Ongoing  Continue to assess pain and treat as per orders and patient request    Problem: Risk for Impaired Skin Integrity  Goal: Tissue integrity - skin and mucous membranes  Structural intactness and normal physiological function of skin and  mucous membranes. Outcome: Ongoing  Continue to assess skin breakdown -  Continue to treat as per orders and assist with repositioning. Assist patient to chair as tolerated using lee ann steady. Problem: Nutrition  Goal: Optimal nutrition therapy  Outcome: Ongoing  Patient on pureed diet - continue to encourage intake. Problem: DISCHARGE BARRIERS  Goal: Patient's continuum of care needs are met  Outcome: Ongoing  Patient remains in icu - continue to follow. Problem: Falls - Risk of:  Goal: Will remain free from falls  Will remain free from falls   Outcome: Ongoing  Patient remains free from falls.   Fall precautions in place - assist with ambulation using lee ann steady and 2-3 assist.    Comments: Care plan reviewed with patient. Patient verbalizes understanding of the plan of care and contribute to goal setting.

## 2018-10-28 NOTE — PROGRESS NOTES
therapy now. Pt educated on normal therapy schedule during wek. General:       Vision: Within Functional Limits    Hearing: Within functional limits         Pain:  Pain Assessment  Patient Currently in Pain: Denies (intially denying but then when initiating movement pt reporting pain. )       Social/Functional History:  Lives With: Family  Type of Home: House  Home Layout: Two level (only bathroom on 2nd floor )  Home Access: Stairs to enter with rails  Entrance Stairs - Number of Steps: 2  Home Equipment:  (BSC)     Bathroom Shower/Tub: Tub/Shower unit  Bathroom Toilet: Bedside commode  Bathroom Accessibility: Accessible    Receives Help From: Family  ADL Assistance: Independent  Homemaking Assistance: Independent       Ambulation Assistance: Independent  Transfer Assistance: Independent          Additional Comments: Pt reports living with SO PLOF in 2 story home-2nd story bed & bathroom. No AD used PLOF. Pt reports she plans to go home with her father at discharge. Objective        Overall Cognitive Status: Exceptions (anxiety noted)  Cognition Comment: Pt following all commands and answering questions appropriately. Pt with decreased insight to need to move and participate int herapy this date requiring education to do so.           Sensation  Overall Sensation Status: WNL              Hand Dominance: Left            LUE AROM (degrees)  LUE AROM : WNL          RUE AROM (degrees)  RUE AROM : WNL       LUE Strength  Gross LUE Strength:  (NT d/t sternal precautions)  LUE Strength Comment: not tested d/t sternal precautions                 RUE Strength  Gross RUE Strength:  (NT d/t sternal precautions)                     RUE Tone: Normotonic  LUE Tone: Normotonic                    ADL  Feeding: Increased time to complete, Supervision (pt educated on ability to use her UEs normally during ADL tasks wiht education on sternal precautions )  LE Dressing: Dependent/Total     Bed mobility  Supine to Sit: Maximum deficits / Impairments: Decreased functional mobility , Decreased ADL status, Decreased safe awareness, Decreased balance, Decreased endurance  Prognosis: Fair    Clinical Decision Making: Clinical Decision making was of High Complexity as the result of analysis of data from a comprehensive assessment, the presence of comorbidities affecting the plan of care and the need for signficant modifications or assistance required to complete the evaluation. Discharge Recommendations:  Discharge Recommendations: IP Rehab (Pt with increased weakness d/t heart surgery with compications after. Pt would benefit from continued therapy upon discharge to increse her strength and indep with ADL tasks prior to discharge. )    Patient Education:  Patient Education: OT POC, sternal precautions, need to participate int herapy   Barriers to Learning: anxiety    Equipment Recommendations: Other: Continue to assess pending progress. Monitor for RW need. Safety:  Safety Devices in place: Yes  Type of devices: All fall risk precautions in place, Gait belt, Call light within reach, Nurse notified, Left in chair, Patient at risk for falls    Plan:  Times per week: 6x  Current Treatment Recommendations: Balance Training, Functional Mobility Training, Endurance Training, Self-Care / ADL, Safety Education & Training    Goals:  Patient goals : go home to dad's house & recuperate    Short term goals  Time Frame for Short term goals: 2 weeks  Short term goal 1: Tolerate standing 2-3 min with 0>min A for increased ease of sinkside grooming  Short term goal 2: Complete various t/fs including BSC with mod A x1 & min vcs for sternal precautions  Short term goal 3: Complete step 2 cardiac exercises x 10 reps with  min RBs to increase endurance for BADL  Short term goal 4: OTR to further assess mobility as pts strength increased.    Short term goal 5: Complete LE dressing with mod A & min vcs for adapative strategies  Long term goals  Time Frame

## 2018-10-29 ENCOUNTER — APPOINTMENT (OUTPATIENT)
Dept: GENERAL RADIOLOGY | Age: 53
DRG: 163 | End: 2018-10-29
Attending: THORACIC SURGERY (CARDIOTHORACIC VASCULAR SURGERY)
Payer: COMMERCIAL

## 2018-10-29 LAB
ANION GAP SERPL CALCULATED.3IONS-SCNC: 15 MEQ/L (ref 8–16)
BUN BLDV-MCNC: 45 MG/DL (ref 7–22)
CALCIUM SERPL-MCNC: 8.7 MG/DL (ref 8.5–10.5)
CHLORIDE BLD-SCNC: 97 MEQ/L (ref 98–111)
CO2: 26 MEQ/L (ref 23–33)
CREAT SERPL-MCNC: 2.6 MG/DL (ref 0.4–1.2)
ERYTHROCYTE [DISTWIDTH] IN BLOOD BY AUTOMATED COUNT: 18.7 % (ref 11.5–14.5)
ERYTHROCYTE [DISTWIDTH] IN BLOOD BY AUTOMATED COUNT: 58.2 FL (ref 35–45)
GFR SERPL CREATININE-BSD FRML MDRD: 19 ML/MIN/1.73M2
GLUCOSE BLD-MCNC: 105 MG/DL (ref 70–108)
GLUCOSE BLD-MCNC: 109 MG/DL (ref 70–108)
GLUCOSE BLD-MCNC: 117 MG/DL (ref 70–108)
GLUCOSE BLD-MCNC: 98 MG/DL (ref 70–108)
GLUCOSE BLD-MCNC: 99 MG/DL (ref 70–108)
HCT VFR BLD CALC: 29.6 % (ref 37–47)
HEMOGLOBIN: 9.1 GM/DL (ref 12–16)
MAGNESIUM: 2.3 MG/DL (ref 1.6–2.4)
MCH RBC QN AUTO: 27.2 PG (ref 26–33)
MCHC RBC AUTO-ENTMCNC: 30.7 GM/DL (ref 32.2–35.5)
MCV RBC AUTO: 88.6 FL (ref 81–99)
PLATELET # BLD: 300 THOU/MM3 (ref 130–400)
PMV BLD AUTO: 11.2 FL (ref 9.4–12.4)
POTASSIUM SERPL-SCNC: 4 MEQ/L (ref 3.5–5.2)
RBC # BLD: 3.34 MILL/MM3 (ref 4.2–5.4)
SODIUM BLD-SCNC: 138 MEQ/L (ref 135–145)
WBC # BLD: 9.8 THOU/MM3 (ref 4.8–10.8)

## 2018-10-29 PROCEDURE — 6370000000 HC RX 637 (ALT 250 FOR IP): Performed by: INTERNAL MEDICINE

## 2018-10-29 PROCEDURE — 36592 COLLECT BLOOD FROM PICC: CPT

## 2018-10-29 PROCEDURE — 97530 THERAPEUTIC ACTIVITIES: CPT

## 2018-10-29 PROCEDURE — 6370000000 HC RX 637 (ALT 250 FOR IP): Performed by: THORACIC SURGERY (CARDIOTHORACIC VASCULAR SURGERY)

## 2018-10-29 PROCEDURE — 2700000000 HC OXYGEN THERAPY PER DAY

## 2018-10-29 PROCEDURE — 85027 COMPLETE CBC AUTOMATED: CPT

## 2018-10-29 PROCEDURE — 97110 THERAPEUTIC EXERCISES: CPT

## 2018-10-29 PROCEDURE — 94760 N-INVAS EAR/PLS OXIMETRY 1: CPT

## 2018-10-29 PROCEDURE — 2580000003 HC RX 258: Performed by: THORACIC SURGERY (CARDIOTHORACIC VASCULAR SURGERY)

## 2018-10-29 PROCEDURE — 94640 AIRWAY INHALATION TREATMENT: CPT

## 2018-10-29 PROCEDURE — 97535 SELF CARE MNGMENT TRAINING: CPT

## 2018-10-29 PROCEDURE — 82948 REAGENT STRIP/BLOOD GLUCOSE: CPT

## 2018-10-29 PROCEDURE — 2000000000 HC ICU R&B

## 2018-10-29 PROCEDURE — 99232 SBSQ HOSP IP/OBS MODERATE 35: CPT | Performed by: INTERNAL MEDICINE

## 2018-10-29 PROCEDURE — 2709999900 HC NON-CHARGEABLE SUPPLY

## 2018-10-29 PROCEDURE — 6370000000 HC RX 637 (ALT 250 FOR IP): Performed by: NURSE PRACTITIONER

## 2018-10-29 PROCEDURE — 97162 PT EVAL MOD COMPLEX 30 MIN: CPT

## 2018-10-29 PROCEDURE — 71045 X-RAY EXAM CHEST 1 VIEW: CPT

## 2018-10-29 PROCEDURE — 99233 SBSQ HOSP IP/OBS HIGH 50: CPT | Performed by: INTERNAL MEDICINE

## 2018-10-29 PROCEDURE — G8978 MOBILITY CURRENT STATUS: HCPCS

## 2018-10-29 PROCEDURE — 92526 ORAL FUNCTION THERAPY: CPT

## 2018-10-29 PROCEDURE — G8979 MOBILITY GOAL STATUS: HCPCS

## 2018-10-29 PROCEDURE — 6360000002 HC RX W HCPCS: Performed by: INTERNAL MEDICINE

## 2018-10-29 PROCEDURE — 80048 BASIC METABOLIC PNL TOTAL CA: CPT

## 2018-10-29 PROCEDURE — 83735 ASSAY OF MAGNESIUM: CPT

## 2018-10-29 RX ADMIN — DILTIAZEM HYDROCHLORIDE 30 MG: 30 TABLET, FILM COATED ORAL at 12:28

## 2018-10-29 RX ADMIN — MIDODRINE HYDROCHLORIDE 15 MG: 10 TABLET ORAL at 12:28

## 2018-10-29 RX ADMIN — MIDODRINE HYDROCHLORIDE 15 MG: 10 TABLET ORAL at 17:35

## 2018-10-29 RX ADMIN — ASPIRIN 325 MG: 325 TABLET ORAL at 08:54

## 2018-10-29 RX ADMIN — AMIODARONE HYDROCHLORIDE 200 MG: 200 TABLET ORAL at 08:53

## 2018-10-29 RX ADMIN — MIDODRINE HYDROCHLORIDE 15 MG: 10 TABLET ORAL at 08:53

## 2018-10-29 RX ADMIN — ALBUTEROL SULFATE 5 MG: 2.5 SOLUTION RESPIRATORY (INHALATION) at 01:40

## 2018-10-29 RX ADMIN — Medication 10 ML: at 08:50

## 2018-10-29 RX ADMIN — ALBUTEROL SULFATE 5 MG: 2.5 SOLUTION RESPIRATORY (INHALATION) at 09:05

## 2018-10-29 RX ADMIN — TIOTROPIUM BROMIDE 18 MCG: 18 CAPSULE ORAL; RESPIRATORY (INHALATION) at 09:06

## 2018-10-29 RX ADMIN — OXYCODONE HYDROCHLORIDE 5 MG: 5 TABLET ORAL at 23:03

## 2018-10-29 RX ADMIN — ATORVASTATIN CALCIUM 20 MG: 20 TABLET, FILM COATED ORAL at 20:56

## 2018-10-29 RX ADMIN — FAMOTIDINE 20 MG: 20 TABLET ORAL at 08:54

## 2018-10-29 RX ADMIN — OXYCODONE HYDROCHLORIDE 5 MG: 5 TABLET ORAL at 14:36

## 2018-10-29 RX ADMIN — BUDESONIDE 500 MCG: 0.5 INHALANT RESPIRATORY (INHALATION) at 09:06

## 2018-10-29 RX ADMIN — AMIODARONE HYDROCHLORIDE 200 MG: 200 TABLET ORAL at 20:56

## 2018-10-29 RX ADMIN — SILDENAFIL 20 MG: 20 TABLET ORAL at 08:53

## 2018-10-29 RX ADMIN — FORMOTEROL FUMARATE DIHYDRATE 20 MCG: 20 SOLUTION RESPIRATORY (INHALATION) at 09:05

## 2018-10-29 RX ADMIN — DILTIAZEM HYDROCHLORIDE 30 MG: 30 TABLET, FILM COATED ORAL at 17:36

## 2018-10-29 RX ADMIN — Medication 10 ML: at 20:57

## 2018-10-29 RX ADMIN — THERA TABS 1 TABLET: TAB at 08:53

## 2018-10-29 RX ADMIN — OXYCODONE HYDROCHLORIDE 5 MG: 5 TABLET ORAL at 04:06

## 2018-10-29 ASSESSMENT — PAIN DESCRIPTION - LOCATION
LOCATION: CHEST
LOCATION: LEG

## 2018-10-29 ASSESSMENT — PAIN SCALES - GENERAL
PAINLEVEL_OUTOF10: 3
PAINLEVEL_OUTOF10: 0
PAINLEVEL_OUTOF10: 4
PAINLEVEL_OUTOF10: 6
PAINLEVEL_OUTOF10: 6
PAINLEVEL_OUTOF10: 1
PAINLEVEL_OUTOF10: 0

## 2018-10-29 ASSESSMENT — PAIN DESCRIPTION - DESCRIPTORS
DESCRIPTORS: ACHING;DISCOMFORT
DESCRIPTORS: ACHING;CONSTANT

## 2018-10-29 ASSESSMENT — PAIN DESCRIPTION - PAIN TYPE
TYPE: SURGICAL PAIN
TYPE: ACUTE PAIN

## 2018-10-29 ASSESSMENT — PAIN DESCRIPTION - ORIENTATION
ORIENTATION: MID
ORIENTATION: RIGHT;LEFT

## 2018-10-29 ASSESSMENT — PAIN DESCRIPTION - FREQUENCY
FREQUENCY: CONTINUOUS
FREQUENCY: CONTINUOUS

## 2018-10-29 NOTE — PROGRESS NOTES
goal 3: amb >50'x1 with AD and CGA to walk safely in room  Long term goals  Time Frame for Long term goals : no LTGs set secondary to short ELOS    Evaluation Complexity: Based on the findings of patient history, examination, clinical presentation, and decision making during this evaluation, the evaluation of Abel Urbina  is of medium complexity. PT G-Codes  Functional Limitation: Mobility: Walking and moving around  Mobility: Walking and Moving Around Current Status (): At least 60 percent but less than 80 percent impaired, limited or restricted  Mobility: Walking and Moving Around Goal Status ():  At least 40 percent but less than 60 percent impaired, limited or restricted       AM-PAC Inpatient Mobility without Stair Climbing Raw Score : 11  AM-PAC Inpatient without Stair Climbing T-Scale Score : 35.66  Mobility Inpatient CMS 0-100% Score: 67.36  Mobility Inpatient without Stair CMS G-Code Modifier : CL

## 2018-10-29 NOTE — PLAN OF CARE
Problem: Discharge Planning:  Goal: Discharged to appropriate level of care  Discharged to appropriate level of care   Outcome: Ongoing  Plan pending clinical course. From home with family. Possible Mervin candidate vs TCU or SNF. Problem: Anxiety:  Goal: Level of anxiety will decrease  Level of anxiety will decrease   Outcome: Ongoing  Patient resting peacefully this shift. Problem: Cardiac Output - Decreased:  Goal: Hemodynamic stability will improve  Hemodynamic stability will improve   Outcome: Ongoing  Vitals:    10/28/18 2300 10/29/18 0000 10/29/18 0032 10/29/18 0100   BP: 90/62 88/60 92/64 (!) 88/55   Pulse: 136 133 130 130   Resp: 19 12 14 12   Temp:  97.8 °F (36.6 °C)     TempSrc:  Oral     SpO2: 98% 96% 98% 98%   Weight:       Height:       Vital signs stable. Blood pressures running a little low but MAPs are >60. HR in the 130's. Cardiology is aware. Patient is on continuous telemetry monitoring and is currently in A-fib/A-flutter with RVR. Will continue to monitor. Problem: Fluid Volume - Imbalance:  Goal: Ability to achieve a balanced intake and output will improve  Ability to achieve a balanced intake and output will improve   Outcome: Ongoing  Patient has been receiving hemodialysis. Nephrology following. Problem: Pain:  Goal: Pain level will decrease  Pain level will decrease    Outcome: Completed Date Met: 10/29/18    Goal: Control of acute pain  Control of acute pain   Outcome: Ongoing  Patient complaining of surgical pain in her chest area. Gave Tylenol for pain. Patient resting comfortably.     Problem: Pain:  Goal: Control of acute pain  Control of acute pain   Outcome: Ongoing    Goal: Control of chronic pain  Control of chronic pain   Outcome: Ongoing    Goal: Pain level will decrease  Pain level will decrease    Outcome: Completed Date Met: 10/29/18      Problem: Risk for Impaired Skin Integrity  Goal: Tissue integrity - skin and mucous membranes  Structural intactness and

## 2018-10-29 NOTE — PLAN OF CARE
Problem: Risk for Impaired Skin Integrity  Goal: Tissue integrity - skin and mucous membranes  Structural intactness and normal physiological function of skin and  mucous membranes. Outcome: Ongoing  Consulted to assess patients bilateral groin sites. Left groin site is noted to be a linear moist, yellow slough. Painted area with betadine, and  skin folds with gauze and ABD pad. To the right groin site, an area that is noted to be moist with yellow slough. Painted area with betadine,  skin folds with gauze and ABD pad. Spoke with Dr. Eduin Bassett on the floor and he is okay with the betadine and Gauze to folds. Will continue to follow. Care plan reviewed with patient and RN. Patient and RN verbalize understanding of the plan of care and contribute to goal setting. Wound Care Documentation:  Wound 10/29/18 Groin Right Wound (Active)   Wound Image   10/29/2018 10:00 AM   Wound Type Wound 10/29/2018 10:00 AM   Wound Other 10/29/2018 10:00 AM   Dressing Status Changed 10/29/2018 10:00 AM   Dressing Changed Changed/New 10/29/2018 10:00 AM   Dressing/Treatment Dry dressing 10/29/2018 10:00 AM   Wound Length (cm) 1.4 cm 10/29/2018 10:00 AM   Wound Width (cm) 3.5 cm 10/29/2018 10:00 AM   Calculated Wound Size (cm^2) (l*w) 4.9 cm^2 10/29/2018 10:00 AM   Wound Assessment Yellow;Slough;Fragile 10/29/2018 10:00 AM   Vielka-wound Assessment Blanchable erythema; Excoriated 10/29/2018 10:00 AM   Yellow%Wound Bed 100 10/29/2018 10:00 AM   Number of days: 0       Incision 10/15/18 Groin Left (Active)   Wound Image   10/29/2018 10:00 AM   Wound Assessment Fragile; Excoriated;Yellow;Slough 10/29/2018 10:00 AM   Vielka-wound Assessment Fragile; Excoriated 10/29/2018 10:00 AM   Wound Length (cm) 0.9 cm 10/29/2018 10:00 AM   Wound Width (cm) 7.5 cm 10/29/2018 10:00 AM   Drainage Amount Scant 10/29/2018 10:00 AM   Drainage Description Serous 10/29/2018 10:00 AM   Dressing/Treatment Dry dressing 10/29/2018 10:00 AM   Dressing

## 2018-10-29 NOTE — PROGRESS NOTES
Activity Tolerance:  Activity Tolerance: Patient limited by fatigue  Activity Tolerance: increased time to complete tasks d/t reassuring of pt and pt with self limiting. Assessment:     Performance deficits / Impairments: Decreased functional mobility , Decreased ADL status, Decreased safe awareness, Decreased balance, Decreased endurance  Prognosis: Fair    Discharge Recommendations:  Discharge Recommendations: IP Rehab (Pt with increased weakness d/t heart surgery with compications after. Pt would benefit from continued therapy upon discharge to increse her strength and indep with ADL tasks prior to discharge. )    Patient Education:  Patient Education: maintianing sternal precautions, incresing participation in therapy   Barriers to Learning: anxiety    Equipment Recommendations: Other: Continue to assess pending progress. Monitor for RW need. Safety:  Safety Devices in place: Yes  Type of devices:  All fall risk precautions in place, Gait belt, Call light within reach, Nurse notified, Left in chair, Patient at risk for falls    Plan:  Times per week: 6x  Current Treatment Recommendations: Balance Training, Functional Mobility Training, Endurance Training, Self-Care / ADL, Safety Education & Training    Goals:  Patient goals : go home to

## 2018-10-30 ENCOUNTER — APPOINTMENT (OUTPATIENT)
Dept: GENERAL RADIOLOGY | Age: 53
DRG: 163 | End: 2018-10-30
Attending: THORACIC SURGERY (CARDIOTHORACIC VASCULAR SURGERY)
Payer: COMMERCIAL

## 2018-10-30 LAB
ANION GAP SERPL CALCULATED.3IONS-SCNC: 13 MEQ/L (ref 8–16)
BLOOD CULTURE, ROUTINE: NORMAL
BLOOD CULTURE, ROUTINE: NORMAL
BUN BLDV-MCNC: 48 MG/DL (ref 7–22)
CALCIUM SERPL-MCNC: 8.8 MG/DL (ref 8.5–10.5)
CHLORIDE BLD-SCNC: 98 MEQ/L (ref 98–111)
CO2: 26 MEQ/L (ref 23–33)
CREAT SERPL-MCNC: 2.6 MG/DL (ref 0.4–1.2)
ERYTHROCYTE [DISTWIDTH] IN BLOOD BY AUTOMATED COUNT: 18.8 % (ref 11.5–14.5)
ERYTHROCYTE [DISTWIDTH] IN BLOOD BY AUTOMATED COUNT: 54.9 FL (ref 35–45)
GFR SERPL CREATININE-BSD FRML MDRD: 19 ML/MIN/1.73M2
GLUCOSE BLD-MCNC: 141 MG/DL (ref 70–108)
GLUCOSE BLD-MCNC: 99 MG/DL (ref 70–108)
HCT VFR BLD CALC: 27.8 % (ref 37–47)
HEMOGLOBIN: 8.7 GM/DL (ref 12–16)
MAGNESIUM: 2.3 MG/DL (ref 1.6–2.4)
MCH RBC QN AUTO: 27.4 PG (ref 26–33)
MCHC RBC AUTO-ENTMCNC: 31.3 GM/DL (ref 32.2–35.5)
MCV RBC AUTO: 87.7 FL (ref 81–99)
PLATELET # BLD: 299 THOU/MM3 (ref 130–400)
PMV BLD AUTO: 10.6 FL (ref 9.4–12.4)
POTASSIUM SERPL-SCNC: 4.1 MEQ/L (ref 3.5–5.2)
RBC # BLD: 3.17 MILL/MM3 (ref 4.2–5.4)
SODIUM BLD-SCNC: 137 MEQ/L (ref 135–145)
WBC # BLD: 10.1 THOU/MM3 (ref 4.8–10.8)

## 2018-10-30 PROCEDURE — 36592 COLLECT BLOOD FROM PICC: CPT

## 2018-10-30 PROCEDURE — 99233 SBSQ HOSP IP/OBS HIGH 50: CPT | Performed by: INTERNAL MEDICINE

## 2018-10-30 PROCEDURE — 97116 GAIT TRAINING THERAPY: CPT

## 2018-10-30 PROCEDURE — 97110 THERAPEUTIC EXERCISES: CPT

## 2018-10-30 PROCEDURE — 6360000002 HC RX W HCPCS: Performed by: THORACIC SURGERY (CARDIOTHORACIC VASCULAR SURGERY)

## 2018-10-30 PROCEDURE — 2700000000 HC OXYGEN THERAPY PER DAY

## 2018-10-30 PROCEDURE — 6370000000 HC RX 637 (ALT 250 FOR IP): Performed by: THORACIC SURGERY (CARDIOTHORACIC VASCULAR SURGERY)

## 2018-10-30 PROCEDURE — 97530 THERAPEUTIC ACTIVITIES: CPT

## 2018-10-30 PROCEDURE — 6370000000 HC RX 637 (ALT 250 FOR IP): Performed by: INTERNAL MEDICINE

## 2018-10-30 PROCEDURE — 2580000003 HC RX 258: Performed by: THORACIC SURGERY (CARDIOTHORACIC VASCULAR SURGERY)

## 2018-10-30 PROCEDURE — 6370000000 HC RX 637 (ALT 250 FOR IP): Performed by: NURSE PRACTITIONER

## 2018-10-30 PROCEDURE — 83735 ASSAY OF MAGNESIUM: CPT

## 2018-10-30 PROCEDURE — 82948 REAGENT STRIP/BLOOD GLUCOSE: CPT

## 2018-10-30 PROCEDURE — 2000000000 HC ICU R&B

## 2018-10-30 PROCEDURE — 99232 SBSQ HOSP IP/OBS MODERATE 35: CPT | Performed by: INTERNAL MEDICINE

## 2018-10-30 PROCEDURE — 85027 COMPLETE CBC AUTOMATED: CPT

## 2018-10-30 PROCEDURE — 80048 BASIC METABOLIC PNL TOTAL CA: CPT

## 2018-10-30 PROCEDURE — 71045 X-RAY EXAM CHEST 1 VIEW: CPT

## 2018-10-30 PROCEDURE — 2709999900 HC NON-CHARGEABLE SUPPLY

## 2018-10-30 PROCEDURE — 94640 AIRWAY INHALATION TREATMENT: CPT

## 2018-10-30 RX ORDER — LANOLIN ALCOHOL/MO/W.PET/CERES
3 CREAM (GRAM) TOPICAL NIGHTLY PRN
Status: DISCONTINUED | OUTPATIENT
Start: 2018-10-30 | End: 2018-11-05 | Stop reason: HOSPADM

## 2018-10-30 RX ORDER — MIDODRINE HYDROCHLORIDE 5 MG/1
5 TABLET ORAL
Status: DISCONTINUED | OUTPATIENT
Start: 2018-10-30 | End: 2018-10-31

## 2018-10-30 RX ADMIN — ASPIRIN 325 MG: 325 TABLET ORAL at 08:28

## 2018-10-30 RX ADMIN — ATORVASTATIN CALCIUM 20 MG: 20 TABLET, FILM COATED ORAL at 21:35

## 2018-10-30 RX ADMIN — OXYCODONE HYDROCHLORIDE 5 MG: 5 TABLET ORAL at 04:36

## 2018-10-30 RX ADMIN — DILTIAZEM HYDROCHLORIDE 30 MG: 30 TABLET, FILM COATED ORAL at 05:39

## 2018-10-30 RX ADMIN — OXYCODONE HYDROCHLORIDE 10 MG: 5 TABLET ORAL at 21:35

## 2018-10-30 RX ADMIN — OXYCODONE HYDROCHLORIDE 10 MG: 5 TABLET ORAL at 12:38

## 2018-10-30 RX ADMIN — AMIODARONE HYDROCHLORIDE 200 MG: 200 TABLET ORAL at 21:35

## 2018-10-30 RX ADMIN — ONDANSETRON HYDROCHLORIDE 4 MG: 2 SOLUTION INTRAMUSCULAR; INTRAVENOUS at 12:34

## 2018-10-30 RX ADMIN — THERA TABS 1 TABLET: TAB at 08:28

## 2018-10-30 RX ADMIN — DILTIAZEM HYDROCHLORIDE 30 MG: 30 TABLET, FILM COATED ORAL at 17:06

## 2018-10-30 RX ADMIN — TIOTROPIUM BROMIDE 18 MCG: 18 CAPSULE ORAL; RESPIRATORY (INHALATION) at 08:41

## 2018-10-30 RX ADMIN — FAMOTIDINE 20 MG: 20 TABLET ORAL at 08:28

## 2018-10-30 RX ADMIN — MIDODRINE HYDROCHLORIDE 5 MG: 5 TABLET ORAL at 17:06

## 2018-10-30 RX ADMIN — Medication 10 ML: at 21:35

## 2018-10-30 RX ADMIN — MIDODRINE HYDROCHLORIDE 5 MG: 5 TABLET ORAL at 12:38

## 2018-10-30 RX ADMIN — DILTIAZEM HYDROCHLORIDE 30 MG: 30 TABLET, FILM COATED ORAL at 12:38

## 2018-10-30 RX ADMIN — AMIODARONE HYDROCHLORIDE 200 MG: 200 TABLET ORAL at 08:28

## 2018-10-30 RX ADMIN — Medication 10 ML: at 08:28

## 2018-10-30 ASSESSMENT — PAIN SCALES - GENERAL
PAINLEVEL_OUTOF10: 6
PAINLEVEL_OUTOF10: 10
PAINLEVEL_OUTOF10: 8
PAINLEVEL_OUTOF10: 10
PAINLEVEL_OUTOF10: 3

## 2018-10-30 ASSESSMENT — PAIN DESCRIPTION - LOCATION: LOCATION: CHEST

## 2018-10-30 ASSESSMENT — PAIN DESCRIPTION - ORIENTATION: ORIENTATION: MID

## 2018-10-30 ASSESSMENT — PAIN DESCRIPTION - PAIN TYPE: TYPE: SURGICAL PAIN

## 2018-10-30 NOTE — PROGRESS NOTES
reports living with SO PLOF in 2 story home-2nd story bed & bathroom. No AD used PLOF. Pt reports she plans to go home with her father at discharge. Subjective:     Subjective: Attempted pt 2 times this pm, disussed importance of therapy and needing to get up and move around, pt c/o of feeling sick to stomach has taken meds for this BP and O2 WNL HR in 120's     Pain:   .  Pain Assessment  Pain Level: 8 (mostly c/o of upset stomach and a little surgical pain )       Social/Functional:  Lives With: Family  Type of Home: House  Home Layout: Two level (only bathroom on 2nd floor )  Home Access: Stairs to enter with rails  Entrance Stairs - Number of Steps: 2  [place]     Objective:  Sit to Supine: Moderate assistance (guarded at trunk and assist to lift LE up into bed 1 at a time, once in bed she was able to complete bridges to scoot hips and needed min assist at shouldes, she then completed partial roll with CGA for placement of pillow )    Transfers  Sit to Stand: Minimal Assistance (from bedside chair and took 2 attempts to come to standing )  Stand to sit: Contact guard assistance       Ambulation 1  Device: Rolling Walker  Assistance: Minimal assistance  Quality of Gait: slow tati and flexed posture she needed assist to guide the walker noted decreased step length at wilma LEs and encouraged increased distance but she refused due to upset stomach   Distance: 5'x1        Exercises:  Exercises  Comments: pt completed ex reclined in chair ankle pu mps with heelcord stretch, glut sets, qaud sets, heelslides and hip abd/add with min assist, seated upper trunk rotations, shoulder rolls x 10 reps with several rest breaks              Activity Tolerance:  Activity Tolerance: Patient limited by fatigue;Patient limited by endurance    Assessment:   Body structures, Functions, Activity limitations: Decreased functional mobility , Decreased strength, Decreased endurance, Decreased balance  Assessment: pt tolerated session fair, she required encouragment throughout session and rest breaks vitals WNL with exception of HR in 120's nursing aware, pt needed hands on assist for mobility transfers and gait and only tolerated short walking distance she would benefit from cont skilled therapy to work on strenght balance endurance and increased independence with functional mobility   Prognosis: Good     REQUIRES PT FOLLOW UP: Yes    Discharge Recommendations:  Discharge Recommendations:  (recommend physiatry consult, anticipate pt will require an inpatient therapy stay prior to home )    Patient Education:  Patient Education: POC, importance of inc mobility, use of walker     Equipment Recommendations: Other: cont to assess needs    Safety:  Type of devices:  All fall risk precautions in place, Left in chair, Gait belt, Call light within reach, Patient at risk for falls, Nurse notified    Plan:  Times per week: 6X open heart  Times per day: Daily  Specific instructions for Next Treatment: therex and mobility with cardiac and sternal precautions  Current Treatment Recommendations: Strengthening, Functional Mobility Training, Transfer Training, Balance Training, Endurance Training, Gait Training, Stair training, Pain Management, Patient/Caregiver Education & Training, Safety Education & Training, Home Exercise Program, Equipment Evaluation, Education, & procurement    Goals:  Patient goals : not stated    Short term goals  Time Frame for Short term goals: 2 weeks  Short term goal 1: bed mobility with minAx1 to get in/out of bed  Short term goal 2: transfer with CGA to get in/out of chairs  Short term goal 3: amb >50'x1 with AD and CGA to walk safely in room    Long term goals  Time Frame for Long term goals : no LTGs set secondary to short ELOS            AM-PAC Inpatient Mobility without Stair Climbing Raw Score : 14  AM-PAC Inpatient without Stair Climbing T-Scale Score : 40.85  Mobility Inpatient CMS 0-100% Score:

## 2018-10-30 NOTE — PROGRESS NOTES
Renal Progress Note    Assessment and Plan: 1. Acute kidney injury improved and stable. 2.  Hypotension stable  3. Volume overload much improved  4. Status post mitral valve surgery  5. Deconditioning  6.   Normocytic anemia  PLAN:   Reviewed labs  Review medications  No changes  Still holding hemodialysis for now  Monitor urine output and  serum creatinine level   Labs in the morning  Discussed with the patient, staff  We'll follow    Patient Active Problem List:     PAF (paroxysmal atrial fibrillation) ([facility])     Chronic diastolic congestive heart failure ([facility]     Nonischemic dilated cardiomyopathy [facility]     [employer] dual pacer     Persistent atrial fibrillation ([facility])     Acute hypoxemic respiratory failure ([facility])     Atrial flutter ([facility])     LILLI (acute kidney injury) ([facility] [address]     Hypervolemia      Subjective:   Admit Date: 10/15/2018    Interval History:   Seeing for acute kidney injury and volume overload  Very awake and very alert  Updated by the staff  No new issues  No complaint  Stable but low blood pressure  Marginal urine output       Medications:   Scheduled Meds:   midodrine  5 mg Oral TID WC    diltiazem  30 mg Oral 4 times per day    multivitamin  1 tablet Oral Daily    docusate sodium  100 mg Oral Daily    amiodarone  200 mg Oral BID    tiotropium  18 mcg Inhalation Daily    potassium (CARDIAC) replacement protocol   Other RX Placeholder    aspirin  325 mg Oral Daily    famotidine  20 mg Oral Daily    sodium chloride flush  10 mL Intravenous 2 times per day    calcium replacement protocol   Other RX Placeholder    mupirocin   Topical Once    atorvastatin  20 mg Oral Nightly     Continuous Infusions:   dextrose         CBC:   Recent Labs      10/28/18   0500  10/29/18   0412  10/30/18   0455   WBC  9.2  9.8  10.1   HGB  8.9*  9.1*  8.7*   PLT  257  300  299     CMP:  Recent Labs      10/28/18   0500  10/29/18   0412  10/30/18   0644   NA  139  138  137   K  3.2*  4.0  4.1   CL

## 2018-10-30 NOTE — PROGRESS NOTES
mitral regurgitation. Patient was admitted on 10/15/18 and underwent redo sternotomy, mitral valve repair, maze procedure, and placement of left ventricular epicardial lead. Ulysses Churches her cardiomyopathy, she temporarily required femoral intra-aortic balloon pump.  Patient was extubated 10/16/18.  On 10/17/18, patient was sitting in a chair after working with physical therapy and occupational therapy.  She had done very well with therapy with marching in place.  Upon sitting in the chair, she became very lethargic and unarousable.  She was lifted from the chair and placed in the bed by the medical staff including me. Jaime Jaime was very lethargic and had a very faint pulse.  Given her altered mental status, she was intubated 10/17/18. Jaime Jaime was continued on epinephrine drip and vasopressin was added.  Blood pressure did respond to pressure intervention. She was started on a Bumex drip with improved UOP.  Transitioned to scheduled Bumex.  Extubated 10/24/18. Danae Sauceda has issues with anemia given rise to possible alveolar hemorrhage vs hemolysis. Epoprostenol discontinued.  Capillaritis screen negative.  Given the volume overload status, dialysis initiated 10/24/18. For further details, please review the assessment and plan. ROS: Patient states that she is breathing better. No fever. No mucus production. Mild soreness with deep respirations. PMH:  Per HPI  SHX:  Reformed smoker since September 16, 2018. Mary Ann Beverly known drug allergies  Medications:  per the [facility]  Vital Signs: T: 97.9: P: 133: RR: 18: B/P: 117/104: FiO2: 1 L: O2 Sat: 96%: I/O: 1115/2014  General:   Ill-appearing overweight white female. HEENT:  normocephalic and atraumatic.  No scleral icterus. Neck: supple.  No JVD. Lungs: Better air exchange.  Mild tachypnea. Cardiac:  Tachycardia with a heart rate of 130. Abdomen: soft.  Nontender.   Extremities:  No clubbing, cyanosis x 4.   Bilateral upper extremity

## 2018-10-30 NOTE — PLAN OF CARE
Problem: Discharge Planning:  Goal: Discharged to appropriate level of care  Discharged to appropriate level of care   Outcome: Ongoing  Patient continues in the ICU at this time for care. Discharge planning in process. Possible TCU/Rehab. Problem: Anxiety:  Goal: Level of anxiety will decrease  Level of anxiety will decrease   Outcome: Ongoing  Patient anxious at times. Encouragement given. Problem: Cardiac Output - Decreased:  Goal: Hemodynamic stability will improve  Hemodynamic stability will improve   Outcome: Ongoing  Patient remains in A. Flutter with rate 130's. Problem: Fluid Volume - Imbalance:  Goal: Ability to achieve a balanced intake and output will improve  Ability to achieve a balanced intake and output will improve   Outcome: Ongoing  Monitoring intake and output. Problem: Pain:  Goal: Control of acute pain  Control of acute pain   Outcome: Ongoing  PRN oxycodone given for bilateral leg pain. Effective. Problem: Risk for Impaired Skin Integrity  Goal: Tissue integrity - skin and mucous membranes  Structural intactness and normal physiological function of skin and  mucous membranes. Outcome: Ongoing  Encourage patient to assist with turning. Pillows for support, sternal precautions with moving. Scattered abrasions and bruising noted. Surgical incision to midline chest, site care completed. Site care completed to bilateral wounds to groin. Problem: Nutrition  Goal: Optimal nutrition therapy  Outcome: Ongoing  Patient eating very little today. Patient on a Dysphagia II, carb control diet, tolerating. Problem: Falls - Risk of:  Goal: Will remain free from falls  Will remain free from falls   Outcome: Ongoing  No falls so far this shift. Fall precautions in place, call light within reach, bed in lowest position, side rails up x4 per ICU, bed alarm on, non-skid socks. Hourly rounding in place.      Problem: Musculor/Skeletal Functional Status  Goal: Highest potential functional

## 2018-10-30 NOTE — PROGRESS NOTES
contain minor errors which are inherent in voice recognition technology. **      Final report electronically signed by Dr. Nohemi Cazares on 10/30/2018 5:07 AM      XR CHEST PORTABLE   Final Result   Overall unchanged appearance of the thorax described above. **This report has been created using voice recognition software. It may contain minor errors which are inherent in voice recognition technology. **      Final report electronically signed by Dr. Marivel Jalloh on 10/29/2018 5:11 AM      XR CHEST PORTABLE   Final Result   Stable abnormal chest.            **This report has been created using voice recognition software. It may contain minor errors which are inherent in voice recognition technology. **      Final report electronically signed by Dr. Marivel Jalloh on 10/28/2018 6:08 AM      XR CHEST PORTABLE   Final Result   1. Moderate cardiac megaly. Metallic sternotomy sutures. Prosthetic heart valve. Permanent pacemaker. Right jugular PICC line, catheter tip in right atrium. Left jugular dialysis catheter, catheter tip the cavoatrial junction. 2. Moderate diffuse pneumonia/pulmonary edema both lungs. No effusion is seen. No appreciable change from prior. **This report has been created using voice recognition software. It may contain minor errors which are inherent in voice recognition technology. **      Final report electronically signed by Dr. Sobia Nuñez on 10/27/2018 7:15 AM      XR CHEST PORTABLE   Final Result   1. There is a stable left jugular central venous catheter with the distal tip overlying the superior vena cava. 2. There is a stable right jugular central venous catheter with the distal tip overlying the superior vena cava. 3. There are stable to slightly worsened patchy infiltrates throughout both lung fields with the differential including however not limited to edema and/or pneumonia. Follow-up imaging is recommended to confirm complete resolution.          **This

## 2018-10-30 NOTE — PROGRESS NOTES
[address] ICU 4D  DAILY NOTE    Time:  Time In: 5125  Time Out: 0168  Timed Code Treatment Minutes: 30 Minutes  Minutes: 30          Date: 10/30/2018  Patient Name: Manny Dodge,   Gender: female      Room: 4D--A  MRN: [id number]  : 1965  (48 y.o.)  Referring Practitioner: Dr. Roberto Rajan  Diagnosis: persistant A-fib  Additional Pertinent Hx: 48y.o. year-old female who presents for redo sternotomy, mitral valve repair, maze procedure, placement of left ventricular epicardial lead. sx on 10/15/18. Pt reintubated on 10/17/18 with self extubation and reintubation on 10/19/18. Pt with chest tubes rmoved on 10/22. extubation on 10/24/18 with hgih flow O2 started on 10/25. Hemodialysis started wht runs on 10/24,  and , . Past Medical History:   Diagnosis Date    [employer] dual pacer 2018    Hyperlipidemia      Past Surgical History:   Procedure Laterality Date    CARDIAC CATHETERIZATION  10/2018    CARDIAC SURGERY  1970    repair hole in heart pt states was 3 yrs old.  COLONOSCOPY      PACEMAKER INSERTION  2017    [place] OFFICE/OUTPT VISIT,PROCEDURE ONLY N/A 10/15/2018    REDO STERNOTOMY, BI-ATRIAL MAZE PROCEDURE, MITRAL REPAIR, PLACEMENT OF INTRA AORTIC BALLOON PUMP performed by Marjorie Hylton MD at [address]       Restrictions/Precautions:  Surgical Protocols, Fall Risk                    Sternal Precautions: No Pushing, No Pulling, 5# Lifting Restrictions  Other position/activity restrictions: s/predo sternotomy, mitral valve repair, maze procedure, placement of left ventricular epicardial lead on 10/15/18       Prior Level of Function:  ADL Assistance: Independent  Homemaking Assistance: Independent  Ambulation Assistance: Independent  Transfer Assistance: Independent  Additional Comments: Pt reports living with SO PLOF in 2 story home-2nd story bed & bathroom. No AD used PLOF.  Pt reports she plans

## 2018-10-30 NOTE — PLAN OF CARE
Problem: Discharge Planning:  Goal: Discharged to appropriate level of care  Discharged to appropriate level of care   Outcome: Ongoing  Discharge pending physician approval.  Patient will likely discharge to TCU for further rehab prior to returning home. Problem: Anxiety:  Goal: Level of anxiety will decrease  Level of anxiety will decrease   Outcome: Ongoing  Patient is very discouraged and convinced that she is unable to do much. With much encouragement, patient was able to get up to the chair and stated that she felt better being up. Discussed the need to have a positive attitude. Problem: Cardiac Output - Decreased:  Goal: Hemodynamic stability will improve  Hemodynamic stability will improve   Outcome: Ongoing  Vitals stable at this point. Midodrine decreased today, patient tolerating changes thus far. Problem: Fluid Volume - Imbalance:  Goal: Ability to achieve a balanced intake and output will improve  Ability to achieve a balanced intake and output will improve   Outcome: Ongoing  Patient reports decreased appetite and expresses a dislike in the dysphagia diet. Problem: Pain:  Goal: Control of acute pain  Control of acute pain   Outcome: Ongoing  Patient complained of pain at her incision site. Medication given per order as needed and available. Reposition for comfort. Goal: Control of chronic pain  Control of chronic pain   Outcome: Ongoing  Patient complained of pain at her incision site. Medication given per order as needed and available. Reposition for comfort. Problem: Pain:  Goal: Control of acute pain  Control of acute pain   Outcome: Ongoing  Patient complained of pain at her incision site. Medication given per order as needed and available. Reposition for comfort. Goal: Control of chronic pain  Control of chronic pain   Outcome: Ongoing  Patient complained of pain at her incision site. Medication given per order as needed and available.   Reposition for

## 2018-10-30 NOTE — PROGRESS NOTES
High    Nutrition Interventions:    (Diet per SLP. Continue ONS)  Continued Inpatient Monitoring, Education Needed, Coordination of Care (cardiac education needed)    Nutrition Evaluation:   · Evaluation: Goals set   · Goals: Patient will consume 75% or greater of most meals during LOS    · Monitoring: Meal Intake, Diet Tolerance, Skin Integrity, Wound Healing, Ascites/Edema, Weight, Comparative Standards, Pertinent Labs, Chewing/Swallowing    See Adult Nutrition Doc Flowsheet for more detail.      Electronically signed by Loreta Red RD, LD on 10/30/18 at 1:41 PM    Contact Number: [phone]

## 2018-10-31 ENCOUNTER — APPOINTMENT (OUTPATIENT)
Dept: GENERAL RADIOLOGY | Age: 53
DRG: 163 | End: 2018-10-31
Attending: THORACIC SURGERY (CARDIOTHORACIC VASCULAR SURGERY)
Payer: COMMERCIAL

## 2018-10-31 LAB
ANION GAP SERPL CALCULATED.3IONS-SCNC: 12 MEQ/L (ref 8–16)
BUN BLDV-MCNC: 46 MG/DL (ref 7–22)
CALCIUM SERPL-MCNC: 9 MG/DL (ref 8.5–10.5)
CHLORIDE BLD-SCNC: 98 MEQ/L (ref 98–111)
CO2: 25 MEQ/L (ref 23–33)
CREAT SERPL-MCNC: 2.6 MG/DL (ref 0.4–1.2)
ERYTHROCYTE [DISTWIDTH] IN BLOOD BY AUTOMATED COUNT: 19.7 % (ref 11.5–14.5)
ERYTHROCYTE [DISTWIDTH] IN BLOOD BY AUTOMATED COUNT: 56.1 FL (ref 35–45)
GFR SERPL CREATININE-BSD FRML MDRD: 19 ML/MIN/1.73M2
GLUCOSE BLD-MCNC: 92 MG/DL (ref 70–108)
HCT VFR BLD CALC: 28.1 % (ref 37–47)
HEMOGLOBIN: 8.8 GM/DL (ref 12–16)
MAGNESIUM: 2.3 MG/DL (ref 1.6–2.4)
MCH RBC QN AUTO: 28.2 PG (ref 26–33)
MCHC RBC AUTO-ENTMCNC: 31.3 GM/DL (ref 32.2–35.5)
MCV RBC AUTO: 90.1 FL (ref 81–99)
PLATELET # BLD: 324 THOU/MM3 (ref 130–400)
PMV BLD AUTO: 10.2 FL (ref 9.4–12.4)
POTASSIUM SERPL-SCNC: 4.4 MEQ/L (ref 3.5–5.2)
RBC # BLD: 3.12 MILL/MM3 (ref 4.2–5.4)
SODIUM BLD-SCNC: 135 MEQ/L (ref 135–145)
WBC # BLD: 9.7 THOU/MM3 (ref 4.8–10.8)

## 2018-10-31 PROCEDURE — 6370000000 HC RX 637 (ALT 250 FOR IP): Performed by: NURSE PRACTITIONER

## 2018-10-31 PROCEDURE — 97530 THERAPEUTIC ACTIVITIES: CPT

## 2018-10-31 PROCEDURE — 83735 ASSAY OF MAGNESIUM: CPT

## 2018-10-31 PROCEDURE — 5A2204Z RESTORATION OF CARDIAC RHYTHM, SINGLE: ICD-10-PCS | Performed by: INTERNAL MEDICINE

## 2018-10-31 PROCEDURE — 99232 SBSQ HOSP IP/OBS MODERATE 35: CPT | Performed by: INTERNAL MEDICINE

## 2018-10-31 PROCEDURE — 2709999900 HC NON-CHARGEABLE SUPPLY

## 2018-10-31 PROCEDURE — 6360000002 HC RX W HCPCS: Performed by: INTERNAL MEDICINE

## 2018-10-31 PROCEDURE — 97116 GAIT TRAINING THERAPY: CPT

## 2018-10-31 PROCEDURE — 6370000000 HC RX 637 (ALT 250 FOR IP): Performed by: THORACIC SURGERY (CARDIOTHORACIC VASCULAR SURGERY)

## 2018-10-31 PROCEDURE — 97535 SELF CARE MNGMENT TRAINING: CPT

## 2018-10-31 PROCEDURE — 6370000000 HC RX 637 (ALT 250 FOR IP): Performed by: INTERNAL MEDICINE

## 2018-10-31 PROCEDURE — 92526 ORAL FUNCTION THERAPY: CPT

## 2018-10-31 PROCEDURE — 6360000002 HC RX W HCPCS

## 2018-10-31 PROCEDURE — 2700000000 HC OXYGEN THERAPY PER DAY

## 2018-10-31 PROCEDURE — 2580000003 HC RX 258: Performed by: THORACIC SURGERY (CARDIOTHORACIC VASCULAR SURGERY)

## 2018-10-31 PROCEDURE — 99233 SBSQ HOSP IP/OBS HIGH 50: CPT | Performed by: INTERNAL MEDICINE

## 2018-10-31 PROCEDURE — 2060000000 HC ICU INTERMEDIATE R&B

## 2018-10-31 PROCEDURE — 94640 AIRWAY INHALATION TREATMENT: CPT

## 2018-10-31 PROCEDURE — 92960 CARDIOVERSION ELECTRIC EXT: CPT | Performed by: INTERNAL MEDICINE

## 2018-10-31 PROCEDURE — 85027 COMPLETE CBC AUTOMATED: CPT

## 2018-10-31 PROCEDURE — 97110 THERAPEUTIC EXERCISES: CPT

## 2018-10-31 PROCEDURE — 71045 X-RAY EXAM CHEST 1 VIEW: CPT

## 2018-10-31 PROCEDURE — 2580000003 HC RX 258: Performed by: INTERNAL MEDICINE

## 2018-10-31 PROCEDURE — 80048 BASIC METABOLIC PNL TOTAL CA: CPT

## 2018-10-31 RX ORDER — MIDODRINE HYDROCHLORIDE 2.5 MG/1
2.5 TABLET ORAL
Status: DISCONTINUED | OUTPATIENT
Start: 2018-10-31 | End: 2018-11-01

## 2018-10-31 RX ORDER — ADENOSINE 3 MG/ML
INJECTION, SOLUTION INTRAVENOUS
Status: COMPLETED
Start: 2018-10-31 | End: 2018-10-31

## 2018-10-31 RX ORDER — LIDOCAINE 4 G/G
1 PATCH TOPICAL DAILY
Status: DISCONTINUED | OUTPATIENT
Start: 2018-10-31 | End: 2018-11-05 | Stop reason: HOSPADM

## 2018-10-31 RX ORDER — MIDAZOLAM HYDROCHLORIDE 1 MG/ML
INJECTION INTRAMUSCULAR; INTRAVENOUS
Status: DISCONTINUED
Start: 2018-10-31 | End: 2018-10-31

## 2018-10-31 RX ORDER — ADENOSINE 3 MG/ML
6 INJECTION, SOLUTION INTRAVENOUS ONCE
Status: COMPLETED | OUTPATIENT
Start: 2018-10-31 | End: 2018-10-31

## 2018-10-31 RX ORDER — FUROSEMIDE 10 MG/ML
40 INJECTION INTRAMUSCULAR; INTRAVENOUS ONCE
Status: COMPLETED | OUTPATIENT
Start: 2018-10-31 | End: 2018-10-31

## 2018-10-31 RX ADMIN — CHLOROTHIAZIDE SODIUM 500 MG: 500 INJECTION, POWDER, LYOPHILIZED, FOR SOLUTION INTRAVENOUS at 08:34

## 2018-10-31 RX ADMIN — ALPRAZOLAM 0.5 MG: 0.5 TABLET ORAL at 08:42

## 2018-10-31 RX ADMIN — OXYCODONE HYDROCHLORIDE 5 MG: 5 TABLET ORAL at 08:42

## 2018-10-31 RX ADMIN — ADENOSINE 6 MG: 3 INJECTION, SOLUTION INTRAVENOUS at 08:20

## 2018-10-31 RX ADMIN — MAGNESIUM HYDROXIDE 30 ML: 400 SUSPENSION ORAL at 08:32

## 2018-10-31 RX ADMIN — MIDODRINE HYDROCHLORIDE 2.5 MG: 2.5 TABLET ORAL at 11:18

## 2018-10-31 RX ADMIN — FUROSEMIDE 40 MG: 10 INJECTION, SOLUTION INTRAMUSCULAR; INTRAVENOUS at 06:52

## 2018-10-31 RX ADMIN — DILTIAZEM HYDROCHLORIDE 30 MG: 30 TABLET, FILM COATED ORAL at 17:45

## 2018-10-31 RX ADMIN — Medication 10 ML: at 20:19

## 2018-10-31 RX ADMIN — DILTIAZEM HYDROCHLORIDE 30 MG: 30 TABLET, FILM COATED ORAL at 11:18

## 2018-10-31 RX ADMIN — DILTIAZEM HYDROCHLORIDE 30 MG: 30 TABLET, FILM COATED ORAL at 23:24

## 2018-10-31 RX ADMIN — THERA TABS 1 TABLET: TAB at 08:35

## 2018-10-31 RX ADMIN — MIDODRINE HYDROCHLORIDE 2.5 MG: 2.5 TABLET ORAL at 17:45

## 2018-10-31 RX ADMIN — DILTIAZEM HYDROCHLORIDE 30 MG: 30 TABLET, FILM COATED ORAL at 00:35

## 2018-10-31 RX ADMIN — ATORVASTATIN CALCIUM 20 MG: 20 TABLET, FILM COATED ORAL at 20:19

## 2018-10-31 RX ADMIN — Medication 10 ML: at 08:00

## 2018-10-31 RX ADMIN — FAMOTIDINE 20 MG: 20 TABLET ORAL at 08:32

## 2018-10-31 RX ADMIN — AMIODARONE HYDROCHLORIDE 200 MG: 200 TABLET ORAL at 08:34

## 2018-10-31 RX ADMIN — ASPIRIN 325 MG: 325 TABLET ORAL at 08:32

## 2018-10-31 RX ADMIN — DOCUSATE SODIUM 100 MG: 100 CAPSULE, LIQUID FILLED ORAL at 08:32

## 2018-10-31 RX ADMIN — AMIODARONE HYDROCHLORIDE 200 MG: 200 TABLET ORAL at 20:19

## 2018-10-31 RX ADMIN — ACETAMINOPHEN 650 MG: 325 TABLET ORAL at 08:32

## 2018-10-31 RX ADMIN — DILTIAZEM HYDROCHLORIDE 30 MG: 30 TABLET, FILM COATED ORAL at 05:45

## 2018-10-31 RX ADMIN — TIOTROPIUM BROMIDE 18 MCG: 18 CAPSULE ORAL; RESPIRATORY (INHALATION) at 08:57

## 2018-10-31 ASSESSMENT — PAIN SCALES - GENERAL
PAINLEVEL_OUTOF10: 7
PAINLEVEL_OUTOF10: 10
PAINLEVEL_OUTOF10: 0
PAINLEVEL_OUTOF10: 0
PAINLEVEL_OUTOF10: 8
PAINLEVEL_OUTOF10: 10
PAINLEVEL_OUTOF10: 4

## 2018-10-31 ASSESSMENT — PAIN DESCRIPTION - FREQUENCY
FREQUENCY: INTERMITTENT
FREQUENCY: CONTINUOUS
FREQUENCY: INTERMITTENT

## 2018-10-31 ASSESSMENT — PAIN DESCRIPTION - PAIN TYPE
TYPE: ACUTE PAIN
TYPE: SURGICAL PAIN
TYPE: ACUTE PAIN
TYPE: ACUTE PAIN

## 2018-10-31 ASSESSMENT — PAIN DESCRIPTION - LOCATION
LOCATION: CHEST
LOCATION: BREAST
LOCATION: BACK
LOCATION: BACK

## 2018-10-31 ASSESSMENT — PAIN DESCRIPTION - DESCRIPTORS
DESCRIPTORS: ACHING;CONSTANT;DISCOMFORT
DESCRIPTORS: ACHING;CONSTANT
DESCRIPTORS: ACHING;CONSTANT;NAGGING

## 2018-10-31 ASSESSMENT — PAIN DESCRIPTION - PROGRESSION
CLINICAL_PROGRESSION: NOT CHANGED
CLINICAL_PROGRESSION: GRADUALLY WORSENING
CLINICAL_PROGRESSION: NOT CHANGED

## 2018-10-31 ASSESSMENT — PAIN DESCRIPTION - ONSET
ONSET: ON-GOING
ONSET: ON-GOING

## 2018-10-31 ASSESSMENT — PAIN DESCRIPTION - ORIENTATION
ORIENTATION: RIGHT;LOWER
ORIENTATION: LOWER;RIGHT
ORIENTATION: RIGHT

## 2018-10-31 NOTE — PROGRESS NOTES
[facility] ICU 4D - 4D-05/005-A    Time In: 1120  Time Out: 1159  Timed Code Treatment Minutes: 44 Minutes  Minutes: 39          Date: 10/31/2018  Patient Name: Pina Wakefield,  Gender:  female        MRN: [id number]  : 1965  (48 y.o.)     Referring Practitioner: Dr. Ronit Dunbar  Diagnosis: persistent a fib  Additional Pertinent Hx: 48y.o. year-old female who presents for redo sternotomy, mitral valve repair, maze procedure, placement of left ventricular epicardial lead. sx on 10/15/18. Pt reintubated on 10/17/18 with self extubation and reintubation on 10/19/18. Pt with chest tubes rmoved on 10/22. extubation on 10/24/18 with hgih flow O2 started on 10/25. Hemodialysis started wht runs on 10/24,  and . Past Medical History:   Diagnosis Date    [employer] dual pacer 2018    Chronic diastolic congestive heart failure ([address] 2018    Hyperlipidemia     Nonischemic dilated cardiomyopathy ([address] 2018     Past Surgical History:   Procedure Laterality Date    CARDIAC CATHETERIZATION  10/2018    CARDIAC SURGERY  1970    repair hole in heart pt states was 3 yrs old.     COLONOSCOPY      PACEMAKER INSERTION  2017    [place] OFFICE/OUTPT VISIT,PROCEDURE ONLY N/A 10/15/2018    REDO STERNOTOMY, BI-ATRIAL MAZE PROCEDURE, MITRAL REPAIR, PLACEMENT OF INTRA AORTIC BALLOON PUMP performed by Elke Avila MD at . [address]       Restrictions/Precautions:  Surgical Protocols, Fall Risk                    Sternal Precautions: No Pushing, No Pulling, 5# Lifting Restrictions  Other position/activity restrictions: s/predo sternotomy, mitral valve repair, maze procedure, placement of left ventricular epicardial lead on 10/15/18       Prior Level of Function:  ADL Assistance: Independent  Homemaking Assistance: Independent  Ambulation Assistance: Independent  Transfer Assistance: Independent  Additional Comments: Pt shoulder rolls cues for technique, long arc qauds and hip fleixon x 10 reps each with rest breaks and cues for deep breathing           Activity Tolerance:  Activity Tolerance: Patient limited by fatigue;Patient limited by endurance; Patient Tolerated treatment well    Assessment: Body structures, Functions, Activity limitations: Decreased functional mobility , Decreased strength, Decreased endurance, Decreased balance  Assessment: pt tolerated session well, she was more motivated this date and had good family support, she was on room air and needed cues for deep breathing during session as sats varried from 88-92% and HR in 120's she is getting cardioverted today, she was able to tolerate increased activity but still needing hands on assist with transfers and mobility she would benefit from cont skilled therapy  Prognosis: Good     REQUIRES PT FOLLOW UP: Yes    Discharge Recommendations:  Discharge Recommendations:  (recommend physiatry consult, anticipate pt will require an inpatient therapy stay prior to home )    Patient Education:  Patient Education: POC, importance of inc mobility, use of walker     Equipment Recommendations: Other: cont to assess needs    Safety:  Type of devices:  All fall risk precautions in place, Left in chair, Gait belt, Call light within reach, Patient at risk for falls, Nurse notified    Plan:  Times per week: 6X open heart  Times per day: Daily  Specific instructions for Next Treatment: therex and mobility with cardiac and sternal precautions  Current Treatment Recommendations: Strengthening, Functional Mobility Training, Transfer Training, Balance Training, Endurance Training, Gait Training, Stair training, Pain Management, Patient/Caregiver Education & Training, Safety Education & Training, Home Exercise Program, Equipment Evaluation, Education, & procurement    Goals:  Patient goals : not stated    Short term goals  Time Frame for Short term goals: 2 weeks  Short term goal 1: bed

## 2018-10-31 NOTE — PROCEDURES
Electrical cardioversion:    After informed consent was obtained, pads were placed. Synchronously with electric cardioverter was obtained. Patient received 5 mg Versed and subsequently 5 cc Diprivan for sedation. After the patient was sedated, patient received 200 J synchronized cardioversion and converted to normal sinus rhythm with a rate of 83.     Electronically signed by Eleanor Cueto M.D.

## 2018-10-31 NOTE — PROGRESS NOTES
Cardiovascular Surgery Progress Note      Comfortable on 2L NC  Stable atrial flutter  CXR interstitial edema  Cr 2.6  -Diuretics per Nephrology  -Midodrine to 2.5  -Trial of cardioversion today  [facility]

## 2018-11-01 ENCOUNTER — APPOINTMENT (OUTPATIENT)
Dept: GENERAL RADIOLOGY | Age: 53
DRG: 163 | End: 2018-11-01
Attending: THORACIC SURGERY (CARDIOTHORACIC VASCULAR SURGERY)
Payer: COMMERCIAL

## 2018-11-01 LAB
ANION GAP SERPL CALCULATED.3IONS-SCNC: 14 MEQ/L (ref 8–16)
BUN BLDV-MCNC: 48 MG/DL (ref 7–22)
CALCIUM SERPL-MCNC: 9.1 MG/DL (ref 8.5–10.5)
CHLORIDE BLD-SCNC: 95 MEQ/L (ref 98–111)
CO2: 26 MEQ/L (ref 23–33)
CREAT SERPL-MCNC: 2.7 MG/DL (ref 0.4–1.2)
ERYTHROCYTE [DISTWIDTH] IN BLOOD BY AUTOMATED COUNT: 19.5 % (ref 11.5–14.5)
ERYTHROCYTE [DISTWIDTH] IN BLOOD BY AUTOMATED COUNT: 55.6 FL (ref 35–45)
GFR SERPL CREATININE-BSD FRML MDRD: 19 ML/MIN/1.73M2
GLUCOSE BLD-MCNC: 112 MG/DL (ref 70–108)
GLUCOSE BLD-MCNC: 117 MG/DL (ref 70–108)
GLUCOSE BLD-MCNC: 96 MG/DL (ref 70–108)
GLUCOSE BLD-MCNC: 97 MG/DL (ref 70–108)
HCT VFR BLD CALC: 27.9 % (ref 37–47)
HEMOGLOBIN: 8.8 GM/DL (ref 12–16)
MAGNESIUM: 2.2 MG/DL (ref 1.6–2.4)
MCH RBC QN AUTO: 28.1 PG (ref 26–33)
MCHC RBC AUTO-ENTMCNC: 31.5 GM/DL (ref 32.2–35.5)
MCV RBC AUTO: 89.1 FL (ref 81–99)
PLATELET # BLD: 330 THOU/MM3 (ref 130–400)
PMV BLD AUTO: 10 FL (ref 9.4–12.4)
POTASSIUM SERPL-SCNC: 4.1 MEQ/L (ref 3.5–5.2)
RBC # BLD: 3.13 MILL/MM3 (ref 4.2–5.4)
SODIUM BLD-SCNC: 135 MEQ/L (ref 135–145)
WBC # BLD: 9.8 THOU/MM3 (ref 4.8–10.8)

## 2018-11-01 PROCEDURE — 2709999900 HC NON-CHARGEABLE SUPPLY

## 2018-11-01 PROCEDURE — 6370000000 HC RX 637 (ALT 250 FOR IP): Performed by: THORACIC SURGERY (CARDIOTHORACIC VASCULAR SURGERY)

## 2018-11-01 PROCEDURE — 2580000003 HC RX 258: Performed by: THORACIC SURGERY (CARDIOTHORACIC VASCULAR SURGERY)

## 2018-11-01 PROCEDURE — 80048 BASIC METABOLIC PNL TOTAL CA: CPT

## 2018-11-01 PROCEDURE — 99024 POSTOP FOLLOW-UP VISIT: CPT | Performed by: PHYSICIAN ASSISTANT

## 2018-11-01 PROCEDURE — 71046 X-RAY EXAM CHEST 2 VIEWS: CPT

## 2018-11-01 PROCEDURE — 92526 ORAL FUNCTION THERAPY: CPT

## 2018-11-01 PROCEDURE — 97530 THERAPEUTIC ACTIVITIES: CPT

## 2018-11-01 PROCEDURE — 83735 ASSAY OF MAGNESIUM: CPT

## 2018-11-01 PROCEDURE — 85027 COMPLETE CBC AUTOMATED: CPT

## 2018-11-01 PROCEDURE — 6370000000 HC RX 637 (ALT 250 FOR IP): Performed by: NURSE PRACTITIONER

## 2018-11-01 PROCEDURE — 97110 THERAPEUTIC EXERCISES: CPT

## 2018-11-01 PROCEDURE — 6370000000 HC RX 637 (ALT 250 FOR IP): Performed by: INTERNAL MEDICINE

## 2018-11-01 PROCEDURE — 97116 GAIT TRAINING THERAPY: CPT

## 2018-11-01 PROCEDURE — 99232 SBSQ HOSP IP/OBS MODERATE 35: CPT | Performed by: INTERNAL MEDICINE

## 2018-11-01 PROCEDURE — 2060000000 HC ICU INTERMEDIATE R&B

## 2018-11-01 PROCEDURE — 36592 COLLECT BLOOD FROM PICC: CPT

## 2018-11-01 PROCEDURE — 82948 REAGENT STRIP/BLOOD GLUCOSE: CPT

## 2018-11-01 PROCEDURE — 97535 SELF CARE MNGMENT TRAINING: CPT

## 2018-11-01 PROCEDURE — 2700000000 HC OXYGEN THERAPY PER DAY

## 2018-11-01 PROCEDURE — 94760 N-INVAS EAR/PLS OXIMETRY 1: CPT

## 2018-11-01 PROCEDURE — 6360000002 HC RX W HCPCS: Performed by: THORACIC SURGERY (CARDIOTHORACIC VASCULAR SURGERY)

## 2018-11-01 PROCEDURE — 94640 AIRWAY INHALATION TREATMENT: CPT

## 2018-11-01 PROCEDURE — 2500000003 HC RX 250 WO HCPCS: Performed by: INTERNAL MEDICINE

## 2018-11-01 RX ORDER — METOLAZONE 5 MG/1
5 TABLET ORAL ONCE
Status: COMPLETED | OUTPATIENT
Start: 2018-11-01 | End: 2018-11-01

## 2018-11-01 RX ORDER — DILTIAZEM HYDROCHLORIDE 60 MG/1
60 TABLET, FILM COATED ORAL EVERY 6 HOURS SCHEDULED
Status: DISCONTINUED | OUTPATIENT
Start: 2018-11-01 | End: 2018-11-02

## 2018-11-01 RX ORDER — BUMETANIDE 0.25 MG/ML
1 INJECTION, SOLUTION INTRAMUSCULAR; INTRAVENOUS ONCE
Status: COMPLETED | OUTPATIENT
Start: 2018-11-01 | End: 2018-11-01

## 2018-11-01 RX ADMIN — METOLAZONE 5 MG: 5 TABLET ORAL at 08:48

## 2018-11-01 RX ADMIN — ONDANSETRON HYDROCHLORIDE 4 MG: 2 SOLUTION INTRAMUSCULAR; INTRAVENOUS at 23:01

## 2018-11-01 RX ADMIN — ACETAMINOPHEN 650 MG: 325 TABLET ORAL at 11:17

## 2018-11-01 RX ADMIN — TIOTROPIUM BROMIDE 18 MCG: 18 CAPSULE ORAL; RESPIRATORY (INHALATION) at 09:20

## 2018-11-01 RX ADMIN — MIDODRINE HYDROCHLORIDE 2.5 MG: 2.5 TABLET ORAL at 08:43

## 2018-11-01 RX ADMIN — ATORVASTATIN CALCIUM 20 MG: 20 TABLET, FILM COATED ORAL at 20:47

## 2018-11-01 RX ADMIN — DILTIAZEM HYDROCHLORIDE 60 MG: 60 TABLET, FILM COATED ORAL at 13:35

## 2018-11-01 RX ADMIN — ASPIRIN 325 MG: 325 TABLET ORAL at 08:43

## 2018-11-01 RX ADMIN — AMIODARONE HYDROCHLORIDE 200 MG: 200 TABLET ORAL at 08:43

## 2018-11-01 RX ADMIN — THERA TABS 1 TABLET: TAB at 08:44

## 2018-11-01 RX ADMIN — DILTIAZEM HYDROCHLORIDE 60 MG: 60 TABLET, FILM COATED ORAL at 18:05

## 2018-11-01 RX ADMIN — OXYCODONE HYDROCHLORIDE 5 MG: 5 TABLET ORAL at 21:58

## 2018-11-01 RX ADMIN — BUMETANIDE 1 MG: 0.25 INJECTION INTRAMUSCULAR; INTRAVENOUS at 08:54

## 2018-11-01 RX ADMIN — DOCUSATE SODIUM 100 MG: 100 CAPSULE, LIQUID FILLED ORAL at 08:43

## 2018-11-01 RX ADMIN — Medication 10 ML: at 20:47

## 2018-11-01 RX ADMIN — Medication 10 ML: at 08:45

## 2018-11-01 RX ADMIN — AMIODARONE HYDROCHLORIDE 200 MG: 200 TABLET ORAL at 20:47

## 2018-11-01 RX ADMIN — DILTIAZEM HYDROCHLORIDE 30 MG: 30 TABLET, FILM COATED ORAL at 06:14

## 2018-11-01 RX ADMIN — FAMOTIDINE 20 MG: 20 TABLET ORAL at 08:43

## 2018-11-01 RX ADMIN — OXYCODONE HYDROCHLORIDE 10 MG: 5 TABLET ORAL at 09:49

## 2018-11-01 ASSESSMENT — PAIN DESCRIPTION - DESCRIPTORS
DESCRIPTORS: ACHING;CRAMPING
DESCRIPTORS: ACHING;NAGGING
DESCRIPTORS: ACHING;DISCOMFORT
DESCRIPTORS: ACHING

## 2018-11-01 ASSESSMENT — PAIN DESCRIPTION - PAIN TYPE
TYPE: SURGICAL PAIN
TYPE: ACUTE PAIN

## 2018-11-01 ASSESSMENT — PAIN DESCRIPTION - FREQUENCY
FREQUENCY: INTERMITTENT

## 2018-11-01 ASSESSMENT — PAIN SCALES - GENERAL
PAINLEVEL_OUTOF10: 5
PAINLEVEL_OUTOF10: 4
PAINLEVEL_OUTOF10: 7
PAINLEVEL_OUTOF10: 5
PAINLEVEL_OUTOF10: 5
PAINLEVEL_OUTOF10: 9
PAINLEVEL_OUTOF10: 6
PAINLEVEL_OUTOF10: 0
PAINLEVEL_OUTOF10: 6
PAINLEVEL_OUTOF10: 6
PAINLEVEL_OUTOF10: 4
PAINLEVEL_OUTOF10: 6
PAINLEVEL_OUTOF10: 6

## 2018-11-01 ASSESSMENT — PAIN DESCRIPTION - ORIENTATION
ORIENTATION: MID
ORIENTATION: RIGHT;LOWER
ORIENTATION: MID
ORIENTATION: LOWER

## 2018-11-01 ASSESSMENT — PAIN DESCRIPTION - PROGRESSION
CLINICAL_PROGRESSION: NOT CHANGED
CLINICAL_PROGRESSION: NOT CHANGED

## 2018-11-01 ASSESSMENT — PAIN DESCRIPTION - ONSET
ONSET: ON-GOING
ONSET: ON-GOING

## 2018-11-01 ASSESSMENT — PAIN DESCRIPTION - DIRECTION: RADIATING_TOWARDS: LEFT

## 2018-11-01 ASSESSMENT — PAIN DESCRIPTION - LOCATION
LOCATION: BACK
LOCATION: CHEST
LOCATION: BACK
LOCATION: BACK
LOCATION: CHEST

## 2018-11-01 NOTE — PROGRESS NOTES
[address] THERAPY  STR ICU 4D    TIME   SLP Individual Minutes  Time In: 3067  Time Out: 1102  Minutes: 15  Timed Code Treatment Minutes: 0 Minutes       [x]Daily Note  []Progress Note  []Discharge Note    Date: 2018  Patient Name: Kori Sargent        MRN: [id number]    : 1965  (48 y.o.)  Gender: female   Primary Provider: Pravin Monahan MD  Admitting Diagnosis:  Persistent atrial fibrillation   Secondary Diagnosis: dysphagia  Precautions: aspiration  Swallowing Status/Diet: Regular with thin -  Swallowing Strategies: pulmonary monitoring, upright for meals  DATE of last MBS:  BSE 10/26/18  Pain:  0/10    Subjective: Pt seen upright in bed; alert and pleasant. SHORT TERM GOAL #1:  Goal 1: Pt will tolerate puree and thin diet without overt aspiration to ensure safe and adequate nutrition and hydration GOAL MET. Goal 1:  Pt will tolerate dysphagia III diet with thin liquids without s/s of aspiration in order to safely maintain adequate hydration and nutrition. INTERVENTIONS:  Patient and RN reports good success with PO medication and dysphagia III diet with thin liquids. RN states improving pulmonary status. SHORT TERM GOAL #2:  Goal 2: Pt will tolerate trials of advanced solids without overt aspiration 10/10 times to advance pt to least restricitive diet  INTERVENTIONS: Trial peanut butter bread; Adequate mastication and oral bolus control with advanced solids, timely swallow reflex and effective oral clearance. No overt aspiration of solids or thin liquids by straw this date. Recommend advancing pt to Regular and thin liquids at this time. Pt and RN notified of diet upgrade. Will plan to complete diet monitor 1-2x to ensure diet tolerance prior to signing off.      SHORT TERM GOAL #3:  Goal 3: Monitor pulmonary status and compelete instrumetnal assessment as needed  INTERVENTIONS: Stable pulmonary status with no new

## 2018-11-01 NOTE — PROGRESS NOTES
Pt is resting in bed with eyes closed. Call light within reach.    Delfina CARDENAS,[facility]

## 2018-11-01 NOTE — PROGRESS NOTES
CT/CV Surgery Progress Note    2018 7:40 AM  Surgeon:  Dr. Jayjay Bray     Procedure:   1) Redo sternotomy  2) Mitral valve repair with a 26 mm Blum Physio II annuloplasty band  3)) Left atrial maze procedure, with ablation of both the anterior and posterior mitral trigones  4) Ligation of the left atrial appendage  5) Insertion of right common femoral intra-aortic balloon pump    POD #17    Subjective:  Ms. Nicolas Carson  is resting comfortable in bed, alert, and in no acute distress. Pt denies chest pressure, SOB, fever,chills, N/V/D.      Vital Signs: /69   Pulse 83   Temp 98.5 °F (36.9 °C) (Oral)   Resp 18   Ht 5' 6\" (1.676 m)   Wt 207 lb 1.6 oz (93.9 kg)   SpO2 96%   BMI 33.43 kg/m²    Temp (24hrs), Av °F (36.7 °C), Min:97.7 °F (36.5 °C), Max:98.5 °F (36.9 °C)    Labs:   CBC:     Recent Labs      10/30/18   0455  10/31/18   0515  18   0330   WBC  10.1  9.7  9.8   HGB  8.7*  8.8*  8.8*   HCT  27.8*  28.1*  27.9*   MCV  87.7  90.1  89.1   PLT  299  324  330     BMP: Recent Labs      10/30/18   0455  10/30/18   0644  10/31/18   0515  18   0330   NA   --   137  135  135   K   --   4.1  4.4  4.1   CL   --   98  98  95*   CO2   --   26  25  26   BUN   --   48*  46*  48*   CREATININE   --   2.6*  2.6*  2.7*   MG  2.3   --   2.3  2.2     Imaging:  CXR: Small bilat pleural effusions on today's film      Intake/Output Summary (Last 24 hours) at 18 0707  Last data filed at 18 0345   Gross per 24 hour   Intake             1902 ml   Output             1725 ml   Net              177 ml       Scheduled Meds:    bumetanide  1 mg Intravenous Once    metolazone  5 mg Oral Once    midodrine  2.5 mg Oral TID     lidocaine  1 patch Transdermal Daily    diltiazem  30 mg Oral 4 times per day    multivitamin  1 tablet Oral Daily    docusate sodium  100 mg Oral Daily    amiodarone  200 mg Oral BID    tiotropium  18 mcg Inhalation Daily    potassium (CARDIAC) replacement protocol   Other

## 2018-11-01 NOTE — PROGRESS NOTES
Independent  Additional Comments: Pt reports living with SO PLOF in 2 story home-2nd story bed & bathroom. No AD used PLOF. Pt reports she plans to go home with her father at discharge. Subjective:     Subjective: pt in bed on arrival required much encouragment this date to get up, she cont to require cues for sternal precautions and cues for safety poor recall of conversation, she was wanting to return to bed following walk    Pain:   .  Pain Assessment  Pain Level: 6 (back, and placed kpad on bed following session )       Social/Functional:  Lives With: Family  Type of Home: House  Home Layout: Two level (only bathroom on 2nd floor )  Home Access: Stairs to enter with rails  Entrance Stairs - Number of Steps: 2  Home Equipment:  (BSC)     Objective:  Supine to Sit: Moderate assistance (at trunk with HOB slighlty elevated and extra time once sitting she needed min to mod assist to scoot to edge of bed and max cues for sternal precautions she kept letting go of her bear )  Sit to Supine: Minimal assistance (at right LE and she was able to lift left LE up into bed once in bed she needed min assist at trunk to reposition and min assist at hips to scoot over with bridges )    Transfers  Sit to Stand: Minimal Assistance (pt took 2 attempts to come to standing position )  Stand to sit: Minimal Assistance (cues for sternal precautiosn )       Ambulation 1  Device: Rolling Walker  Assistance: Contact guard assistance  Quality of Gait: very very slow tati pt very distracted in halls and needed cues to stay on task she would stop every few feet, pt needed cues for posture and to stay in walker noted decreased step length at wilma LEs   Distance: 80 feet   Comments: gait speed . 19 feet per sec and less than 3.28 is consided fall risk and normal for her age is 4.59 feet per sec         Balance  Comments: dynamic balance one UE at support pt reaching above head and to knee ht with CGA noted wide base of support and hesitant

## 2018-11-01 NOTE — PROGRESS NOTES
Pt alert and oriented x4. Mucous membranes without lesions. Pupils equal, round and react to light. Pt states no numbness or tingling in extremities. Active range of motion in all 4 extremities. Cap refill and skin turgor <3 secs. Hand grasps strong and equal. Skin of upper extremities appropriate color, warm and dry. Pt has triple lumen PICC in right internal jugular. Also has hemodialysis in left internal jugular. Pt has chest wound and is clean dry and intact and is open to air. Incision sites on either side of groin. Heart sounds regular. Lung sounds clear and diminished. Pt has strong productive cough with thick yellow sputum moderate amount. Respirations unlabored with symmetrical chest expansion. Pt on 2liters nasal cannula. Bowel sounds active x4. Abdomen soft and nontender. Pt has de león catheter inserted. Urine is clear yellow. Skin of lower extremities appropriate color, warm and pink. Has bilateral pneumatic compression devices below knee. Pedal push and pull strong and equal. Pedal pulses present +1. +1 edema in lower extremities. Pt states having no pain at this time. Stated Pain goal is 0/10. Left pt sitting in wheelchair with call light in reach. Denies needs.    CStauffer SN,RSC

## 2018-11-02 ENCOUNTER — APPOINTMENT (OUTPATIENT)
Dept: GENERAL RADIOLOGY | Age: 53
DRG: 163 | End: 2018-11-02
Attending: THORACIC SURGERY (CARDIOTHORACIC VASCULAR SURGERY)
Payer: COMMERCIAL

## 2018-11-02 LAB
ANION GAP SERPL CALCULATED.3IONS-SCNC: 12 MEQ/L (ref 8–16)
BUN BLDV-MCNC: 43 MG/DL (ref 7–22)
CALCIUM SERPL-MCNC: 8.9 MG/DL (ref 8.5–10.5)
CHLORIDE BLD-SCNC: 94 MEQ/L (ref 98–111)
CO2: 28 MEQ/L (ref 23–33)
CREAT SERPL-MCNC: 2.7 MG/DL (ref 0.4–1.2)
ERYTHROCYTE [DISTWIDTH] IN BLOOD BY AUTOMATED COUNT: 19.5 % (ref 11.5–14.5)
ERYTHROCYTE [DISTWIDTH] IN BLOOD BY AUTOMATED COUNT: 55.8 FL (ref 35–45)
GFR SERPL CREATININE-BSD FRML MDRD: 19 ML/MIN/1.73M2
GLUCOSE BLD-MCNC: 103 MG/DL (ref 70–108)
GLUCOSE BLD-MCNC: 135 MG/DL (ref 70–108)
GLUCOSE BLD-MCNC: 88 MG/DL (ref 70–108)
GLUCOSE BLD-MCNC: 98 MG/DL (ref 70–108)
HCT VFR BLD CALC: 26.4 % (ref 37–47)
HEMOGLOBIN: 8.4 GM/DL (ref 12–16)
MCH RBC QN AUTO: 28.4 PG (ref 26–33)
MCHC RBC AUTO-ENTMCNC: 31.8 GM/DL (ref 32.2–35.5)
MCV RBC AUTO: 89.2 FL (ref 81–99)
PLATELET # BLD: 296 THOU/MM3 (ref 130–400)
PMV BLD AUTO: 9.6 FL (ref 9.4–12.4)
POTASSIUM SERPL-SCNC: 3.9 MEQ/L (ref 3.5–5.2)
RBC # BLD: 2.96 MILL/MM3 (ref 4.2–5.4)
SODIUM BLD-SCNC: 134 MEQ/L (ref 135–145)
WBC # BLD: 7.8 THOU/MM3 (ref 4.8–10.8)

## 2018-11-02 PROCEDURE — 99024 POSTOP FOLLOW-UP VISIT: CPT | Performed by: PHYSICIAN ASSISTANT

## 2018-11-02 PROCEDURE — 6370000000 HC RX 637 (ALT 250 FOR IP): Performed by: INTERNAL MEDICINE

## 2018-11-02 PROCEDURE — 97116 GAIT TRAINING THERAPY: CPT

## 2018-11-02 PROCEDURE — 92526 ORAL FUNCTION THERAPY: CPT

## 2018-11-02 PROCEDURE — 2709999900 HC NON-CHARGEABLE SUPPLY

## 2018-11-02 PROCEDURE — 6370000000 HC RX 637 (ALT 250 FOR IP): Performed by: THORACIC SURGERY (CARDIOTHORACIC VASCULAR SURGERY)

## 2018-11-02 PROCEDURE — 99232 SBSQ HOSP IP/OBS MODERATE 35: CPT | Performed by: INTERNAL MEDICINE

## 2018-11-02 PROCEDURE — 97530 THERAPEUTIC ACTIVITIES: CPT

## 2018-11-02 PROCEDURE — 2060000000 HC ICU INTERMEDIATE R&B

## 2018-11-02 PROCEDURE — 80048 BASIC METABOLIC PNL TOTAL CA: CPT

## 2018-11-02 PROCEDURE — 97110 THERAPEUTIC EXERCISES: CPT

## 2018-11-02 PROCEDURE — 85027 COMPLETE CBC AUTOMATED: CPT

## 2018-11-02 PROCEDURE — 6370000000 HC RX 637 (ALT 250 FOR IP): Performed by: NURSE PRACTITIONER

## 2018-11-02 PROCEDURE — 97535 SELF CARE MNGMENT TRAINING: CPT

## 2018-11-02 PROCEDURE — 36592 COLLECT BLOOD FROM PICC: CPT

## 2018-11-02 PROCEDURE — 2580000003 HC RX 258: Performed by: THORACIC SURGERY (CARDIOTHORACIC VASCULAR SURGERY)

## 2018-11-02 PROCEDURE — 82948 REAGENT STRIP/BLOOD GLUCOSE: CPT

## 2018-11-02 PROCEDURE — 71046 X-RAY EXAM CHEST 2 VIEWS: CPT

## 2018-11-02 PROCEDURE — 94640 AIRWAY INHALATION TREATMENT: CPT

## 2018-11-02 RX ORDER — CARVEDILOL 3.12 MG/1
3.12 TABLET ORAL 2 TIMES DAILY WITH MEALS
Status: DISCONTINUED | OUTPATIENT
Start: 2018-11-02 | End: 2018-11-05 | Stop reason: HOSPADM

## 2018-11-02 RX ADMIN — FAMOTIDINE 20 MG: 20 TABLET ORAL at 09:04

## 2018-11-02 RX ADMIN — DOCUSATE SODIUM 100 MG: 100 CAPSULE, LIQUID FILLED ORAL at 09:04

## 2018-11-02 RX ADMIN — ASPIRIN 325 MG: 325 TABLET ORAL at 09:04

## 2018-11-02 RX ADMIN — AMIODARONE HYDROCHLORIDE 200 MG: 200 TABLET ORAL at 09:04

## 2018-11-02 RX ADMIN — TIOTROPIUM BROMIDE 18 MCG: 18 CAPSULE ORAL; RESPIRATORY (INHALATION) at 09:22

## 2018-11-02 RX ADMIN — Medication 10 ML: at 09:04

## 2018-11-02 RX ADMIN — ACETAMINOPHEN 650 MG: 325 TABLET ORAL at 00:46

## 2018-11-02 RX ADMIN — ATORVASTATIN CALCIUM 20 MG: 20 TABLET, FILM COATED ORAL at 19:27

## 2018-11-02 RX ADMIN — CARVEDILOL 3.12 MG: 3.12 TABLET, FILM COATED ORAL at 17:07

## 2018-11-02 RX ADMIN — AMIODARONE HYDROCHLORIDE 200 MG: 200 TABLET ORAL at 19:27

## 2018-11-02 RX ADMIN — DILTIAZEM HYDROCHLORIDE 60 MG: 60 TABLET, FILM COATED ORAL at 05:45

## 2018-11-02 RX ADMIN — CARVEDILOL 3.12 MG: 3.12 TABLET, FILM COATED ORAL at 11:57

## 2018-11-02 RX ADMIN — Medication 10 ML: at 19:27

## 2018-11-02 RX ADMIN — THERA TABS 1 TABLET: TAB at 09:04

## 2018-11-02 RX ADMIN — DILTIAZEM HYDROCHLORIDE 60 MG: 60 TABLET, FILM COATED ORAL at 00:43

## 2018-11-02 ASSESSMENT — PAIN SCALES - GENERAL
PAINLEVEL_OUTOF10: 4
PAINLEVEL_OUTOF10: 0
PAINLEVEL_OUTOF10: 5
PAINLEVEL_OUTOF10: 0

## 2018-11-02 NOTE — PROGRESS NOTES
Nutrition Assessment    Type and Reason for Visit: Reassess    Nutrition Recommendations: Continue current diet, bowel medications, MVI. ONS adjusted. Malnutrition Assessment:  · Malnutrition Status: At risk for malnutrition    Nutrition Diagnosis:   · Problem: Increased nutrient needs  · Etiology: related to Increased demand for energy/nutrients due to (wound healing)     Signs and symptoms:  as evidenced by Presence of wounds    Nutrition Assessment:  · Subjective Assessment: Pt. seen. Reports not much of an appetite. States \"I am trying not to eat much. I don't want to get fat\". States bowels haven't been moving much. Rx includes Senokot, Colace, MVI. On ileus prevention protocol. States hasn't been receiving Activia yogurt but does like it. Called nutrition ambassador to confirm order. Dislikes Magic cup ONS. Agrees to trial Ensure High Protein BID.  11/2: BUN 43, Cr 2.7, Glucose 88. Stressed importance of good nutrition for healing. Pt. voices understanding. Plan IP rehab at discharge. SLP following.   · Wound Type: Surgical Wound (10.15 MVR, MAZE, redo sternotomy. )  · Current Nutrition Therapies:  · Oral Diet Orders: General, 2gm Sodium   · Oral Diet intake: 1-25%, 26-50%  · Oral Nutrition Supplement (ONS) Orders: Frozen Oral Supplement (magic cup tid)  · ONS intake:  (changed to Ensure High Protein BID; continue Activia TID and hot beverage)  · Anthropometric Measures:  · Ht: 5' 6\" (167.6 cm)   · Current Body Wt: 201 lb (91.2 kg) (11/2, +1 edema)  · Admission Body Wt: 197 lb 8.5 oz (89.6 kg) (10/15 no edema)  · Usual Body Wt: 199 lb 11.8 oz (90.6 kg) (9/26/18)  · Ideal Body Wt: 130 lb (59 kg),   · BMI Classification: BMI 30.0 - 34.9 Obese Class I  · Comparative Standards (Estimated Nutrition Needs):  · Estimated Daily Total Kcal: ~1631 kcals ( 18 kcals/kg on admit weight of 89.6 kg)  · Estimated Daily Protein (g): ~83 grams ( 1.4 grams protein/kg on IBW of 59 kg)    Estimated Intake

## 2018-11-02 NOTE — PROGRESS NOTES
2.7*   GLUCOSE  92  97  88   CALCIUM  9.0  9.1  8.9   LABGLOM  19*  19*  19*     Troponin: No results for input(s): TROPONINI in the last 72 hours. BNP: No results for input(s): BNP in the last 72 hours. INR: No results for input(s): INR in the last 72 hours. Lipids: No results for input(s): CHOL, LDLDIRECT, TRIG, HDL, AMYLASE, LIPASE in the last 72 hours. Liver: No results for input(s): AST, ALT, ALKPHOS, PROT, LABALBU, BILITOT in the last 72 hours. Invalid input(s): BILDIR  Iron:  No results for input(s): IRONS, FERRITIN in the last 72 hours. Invalid input(s): LABIRONS    Objective:   Vitals: BP (!) 107/59   Pulse 71   Temp 97.6 °F (36.4 °C) (Oral)   Resp 18   Ht 5' 6\" (1.676 m)   Wt 201 lb (91.2 kg)   SpO2 97%   BMI 32.44 kg/m²    Wt Readings from Last 3 Encounters:   11/02/18 201 lb (91.2 kg)   10/10/18 196 lb 8 oz (89.1 kg)   09/26/18 199 lb 12.8 oz (90.6 kg)      24HR INTAKE/OUTPUT:    Intake/Output Summary (Last 24 hours) at 11/02/18 0721  Last data filed at 11/02/18 0423   Gross per 24 hour   Intake              570 ml   Output             1750 ml   Net            -1180 ml       Constitutional:  Very sleepy. Skin:normal   HEENT:Pupils are reactive . Throat is clear   Neck:supple with no thyromegally  Cardiovascular:  S1, S2 without murmur. Respiratory:  Clear to auscultation. Abdomen: +bs, soft,   Ext: 1+ LE edema  Musculoskeletal:Intact  Neuro: Deferred.       Electronically signed by Georgi Oliva MD on 11/2/2018 at 7:21 AM

## 2018-11-02 NOTE — CONSULTS
endurance, gait stability and ability for activities of daily living. Prior Function  Lives With: Family  Receives Help From: Family  ADL Assistance: Independent  Homemaking Assistance: Independent  Ambulation Assistance: Independent  Transfer Assistance: Independent  Additional Comments: Pt reports living with SO PLOF in 2 story home-2nd story bed & bathroom. No AD used PLOF. Pt reports she plans to go home with her father at discharge. Previously was independent of ADLs and used no AD for ambulation prior to recent admission. Initially has ambulated 100' with RW at [facility] with PT. With therapy is Total Assistance and CGA for bed mobility, CGA for transfers, and supervision and CGA with balance. ROM restrictions, WB restrictions, precautions: Restrictions/Precautions: Surgical Protocols, Fall Risk  Other position/activity restrictions: s/predo sternotomy, mitral valve repair, maze procedure, placement of left ventricular epicardial lead on 10/15/18    Fall Risk    Current Rehabilitation Assessments:  PT:    Strength RLE: WFL  Comment: grossly 4/5, generalized weakness  Strength LLE: WFL  Comment: grossly 4/5, generalized weakness    Bed Mobility  Bridging: Contact guard assistance  Scooting: Dependent/Total, Contact guard assistance (Using hercules bed for scooting towards HOB, CGA for scooting towards edge of chair)    Transfers  Sit to Stand: Contact guard assistance (CGA x1 from chair and toilet, VCs for sternal precautions)  Stand to sit: Contact guard assistance    Ambulation 1  Surface: level tile  Device: Rolling Walker  Assistance: Contact guard assistance  Quality of Gait: slow tati, good foot clearance and mod heel strike, she had some SOB and took standing rest breaks to recover  Distance: 10ft x1, 100ft x1  Comments: gait speed . 33 feet per sec which is an improvement from yesterday but normal for her age 4.60    OT:  ADL  Feeding: Independent (drinking water)  Grooming: Stand by assistance Patient completed PO trial of thin liquids via cup x1 demonstrating with immediate cough and wet vocal quality. Patient unable to coordinate swallow reflex approprietly with poor respiratory/pulmonary support. Patient remains at [address] [place] [address] for aspiration due to the findings outlined above. Recommend STRICT NPO with plans to repeat BSE 10/26 to determine safety of PO intake or need for alternate means of nutrition. Patient may need an NG tube to maintain adequate hydration and nutrition safely. RN Ed Speak aware of ST recommendations. Education provided to patient's . Will require additional education     Functional History:  Prior Function  Lives With: Family  Receives Help From: Family  ADL Assistance: Independent  Homemaking Assistance: Independent  Ambulation Assistance: Independent  Transfer Assistance: Independent  Additional Comments: Pt reports living with SO PLOF in 2 story home-2nd story bed & bathroom. No AD used PLOF. Pt reports she plans to go home with her father at discharge. IADL History:  Lift/Transfer Equipment Utilized: None    Past Medical, Surgical, and Family History:    Medical:   Past Medical History:   Diagnosis Date    [employer] dual pacer 5/23/2018    Chronic diastolic congestive heart failure ([facility] [address] 5/7/2018    GERD (gastroesophageal reflux disease) 3/30/2018    Last Assessment & Plan:  Continue home protonix     History of atrial septal defect repair 2/9/2018    HLD (hyperlipidemia) 3/30/2018    Last Assessment & Plan:  Cardiac diet Continue statin    Nonischemic dilated cardiomyopathy ([facility] [address] 05/07/2018    EF of 25%     Surgical:   Past Surgical History:   Procedure Laterality Date    CARDIAC CATHETERIZATION  09/20/2018    Dr. Noemi Villafuerte. No CAD.  CARDIAC SURGERY  1970    repair hole in heart pt states was 3 yrs old.     COLONOSCOPY      PACEMAKER INSERTION  05/01/2017    ND OFFICE/OUTPT VISIT,PROCEDURE ONLY N/A 10/15/2018    REDO STERNOTOMY, BI-ATRIAL MAZE 5:40 AM   Result Value Ref Range    Anion Gap 14.0 8.0 - 16.0 meq/L   Glomerular Filtration Rate, Estimated    Collection Time: 11/04/18  5:40 AM   Result Value Ref Range    Est, Glom Filt Rate 21 (A) ml/min/1.73m2   POCT glucose    Collection Time: 11/04/18 11:29 AM   Result Value Ref Range    POC Glucose 97 70 - 108 mg/dl     Lab Results   Component Value Date    LABA1C 5.9 10/10/2018     No results found for: EAG  Recent Labs      11/04/18   0540  11/03/18   0412  11/02/18   0540   WBC  7.4  6.8  7.8   HGB  8.8*  8.3*  8.4*   HCT  27.8*  26.5*  26.4*   MCV  89.7  89.8  89.2   PLT  286  293  296       Imaging  Ct Abdomen Pelvis Wo Contrast Additional Contrast? None    Result Date: 10/18/2018  PROCEDURE: CT ABDOMEN PELVIS WO CONTRAST CLINICAL INFORMATION: 65-year-old female with possible retroperitoneal hemorrhage . COMPARISON: None. TECHNIQUE: Axial 5 mm CT images were obtained through the abdomen and pelvis. No contrast was given. Coronal reconstructions were obtained. All CT scans at this facility use dose modulation, iterative reconstruction, and/or weight-based dosing when appropriate to reduce radiation dose to as low as reasonably achievable. FINDINGS:  There are diffuse infiltrates throughout the bilateral lower lobes. 4 chest tubes are partially visualized entering the thorax. Lack of intravenous contrast limits evaluation of the abdominal organs. There is motion artifact throughout the abdomen. There is cardiomegaly. There is no pericardial effusion. A nasogastric tube is partially visualized, terminating in the stomach. The liver is smooth and has a normal contour. The spleen, pancreas, and adrenal glands are within normal limits. There is some high density material at the dependent portion of the gallbladder which could represent sludge. There is no evidence of small bowel obstruction. Some diverticula are scattered throughout the colon. There is no colonic wall thickening or edema.  The appendix appears dosing when appropriate to reduce radiation dose to as low as reasonably achievable. FINDINGS: AICD over The right chest. Normal-appearing thyroid Probable left subclavian stenosis with collateral vessels seen around The base of a neck and axilla No mediastinal or hilar adenopathy. Heart size normal. No pericardial effusion. No aneurysm or dissection. No infiltrates effusions or lung masses     Unremarkable appearance of chest there is a presence of a pacemaker and possible left subclavian or axillary vein stenosis. **This report has been created using voice recognition software. It may contain minor errors which are inherent in voice recognition technology. ** Final report electronically signed by Dr. Tarik Robb on 10/10/2018 1:19 PM    Shay Eligio Carroll Cva Device Placement    Result Date: 10/24/2018  NON-TUNNELED DIALYSIS CATHETER INSERTION: CLINICAL INFORMATION: 53-year-old female with acute renal failure, post CABG. Fluid overload PERFORMED BY: Dr. Joshua Winter M.D. APPROACH : Left Internal Jugular Vein, ultrasound guidance, micropuncture technique. DIALYSIS CATHETER:  14 [place] Hemocath, 20 cm in length. DIALYSIS CATHETER TIP:  Right Atrium FLUOROSCOPY TIME: 30 seconds FLUOROSCOPIC IMAGES: 1 PROCEDURE: Informed consent was obtained prior to performing this procedure. The patient was not sedated during this procedure but was monitored with EKG and pulse-ox monitoring devices by a registered nurse. The procedure was performed within the intensive care unit at the bedside. Following local anesthesia and utilizing MAXIMAL STERILE BARRIER TECHNIQUE, the vein listed above was punctured with a micropuncture needle, utilizing ultrasound guidance, an image was obtained confirming needle position and vessel patency. .  A .018 wire  was passed through this needle, followed by insertion of a 4 [place] dilator. Following guide wire and catheter exchange, a percutaneous tract was dilated to a 14 [place] size.   Then, the document appropriate catheter position. The lumens of the catheter were flushed with saline, and positive pressure caps were applied. The hub of the catheter was then stabilized to the skin with 3-0 silk sutures and a sterile op-site dressing was applied. Status post successful PICC line insertion. **This report has been created using voice recognition software. It may contain minor errors which are inherent in voice recognition technology. ** Final report electronically signed by Dr. Franki Vo on 10/22/2018 4:57 PM    Xr Chest Portable    Result Date: 10/31/2018  PROCEDURE: XR CHEST PORTABLE CLINICAL INFORMATION: pulmonary edema, . COMPARISON: Chest x-ray dated 10/30/2018 TECHNIQUE: AP Portable upright chest xray FINDINGS: Lungs/pleura: There is again central pulmonary venous congestion with bilateral predominantly interstitial infiltrates consistent with pulmonary edema. There is mild improvement in the left upper lobe. . There is minimally progressive focal mild alveolar  pulmonary edema at the right base. There is a new small right pleural effusion. There is no pneumothorax. Heart: There is again mild cardiomegaly. Patient status post sternotomy for mitral valve replacement. Mediastinum/pamela: No obvious mass or adenopathy. Skeleton: No significant bone or joint abnormality. Lines/tubes/devices: Stable satisfactory positions of the right jugular central line, left jugular Vas-Cath, and right subclavian dual-lead pacemaker. Mildly improved interstitial pulmonary edema in the left upper lobe with focal mild worsening of the alveolar edema at the right base. New small right pleural effusion. **This report has been created using voice recognition software. It may contain minor errors which are inherent in voice recognition technology. ** Final report electronically signed by Dr. Carlos Jackson on 10/31/2018 6:17 AM    Xr Chest Portable    Result Date: 10/30/2018  PROCEDURE: XR CHEST PORTABLE CLINICAL tube projects over the body the stomach. 3. There is a stable right jugular central venous catheter with the distal tip overlying the superior vena cava. 4. There are stable to slightly worsened infiltrates throughout both lung fields with the differential including however not limited to edema and/or pneumonia. There is no pleural effusion. Follow-up imaging is recommended to confirm complete clearing. **This report has been created using voice recognition software. It may contain minor errors which are inherent in voice recognition technology. ** Final report electronically signed by Dr. Hermelinda Hebert on 10/24/2018 7:26 AM    Xr Chest Portable    Result Date: 10/23/2018  PROCEDURE: XR CHEST PORTABLE CLINICAL INFORMATION: pulmonary edema, . COMPARISON: October 22, 2018 TECHNIQUE: AP upright view of the chest. FINDINGS: Postoperative sternotomy with heart valve prosthesis changes and cardiac enlargement are noted with mild improvement of pulmonary venous congestion. The right [place]-Samm catheter has been removed. There is a vascular sheath or small central line in the superior vena cava. There is no pneumothorax. The skeleton is intact. 1. Interval removal of [place]-Samm catheter. 2. Improving pulmonary venous congestion with mild residual edema and atelectatic changes present. **This report has been created using voice recognition software. It may contain minor errors which are inherent in voice recognition technology. ** Final report electronically signed by Dr. Marietta Null on 10/23/2018 5:01 AM    Xr Chest Portable    Result Date: 10/22/2018  PROCEDURE: XR CHEST PORTABLE CLINICAL INFORMATION: pulmonary edema, . COMPARISON: Chest x-ray dated 10/21/2018 TECHNIQUE: AP Portable semiupright chest xray FINDINGS: Lungs/pleura: There has been focal mild improvement in the medial left lower lobe pulmonary edema with otherwise no change in the diffuse bilateral predominantly interstitial pulmonary edema.  No pleural effusion. No pneumothorax. Heart: There is stable mild cardiomegaly with stable postoperative changes consistent with valve replacement Mediastinum/pamela: No obvious mass or adenopathy. Skeleton: No significant bone or joint abnormality. Lines/tubes/devices: There is no significant change in the satisfactory positions of the life support lines and tubes. Focal mildly improved pulmonary edema in the medial left lower lobe with otherwise stable diffuse bilateral predominantly interstitial pulmonary edema. Stable satisfactory positions of the life support lines and tubes **This report has been created using voice recognition software. It may contain minor errors which are inherent in voice recognition technology. ** Final report electronically signed by Dr. Nohemi Cazares on 10/22/2018 3:49 AM    Xr Chest Portable    Result Date: 10/21/2018  PROCEDURE: XR CHEST PORTABLE CLINICAL INFORMATION: pulmonary edema, . COMPARISON: Chest x-ray dated 10/20/2018 TECHNIQUE: AP Portable supine chest xray FINDINGS: Lungs/pleura: There is mild improvement in the pulmonary edema predominantly in the left lower lobe with otherwise no significant change in the diffuse bilateral moderate predominantly interstitial pulmonary edema. No pleural effusion. No pneumothorax. Heart: There is again mild cardiomegaly with stable postoperative changes. Mediastinum/pamela: No obvious mass or adenopathy. Skeleton: No acute bone or joint abnormality. Lines/tubes/devices: There is no significant change in the satisfactory positions of the life support lines and tubes. Mildly improved pulmonary edema predominantly in the left lower lobe. Otherwise stable appearance of the chest as detailed above. **This report has been created using voice recognition software. It may contain minor errors which are inherent in voice recognition technology. ** Final report electronically signed by Dr. Nohemi Cazares on 10/21/2018 4:57 AM    Xr Chest Portable    Result Date: 10/20/2018  PROCEDURE: XR CHEST PORTABLE CLINICAL INFORMATION: Post op open heart surgery, . COMPARISON: Chest x-ray dated 10/19/2018 TECHNIQUE: AP Portable semiupright chest xray FINDINGS: Lungs/pleura:  Stable alveolar interstitial infiltrates likely representing pulmonary edema. Component of pneumonia not excluded. No pleural effusion. No pneumothorax. Heart: Moderate cardiomegaly. Patient again status post sternotomy for valve replacement. Mediastinum/pamela: No obvious mass or adenopathy. Skeleton: No significant bone or joint abnormality. Lines/tubes/devices: There is no significant change in the satisfactory positions of the life support lines and tubes. Stable bilateral alveolar interstitial infiltrates most likely representing pulmonary edema. Satisfactory positions of the life support lines and tubes **This report has been created using voice recognition software. It may contain minor errors which are inherent in voice recognition technology. ** Final report electronically signed by Dr. Roberto Carlos Higgins on 10/20/2018 4:38 AM    Xr Chest Portable    Result Date: 10/19/2018  PROCEDURE: XR CHEST PORTABLE CLINICAL INFORMATION: ETT/ OG placement, . COMPARISON: Chest x-ray earlier today at 4:48 AM TECHNIQUE: AP Portable supine chest xray FINDINGS: Lungs/pleura:  No change in the bilateral alveolar interstitial filtrates most likely on the basis of pulmonary edema. No pleural effusion. No pneumothorax. Heart: There is stable mild cardiomegaly status post valve replacement. Mediastinum/pamela: No obvious mass or adenopathy. Skeleton: No significant bone or joint abnormality. Lines/tubes/devices: Endotracheal tube tip lies 3.4 cm above the agus. NG tube courses to the mid stomach region, tip not imaged. No change in the satisfactory positions of the right jugular [place]-Samm catheter and multiple chest tubes.  There is no change in the right subclavian dual-lead pacemaker, leads in the regions of the right atrium minor errors which are inherent in voice recognition technology. ** Final report electronically signed by Dr. Jane Sow on 10/17/2018 2:03 PM    Xr Chest Portable    Result Date: 10/17/2018  PROCEDURE: XR CHEST PORTABLE CLINICAL INFORMATION: Post op open heart surgery, . COMPARISON: Chest x-ray dated 10/16/2018 TECHNIQUE: AP Portable semiupright chest xray FINDINGS: Lungs/pleura: There is now alveolar interstitial pulmonary edema, significantly worse compared to yesterday's chest x-ray. No pleural effusion. No pneumothorax. Heart: There is again mild cardiomegaly. Patient again status post sternotomy for valve replacement. Mediastinum/pamela: No obvious mass or adenopathy. Skeleton: No significant bone or joint abnormality. Lines/tubes/devices: The aortic balloon pump has been removed. The [place]-Samm catheter has been advanced slightly into the proximal right main pulmonary artery. No change in the satisfactory positions of the chest tubes and the right subclavian pacemaker. Alveolar interstitial pulmonary edema, significantly worse compared to yesterday's chest x-ray. Interval removal of the aortic balloon pump. Interval slight advancement of the [place]-Samm catheter into the proximal right main pulmonary artery. Remaining findings unchanged. **This report has been created using voice recognition software. It may contain minor errors which are inherent in voice recognition technology. ** Final report electronically signed by Dr. Home Samuel on 10/17/2018 4:57 AM    Xr Chest Portable    Result Date: 10/16/2018  PROCEDURE: XR CHEST PORTABLE CLINICAL INFORMATION: Post op open heart surgery, . COMPARISON: Chest x-ray dated 10/15/2018 TECHNIQUE: AP Portable semiupright chest xray FINDINGS: Lungs/pleura: There is mild bibasilar atelectasis. There is possible mild interstitial pulmonary edema. No focal pneumonia. No pleural effusion. No pneumothorax. Heart: There is again mild cardiomegaly.  Patient is status post

## 2018-11-02 NOTE — CARE COORDINATION
10/16/18, 12:28 PM      Ramirez Verdugo       Admitted from: Surgery 10/15/2018/ Thanh day: 1   Location: -05/005-A Reason for admit: Persistent atrial fibrillation ([address] [I48.1] Status: IP  Admit order signed?: yes  PMH:  has a past medical history of [employer] dual pacer and Hyperlipidemia. Procedure:   10/15 Redo sternotomy; MVR; Left atrial MAZE w/ablation of both anterior and post mitral trigones; ligation of L atrial appendage  10/15 Intubated - 10/16 Extubated  10/15 IABP placed - 10/16 IABP removed  10/16 CXR: Stable  Medications:  Scheduled Meds:   famotidine  20 mg Oral BID    sodium chloride flush  10 mL Intravenous 2 times per day    calcium replacement protocol   Other RX Placeholder    docusate sodium  100 mg Oral BID    metoprolol tartrate  25 mg Oral BID    amiodarone  200 mg Oral BID    mupirocin   Topical Once    therapeutic multivitamin-minerals  1 tablet Oral Daily with breakfast    atorvastatin  20 mg Oral Nightly    enoxaparin  40 mg Subcutaneous Daily    aspirin  325 mg Oral Daily    ceFAZolin (ANCEF) IVPB  2 g Intravenous Q8H     Continuous Infusions:   sodium chloride 20 mL/hr at 10/15/18 1520    insulin (HUMAN R) non-weight based infusion 1.7 Units/hr (10/16/18 1113)    dextrose      EPINEPHrine infusion 4 mcg/min (10/16/18 0623)    milrinone Stopped (10/16/18 0940)      Pertinent Info/Orders/Treatment Plan: POD #1. IABP removed this am. Primacor off. CT x4 to -20sx. Sats 92% on 6L O2. Cpap last night. Tmax 99.7. Ox4. CR/PT/OT. Dietitian consulted. Dietary ileus prevention protocol. Telemetry, I&O, daily weight, IS, sternal precautions, CT care, wound care, SCDs, de león care, ambulate. Epi @ 4 mcg/min, insulin drip, Amio, asa, lipitor, IV ancef, lovenox, pepcid, prn IV morphine, prn IV zofran, prn roxicodone, K+, electrolyte replacement protocols. Na+ 147, CO2 20, Creat 1.3 - now 1.5, Mg 2.6, WBC 10.6 - now 24.9, Hgb 6.8 - now 7.4, Plt 125 - now 71, INR 1.46.
10/26/18, 1:24 PM      [address] [place] [address] GROUP [facility] day: 11  Location: -05/005-A Reason for admit: Persistent atrial fibrillation [facility]) [I48.1]   Procedure:   10/15 Redo sternotomy; MVR; Left atrial MAZE w/ablation of both anterior and post mitral trigones; ligation of L atrial appendage  10/15 Intubated - 10/16 Extubated  10/15 IABP placed - 10/16 IABP removed  10/17 Reintubated  10/18 CT Abd/pelvis: No evidence of retroperitoneal hemorrhage. There is no evidence of free intraperitoneal air or free fluid throughout the abdomen or pelvis; Diffuse infiltrates at the visualized lung bases which could be on the basis of pulmonary edema or multifocal pneumonia; marked cardiomegaly  10/18 Echo with EF 20-25%; severe global hypokinesis; mod dilated LV w/severely reduced systolic function; RV w/mod to severely reduced systolic function; interventricular septum w/septal flattening in systole suggestive of RV pressure overload; mod tricuspid regurg  10/18 Cardioverted - remained in Afib/flutter, rate down to low 100's. 10/19 Cardioverted - Afib/flutter, rate decreased from 130's to 100's. 10/19 Patient self-extubated/ Reintubated  10/20 Veletri started. 10/22 Cardioverted - 's. 10/22 PICC  10/22 Chest tubes removed  10/22 KUB: Mild gaseous distention of the colon but no evidence for ileus or bowel obstruction. NG tube tip in stomach. Findings suggestive of mild bibasilar atelectasis/pneumonia  10/24 Extubated  10/24 Cardioverted to NSR  10/24 PWs out  10/24 TESSIO placed in IR  10/24, 10/25, & 10/26 HD  10/25 High Flow O2 started  10/26 CXR: There are stable to slightly worsened patchy infiltrates throughout both lung fields with the differential including however not limited to edema and/or pneumonia. Treatment Plan of Care: POD #11. 2.5 L removed with HD yesterday. HD today - 3L removed. Up 15.6 L since admission. Passed swallow eval today - on Dysphagia I, pureed diet. Dietary ileus prevention protocol.
10/29/18, 9:34 AM    DISCHARGE BARRIERS    SW discussed discharge plan with Patient and she is in agreement with TCU/Rehab here at [facility] and second option would be [facility]. Patient does recognize how weak she is at this time and is willing to complete a couple weeks of therapy prior to discharge to her father's home.
10/30/18, 2:30 PM      [facility] day: 15  Location: 4D-05/005-A Reason for admit: Persistent atrial fibrillation [facility]) [I48.1]   Procedure:   10/15 Redo sternotomy; MVR; Left atrial MAZE w/ablation of both anterior and post mitral trigones; ligation of L atrial appendage  10/15 Intubated - 10/16 Extubated  10/15 IABP placed - 10/16 IABP removed  10/17 Reintubated  10/18 CT Abd/pelvis: No evidence of retroperitoneal hemorrhage. There is no evidence of free intraperitoneal air or free fluid throughout the abdomen or pelvis; Diffuse infiltrates at the visualized lung bases which could be on the basis of pulmonary edema or multifocal pneumonia; marked cardiomegaly  10/18 Echo with EF 20-25%; severe global hypokinesis; mod dilated LV w/severely reduced systolic function; RV w/mod to severely reduced systolic function; interventricular septum w/septal flattening in systole suggestive of RV pressure overload; mod tricuspid regurg  10/18 Cardioverted - remained in Afib/flutter, rate down to low 100's. 10/19 Cardioverted - Afib/flutter, rate decreased from 130's to 100's. 10/19 Patient self-extubated/ Reintubated  10/20 Veletri started. 10/22 Cardioverted - 's. 10/22 PICC  10/22 Chest tubes removed  10/22 KUB: Mild gaseous distention of the colon but no evidence for ileus or bowel obstruction. NG tube tip in stomach. Findings suggestive of mild bibasilar atelectasis/pneumonia  10/24 Extubated  10/24 Cardioverted to NSR  10/24 PWs out  10/24 TESSIO placed in IR  10/24, 10/25, 10/26, and 10/27 HD  10/25 High Flow O2 started - 10/28 off High Flow O2  10/30 CXR: Slight improvement in the alveolar edema or atelectasis at both lung bases with otherwise stable diffuse bilateral interstitial pulmonary edema  Treatment Plan of Care: POD #15. Last HD was on 10/27. Aflutter 130's. Midodrine decreased today - possible CV tomorrow? Walked 5' with RW, minimal assist with PT today. UO improving.  Sats 99% on
11/2/18, 3:12 PM    DISCHARGE BARRIERS    sw met with patient to confirm discharge plan.
DISCHARGE BARRIERS  10/16/18, 2:17 PM    Reason for Referral: Discharge planning post OHS. Mental Status: Patient is alert and oriented. Decision Making: Patient makes her own decisions. Family/Social/Home Environment: Assessment completed with patient while in ICU. Patient states she lives alone and plans to go to her fathers residence in [facility] at discharge, his home is 2 story and she can stay on main floor. Patient states her fiance and daughter plan to help out as well. Patient states she works for [employer] and would like them contacted for visiting nurse follow up. Current Services: Patient denies services. Current Equipment: Patient denies dme. Payment Source: [facility]  Concerns or Barriers to Discharge: No barriers expressed. Collabrative List of ECF/HH were provided: declined [place] list.     Teach Back Method used with patient regarding care plan and discharge planning. Patient verbalized understanding of the plan of care and contribute to goal setting. Anticipated Needs/Discharge Plan: Patient planning home with her father [address] Gun[place] Herrera , states her fiance and daughter to assist. Patient requested [employer] [phone]  because she works for [employer].  SW made referral for RN and FAXED [employer], [phone]    Electronically signed by GIANFRANCO Wesley on 10/16/2018 at 2:17 PM
reintubation. CVS, Nephrology, Physiatry, and Intensivist following. CR/PT/OT. Dietitian and Wound Care consulted. Dietary ileus prevention protocol. Telemetry, I&O, daily weight, IS, sternal precautions, wound care, SCDs, de lenó care. Prn xanax, prn nebs, amio, asa, lipitor, cardizem, pepcid, lidoderm patch, midodrine, prn IV zofran, prn roxicodone, inhaler, electrolyte replacement protocols. Received 500 mg IV diuril x1 and 40mg IV lasix x1 today. BUN 46, Creat 2.6, Hgb 8.8. PCP: ALDAIR Rothman CNP  Readmission Risk Score: 21%  Discharge Plan: Plan IP Rehab. Backup plan for [employer]. SW on case.

## 2018-11-02 NOTE — PROGRESS NOTES
PHASE 1 CARDIAC REHABILITATION   CABG, AVR, MVR, TVR, AMI, PCI's  Exercise Physiologists      Treatment Length (l: long, n: normal, s: short): N  Treatment Length Note:   Vitals Stable?: Yes. If No:     Any ECG-Rhythm Issues?: No.  If Yes:   Transfers (bc: Bed to Chair, cb: Chair to Bed): na  Strengthening/ROM Treatment Exercises: Step 2    FIDM:     Frequency: 2 x per day   Intensity: <15 RPE, <120bpm   Time: >30-60 seconds longer or >20% increase in distance each walk   Type: Bed/Chair Exercises/stationary marching or ambulation    Aerobic treatment: Patient was in bathroom with RN upon arrival. Pt agreed to ambulate in colin, 60ft in RW. Gait (i: Independent, s: Steady, u: Unsteady): s  Assists: 1  Patient tolerated treatment (w: well, f: fair, p: poor): w  Goals:    Short term:  Progression on each aerobic treatment. Long term: Independent ambulation and discharge. Chiquita Reyes is to follow sternal precautions. Will learn techniques for getting in and out of bed/chairs/car without using the arms. Using stairs will be addressed if applicable. Home activity instructions (Frequency, Intensity, Time, Type), and progression will be addressed prior to discharge (unless Chiquita Reyes goes to TCU or an ECF where they will follow PT/OT protocols). Notes: Call light left within reach.     Education given:   Phase II Information (Given upon Discharge):

## 2018-11-02 NOTE — PROGRESS NOTES
instrumetnal assessment as needed GOAL MET. INTERVENTIONS: Pt with no pulmonary concerns at this time.      ASSESSMENT:  Assessment: [x]Progressing towards goals          []Not Progressing towards goals       Patient Tolerance of Treatment:   [x]Tolerated well []Tolerated fair []Required rest breaks []Fatigued  Plan for Next Session:     Education:  Learner:  [x]Patient          []Significant Other          []Other  Education provided regarding:  [x]Goals and POC   [x]Diet and swallowing precautions    []Home Exercise Program  []Progress and/or discharge information  Method of Education:  [x]Discussion          []Demonstration          []Handout          []Other  Evaluation of Education:   [x]Verbalized understanding   []Demonstrates without assistance  []Demonstrates with assistance  []Needs further instruction     []No evidence of learning                  [x]No family present      Plan: []Continue with current plan of care    []Modify current plan of care as follows:    [x]Discharge patient    Discharge Location:    Services/Supervision Recommended:      Symone Foreman, Speech Intern   Kitty Giraldo M.S. [id number]

## 2018-11-03 ENCOUNTER — APPOINTMENT (OUTPATIENT)
Dept: GENERAL RADIOLOGY | Age: 53
DRG: 163 | End: 2018-11-03
Attending: THORACIC SURGERY (CARDIOTHORACIC VASCULAR SURGERY)
Payer: COMMERCIAL

## 2018-11-03 LAB
ANION GAP SERPL CALCULATED.3IONS-SCNC: 12 MEQ/L (ref 8–16)
BUN BLDV-MCNC: 37 MG/DL (ref 7–22)
CALCIUM SERPL-MCNC: 8.9 MG/DL (ref 8.5–10.5)
CHLORIDE BLD-SCNC: 95 MEQ/L (ref 98–111)
CO2: 28 MEQ/L (ref 23–33)
CREAT SERPL-MCNC: 2.5 MG/DL (ref 0.4–1.2)
ERYTHROCYTE [DISTWIDTH] IN BLOOD BY AUTOMATED COUNT: 19.6 % (ref 11.5–14.5)
ERYTHROCYTE [DISTWIDTH] IN BLOOD BY AUTOMATED COUNT: 56.9 FL (ref 35–45)
GFR SERPL CREATININE-BSD FRML MDRD: 20 ML/MIN/1.73M2
GLUCOSE BLD-MCNC: 102 MG/DL (ref 70–108)
GLUCOSE BLD-MCNC: 91 MG/DL (ref 70–108)
HCT VFR BLD CALC: 26.5 % (ref 37–47)
HEMOGLOBIN: 8.3 GM/DL (ref 12–16)
MCH RBC QN AUTO: 28.1 PG (ref 26–33)
MCHC RBC AUTO-ENTMCNC: 31.3 GM/DL (ref 32.2–35.5)
MCV RBC AUTO: 89.8 FL (ref 81–99)
PLATELET # BLD: 293 THOU/MM3 (ref 130–400)
PMV BLD AUTO: 9.7 FL (ref 9.4–12.4)
POTASSIUM SERPL-SCNC: 3.7 MEQ/L (ref 3.5–5.2)
RBC # BLD: 2.95 MILL/MM3 (ref 4.2–5.4)
SODIUM BLD-SCNC: 135 MEQ/L (ref 135–145)
WBC # BLD: 6.8 THOU/MM3 (ref 4.8–10.8)

## 2018-11-03 PROCEDURE — 97110 THERAPEUTIC EXERCISES: CPT

## 2018-11-03 PROCEDURE — 6370000000 HC RX 637 (ALT 250 FOR IP): Performed by: INTERNAL MEDICINE

## 2018-11-03 PROCEDURE — 6370000000 HC RX 637 (ALT 250 FOR IP): Performed by: NURSE PRACTITIONER

## 2018-11-03 PROCEDURE — 2060000000 HC ICU INTERMEDIATE R&B

## 2018-11-03 PROCEDURE — 6370000000 HC RX 637 (ALT 250 FOR IP): Performed by: THORACIC SURGERY (CARDIOTHORACIC VASCULAR SURGERY)

## 2018-11-03 PROCEDURE — 87077 CULTURE AEROBIC IDENTIFY: CPT

## 2018-11-03 PROCEDURE — 99232 SBSQ HOSP IP/OBS MODERATE 35: CPT | Performed by: INTERNAL MEDICINE

## 2018-11-03 PROCEDURE — 2500000003 HC RX 250 WO HCPCS: Performed by: THORACIC SURGERY (CARDIOTHORACIC VASCULAR SURGERY)

## 2018-11-03 PROCEDURE — 81001 URINALYSIS AUTO W/SCOPE: CPT

## 2018-11-03 PROCEDURE — 87184 SC STD DISK METHOD PER PLATE: CPT

## 2018-11-03 PROCEDURE — 36592 COLLECT BLOOD FROM PICC: CPT

## 2018-11-03 PROCEDURE — 2580000003 HC RX 258: Performed by: THORACIC SURGERY (CARDIOTHORACIC VASCULAR SURGERY)

## 2018-11-03 PROCEDURE — 87086 URINE CULTURE/COLONY COUNT: CPT

## 2018-11-03 PROCEDURE — 6360000002 HC RX W HCPCS: Performed by: THORACIC SURGERY (CARDIOTHORACIC VASCULAR SURGERY)

## 2018-11-03 PROCEDURE — 82948 REAGENT STRIP/BLOOD GLUCOSE: CPT

## 2018-11-03 PROCEDURE — 94640 AIRWAY INHALATION TREATMENT: CPT

## 2018-11-03 PROCEDURE — 97116 GAIT TRAINING THERAPY: CPT

## 2018-11-03 PROCEDURE — 87186 SC STD MICRODIL/AGAR DIL: CPT

## 2018-11-03 PROCEDURE — 71046 X-RAY EXAM CHEST 2 VIEWS: CPT

## 2018-11-03 PROCEDURE — 80048 BASIC METABOLIC PNL TOTAL CA: CPT

## 2018-11-03 PROCEDURE — 85027 COMPLETE CBC AUTOMATED: CPT

## 2018-11-03 PROCEDURE — 6370000000 HC RX 637 (ALT 250 FOR IP)

## 2018-11-03 RX ORDER — FUROSEMIDE 40 MG/1
40 TABLET ORAL DAILY
Status: DISCONTINUED | OUTPATIENT
Start: 2018-11-03 | End: 2018-11-05 | Stop reason: HOSPADM

## 2018-11-03 RX ORDER — POTASSIUM CHLORIDE 20 MEQ/1
20 TABLET, EXTENDED RELEASE ORAL
Status: DISCONTINUED | OUTPATIENT
Start: 2018-11-04 | End: 2018-11-05 | Stop reason: HOSPADM

## 2018-11-03 RX ORDER — POTASSIUM CHLORIDE 750 MG/1
20 TABLET, FILM COATED, EXTENDED RELEASE ORAL ONCE
Status: COMPLETED | OUTPATIENT
Start: 2018-11-03 | End: 2018-11-03

## 2018-11-03 RX ADMIN — AMIODARONE HYDROCHLORIDE 200 MG: 200 TABLET ORAL at 20:58

## 2018-11-03 RX ADMIN — ALPRAZOLAM 0.5 MG: 0.5 TABLET ORAL at 09:38

## 2018-11-03 RX ADMIN — MICONAZOLE NITRATE: 2 POWDER TOPICAL at 13:51

## 2018-11-03 RX ADMIN — CARVEDILOL 3.12 MG: 3.12 TABLET, FILM COATED ORAL at 16:02

## 2018-11-03 RX ADMIN — MICONAZOLE NITRATE: 2 POWDER TOPICAL at 20:59

## 2018-11-03 RX ADMIN — ATORVASTATIN CALCIUM 20 MG: 20 TABLET, FILM COATED ORAL at 20:58

## 2018-11-03 RX ADMIN — POTASSIUM CHLORIDE 20 MEQ: 750 TABLET, FILM COATED, EXTENDED RELEASE ORAL at 08:10

## 2018-11-03 RX ADMIN — Medication 10 ML: at 20:59

## 2018-11-03 RX ADMIN — AMIODARONE HYDROCHLORIDE 200 MG: 200 TABLET ORAL at 08:10

## 2018-11-03 RX ADMIN — FAMOTIDINE 20 MG: 20 TABLET ORAL at 08:10

## 2018-11-03 RX ADMIN — DOCUSATE SODIUM 100 MG: 100 CAPSULE, LIQUID FILLED ORAL at 08:10

## 2018-11-03 RX ADMIN — ONDANSETRON HYDROCHLORIDE 4 MG: 2 SOLUTION INTRAMUSCULAR; INTRAVENOUS at 01:19

## 2018-11-03 RX ADMIN — ALPRAZOLAM 0.5 MG: 0.5 TABLET ORAL at 20:59

## 2018-11-03 RX ADMIN — ACETAMINOPHEN 650 MG: 325 TABLET ORAL at 20:59

## 2018-11-03 RX ADMIN — THERA TABS 1 TABLET: TAB at 08:11

## 2018-11-03 RX ADMIN — CARVEDILOL 3.12 MG: 3.12 TABLET, FILM COATED ORAL at 08:10

## 2018-11-03 RX ADMIN — Medication 10 ML: at 08:12

## 2018-11-03 RX ADMIN — ASPIRIN 325 MG: 325 TABLET ORAL at 08:10

## 2018-11-03 RX ADMIN — FUROSEMIDE 40 MG: 40 TABLET ORAL at 13:51

## 2018-11-03 RX ADMIN — TIOTROPIUM BROMIDE 18 MCG: 18 CAPSULE ORAL; RESPIRATORY (INHALATION) at 07:53

## 2018-11-03 ASSESSMENT — PAIN DESCRIPTION - ONSET: ONSET: ON-GOING

## 2018-11-03 ASSESSMENT — PAIN DESCRIPTION - ORIENTATION: ORIENTATION: MID

## 2018-11-03 ASSESSMENT — PAIN DESCRIPTION - LOCATION: LOCATION: CHEST

## 2018-11-03 ASSESSMENT — PAIN SCALES - GENERAL
PAINLEVEL_OUTOF10: 5
PAINLEVEL_OUTOF10: 0

## 2018-11-03 ASSESSMENT — PAIN DESCRIPTION - DESCRIPTORS: DESCRIPTORS: ACHING;DISCOMFORT

## 2018-11-03 ASSESSMENT — PAIN DESCRIPTION - PAIN TYPE: TYPE: SURGICAL PAIN

## 2018-11-03 ASSESSMENT — PAIN DESCRIPTION - PROGRESSION: CLINICAL_PROGRESSION: NOT CHANGED

## 2018-11-03 ASSESSMENT — PAIN DESCRIPTION - FREQUENCY: FREQUENCY: CONTINUOUS

## 2018-11-03 NOTE — PLAN OF CARE
Problem: Discharge Planning:  Goal: Discharged to appropriate level of care  Discharged to appropriate level of care   Outcome: Ongoing  Plan to be D/promise to TCU this Monday. Patient understanding of why she has to go there and accepting. Problem: Anxiety:  Goal: Level of anxiety will decrease  Level of anxiety will decrease   Outcome: Ongoing  No anxiety this shift. Will continue to monitor    Problem: Cardiac Output - Decreased:  Goal: Hemodynamic stability will improve  Hemodynamic stability will improve   Outcome: Ongoing  Blood pressure within normal limits this shift. Patient displays no bleeding abnormalities. Monitoring labs frequently for any abnormalities. Capillary refill less than 3 seconds. Skin turgor less than 3 seconds. Pulses palpable. Problem: Fluid Volume - Imbalance:  Goal: Ability to achieve a balanced intake and output will improve  Ability to achieve a balanced intake and output will improve   Outcome: Ongoing  Patient free of signs and symptoms of imbalanced fluid throughout shift. Intake and output completed every 8 hours. Problem: Pain:  Goal: Control of acute pain  Control of acute pain   Outcome: Ongoing  No pain at this time. Problem: Risk for Impaired Skin Integrity  Goal: Tissue integrity - skin and mucous membranes  Structural intactness and normal physiological function of skin and  mucous membranes. Outcome: Ongoing  Patient turns self independently. Patient taught to change position frequently to prevent breakdown. No redness noted on pressure points such as elbows, back of head, heels, shoulder blades, or coccyx at this time. Will continue to monitor. Problem: Falls - Risk of:  Goal: Will remain free from falls  Will remain free from falls   Outcome: Ongoing  Patient alert and oriented x4. Bed alarmed armed. Bed wheels locked. Bedside table in reach. Patient up with assistance when ambulating. Patient verbalizes and demonstrates the use of the call light.

## 2018-11-03 NOTE — PROGRESS NOTES
Physical Therapy   [address]  INPATIENT PHYSICAL THERAPY  DAILY NOTE  [facility] ICU STEPDOWN TELEMETRY 4K - 4K-15/015-A    Time In: 0845  Time Out: 0910  Timed Code Treatment Minutes: 25 Minutes  Minutes: 25          Date: 11/3/2018  Patient Name: Rossana Mckoy,  Gender:  female        MRN: [id number]  : 1965  (48 y.o.)     Referring Practitioner: Dr. Perry Lacey  Diagnosis: persistent a fib  Additional Pertinent Hx: 48y.o. year-old female who presents for redo sternotomy, mitral valve repair, maze procedure, placement of left ventricular epicardial lead. sx on 10/15/18. Pt reintubated on 10/17/18 with self extubation and reintubation on 10/19/18. Pt with chest tubes rmoved on 10/22. extubation on 10/24/18 with hgih flow O2 started on 10/25. Hemodialysis started wht runs on 10/24,  and , . Past Medical History:   Diagnosis Date    [employer] dual pacer 2018    Chronic diastolic congestive heart failure ([facility] 2018    Hyperlipidemia     Nonischemic dilated cardiomyopathy ([facility] 2018     Past Surgical History:   Procedure Laterality Date    CARDIAC CATHETERIZATION  10/2018    CARDIAC SURGERY  1970    repair hole in heart pt states was 3 yrs old.     COLONOSCOPY      PACEMAKER INSERTION  2017    MN OFFICE/OUTPT VISIT,PROCEDURE ONLY N/A 10/15/2018    REDO STERNOTOMY, BI-ATRIAL MAZE PROCEDURE, MITRAL REPAIR, PLACEMENT OF INTRA AORTIC BALLOON PUMP performed by Mike Lynn MD at [address]       Restrictions/Precautions:  Surgical Protocols, Fall Risk     Sternal Precautions: No Pushing, No Pulling, 5# Lifting Restrictions  Other position/activity restrictions: s/predo sternotomy, mitral valve repair, maze procedure, placement of left ventricular epicardial lead on 10/15/18       Prior Level of Function:  ADL Assistance: Independent  Homemaking Assistance: Independent  Ambulation Assistance: Independent  Transfer Assistance: req freq rest breaks d/t fatigue and SOB. VCs for sternal precautions, requires decreased assist for tfers. Pt would benefit from cont skilled PT interventions for improved functional mobility. Prognosis: Good     REQUIRES PT FOLLOW UP: Yes    Discharge Recommendations:  Discharge Recommendations: Continue to assess pending progress, Patient would benefit from continued therapy after discharge    Patient Education:  Patient Education: POC, sternal precautions, gait, ther ex    Equipment Recommendations: Other: cont to assess needs    Safety:  Type of devices:  All fall risk precautions in place, Bed alarm in place, Call light within reach, Gait belt, Patient at risk for falls, Left in bed, Nurse notified    Plan:  Times per week: 6X open heart  Times per day: Daily  Specific instructions for Next Treatment: therex and mobility with cardiac and sternal precautions  Current Treatment Recommendations: Strengthening, Functional Mobility Training, Transfer Training, Balance Training, Endurance Training, Gait Training, Stair training, Pain Management, Patient/Caregiver Education & Training, Safety Education & Training, Home Exercise Program, Equipment Evaluation, Education, & procurement    Goals:  Patient goals : not stated    Short term goals  Time Frame for Short term goals: 2 weeks  Short term goal 1: bed mobility with minAx1 to get in/out of bed  Short term goal 2: transfer with CGA to get in/out of chairs  Short term goal 3: amb >50'x1 with AD and CGA to walk safely in room    Long term goals  Time Frame for Long term goals : no LTGs set secondary to short ELOS            AM-PAC Inpatient Mobility without Stair Climbing Raw Score : 15  AM-PAC Inpatient without Stair Climbing T-Scale Score : 43.03  Mobility Inpatient CMS 0-100% Score: 47.43  Mobility Inpatient without Stair CMS G-Code Modifier : JOHN

## 2018-11-03 NOTE — PROGRESS NOTES
[facility]         Renal Inpatient Follow-Up note         11/3/2018 11:35 AM    Pt Name:   Sakina Ascencio  YOB: 1965  Attending:   Yung Carrasco MD    Chief Complaint : Sakina Ascencio is a 48 y.o. female being followed by nephrology for Acute kidney injury and fluid overload    Interval History :   Patient seen and examined by me. No distress  Feels better denies any chest pain or shortness of breath. Marginal urine output     Scheduled Medications :    furosemide  40 mg Oral Daily    [START ON 11/4/2018] potassium chloride  20 mEq Oral Daily with breakfast    carvedilol  3.125 mg Oral BID WC    lidocaine  1 patch Transdermal Daily    multivitamin  1 tablet Oral Daily    docusate sodium  100 mg Oral Daily    amiodarone  200 mg Oral BID    tiotropium  18 mcg Inhalation Daily    potassium (CARDIAC) replacement protocol   Other RX Placeholder    aspirin  325 mg Oral Daily    famotidine  20 mg Oral Daily    sodium chloride flush  10 mL Intravenous 2 times per day    atorvastatin  20 mg Oral Nightly         Vitals :  /70   Pulse 85   Temp 97.7 °F (36.5 °C) (Oral)   Resp 18   Ht 5' 6\" (1.676 m)   Wt 200 lb 12.8 oz (91.1 kg)   SpO2 92%   BMI 32.41 kg/m²     24HR INTAKE/OUTPUT:      Intake/Output Summary (Last 24 hours) at 11/03/18 1135  Last data filed at 11/03/18 0810   Gross per 24 hour   Intake              370 ml   Output              500 ml   Net             -130 ml     Last 3 weights  Wt Readings from Last 3 Encounters:   11/03/18 200 lb 12.8 oz (91.1 kg)   10/10/18 196 lb 8 oz (89.1 kg)   09/26/18 199 lb 12.8 oz (90.6 kg)           Physical Exam :  General Appearance:  Well developed.  No distress  Mouth/Throat:  Oral mucosa moist  Neck:  Supple, no JVD  Lungs:  Breath sounds:Distant crackles heart  Heart[de-identified]  S1,S2 heard  Abdomen:  Soft, non - tender  Musculoskeletal:  Edema - Bilateral edema noted         Last 3 CBC   Recent Labs      11/01/18   1080

## 2018-11-04 ENCOUNTER — APPOINTMENT (OUTPATIENT)
Dept: GENERAL RADIOLOGY | Age: 53
DRG: 163 | End: 2018-11-04
Attending: THORACIC SURGERY (CARDIOTHORACIC VASCULAR SURGERY)
Payer: COMMERCIAL

## 2018-11-04 PROBLEM — E78.5 HLD (HYPERLIPIDEMIA): Status: ACTIVE | Noted: 2018-03-30

## 2018-11-04 PROBLEM — Z87.74 HISTORY OF ATRIAL SEPTAL DEFECT REPAIR: Status: ACTIVE | Noted: 2018-02-09

## 2018-11-04 PROBLEM — I50.22 CHRONIC SYSTOLIC CONGESTIVE HEART FAILURE (HCC): Status: RESOLVED | Noted: 2018-02-09 | Resolved: 2018-11-04

## 2018-11-04 PROBLEM — I50.22 CHRONIC SYSTOLIC CONGESTIVE HEART FAILURE (HCC): Status: ACTIVE | Noted: 2018-02-09

## 2018-11-04 PROBLEM — K21.9 GERD (GASTROESOPHAGEAL REFLUX DISEASE): Status: ACTIVE | Noted: 2018-03-30

## 2018-11-04 PROBLEM — I44.7 LEFT BUNDLE-BRANCH BLOCK: Status: ACTIVE | Noted: 2018-02-09

## 2018-11-04 LAB
ANION GAP SERPL CALCULATED.3IONS-SCNC: 14 MEQ/L (ref 8–16)
BACTERIA: ABNORMAL
BILIRUBIN URINE: NEGATIVE
BLOOD, URINE: ABNORMAL
BUN BLDV-MCNC: 36 MG/DL (ref 7–22)
CALCIUM SERPL-MCNC: 9 MG/DL (ref 8.5–10.5)
CASTS: ABNORMAL /LPF
CASTS: ABNORMAL /LPF
CHARACTER, URINE: ABNORMAL
CHLORIDE BLD-SCNC: 95 MEQ/L (ref 98–111)
CO2: 28 MEQ/L (ref 23–33)
COLOR: YELLOW
CREAT SERPL-MCNC: 2.4 MG/DL (ref 0.4–1.2)
CRYSTALS: ABNORMAL
EPITHELIAL CELLS, UA: ABNORMAL /HPF
ERYTHROCYTE [DISTWIDTH] IN BLOOD BY AUTOMATED COUNT: 20.2 % (ref 11.5–14.5)
ERYTHROCYTE [DISTWIDTH] IN BLOOD BY AUTOMATED COUNT: 62.4 FL (ref 35–45)
GFR SERPL CREATININE-BSD FRML MDRD: 21 ML/MIN/1.73M2
GLUCOSE BLD-MCNC: 93 MG/DL (ref 70–108)
GLUCOSE BLD-MCNC: 97 MG/DL (ref 70–108)
GLUCOSE, URINE: NEGATIVE MG/DL
HCT VFR BLD CALC: 27.8 % (ref 37–47)
HEMOGLOBIN: 8.8 GM/DL (ref 12–16)
KETONES, URINE: NEGATIVE
LEUKOCYTE EST, POC: ABNORMAL
MCH RBC QN AUTO: 28.4 PG (ref 26–33)
MCHC RBC AUTO-ENTMCNC: 31.7 GM/DL (ref 32.2–35.5)
MCV RBC AUTO: 89.7 FL (ref 81–99)
MISCELLANEOUS LAB TEST RESULT: ABNORMAL
NITRITE, URINE: POSITIVE
PH UA: 7
PLATELET # BLD: 286 THOU/MM3 (ref 130–400)
PMV BLD AUTO: 9.6 FL (ref 9.4–12.4)
POTASSIUM SERPL-SCNC: 3.9 MEQ/L (ref 3.5–5.2)
PROTEIN UA: 30 MG/DL
RBC # BLD: 3.1 MILL/MM3 (ref 4.2–5.4)
RBC URINE: > 200 /HPF
RENAL EPITHELIAL, UA: ABNORMAL
SODIUM BLD-SCNC: 137 MEQ/L (ref 135–145)
SPECIFIC GRAVITY UA: 1.01 (ref 1–1.03)
UROBILINOGEN, URINE: 2 EU/DL
WBC # BLD: 7.4 THOU/MM3 (ref 4.8–10.8)
WBC UA: > 200 /HPF
YEAST: ABNORMAL

## 2018-11-04 PROCEDURE — 6370000000 HC RX 637 (ALT 250 FOR IP): Performed by: INTERNAL MEDICINE

## 2018-11-04 PROCEDURE — 2580000003 HC RX 258: Performed by: THORACIC SURGERY (CARDIOTHORACIC VASCULAR SURGERY)

## 2018-11-04 PROCEDURE — 6370000000 HC RX 637 (ALT 250 FOR IP): Performed by: THORACIC SURGERY (CARDIOTHORACIC VASCULAR SURGERY)

## 2018-11-04 PROCEDURE — 36592 COLLECT BLOOD FROM PICC: CPT

## 2018-11-04 PROCEDURE — 6370000000 HC RX 637 (ALT 250 FOR IP): Performed by: NURSE PRACTITIONER

## 2018-11-04 PROCEDURE — 99232 SBSQ HOSP IP/OBS MODERATE 35: CPT | Performed by: INTERNAL MEDICINE

## 2018-11-04 PROCEDURE — 94640 AIRWAY INHALATION TREATMENT: CPT

## 2018-11-04 PROCEDURE — 85027 COMPLETE CBC AUTOMATED: CPT

## 2018-11-04 PROCEDURE — 6370000000 HC RX 637 (ALT 250 FOR IP)

## 2018-11-04 PROCEDURE — 71046 X-RAY EXAM CHEST 2 VIEWS: CPT

## 2018-11-04 PROCEDURE — 97535 SELF CARE MNGMENT TRAINING: CPT

## 2018-11-04 PROCEDURE — 2060000000 HC ICU INTERMEDIATE R&B

## 2018-11-04 PROCEDURE — 97110 THERAPEUTIC EXERCISES: CPT

## 2018-11-04 PROCEDURE — 2709999900 HC NON-CHARGEABLE SUPPLY

## 2018-11-04 PROCEDURE — 82948 REAGENT STRIP/BLOOD GLUCOSE: CPT

## 2018-11-04 PROCEDURE — 80048 BASIC METABOLIC PNL TOTAL CA: CPT

## 2018-11-04 RX ORDER — POTASSIUM CHLORIDE 750 MG/1
20 TABLET, FILM COATED, EXTENDED RELEASE ORAL ONCE
Status: COMPLETED | OUTPATIENT
Start: 2018-11-04 | End: 2018-11-04

## 2018-11-04 RX ADMIN — AMIODARONE HYDROCHLORIDE 200 MG: 200 TABLET ORAL at 08:17

## 2018-11-04 RX ADMIN — POTASSIUM CHLORIDE 20 MEQ: 750 TABLET, FILM COATED, EXTENDED RELEASE ORAL at 08:17

## 2018-11-04 RX ADMIN — FAMOTIDINE 20 MG: 20 TABLET ORAL at 08:17

## 2018-11-04 RX ADMIN — Medication 10 ML: at 08:17

## 2018-11-04 RX ADMIN — THERA TABS 1 TABLET: TAB at 08:17

## 2018-11-04 RX ADMIN — CARVEDILOL 3.12 MG: 3.12 TABLET, FILM COATED ORAL at 08:17

## 2018-11-04 RX ADMIN — ATORVASTATIN CALCIUM 20 MG: 20 TABLET, FILM COATED ORAL at 20:21

## 2018-11-04 RX ADMIN — CARVEDILOL 3.12 MG: 3.12 TABLET, FILM COATED ORAL at 16:17

## 2018-11-04 RX ADMIN — POTASSIUM CHLORIDE 20 MEQ: 20 TABLET, EXTENDED RELEASE ORAL at 08:16

## 2018-11-04 RX ADMIN — ASPIRIN 325 MG: 325 TABLET ORAL at 08:16

## 2018-11-04 RX ADMIN — AMIODARONE HYDROCHLORIDE 200 MG: 200 TABLET ORAL at 20:21

## 2018-11-04 RX ADMIN — ACETAMINOPHEN 650 MG: 325 TABLET ORAL at 20:21

## 2018-11-04 RX ADMIN — Medication 10 ML: at 20:21

## 2018-11-04 RX ADMIN — TIOTROPIUM BROMIDE 18 MCG: 18 CAPSULE ORAL; RESPIRATORY (INHALATION) at 08:13

## 2018-11-04 RX ADMIN — MICONAZOLE NITRATE: 2 POWDER TOPICAL at 20:26

## 2018-11-04 RX ADMIN — ALPRAZOLAM 0.5 MG: 0.5 TABLET ORAL at 22:21

## 2018-11-04 RX ADMIN — MICONAZOLE NITRATE: 2 POWDER TOPICAL at 08:18

## 2018-11-04 RX ADMIN — FUROSEMIDE 40 MG: 40 TABLET ORAL at 08:17

## 2018-11-04 RX ADMIN — DOCUSATE SODIUM 100 MG: 100 CAPSULE, LIQUID FILLED ORAL at 08:16

## 2018-11-04 ASSESSMENT — PAIN DESCRIPTION - DESCRIPTORS
DESCRIPTORS: ACHING

## 2018-11-04 ASSESSMENT — PAIN DESCRIPTION - PROGRESSION
CLINICAL_PROGRESSION: GRADUALLY WORSENING
CLINICAL_PROGRESSION: GRADUALLY WORSENING
CLINICAL_PROGRESSION: NOT CHANGED

## 2018-11-04 ASSESSMENT — PAIN DESCRIPTION - ONSET
ONSET: ON-GOING

## 2018-11-04 ASSESSMENT — PAIN SCALES - GENERAL
PAINLEVEL_OUTOF10: 3
PAINLEVEL_OUTOF10: 0

## 2018-11-04 ASSESSMENT — PAIN DESCRIPTION - LOCATION
LOCATION: BACK

## 2018-11-04 ASSESSMENT — PAIN DESCRIPTION - FREQUENCY
FREQUENCY: INTERMITTENT

## 2018-11-04 ASSESSMENT — PAIN DESCRIPTION - PAIN TYPE
TYPE: ACUTE PAIN

## 2018-11-04 ASSESSMENT — PAIN DESCRIPTION - ORIENTATION
ORIENTATION: MID;LOWER

## 2018-11-04 NOTE — PROGRESS NOTES
Problem: Impaired respiratory status  Goal: Clear lung sounds  Outcome: Ongoing  Home med maintenance

## 2018-11-04 NOTE — PROGRESS NOTES
[facility]   Nephrology progress note  11/4/2018, 11:16 AM      Pt Name:    Severo So  MRN:     [id number]     [place]:    1965  Admit Date:    10/15/2018  5:26 AM  Primary Care Physician:  ALDAIR Ledbetter CNP   Room number  6Z-75/828-Z    Chief Complaint: Nephrology following for LILLI    Subjective:  Patient seen and examined  No chest pain or shortness of breath  Feels okay    Objective:  24HR INTAKE/OUTPUT:    Intake/Output Summary (Last 24 hours) at 11/04/18 1116  Last data filed at 11/04/18 0819   Gross per 24 hour   Intake          1428.28 ml   Output             1750 ml   Net          -321.72 ml     I/O last 3 completed shifts: In: 1428.3 [P.O.:400; I.V.:1028.3]  Out: 1650 [Urine:1650]  I/O this shift:  In: 30 [I.V.:30]  Out: 200 [Urine:200]  Admission weight: 197 lb 9.6 oz (89.6 kg)  Wt Readings from Last 3 Encounters:   11/04/18 196 lb 9.6 oz (89.2 kg)   10/10/18 196 lb 8 oz (89.1 kg)   09/26/18 199 lb 12.8 oz (90.6 kg)     Body mass index is 31.73 kg/m².     Physical examination  VITALS:     Vitals:    11/04/18 0755   BP: 111/70   Pulse: 76   Resp: 18   Temp: 98.8 °F (37.1 °C)   SpO2: 94%     General Appearance: alert and cooperative with exam, appears comfortable, no distress  Mouth/Throat: Oral mucosa moist  Neck: No JVD  Lungs: Air entry B/L, no rales, no use of accessory muscles  Heart:  S1, S2 heard  GI: soft, non-tender, no guarding  Extremities: trace LE edema      Lab Data  CBC:   Recent Labs      11/02/18   0540  11/03/18   0412 11/04/18   0540   WBC  7.8  6.8  7.4   HGB  8.4*  8.3*  8.8*   HCT  26.4*  26.5*  27.8*   PLT  296  293  286     BMP:  Recent Labs      11/02/18   0540  11/03/18   0412  11/04/18   0540   NA  134*  135  137   K  3.9  3.7  3.9   CL  94*  95*  95*   CO2  28  28  28   BUN  43*  37*  36*   CREATININE  2.7*  2.5*  2.4*   GLUCOSE  88  91  93   CALCIUM  8.9  8.9  9.0     Hepatic: No results for input(s): LABALBU, AST, ALT, ALB, BILITOT, ALKPHOS in the last 72 hours. Meds:  Infusion:   Meds:    furosemide  40 mg Oral Daily    potassium chloride  20 mEq Oral Daily with breakfast    miconazole   Topical BID    carvedilol  3.125 mg Oral BID WC    lidocaine  1 patch Transdermal Daily    multivitamin  1 tablet Oral Daily    docusate sodium  100 mg Oral Daily    amiodarone  200 mg Oral BID    tiotropium  18 mcg Inhalation Daily    potassium (CARDIAC) replacement protocol   Other RX Placeholder    aspirin  325 mg Oral Daily    famotidine  20 mg Oral Daily    sodium chloride flush  10 mL Intravenous 2 times per day    atorvastatin  20 mg Oral Nightly     Meds prn: melatonin, acetaminophen, albuterol, ALPRAZolam, acetaminophen, sodium chloride flush, diphenhydrAMINE, LORazepam, oxyCODONE **OR** oxyCODONE, magnesium hydroxide, bisacodyl, senna, ondansetron       Impression and Plan:  1. LILLI likely secondary to ATN  - appears to be improving slowly  - no dialysis at this time  - check labs in am  - if creat continues to improve then plan dialysis catheter removal in am    2. Peripheral edema/positive fluid balance: on lasix daily  - monitor K while on lasix    3. S/p MV surgery  4.  Anemia: H/H improved today, upto 8.8    D/W patient and RN    Delma Olvera MD  [facility]

## 2018-11-04 NOTE — PROGRESS NOTES
[place] [address]  INPATIENT OCCUPATIONAL THERAPY  STR ICU STEPDOWN TELEMETRY 4K  DAILY NOTE    Time:  Time In:   Time Out: 0551  Timed Code Treatment Minutes: 24 Minutes  Minutes: 24     Date: 2018  Patient Name: Rossana Mckoy,   Gender: female      Room: [facility]15/015-A  MRN: [id number]  : 1965  (48 y.o.)  Referring Practitioner: Dr. Whitney Anderson  Diagnosis: persistant A-fib  Additional Pertinent Hx: 48y.o. year-old female who presents for redo sternotomy, mitral valve repair, maze procedure, placement of left ventricular epicardial lead. sx on 10/15/18. Pt reintubated on 10/17/18 with self extubation and reintubation on 10/19/18. Pt with chest tubes rmoved on 10/22. extubation on 10/24/18 with hgih flow O2 started on 10/25. Hemodialysis started wht runs on 10/24,  and , . Past Medical History:   Diagnosis Date    [employer] dual pacer 2018    Chronic diastolic congestive heart failure ([address] 2018    Hyperlipidemia     Nonischemic dilated cardiomyopathy ([address] 2018     Past Surgical History:   Procedure Laterality Date    CARDIAC CATHETERIZATION  10/2018    CARDIAC SURGERY  1970    repair hole in heart pt states was 3 yrs old.     COLONOSCOPY      PACEMAKER INSERTION  2017    DE OFFICE/OUTPT VISIT,PROCEDURE ONLY N/A 10/15/2018    REDO STERNOTOMY, BI-ATRIAL MAZE PROCEDURE, MITRAL REPAIR, PLACEMENT OF INTRA AORTIC BALLOON PUMP performed by Mike Lynn MD at [address]       Restrictions/Precautions:  Surgical Protocols, Fall Risk   Sternal Precautions: No Pushing, No Pulling, 5# Lifting Restrictions  Other position/activity restrictions: s/predo sternotomy, mitral valve repair, maze procedure, placement of left ventricular epicardial lead on 10/15/18       Prior Level of Function:  ADL Assistance: Independent  Homemaking Assistance: Independent  Ambulation Assistance: Independent  Transfer Assistance: Independent  Additional

## 2018-11-05 ENCOUNTER — APPOINTMENT (OUTPATIENT)
Dept: INTERVENTIONAL RADIOLOGY/VASCULAR | Age: 53
DRG: 163 | End: 2018-11-05
Attending: THORACIC SURGERY (CARDIOTHORACIC VASCULAR SURGERY)
Payer: COMMERCIAL

## 2018-11-05 ENCOUNTER — HOSPITAL ENCOUNTER (INPATIENT)
Age: 53
LOS: 10 days | Discharge: HOME HEALTH | DRG: 163 | End: 2018-11-15
Attending: PHYSICAL MEDICINE & REHABILITATION | Admitting: PHYSICAL MEDICINE & REHABILITATION
Payer: COMMERCIAL

## 2018-11-05 ENCOUNTER — APPOINTMENT (OUTPATIENT)
Dept: GENERAL RADIOLOGY | Age: 53
DRG: 163 | End: 2018-11-05
Attending: THORACIC SURGERY (CARDIOTHORACIC VASCULAR SURGERY)
Payer: COMMERCIAL

## 2018-11-05 VITALS
HEIGHT: 66 IN | OXYGEN SATURATION: 96 % | TEMPERATURE: 98.6 F | DIASTOLIC BLOOD PRESSURE: 66 MMHG | BODY MASS INDEX: 30.92 KG/M2 | WEIGHT: 192.4 LBS | SYSTOLIC BLOOD PRESSURE: 110 MMHG | RESPIRATION RATE: 17 BRPM | HEART RATE: 78 BPM

## 2018-11-05 DIAGNOSIS — N17.9 AKI (ACUTE KIDNEY INJURY) (HCC): ICD-10-CM

## 2018-11-05 DIAGNOSIS — I48.0 PAF (PAROXYSMAL ATRIAL FIBRILLATION) (HCC): Chronic | ICD-10-CM

## 2018-11-05 DIAGNOSIS — Z95.2 MITRAL VALVE REPLACED: Primary | ICD-10-CM

## 2018-11-05 DIAGNOSIS — N18.30 CKD (CHRONIC KIDNEY DISEASE), STAGE III (HCC): ICD-10-CM

## 2018-11-05 LAB
ANION GAP SERPL CALCULATED.3IONS-SCNC: 13 MEQ/L (ref 8–16)
BUN BLDV-MCNC: 36 MG/DL (ref 7–22)
CALCIUM SERPL-MCNC: 8.8 MG/DL (ref 8.5–10.5)
CHLORIDE BLD-SCNC: 95 MEQ/L (ref 98–111)
CO2: 28 MEQ/L (ref 23–33)
CREAT SERPL-MCNC: 2.7 MG/DL (ref 0.4–1.2)
ERYTHROCYTE [DISTWIDTH] IN BLOOD BY AUTOMATED COUNT: 20.1 % (ref 11.5–14.5)
ERYTHROCYTE [DISTWIDTH] IN BLOOD BY AUTOMATED COUNT: 64.9 FL (ref 35–45)
GFR SERPL CREATININE-BSD FRML MDRD: 19 ML/MIN/1.73M2
GLUCOSE BLD-MCNC: 97 MG/DL (ref 70–108)
HCT VFR BLD CALC: 26.5 % (ref 37–47)
HEMOGLOBIN: 8.3 GM/DL (ref 12–16)
MCH RBC QN AUTO: 28.5 PG (ref 26–33)
MCHC RBC AUTO-ENTMCNC: 31.3 GM/DL (ref 32.2–35.5)
MCV RBC AUTO: 91.1 FL (ref 81–99)
PLATELET # BLD: 269 THOU/MM3 (ref 130–400)
PMV BLD AUTO: 9.7 FL (ref 9.4–12.4)
POTASSIUM SERPL-SCNC: 3.9 MEQ/L (ref 3.5–5.2)
RBC # BLD: 2.91 MILL/MM3 (ref 4.2–5.4)
SODIUM BLD-SCNC: 136 MEQ/L (ref 135–145)
WBC # BLD: 6.4 THOU/MM3 (ref 4.8–10.8)

## 2018-11-05 PROCEDURE — APPSS60 APP SPLIT SHARED TIME 46-60 MINUTES: Performed by: PHYSICIAN ASSISTANT

## 2018-11-05 PROCEDURE — 97110 THERAPEUTIC EXERCISES: CPT

## 2018-11-05 PROCEDURE — 6370000000 HC RX 637 (ALT 250 FOR IP): Performed by: INTERNAL MEDICINE

## 2018-11-05 PROCEDURE — 36592 COLLECT BLOOD FROM PICC: CPT

## 2018-11-05 PROCEDURE — 2709999900 HC NON-CHARGEABLE SUPPLY

## 2018-11-05 PROCEDURE — 2580000003 HC RX 258: Performed by: THORACIC SURGERY (CARDIOTHORACIC VASCULAR SURGERY)

## 2018-11-05 PROCEDURE — 6370000000 HC RX 637 (ALT 250 FOR IP): Performed by: THORACIC SURGERY (CARDIOTHORACIC VASCULAR SURGERY)

## 2018-11-05 PROCEDURE — 99999 PR OFFICE/OUTPT VISIT,PROCEDURE ONLY: CPT | Performed by: NURSE PRACTITIONER

## 2018-11-05 PROCEDURE — 97116 GAIT TRAINING THERAPY: CPT

## 2018-11-05 PROCEDURE — APPSS30 APP SPLIT SHARED TIME 16-30 MINUTES: Performed by: NURSE PRACTITIONER

## 2018-11-05 PROCEDURE — 97535 SELF CARE MNGMENT TRAINING: CPT

## 2018-11-05 PROCEDURE — 80048 BASIC METABOLIC PNL TOTAL CA: CPT

## 2018-11-05 PROCEDURE — 6370000000 HC RX 637 (ALT 250 FOR IP): Performed by: NURSE PRACTITIONER

## 2018-11-05 PROCEDURE — 71046 X-RAY EXAM CHEST 2 VIEWS: CPT

## 2018-11-05 PROCEDURE — 1180000000 HC REHAB R&B

## 2018-11-05 PROCEDURE — 99232 SBSQ HOSP IP/OBS MODERATE 35: CPT | Performed by: INTERNAL MEDICINE

## 2018-11-05 PROCEDURE — 94640 AIRWAY INHALATION TREATMENT: CPT

## 2018-11-05 PROCEDURE — 85027 COMPLETE CBC AUTOMATED: CPT

## 2018-11-05 PROCEDURE — 97530 THERAPEUTIC ACTIVITIES: CPT

## 2018-11-05 RX ORDER — ATORVASTATIN CALCIUM 20 MG/1
20 TABLET, FILM COATED ORAL NIGHTLY
Status: CANCELLED | OUTPATIENT
Start: 2018-11-05

## 2018-11-05 RX ORDER — ONDANSETRON 2 MG/ML
4 INJECTION INTRAMUSCULAR; INTRAVENOUS EVERY 6 HOURS PRN
Status: DISCONTINUED | OUTPATIENT
Start: 2018-11-05 | End: 2018-11-09

## 2018-11-05 RX ORDER — MULTIVITAMIN WITH FOLIC ACID 400 MCG
1 TABLET ORAL DAILY
Status: CANCELLED | OUTPATIENT
Start: 2018-11-05

## 2018-11-05 RX ORDER — FUROSEMIDE 40 MG/1
40 TABLET ORAL DAILY
Status: CANCELLED | OUTPATIENT
Start: 2018-11-05

## 2018-11-05 RX ORDER — ALPRAZOLAM 0.5 MG/1
0.5 TABLET ORAL EVERY 4 HOURS PRN
Status: CANCELLED | OUTPATIENT
Start: 2018-11-05

## 2018-11-05 RX ORDER — BISACODYL 10 MG
10 SUPPOSITORY, RECTAL RECTAL DAILY PRN
Status: DISCONTINUED | OUTPATIENT
Start: 2018-11-05 | End: 2018-11-15 | Stop reason: HOSPADM

## 2018-11-05 RX ORDER — ASPIRIN 325 MG
325 TABLET ORAL DAILY
Status: DISCONTINUED | OUTPATIENT
Start: 2018-11-06 | End: 2018-11-15 | Stop reason: HOSPADM

## 2018-11-05 RX ORDER — ATORVASTATIN CALCIUM 20 MG/1
20 TABLET, FILM COATED ORAL NIGHTLY
Status: DISCONTINUED | OUTPATIENT
Start: 2018-11-05 | End: 2018-11-15 | Stop reason: HOSPADM

## 2018-11-05 RX ORDER — ACETAMINOPHEN 325 MG/1
650 TABLET ORAL EVERY 4 HOURS PRN
Status: DISCONTINUED | OUTPATIENT
Start: 2018-11-05 | End: 2018-11-15 | Stop reason: HOSPADM

## 2018-11-05 RX ORDER — DOCUSATE SODIUM 100 MG/1
100 CAPSULE, LIQUID FILLED ORAL 2 TIMES DAILY
Status: DISCONTINUED | OUTPATIENT
Start: 2018-11-05 | End: 2018-11-15 | Stop reason: HOSPADM

## 2018-11-05 RX ORDER — ONDANSETRON 2 MG/ML
4 INJECTION INTRAMUSCULAR; INTRAVENOUS EVERY 6 HOURS PRN
Status: CANCELLED | OUTPATIENT
Start: 2018-11-05

## 2018-11-05 RX ORDER — FAMOTIDINE 20 MG/1
20 TABLET, FILM COATED ORAL DAILY
Status: DISCONTINUED | OUTPATIENT
Start: 2018-11-06 | End: 2018-11-15 | Stop reason: HOSPADM

## 2018-11-05 RX ORDER — SENNA PLUS 8.6 MG/1
2 TABLET ORAL NIGHTLY PRN
Status: DISCONTINUED | OUTPATIENT
Start: 2018-11-06 | End: 2018-11-15 | Stop reason: HOSPADM

## 2018-11-05 RX ORDER — LANOLIN ALCOHOL/MO/W.PET/CERES
3 CREAM (GRAM) TOPICAL NIGHTLY PRN
Status: DISCONTINUED | OUTPATIENT
Start: 2018-11-05 | End: 2018-11-15 | Stop reason: HOSPADM

## 2018-11-05 RX ORDER — ONDANSETRON 4 MG/1
4 TABLET, FILM COATED ORAL EVERY 8 HOURS PRN
Status: DISCONTINUED | OUTPATIENT
Start: 2018-11-05 | End: 2018-11-15 | Stop reason: HOSPADM

## 2018-11-05 RX ORDER — ACETAMINOPHEN 160 MG/5ML
650 SOLUTION ORAL EVERY 4 HOURS PRN
Status: CANCELLED | OUTPATIENT
Start: 2018-11-05

## 2018-11-05 RX ORDER — ALBUTEROL SULFATE 2.5 MG/3ML
5 SOLUTION RESPIRATORY (INHALATION) EVERY 4 HOURS PRN
Status: DISCONTINUED | OUTPATIENT
Start: 2018-11-05 | End: 2018-11-14

## 2018-11-05 RX ORDER — ALBUTEROL SULFATE 2.5 MG/3ML
5 SOLUTION RESPIRATORY (INHALATION) EVERY 4 HOURS PRN
Status: CANCELLED | OUTPATIENT
Start: 2018-11-05

## 2018-11-05 RX ORDER — FAMOTIDINE 20 MG/1
20 TABLET, FILM COATED ORAL DAILY
Status: CANCELLED | OUTPATIENT
Start: 2018-11-05

## 2018-11-05 RX ORDER — MULTIVITAMIN WITH FOLIC ACID 400 MCG
1 TABLET ORAL DAILY
Status: DISCONTINUED | OUTPATIENT
Start: 2018-11-06 | End: 2018-11-15 | Stop reason: HOSPADM

## 2018-11-05 RX ORDER — DIPHENHYDRAMINE HYDROCHLORIDE 50 MG/ML
50 INJECTION INTRAMUSCULAR; INTRAVENOUS NIGHTLY PRN
Status: CANCELLED | OUTPATIENT
Start: 2018-11-05

## 2018-11-05 RX ORDER — AMIODARONE HYDROCHLORIDE 200 MG/1
200 TABLET ORAL 2 TIMES DAILY
Status: CANCELLED | OUTPATIENT
Start: 2018-11-05

## 2018-11-05 RX ORDER — CARVEDILOL 3.12 MG/1
3.12 TABLET ORAL 2 TIMES DAILY WITH MEALS
Status: DISCONTINUED | OUTPATIENT
Start: 2018-11-06 | End: 2018-11-15 | Stop reason: HOSPADM

## 2018-11-05 RX ORDER — DOCUSATE SODIUM 100 MG/1
100 CAPSULE, LIQUID FILLED ORAL DAILY
Status: CANCELLED | OUTPATIENT
Start: 2018-11-05

## 2018-11-05 RX ORDER — POTASSIUM CHLORIDE 20 MEQ/1
20 TABLET, EXTENDED RELEASE ORAL
Status: CANCELLED | OUTPATIENT
Start: 2018-11-06

## 2018-11-05 RX ORDER — ACETAMINOPHEN 160 MG/5ML
650 SOLUTION ORAL EVERY 4 HOURS PRN
Status: DISCONTINUED | OUTPATIENT
Start: 2018-11-05 | End: 2018-11-05 | Stop reason: SDUPTHER

## 2018-11-05 RX ORDER — ACETAMINOPHEN 325 MG/1
650 TABLET ORAL EVERY 4 HOURS PRN
Status: CANCELLED | OUTPATIENT
Start: 2018-11-05

## 2018-11-05 RX ORDER — DIPHENHYDRAMINE HYDROCHLORIDE 50 MG/ML
50 INJECTION INTRAMUSCULAR; INTRAVENOUS NIGHTLY PRN
Status: DISCONTINUED | OUTPATIENT
Start: 2018-11-05 | End: 2018-11-14

## 2018-11-05 RX ORDER — POTASSIUM CHLORIDE 20 MEQ/1
20 TABLET, EXTENDED RELEASE ORAL
Status: DISCONTINUED | OUTPATIENT
Start: 2018-11-06 | End: 2018-11-12

## 2018-11-05 RX ORDER — BISACODYL 10 MG
10 SUPPOSITORY, RECTAL RECTAL DAILY PRN
Status: CANCELLED | OUTPATIENT
Start: 2018-11-05

## 2018-11-05 RX ORDER — LANOLIN ALCOHOL/MO/W.PET/CERES
3 CREAM (GRAM) TOPICAL NIGHTLY PRN
Status: CANCELLED | OUTPATIENT
Start: 2018-11-05

## 2018-11-05 RX ORDER — ASPIRIN 325 MG
325 TABLET ORAL DAILY
Status: CANCELLED | OUTPATIENT
Start: 2018-11-05

## 2018-11-05 RX ORDER — ALPRAZOLAM 0.5 MG/1
0.5 TABLET ORAL EVERY 4 HOURS PRN
Status: DISCONTINUED | OUTPATIENT
Start: 2018-11-05 | End: 2018-11-15 | Stop reason: HOSPADM

## 2018-11-05 RX ORDER — DOCUSATE SODIUM 100 MG/1
100 CAPSULE, LIQUID FILLED ORAL DAILY
Status: DISCONTINUED | OUTPATIENT
Start: 2018-11-06 | End: 2018-11-05

## 2018-11-05 RX ORDER — FUROSEMIDE 40 MG/1
40 TABLET ORAL DAILY
Status: DISCONTINUED | OUTPATIENT
Start: 2018-11-06 | End: 2018-11-15 | Stop reason: HOSPADM

## 2018-11-05 RX ORDER — CARVEDILOL 3.12 MG/1
3.12 TABLET ORAL 2 TIMES DAILY WITH MEALS
Status: CANCELLED | OUTPATIENT
Start: 2018-11-05

## 2018-11-05 RX ORDER — AMIODARONE HYDROCHLORIDE 200 MG/1
200 TABLET ORAL 2 TIMES DAILY
Status: DISCONTINUED | OUTPATIENT
Start: 2018-11-05 | End: 2018-11-15 | Stop reason: HOSPADM

## 2018-11-05 RX ADMIN — AMIODARONE HYDROCHLORIDE 200 MG: 200 TABLET ORAL at 09:37

## 2018-11-05 RX ADMIN — THERA TABS 1 TABLET: TAB at 09:37

## 2018-11-05 RX ADMIN — TIOTROPIUM BROMIDE 18 MCG: 18 CAPSULE ORAL; RESPIRATORY (INHALATION) at 12:11

## 2018-11-05 RX ADMIN — CARVEDILOL 3.12 MG: 3.12 TABLET, FILM COATED ORAL at 17:19

## 2018-11-05 RX ADMIN — FAMOTIDINE 20 MG: 20 TABLET ORAL at 09:38

## 2018-11-05 RX ADMIN — ALPRAZOLAM 0.5 MG: 0.5 TABLET ORAL at 21:52

## 2018-11-05 RX ADMIN — FUROSEMIDE 40 MG: 40 TABLET ORAL at 09:37

## 2018-11-05 RX ADMIN — DOCUSATE SODIUM 100 MG: 100 CAPSULE, LIQUID FILLED ORAL at 09:37

## 2018-11-05 RX ADMIN — MICONAZOLE NITRATE: 2 POWDER TOPICAL at 09:37

## 2018-11-05 RX ADMIN — CARVEDILOL 3.12 MG: 3.12 TABLET, FILM COATED ORAL at 09:37

## 2018-11-05 RX ADMIN — Medication 10 ML: at 09:38

## 2018-11-05 RX ADMIN — POTASSIUM CHLORIDE 20 MEQ: 20 TABLET, EXTENDED RELEASE ORAL at 09:38

## 2018-11-05 RX ADMIN — ASPIRIN 325 MG: 325 TABLET ORAL at 09:38

## 2018-11-05 RX ADMIN — ACETAMINOPHEN 650 MG: 325 TABLET ORAL at 01:34

## 2018-11-05 ASSESSMENT — PAIN SCALES - GENERAL
PAINLEVEL_OUTOF10: 0
PAINLEVEL_OUTOF10: 0
PAINLEVEL_OUTOF10: 3

## 2018-11-05 ASSESSMENT — PAIN DESCRIPTION - DESCRIPTORS: DESCRIPTORS: ACHING

## 2018-11-05 ASSESSMENT — PAIN DESCRIPTION - ORIENTATION: ORIENTATION: MID;LOWER

## 2018-11-05 ASSESSMENT — PAIN DESCRIPTION - PAIN TYPE: TYPE: ACUTE PAIN

## 2018-11-05 ASSESSMENT — PAIN DESCRIPTION - LOCATION: LOCATION: BACK;CHEST

## 2018-11-05 ASSESSMENT — PAIN DESCRIPTION - PROGRESSION: CLINICAL_PROGRESSION: NOT CHANGED

## 2018-11-05 ASSESSMENT — PAIN DESCRIPTION - FREQUENCY: FREQUENCY: INTERMITTENT

## 2018-11-05 ASSESSMENT — PAIN DESCRIPTION - ONSET: ONSET: ON-GOING

## 2018-11-05 NOTE — PLAN OF CARE
Problem: Discharge Planning:  Goal: Discharged to appropriate level of care  Discharged to appropriate level of care   Outcome: Ongoing  Patient plans to go to inpatient rehab at discharge. Planning in process. Comments: Problem: Anxiety:  Goal: Level of anxiety will decrease  Level of anxiety will decrease   Outcome: Ongoing  Patient states that she feels mildly anxious and can't seem to turn her mind off to sleep. Patient taking PRN Xanax as ordered to treat anxiety.      Problem: Pain:  Goal: Control of acute pain  Control of acute pain   Outcome: Ongoing  Patient currently rating pain of 5 on a pain scale of 1-10. Patient is able to verbally describe pain and understanding of the pain scale. Patient's current pain goal is 2 and voices understanding that we may not be able alleviate all chronic pain. Patient taking pain medication as ordered PRN to treat pain.      Problem: Risk for Impaired Skin Integrity  Goal: Tissue integrity - skin and mucous membranes  Structural intactness and normal physiological function of skin and  mucous membranes. Outcome: Ongoing  No new areas of skin breakdown noted with assessment. The base of patients midsternal incision appears yellow and fragile. Dry dressing changed and replaced over area to protect. Patient does have some redness under breasts, powder placed. Both right and left groin sites appear yellow and fragile as well. Patients mucous membranes remain moist and intact.     Problem: Nutrition  Goal: Optimal nutrition therapy  Outcome: Ongoing  Patient currently is eating partial meals and denies having any nausea or vomiting.      Problem: Falls - Risk of:  Goal: Will remain free from falls  Will remain free from falls   Outcome: Ongoing  Patient's call light within reach, bed in lowest position, bed break locked, bed side table within reach, non skid socks on, bed alarm in use, and is oriented to own ability.  Hourly rounding performed to assess and address patient needs as they arise.      Comments: Care plan reviewed with patient. Patient verbalize understanding of the plan of care and contribute to goal setting.

## 2018-11-05 NOTE — PROGRESS NOTES
[facility] ICU STEPDOWN TELEMETRY 4K - 4K-15/015-A    Time In: 1601  Time Out: 1259  Timed Code Treatment Minutes: 32 Minutes  Minutes: 31          Date: 2018  Patient Name: Hiwot Garcia,  Gender:  female        MRN: [id number]  : 1965  (48 y.o.)     Referring Practitioner: Dr. Juno Kent  Diagnosis: persistent a fib  Additional Pertinent Hx: 48y.o. year-old female who presents for redo sternotomy, mitral valve repair, maze procedure, placement of left ventricular epicardial lead. sx on 10/15/18. Pt reintubated on 10/17/18 with self extubation and reintubation on 10/19/18. Pt with chest tubes rmoved on 10/22. extubation on 10/24/18 with hgih flow O2 started on 10/25. Hemodialysis started wht runs on 10/24,  and , . Past Medical History:   Diagnosis Date    [employer] dual pacer 2018    Chronic diastolic congestive heart failure ([facility] 2018    GERD (gastroesophageal reflux disease) 3/30/2018    Last Assessment & Plan:  Continue home protonix     History of atrial septal defect repair 2018    HLD (hyperlipidemia) 3/30/2018    Last Assessment & Plan:  Cardiac diet Continue statin    Nonischemic dilated cardiomyopathy ([facility] 2018    EF of 25%     Past Surgical History:   Procedure Laterality Date    CARDIAC CATHETERIZATION  2018    Dr. Cherelle Cordoba. No CAD.  CARDIAC SURGERY  1970    repair hole in heart pt states was 3 yrs old.     COLONOSCOPY      PACEMAKER INSERTION  2017    [place] OFFICE/OUTPT VISIT,PROCEDURE ONLY N/A 10/15/2018    REDO STERNOTOMY, BI-ATRIAL MAZE PROCEDURE, MITRAL REPAIR, PLACEMENT OF INTRA AORTIC BALLOON PUMP performed by Cira Rajan MD at [address]       Restrictions/Precautions:  Surgical Protocols, Fall Risk         Sternal Precautions: No Pushing, No Pulling, 5# Lifting Restrictions  Other position/activity restrictions: s/predo sternotomy, mitral

## 2018-11-05 NOTE — PROGRESS NOTES
Daily    potassium (CARDIAC) replacement protocol   Other RX Placeholder    aspirin  325 mg Oral Daily    famotidine  20 mg Oral Daily    sodium chloride flush  10 mL Intravenous 2 times per day    atorvastatin  20 mg Oral Nightly       ROS: All neg unless specifically mentioned in subjective section. Exam:  General Appearance: alert ,conversing, in no acute distress  Cardiovascular: normal rate, regular rhythm, normal S1 and S2, no murmurs, rubs, clicks, or gallops  Pulmonary/Chest: clear to auscultation bilaterally- no wheezes, rales or rhonchi, normal air movement, no respiratory distress  Neurological: alert, oriented, normal speech, no focal findings or movement disorder noted  Sternum: Incision healing appropriately and no wound dehiscence noted. Assessment:   Patient Active Problem List   Diagnosis    PAF (paroxysmal atrial fibrillation) (HCC)    Chronic diastolic congestive heart failure ([address]    Nonischemic dilated cardiomyopathy ([address]    [employer] dual pacer    Persistent atrial fibrillation ([address]    Acute hypoxemic respiratory failure (HCC)    Atrial flutter ([address]    LILLI (acute kidney injury) ([address]    Hypervolemia    GERD (gastroesophageal reflux disease)    History of atrial septal defect repair    HLD (hyperlipidemia)    Left bundle-branch block       Plan: 11/1/18  1. Cont current therapy--Rehab when ok with insurance  2. Hx of DCM---no ACE/ARB due to hypotension/LILLI---on Coreg/Lasix  3. PPM placed 5/2017 for profound bradycardia (poss secondary to treatment of tachycardia with Afib)   4.  S/P CV 10/31/18 with restoration of SR---no Afib or Aflutter since---On Amio and Coreg    The plan of care was discussed in detail with Francisco Dominguez PA-C

## 2018-11-05 NOTE — PROGRESS NOTES
Nephrology Progress Note    Patient Hayes Ochoa   MRN -  [id number]   Acct # - [de-identified]      - 1965    48 y.o. Admit Date: 10/15/2018  Hospital Day: 21  Location: [facility]-A  Date of evaluation -  2018    Subjective:   CC: atrial fib, Mitral valve repair  Denies sob. Room air   /-110/  Up in room  Congested cough, Using incentive    Objective:   VITALS:  /61   Pulse 79   Temp 98 °F (36.7 °C) (Oral)   Resp 16   Ht 5' 6\" (1.676 m)   Wt 192 lb 6.4 oz (87.3 kg)   SpO2 97%   BMI 31.05 kg/m²    Patient Vitals for the past 24 hrs:   BP Temp Temp src Pulse Resp SpO2 Weight   18 0923 109/61 98 °F (36.7 °C) Oral 79 16 97 % -   18 0430 105/63 98.3 °F (36.8 °C) Oral 77 16 93 % 192 lb 6.4 oz (87.3 kg)   18 2228 (!) 100/57 98.1 °F (36.7 °C) Oral 77 18 93 % -   18 2009 116/64 98.6 °F (37 °C) Oral 77 16 97 % -   18 - - - - - - 195 lb 12.8 oz (88.8 kg)   18 1537 113/60 98 °F (36.7 °C) Oral 83 18 95 % -   18 1128 108/70 97.7 °F (36.5 °C) Oral 82 20 95 % -     2 L/min  Patient Vitals for the past 96 hrs (Last 3 readings):   Weight   18 0430 192 lb 6.4 oz (87.3 kg)   18 1908 195 lb 12.8 oz (88.8 kg)   18 0135 196 lb 9.6 oz (89.2 kg)       Intake/Output Summary (Last 24 hours) at 18 0947  Last data filed at 18 0937   Gross per 24 hour   Intake             1590 ml   Output             1400 ml   Net              190 ml       EXAM:  CONSTITUTIONAL:  No acute distress. Lying comfortable in bed. Pleasant  HEENT:  Head is normocephalic, Extraocular movement intact. Neck is supple. Voice is clear. CARDIOVASCULAR:  S1, S2  regular rate and rhythm. RESPIRATORY: Clear to ausculation bilaterally. Equal breath sounds. No wheezes. No shortness of breath noted at rest.  ABDOMEN: soft, non tender  NEUROLOGICAL: Patient is alert and oriented to person, place, and time. Recent and remote memory intact. Thought is coherant.   SKIN: no

## 2018-11-05 NOTE — DISCHARGE SUMMARY
CT/CV Surgery Discharge Summary     Pt Name: Sunshine Guthrie  MRN: [id number]  YOB: 1965  Primary Care Physician: ALDAIR Lopez CNP    Admit date:  10/15/2018  5:26 AM      Discharge date: 11/05/18     Disposition: Rehab    Admitting Diagnosis:   1) Severe mitral regurgitation  2) Longstanding persistent atrial fibrillation  3) Tachycardiomyopathy    Discharge Diagnosis:   1) Severe mitral regurgitation  2) Longstanding persistent atrial fibrillation  3) Tachycardiomyopathy    Problem List:   Patient Active Problem List   Diagnosis Code    PAF (paroxysmal atrial fibrillation) ([facility]) I48.0    Chronic diastolic congestive heart failure ([facility]) I50.32    Nonischemic dilated cardiomyopathy ([facility] I42.0    [employer] dual pacer Z95.0    Persistent atrial fibrillation ([facility]) I48.1    Acute hypoxemic respiratory failure ([facility]) J96.01    Atrial flutter ([facility]) I48.92    LILLI (acute kidney injury) ([facility] N17.9    Hypervolemia E87.70    GERD (gastroesophageal reflux disease) K21.9    History of atrial septal defect repair Z87.74    HLD (hyperlipidemia) E78.5    Left bundle-branch block I44.7         Procedures:    1) Redo sternotomy  2) Mitral valve repair with a 26 mm Blum Physio II annuloplasty band  3)) Left atrial maze procedure, with ablation of both the anterior and posterior mitral trigones  4) Ligation of the left atrial appendage  5) Insertion of right common femoral intra-aortic balloon pump      Reason for Admission:    The patient is a 48y.o. year-old female, s/p open ASD repair as a child, with a long history of longstanding persistent atrial fibrillation, complicated by a medically refractory tachycardiomyopathy, who presents with biventricular failure and severe mitral regurgitation    Hospital Course:     Following a Redo sternotomy with a mitral valve repair with a 26 mm Blum Physio II annuloplasty band, left atrial maze procedure, with ablation of both the anterior and

## 2018-11-05 NOTE — PROGRESS NOTES
increase endurance for BADL  Short term goal 4: Pt will complete functional mobility to/from [facility] or chair, progressing to bathroom, with SBA X1 and RW to increase activity tolerance needed for toileting tasks.    Short term goal 5: Complete LE dressing with Mod A & min vcs for adapative strategies  Long term goals  Time Frame for Long term goals : No LTG set d/t short ELOS

## 2018-11-05 NOTE — PROGRESS NOTES
PHASE 1 CARDIAC REHABILITATION   CABG, AVR, MVR, TVR, AMI, PCI's  Exercise Physiologists      Treatment Length (l: long, n: normal, s: short): N  Treatment Length Note:   Vitals Stable?: Yes. If No:     Any ECG-Rhythm Issues?: No.  If Yes:   Transfers (bc: Bed to Chair, cb: Chair to Bed): na  Strengthening/ROM Treatment Exercises: Step 2    FIDM:     Frequency: 2 x per day   Intensity: <15 RPE, <120bpm   Time: >30-60 seconds longer or >20% increase in distance each walk   Type: Bed/Chair Exercises/stationary marching or ambulation    Aerobic treatment: Pt just returned to chair from walking in [place] with RN. Gait (i: Independent, s: Steady, u: Unsteady): s  Assists: 1  Patient tolerated treatment (w: well, f: fair, p: poor): w  Goals:    Short term:  Progression on each aerobic treatment. Long term: Independent ambulation and discharge. Ileene Medicus is to follow sternal precautions. Will learn techniques for getting in and out of bed/chairs/car without using the arms. Using stairs will be addressed if applicable. Home activity instructions (Frequency, Intensity, Time, Type), and progression will be addressed prior to discharge (unless Ileene Medicus goes to TCU or an ECF where they will follow PT/OT protocols). Notes: Call light left within reach. Education given: Home education complete with pt. Pt going to Rehab before going home. Phase II Information (Given upon Discharge): [place] or Nena Ochoa, will call patient at home following discharge from rehab.

## 2018-11-06 LAB
ALBUMIN SERPL-MCNC: 3.5 G/DL (ref 3.5–5.1)
ALP BLD-CCNC: 146 U/L (ref 38–126)
ALT SERPL-CCNC: 15 U/L (ref 11–66)
ANION GAP SERPL CALCULATED.3IONS-SCNC: 14 MEQ/L (ref 8–16)
AST SERPL-CCNC: 18 U/L (ref 5–40)
BACTERIA: ABNORMAL /HPF
BILIRUB SERPL-MCNC: 0.6 MG/DL (ref 0.3–1.2)
BILIRUBIN URINE: NEGATIVE
BLOOD, URINE: ABNORMAL
BUN BLDV-MCNC: 29 MG/DL (ref 7–22)
CALCIUM SERPL-MCNC: 9.3 MG/DL (ref 8.5–10.5)
CASTS 2: ABNORMAL /LPF
CASTS UA: ABNORMAL /LPF
CHARACTER, URINE: ABNORMAL
CHLORIDE BLD-SCNC: 96 MEQ/L (ref 98–111)
CO2: 28 MEQ/L (ref 23–33)
COLOR: YELLOW
CREAT SERPL-MCNC: 2.5 MG/DL (ref 0.4–1.2)
CRYSTALS, UA: ABNORMAL
EPITHELIAL CELLS, UA: ABNORMAL /HPF
ERYTHROCYTE [DISTWIDTH] IN BLOOD BY AUTOMATED COUNT: 20.4 % (ref 11.5–14.5)
ERYTHROCYTE [DISTWIDTH] IN BLOOD BY AUTOMATED COUNT: 65.5 FL (ref 35–45)
GFR SERPL CREATININE-BSD FRML MDRD: 20 ML/MIN/1.73M2
GLUCOSE BLD-MCNC: 91 MG/DL (ref 70–108)
GLUCOSE URINE: NEGATIVE MG/DL
HCT VFR BLD CALC: 29.5 % (ref 37–47)
HEMOGLOBIN: 9.3 GM/DL (ref 12–16)
KETONES, URINE: NEGATIVE
LEUKOCYTE ESTERASE, URINE: ABNORMAL
MCH RBC QN AUTO: 28.8 PG (ref 26–33)
MCHC RBC AUTO-ENTMCNC: 31.5 GM/DL (ref 32.2–35.5)
MCV RBC AUTO: 91.3 FL (ref 81–99)
MISCELLANEOUS 2: ABNORMAL
NITRITE, URINE: POSITIVE
ORGANISM: ABNORMAL
PATHOLOGIST REVIEW: ABNORMAL
PH UA: 6.5
PLATELET # BLD: 272 THOU/MM3 (ref 130–400)
PMV BLD AUTO: 9.3 FL (ref 9.4–12.4)
POTASSIUM SERPL-SCNC: 4.3 MEQ/L (ref 3.5–5.2)
PROTEIN UA: NEGATIVE
RBC # BLD: 3.23 MILL/MM3 (ref 4.2–5.4)
RBC URINE: ABNORMAL /HPF
RENAL EPITHELIAL, UA: ABNORMAL
SODIUM BLD-SCNC: 138 MEQ/L (ref 135–145)
SPECIFIC GRAVITY, URINE: 1.01 (ref 1–1.03)
TOTAL PROTEIN: 7.7 G/DL (ref 6.1–8)
URINE CULTURE, ROUTINE: ABNORMAL
UROBILINOGEN, URINE: 1 EU/DL
VITAMIN D 25-HYDROXY: 23 NG/ML (ref 30–100)
WBC # BLD: 6.1 THOU/MM3 (ref 4.8–10.8)
WBC UA: ABNORMAL /HPF
YEAST: ABNORMAL

## 2018-11-06 PROCEDURE — 97110 THERAPEUTIC EXERCISES: CPT

## 2018-11-06 PROCEDURE — 97535 SELF CARE MNGMENT TRAINING: CPT

## 2018-11-06 PROCEDURE — 97162 PT EVAL MOD COMPLEX 30 MIN: CPT

## 2018-11-06 PROCEDURE — 80053 COMPREHEN METABOLIC PANEL: CPT

## 2018-11-06 PROCEDURE — 87077 CULTURE AEROBIC IDENTIFY: CPT

## 2018-11-06 PROCEDURE — 94640 AIRWAY INHALATION TREATMENT: CPT

## 2018-11-06 PROCEDURE — 97116 GAIT TRAINING THERAPY: CPT

## 2018-11-06 PROCEDURE — 2709999900 HC NON-CHARGEABLE SUPPLY

## 2018-11-06 PROCEDURE — 97166 OT EVAL MOD COMPLEX 45 MIN: CPT

## 2018-11-06 PROCEDURE — 2500000003 HC RX 250 WO HCPCS: Performed by: THORACIC SURGERY (CARDIOTHORACIC VASCULAR SURGERY)

## 2018-11-06 PROCEDURE — 87184 SC STD DISK METHOD PER PLATE: CPT

## 2018-11-06 PROCEDURE — 82306 VITAMIN D 25 HYDROXY: CPT

## 2018-11-06 PROCEDURE — 97530 THERAPEUTIC ACTIVITIES: CPT

## 2018-11-06 PROCEDURE — 85027 COMPLETE CBC AUTOMATED: CPT

## 2018-11-06 PROCEDURE — 87186 SC STD MICRODIL/AGAR DIL: CPT

## 2018-11-06 PROCEDURE — 1180000000 HC REHAB R&B

## 2018-11-06 PROCEDURE — 6370000000 HC RX 637 (ALT 250 FOR IP): Performed by: PHYSICAL MEDICINE & REHABILITATION

## 2018-11-06 PROCEDURE — 6370000000 HC RX 637 (ALT 250 FOR IP): Performed by: THORACIC SURGERY (CARDIOTHORACIC VASCULAR SURGERY)

## 2018-11-06 PROCEDURE — 36415 COLL VENOUS BLD VENIPUNCTURE: CPT

## 2018-11-06 PROCEDURE — 87086 URINE CULTURE/COLONY COUNT: CPT

## 2018-11-06 PROCEDURE — 81001 URINALYSIS AUTO W/SCOPE: CPT

## 2018-11-06 RX ORDER — CEPHALEXIN 250 MG/1
250 CAPSULE ORAL EVERY 6 HOURS SCHEDULED
Status: DISCONTINUED | OUTPATIENT
Start: 2018-11-06 | End: 2018-11-08 | Stop reason: ALTCHOICE

## 2018-11-06 RX ORDER — OXYCODONE HYDROCHLORIDE 5 MG/1
5 TABLET ORAL EVERY 6 HOURS PRN
Status: DISCONTINUED | OUTPATIENT
Start: 2018-11-06 | End: 2018-11-15 | Stop reason: HOSPADM

## 2018-11-06 RX ORDER — LACTOBACILLUS RHAMNOSUS GG 10B CELL
1 CAPSULE ORAL
Status: COMPLETED | OUTPATIENT
Start: 2018-11-07 | End: 2018-11-11

## 2018-11-06 RX ORDER — GRANULES FOR ORAL 3 G/1
3 POWDER ORAL
Status: DISCONTINUED | OUTPATIENT
Start: 2018-11-06 | End: 2018-11-06

## 2018-11-06 RX ADMIN — FAMOTIDINE 20 MG: 20 TABLET ORAL at 08:48

## 2018-11-06 RX ADMIN — MICONAZOLE NITRATE: 2 POWDER TOPICAL at 08:52

## 2018-11-06 RX ADMIN — DOCUSATE SODIUM 100 MG: 100 CAPSULE, LIQUID FILLED ORAL at 08:48

## 2018-11-06 RX ADMIN — VITAMIN D, TAB 1000IU (100/BT) 3000 UNITS: 25 TAB at 15:11

## 2018-11-06 RX ADMIN — AMIODARONE HYDROCHLORIDE 200 MG: 200 TABLET ORAL at 22:12

## 2018-11-06 RX ADMIN — AMIODARONE HYDROCHLORIDE 200 MG: 200 TABLET ORAL at 00:24

## 2018-11-06 RX ADMIN — ACETAMINOPHEN 650 MG: 500 TABLET, FILM COATED ORAL at 01:00

## 2018-11-06 RX ADMIN — MICONAZOLE NITRATE: 2 POWDER TOPICAL at 00:24

## 2018-11-06 RX ADMIN — CEPHALEXIN 250 MG: 250 CAPSULE ORAL at 17:24

## 2018-11-06 RX ADMIN — ATORVASTATIN CALCIUM 20 MG: 20 TABLET, FILM COATED ORAL at 22:11

## 2018-11-06 RX ADMIN — FOSFOMYCIN TROMETHAMINE 1 PACKET: 3 POWDER ORAL at 11:06

## 2018-11-06 RX ADMIN — ATORVASTATIN CALCIUM 20 MG: 20 TABLET, FILM COATED ORAL at 00:24

## 2018-11-06 RX ADMIN — TIOTROPIUM BROMIDE 18 MCG: 18 CAPSULE ORAL; RESPIRATORY (INHALATION) at 06:03

## 2018-11-06 RX ADMIN — DOCUSATE SODIUM 100 MG: 100 CAPSULE, LIQUID FILLED ORAL at 22:12

## 2018-11-06 RX ADMIN — ASPIRIN 325 MG: 325 TABLET ORAL at 08:48

## 2018-11-06 RX ADMIN — CARVEDILOL 3.12 MG: 3.12 TABLET, FILM COATED ORAL at 17:24

## 2018-11-06 RX ADMIN — CEPHALEXIN 250 MG: 250 CAPSULE ORAL at 22:12

## 2018-11-06 RX ADMIN — CARVEDILOL 3.12 MG: 3.12 TABLET, FILM COATED ORAL at 08:49

## 2018-11-06 RX ADMIN — FUROSEMIDE 40 MG: 40 TABLET ORAL at 08:48

## 2018-11-06 RX ADMIN — POTASSIUM CHLORIDE 20 MEQ: 20 TABLET, EXTENDED RELEASE ORAL at 08:48

## 2018-11-06 RX ADMIN — DOCUSATE SODIUM 100 MG: 100 CAPSULE, LIQUID FILLED ORAL at 00:25

## 2018-11-06 RX ADMIN — THERA TABS 1 TABLET: TAB at 08:49

## 2018-11-06 RX ADMIN — AMIODARONE HYDROCHLORIDE 200 MG: 200 TABLET ORAL at 08:50

## 2018-11-06 ASSESSMENT — PAIN DESCRIPTION - ORIENTATION: ORIENTATION: RIGHT

## 2018-11-06 ASSESSMENT — PAIN SCALES - GENERAL
PAINLEVEL_OUTOF10: 0
PAINLEVEL_OUTOF10: 0
PAINLEVEL_OUTOF10: 10
PAINLEVEL_OUTOF10: 3

## 2018-11-06 ASSESSMENT — PAIN DESCRIPTION - PAIN TYPE: TYPE: ACUTE PAIN

## 2018-11-06 ASSESSMENT — PAIN DESCRIPTION - LOCATION: LOCATION: NECK

## 2018-11-06 ASSESSMENT — PAIN DESCRIPTION - DESCRIPTORS: DESCRIPTORS: ACHING

## 2018-11-06 ASSESSMENT — PAIN DESCRIPTION - ONSET: ONSET: GRADUAL

## 2018-11-06 ASSESSMENT — PAIN DESCRIPTION - FREQUENCY: FREQUENCY: INTERMITTENT

## 2018-11-06 NOTE — PROGRESS NOTES
Will issue handouts     Exercises: Step 2 cardiac ex performed as warm up and cool down with walk. x8 reps ea. Activity Tolerance:  Activity Tolerance: Patient limited by endurance    Assessment:  Assessment: Pt showing minimal core weakness resulting in need for assist with bed mobility, decreased overall endurance. Pt will benefit from continued use of the RW at this time for assist with overall endurance and balance. Pt will benefit from skilled PT to further progress her balance, endurance, strength and education re: cardiac walking program for home. Prognosis: Good          Discharge Recommendations:  Discharge Recommendations: Continue to assess pending progress, Patient would benefit from continued therapy after discharge    Patient Education:  Patient Education: POC, sternal precautions, gait, ther ex, steps, car transfer, home walking program initiated    Equipment Recommendations:  Equipment Needed: No (Pt has RW)    Safety:  Type of devices: Gait belt, Left in bed, Call light within reach, Bed alarm in place    Plan:  Times per week: 5x/ wk 90 min, 1x/ wk 30 min  Specific instructions for Next Treatment: therex, bed mobility, transfers, sitting and standing alance, gait, steps, vehicle transfer  Current Treatment Recommendations: Strengthening, Functional Mobility Training, Transfer Training, Balance Training, Endurance Training, Gait Training, Stair training, Pain Management, Patient/Caregiver Education & Training, Safety Education & Training, Home Exercise Program, Equipment Evaluation, Education, & procurement    Goals:  Patient goals : Get moving on my own     Short term goals  Time Frame for Short term goals: 1 wk  Short term goal 1: Pt to go supine <->sit, CGA, maintaining sternal precautions, to get in/out of bed. Short term goal 2: Pt to get up/down from various seated surfaces, SBA to get up to walk.   Short term goal 3: Pt to walk with RW >= 150 ft, RPE <= 13, to progress to home and community mobility, SBA  Short term goal 4: Pt to ascend/descend 4 steps with wilma rails, sBA, for home entry. Short term goal 5: Pt to get in/out of car, SBA, for transportation needs. Long term goals  Time Frame for Long term goals : 2 wks  Long term goal 1: Pt to go supine <->sit, Mod I, maintaining sternal precautions, to get in/out of bed. Long term goal 2: Pt to get up/down from various seated surfaces, Mod I to get up to walk. Long term goal 3: Pt to walk with RW >= 150 ft, RPE <= 13, to progress to home and community mobility. Mod I  Long term goal 4: Pt to ascend/descend 4+ steps with wilma rails, Mod I, for home entry. Long term goal 5: Pt to get in/out of car, Mod I, for transportation needs.

## 2018-11-06 NOTE — PROGRESS NOTES
procedure, placement of left ventricular epicardial lead on 10/15/18    Prior Level of Function:  ADL Assistance: Independent  Homemaking Assistance: Independent  Ambulation Assistance: Independent  Transfer Assistance: Independent  Additional Comments: Pt reports living with SO PLOF in 2 story home-2nd story bed & bathroom. No AD used PLOF. Pt reports she plans to go home with her father at discharge. Subjective  Subjective: Patient in recliner upon arrival, agreeable to OT. Pain:  Pain Assessment  Patient Currently in Pain: No    Objective  Transfers  Sit to stand: Contact guard assistance  Stand to sit: Contact guard assistance    Functional Mobility  Functional - Mobility Device: Rolling Walker  Activity: To/from bathroom with RW. Used w/c for to/from rehab apartment for ECT. Assist Level: Contact guard assistance     Exercises   Comment: CABG step II exercises completed x12 reps x1 set  - in addition completed with shoulder flex to 90 degrees, chest press, elbow flex/ext and wrist flex/ext. Ex completed to increase endurance needed for homemaking tasks,      ADL:                                                                             Groomin - Able to perform 3-4 tasks with touching help (CGA standing sinkside hand hygiene)                                                                                         Toiletin - Requires steadying assistance only                                                                               Toilet Transfer: 4 - Requires steadying assistance only < 25% assist (CGA )                                                                            Activity Tolerance:  Activity Tolerance: Patient limited by fatigue    Assessment:  Assessment: Pt demo decreased endurance & balance for ADLs at PLOF. Continued OT recommended to educate Pt on adaptative strategies & safety for increasing indep with ADLs with sternal precautions.    Performance deficits /

## 2018-11-06 NOTE — PROGRESS NOTES
precautions.)  Sit to Supine: Stand by assistance (Cues to maintain sternal precautions )    Transfers  Sit to Stand: Contact guard assistance (trials from recliner, bed, w/c and toilet with elevated seat)  Stand to sit: Contact guard assistance  Stand Pivot Transfers: Contact guard assistance (with and without RW)  Car Transfer: Contact guard assistance (Cues and demo for technique, in/out back seat)       Ambulation 1  Surface: level tile  Device: Rolling Walker  Assistance: Contact guard assistance  Quality of Gait: slow pace, steady. minimal shortness of breath at the longer distance. Reviewed RPE scale and how to use with walking. Distance: 75 ft, numerous smaller distances to destinations              Stairs  # Steps : 4  Stairs Height: 6\"  Rails: Bilateral  Curbs: 6\"  Device: Rolling walker  Assistance: Contact guard assistance  Comment: Cues and demo for technique to pause after ea step. Minimal c/o lightheadedness at top of 4 steps. Quickly dissipated on descent. Wheelchair Activities  Propulsion: No      EXERCISES:  The following exercises were completed to improve functional mobility: step 2 cardiac ex performed with demo for technique x8 reps   Activity Tolerance:  Activity Tolerance: Patient Tolerated treatment well    Treatment Initiated: transfer and gait trials noted above, ex noted above, steps, car transfer    Assessment:  Assessment: Pt showing minimal core weakness resulting in need for assist with bed mobility, decreased overall endurance. Pt will benefit from continued use of the RW at this time for assist with overall endurance and balance. Pt will benefit from skilled PT to further progress her balance, endurance, strength and education re: cardiac walking program for home.    Prognosis: Good    Clinical Presentation: Moderate - Evolving with Changing Characteristics: based on diagnosis, history and level of assist with evaluation    Decision Making: High Complexitybased on patient assessment and decision making process of determining plan of care and establishing reasonable expectations for measurable functional outcomes         Discharge Recommendations:  Discharge Recommendations: Continue to assess pending progress, Patient would benefit from continued therapy after discharge    Patient Education:  Patient Education: POC, sternal precautions, gait, ther ex, steps, car transfer, home walking program initiated    Equipment Recommendations:  Equipment Needed: No (Pt has RW)    Safety:  Type of devices: Left in chair, Gait belt, Call light within reach, Chair alarm in place    Plan:  Times per week: 5x/ wk 90 min, 1x/ wk 30 min  Specific instructions for Next Treatment: therex, bed mobility, transfers, sitting and standing alance, gait, steps, vehicle transfer  Current Treatment Recommendations: Strengthening, Functional Mobility Training, Transfer Training, Balance Training, Endurance Training, Gait Training, Stair training, Pain Management, Patient/Caregiver Education & Training, Safety Education & Training, Home Exercise Program, Equipment Evaluation, Education, & procurement    Goals:  Patient goals : Get moving on my own   Short term goals  Time Frame for Short term goals: 1 wk  Short term goal 1: Pt to go supine <->sit, CGA, maintaining sternal precautions, to get in/out of bed. Short term goal 2: Pt to get up/down from various seated surfaces, SBA to get up to walk. Short term goal 3: Pt to walk with RW >= 150 ft, RPE <= 13, to progress to home and community mobility, SBA  Short term goal 4: Pt to ascend/descend 4 steps with wilma rails, sBA, for home entry. Short term goal 5: Pt to get in/out of car, SBA, for transportation needs. Long term goals  Time Frame for Long term goals : 2 wks  Long term goal 1: Pt to go supine <->sit, Mod I, maintaining sternal precautions, to get in/out of bed.    Long term goal 2: Pt to get up/down from various seated surfaces, Mod I to get up to

## 2018-11-06 NOTE — PLAN OF CARE
Problem: Pain:  Goal: Pain level will decrease  Pain level will decrease   Outcome: Ongoing    Goal: Control of acute pain  Control of acute pain   Outcome: Met This Shift    Goal: Control of chronic pain  Control of chronic pain   Outcome: Met This Shift      Problem: Falls - Risk of:  Goal: Will remain free from falls  Will remain free from falls   Outcome: Ongoing  Fall precautions in place. Alarms on both the bed and chair. Two hours bathroom checks. Falling star on door and fall armband on pt. Problem: IP MOBILITY  Goal: LTG - patient will demonstrate safe mobility requirements  Outcome: Ongoing  Working with therapies for maximin improvement      Comments: Care plan reviewed with patient. Patient verbalize understanding of the plan of care and contribute to goal setting.

## 2018-11-06 NOTE — H&P
she has having pain at the RIGHT side of her rib cage where she states a chest tube had been placed. The pain is rated at 9 out of 10 and she is using a heating pad at this time for pain control. The patient states that she has good family support after discharge; she lives with her father, daughter, sister and her [de-identified]. She states that she would be interested in coming to the acute inpatient rehabilitation unit to improve her endurance, gait stability and ability for activities of daily living. In the interim the patient states that she no longer has any pain over her RIGHT sided rib cage. She does have noted urinary tract infection with Escherichia coli (noted on 11/3) and is not currently on any antibiotics prior to transferred to the acute inpatient rehabilitation unit. Previously was independent of ADLs and used no AD for ambulation prior to recent admission. Initially has ambulated 100' with RW at [facility] with PT.   With therapy is Total Assistance and CGA for bed mobility, CGA for transfers, and supervision and CGA with balance.     ROM restrictions, WB restrictions, precautions: Restrictions/Precautions: Surgical Protocols, Fall Risk  Other position/activity restrictions: s/predo sternotomy, mitral valve repair, maze procedure, placement of left ventricular epicardial lead on 10/15/18    Fall Risk    Current Rehabilitation Assessments:  Physical therapy: FIMS:  Bed Mobility: Scooting: Dependent/Total, Contact guard assistance (Using hercules bed for scooting towards HOB, CGA for scooting towards edge of chair)    Transfers: Sit to Stand: Contact guard assistance (from chair she didn't want her bear but cont to require cues for sternal precautions which she didn't follow despite us just talking about it )  Stand to sit: Contact guard assistance (again cues for sternal precautions not to use UEs ), Ambulation 1  Surface: level tile  Device: No Device  Assistance: Contact guard assistance (to occ min Date    CARDIAC CATHETERIZATION  09/20/2018    Dr. Rohit Graham. No CAD.  CARDIAC SURGERY  1970    repair hole in heart pt states was 3 yrs old.  COLONOSCOPY      PACEMAKER INSERTION  05/01/2017    [place] OFFICE/OUTPT VISIT,PROCEDURE ONLY N/A 10/15/2018    REDO STERNOTOMY, BI-ATRIAL MAZE PROCEDURE, MITRAL REPAIR, PLACEMENT OF INTRA AORTIC BALLOON PUMP performed by Gilberto Dillard MD at [address]     Allergies:    Patient has no known allergies.     Current Medications:    Current Facility-Administered Medications: atorvastatin (LIPITOR) tablet 20 mg, 20 mg, Oral, Nightly  bisacodyl (DULCOLAX) suppository 10 mg, 10 mg, Rectal, Daily PRN  [START ON 11/6/2018] famotidine (PEPCID) tablet 20 mg, 20 mg, Oral, Daily  ondansetron (ZOFRAN) injection 4 mg, 4 mg, Intravenous, Q6H PRN  acetaminophen (TYLENOL) tablet 650 mg, 650 mg, Oral, Q4H PRN  albuterol (PROVENTIL) nebulizer solution 5 mg, 5 mg, Nebulization, Q4H PRN  ALPRAZolam (XANAX) tablet 0.5 mg, 0.5 mg, Oral, Q4H PRN  amiodarone (CORDARONE) tablet 200 mg, 200 mg, Oral, BID  [START ON 11/6/2018] aspirin tablet 325 mg, 325 mg, Oral, Daily  [START ON 11/6/2018] carvedilol (COREG) tablet 3.125 mg, 3.125 mg, Oral, BID WC  diphenhydrAMINE (BENADRYL) injection 50 mg, 50 mg, Intravenous, Nightly PRN  [START ON 11/6/2018] furosemide (LASIX) tablet 40 mg, 40 mg, Oral, Daily  melatonin tablet 3 mg, 3 mg, Oral, Nightly PRN  miconazole (MICOTIN) 2 % powder, , Topical, BID  [START ON 11/6/2018] multivitamin 1 tablet, 1 tablet, Oral, Daily  [START ON 10/23/2019] potassium (CARDIAC) replacement protocol, , Other, RX Placeholder  [START ON 11/6/2018] potassium chloride (KLOR-CON M) extended release tablet 20 mEq, 20 mEq, Oral, Daily with breakfast  [START ON 11/6/2018] tiotropium (SPIRIVA) inhalation capsule 18 mcg, 18 mcg, Inhalation, Daily  docusate sodium (COLACE) capsule 100 mg, 100 mg, Oral, BID  ondansetron (ZOFRAN) tablet 4 mg, 4 mg, Oral, Q8H PRN  [START

## 2018-11-06 NOTE — PROGRESS NOTES
[place] [address]  INPATIENT OCCUPATIONAL THERAPY  [facility]  EVALUATION    Time:  Time In: 0715  Time Out: 0830  Timed Code Treatment Minutes: 61 Minutes  Minutes: 75          Date: 2018  Patient Name: Juan Padgett,   Gender: female      MRN: [id number]  : 1965  (48 y.o.)  Referring Practitioner: Dr. Roberto Farris  Diagnosis: mitral valve replacement  Additional Pertinent Hx: 48y.o. year-old female who presents for redo sternotomy, mitral valve repair, maze procedure, placement of left ventricular epicardial lead. sx on 10/15/18. Pt reintubated on 10/17/18 with self extubation and reintubation on 10/19/18. Pt with chest tubes rmoved on 10/22. extubation on 10/24/18 with hgih flow O2 started on 10/25. Pt was admitted to IPR on 18. Restrictions/Precautions:  Surgical Protocols, Fall Risk               Sternal Precautions: No Pushing, No Pulling, 5# Lifting Restrictions  Other position/activity restrictions: s/predo sternotomy, mitral valve repair, maze procedure, placement of left ventricular epicardial lead on 10/15/18       Past Medical History:   Diagnosis Date    [employer] dual pacer 2018    Chronic diastolic congestive heart failure ([address] 2018    GERD (gastroesophageal reflux disease) 3/30/2018    Last Assessment & Plan:  Continue home protonix     History of atrial septal defect repair 2018    HLD (hyperlipidemia) 3/30/2018    Last Assessment & Plan:  Cardiac diet Continue statin    Nonischemic dilated cardiomyopathy ([address] 2018    EF of 25%     Past Surgical History:   Procedure Laterality Date    CARDIAC CATHETERIZATION  2018    Dr. Andreina Su. No CAD.  CARDIAC SURGERY  1970    repair hole in heart pt states was 3 yrs old.     COLONOSCOPY      PACEMAKER INSERTION  2017    [place] OFFICE/OUTPT VISIT,PROCEDURE ONLY N/A 10/15/2018    REDO STERNOTOMY, BI-ATRIAL MAZE PROCEDURE, MITRAL REPAIR, PLACEMENT OF INTRA AORTIC BALLOON PUMP

## 2018-11-06 NOTE — PROGRESS NOTES
[facility]  Individualized Disclosure Statement      Patient: Sascha Valdovinos      [facility] [address] provides 24 hour individualized service to patients with functional limitations due to, but not limited to: stroke, brain injury, spinal cord injury, major multiple trauma, fractures, amputation, and neurological disorders. The [address] [place] [address] provides rehabilitative nursing and medical services as well as physical, occupational, speech, and recreation therapies. [facility] is fully accredited by the Commission on Accreditation of Rehabilitation Facilities (CARF) as a comprehensive provider of rehabilitation services. Patients admitted to the [address] [place] [address] receive a minimum of three hours of therapy per day, at least six days per week, with a revised therapy schedule on weekends and holidays. Physical therapy, occupational therapy, and speech therapy are provided seven days per week including holidays. Other therapeutic services are available on weekends and evenings as needed or scheduled. Intensity of Treatment  Your treatment program will consist of Nursing Care and:  1.5 hours of Physical Therapy, per day  1.5    hours of Occupational Therapy, per day   1 hours of Recreational Therapy, per week    [facility] maintains contracts with most insurance plans. Depending on the type of coverage, the insurance may impose limits on the coverage for rehabilitation care. Coverage is based on the premise that you are able to fully participate in the rehabilitation program and show continued progress. Please verify your own insurance information A copy of this was given to the patient/ family on this date.   Insurance Coverage  Your insurance company has made the following determination relative to the length of your stay:   Your estimated length of stay is 14

## 2018-11-06 NOTE — PROGRESS NOTES
Assessment: Pt showing minimal core weakness resulting in need for assist with bed mobility, decreased overall endurance. Pt will benefit from continued use of the RW at this time for assist with overall endurance and balance. Pt will benefit from skilled PT to further progress her balance, endurance, strength and education re: cardiac walking program for home. Occupational therapy: FIMS:  Eatin - Patient feeds self  Groomin - Able to perform 3-4 tasks with touching help (CGA standing sinkside hand hygiene)  Bathing: 3 - Able to bathe 5-7 areas (A for B lower legs & feet, CGA while washing bottom; sponge bath completed in recliner)  Dressing-Upper: 4 - Requires assist with buttons/zippers only and/or requires assist with one arm only (min A to pull down in back for sweatshirt )  Dressing-Lower: 2 - Requires assist with 4-5 parts of dressing (A for donning B socks (& TEDs), threading RLE in pants & CGA while pulling up)  Toiletin - Requires steadying assistance only  Toilet Transfer: 4 - Requires steadying assistance only < 25% assist (CGA ),  , Assessment: Pt demo decreased endurance & balance for ADLs at [facility]. Continued OT recommended to educate Pt on adaptative strategies & safety for increasing indep with ADLs with sternal precautions.      Speech therapy: FIMS: Comprehension: 6 - Complex ideas 90% or device (hearing aid/glasses)  Expression: 7 - Patient expresses complex ideas/needs  Social Interaction: 5 - Patient is appropriate with supervision/cues  Problem Solvin - Patient able to solve simple/routine tasks  Memory: 4 - Patient remembers 75-90%+ of the time    Review of Systems:  CONSTITUTIONAL:  positive for fatigue  EYES:  positive for  glasses  HEENT:  negative  RESPIRATORY:  negative  CARDIOVASCULAR:  negative  GASTROINTESTINAL:  negative  GENITOURINARY:  negative  SKIN:  negative  HEMATOLOGIC/LYMPHATIC:  negative  MUSCULOSKELETAL:  positive for muscle weakness and bone pain  NEUROLOGICAL:  positive for weakness  BEHAVIOR/PSYCH:  negative  10 point system review otherwise negative    Objective:  /71   Pulse 76   Temp 97.7 °F (36.5 °C) (Oral)   Resp 18   Ht 5' 6\" (1.676 m)   Wt 194 lb 6 oz (88.2 kg)   SpO2 97%   BMI 31.37 kg/m²     awake  Orientation:   person, place, time, situation  Mood: within normal limits  Affect: calm  General appearance: in no acute distress, glasses, up in [facility]     Memory:   grossly normal  Attention/Concentration: normal  Language:  abnormal - hoarse quality     Cranial Nerves:  cranial nerves II-XII are grossly intact  ROM:  abnormal - sternal precautions  Motor Exam:  Motor exam is 4 out of 5 all extremities with the exception of Sabd, EF, and EE not tested     Deep Tendon Reflexes:  Reflexes are intact and symmetrical bilaterally     Skin: warm and dry, no rash or erythema  Peripheral vascular: Pulses: Normal upper and lower extremity pulses; Edema: 1+    Diagnostics:   Recent Results (from the past 24 hour(s))   Comprehensive Metabolic Panel    Collection Time: 11/06/18  8:40 AM   Result Value Ref Range    Glucose 91 70 - 108 mg/dL    CREATININE 2.5 (H) 0.4 - 1.2 mg/dL    BUN 29 (H) 7 - 22 mg/dL    Sodium 138 135 - 145 meq/L    Potassium 4.3 3.5 - 5.2 meq/L    Chloride 96 (L) 98 - 111 meq/L    CO2 28 23 - 33 meq/L    Calcium 9.3 8.5 - 10.5 mg/dL    AST 18 5 - 40 U/L    Alkaline Phosphatase 146 (H) 38 - 126 U/L    Total Protein 7.7 6.1 - 8.0 g/dL    Alb 3.5 3.5 - 5.1 g/dL    Total Bilirubin 0.6 0.3 - 1.2 mg/dL    ALT 15 11 - 66 U/L   Vitamin D 25 Hydroxy    Collection Time: 11/06/18  8:40 AM   Result Value Ref Range    Vit D, 25-Hydroxy 23 (L) 30 - 100 ng/ml   CBC    Collection Time: 11/06/18  8:40 AM   Result Value Ref Range    Hemoglobin 9.3 (L) 12.0 - 16.0 gm/dl    Hematocrit 29.5 (L) 37.0 - 47.0 %    WBC 6.1 4.8 - 10.8 thou/mm3    RBC 3.23 (L) 4.20 - 5.40 mill/mm3    MCV 91.3 81.0 - 99.0 fL    MCH 28.8 26.0 - 33.0 pg    MCHC 31.5 (L) 32.2

## 2018-11-06 NOTE — DISCHARGE SUMMARY
was started on Midodrine to improve her hypertension and was given Aranesp 60 µg per persistent anemia. She was started on Keflex which was transitioned to Ceftin at recommendation of the pharmacy to treat her groin wound and cardiothoracic surgery ordered collagenase with follow-up in 1 week of discharge. Urinary tract infection she was initially treated with Monuril for 1 dose which was transitioned to Keflex and then Ceftin with a repeat urinalysis at time of discharge showing moderate leukocyte esterase and she was discharged with a prescription for ciprofloxacin for 7 days. The hyperkalemia was treated with a single dose of Kayexalate with good improvement and her potassium supplementation was stopped for several days; and continued at a lower dose at time of discharge. Her pain was controlled at time of discharge without requiring any further narcotic pain medicines for pain management. Details are provided below. The patient progressed well with the offered therapies and was discharged to home with Home Health Physical Therapy, Occupational Therapy, Speech Therapy, Nursing, CHF Nurse, and an aide. Issues addressed during rehabilitation stay and medication changes:   Narcotic usage:  Oxycodone Last 24 hour usage None. Decreased. Last BM:  11/14  Ambulation/Mobility:  Quality of Gait: Decreased tati and velocity. Decreased step length and heel strike. Pt. steady with no LOB but does demo inreased SOB with exertion. Distance: 150' x 1, 5 min timed walk and did 4 6\" steps     Wheelchair Activities  Propulsion: No     Acute/Rehabilitation Problems:  1. S/p redo sternotomy; mitral valve repair; LEFT atrial maze procedure, with ablation of both the anterior and posterior mitral trigones; ligation of the LEFT atrial appendage; and insertion of RIGHT common femoral intra-aortic balloon pump. 1. Wound care. 1. Keflex x 5 days for groin wounds.  Discontinued as pharmacy recommended 5 days of Ceftin BID 11/15/2018 20:02   Ref. Range 11/14/2018 22:37   Color, UA Latest Ref Range: STRAW-YELL  YELLOW   Glucose, UA Latest Ref Range: NEGATIVE mg/dl NEGATIVE   Bilirubin, Urine Latest Ref Range: NEGATIVE  NEGATIVE   Ketones, Urine Latest Ref Range: NEGATIVE  NEGATIVE   Blood, Urine Latest Ref Range: NEGATIVE  NEGATIVE   pH, UA Latest Ref Range: 5.0 - 9.0  5.5   Protein, UA Latest Ref Range: NEGATIVE  30 (A)   Urobilinogen, Urine Latest Ref Range: 0.0 - 1.0 eu/dl 0.2   Nitrite, Urine Latest Ref Range: NEGATIVE  NEGATIVE   Leukocyte Esterase, Urine Latest Ref Range: NEGATIVE  MODERATE (A)   Casts UA Latest Ref Range: NONE SEEN /lpf 4-8 HYALINE   CASTS 2 Latest Ref Range: NONE SEEN /lpf NONE SEEN   WBC, UA Latest Ref Range: 0-4/hpf /hpf 15-25   RBC, UA Latest Ref Range: 0-2/hpf /hpf 0-2   Epi Cells Latest Ref Range: 3-5/hpf /hpf 5-10   Renal Epithelial, Urine Latest Ref Range: NONE SEEN  NONE   Bacteria, UA Latest Ref Range: FEW/NONE S /hpf NONE   Yeast, Urine Latest Ref Range: NONE SEEN  NONE SEEN   Crystals Latest Ref Range: NONE SEEN  NONE SEEN   Character, Urine Latest Ref Range: CLEAR-SL C  CLOUDY (A)     Results for Jenn King (MRN [id number]) as of 11/14/2018 08:06   Ref. Range 11/6/2018 08:40 11/14/2018 05:30   Vit D, 25-Hydroxy Latest Ref Range: 30 - 100 ng/ml 23 (L) 24 (L)     Results for Jenn King (MRN [id number]) as of 11/14/2018 20:50   Ref.  Range 11/14/2018 11:29   Ferritin Latest Ref Range: 10 - 291 ng/mL 304 (H)   Iron Latest Ref Range: 50 - 170 ug/dL 82   Iron Saturation Latest Ref Range: 20 - 50 % 31   TIBC Latest Ref Range: 171 - 450 ug/dL 264     Patient Instructions:    See AVS  Follow-up visits: See after visit summary from hospitalization    Discharge Medications:   [place], [address] [facility] Medication Instructions AMP:[id number]    Printed on:11/18/18 2029   Medication Information                      amiodarone (CORDARONE) 200 MG tablet  Take 1 tablet by mouth 2 times daily             aspirin 325

## 2018-11-07 PROCEDURE — 97116 GAIT TRAINING THERAPY: CPT

## 2018-11-07 PROCEDURE — 97535 SELF CARE MNGMENT TRAINING: CPT

## 2018-11-07 PROCEDURE — 6370000000 HC RX 637 (ALT 250 FOR IP): Performed by: PHYSICAL MEDICINE & REHABILITATION

## 2018-11-07 PROCEDURE — 97530 THERAPEUTIC ACTIVITIES: CPT

## 2018-11-07 PROCEDURE — 97110 THERAPEUTIC EXERCISES: CPT

## 2018-11-07 PROCEDURE — 94640 AIRWAY INHALATION TREATMENT: CPT

## 2018-11-07 PROCEDURE — 6370000000 HC RX 637 (ALT 250 FOR IP): Performed by: THORACIC SURGERY (CARDIOTHORACIC VASCULAR SURGERY)

## 2018-11-07 PROCEDURE — 92523 SPEECH SOUND LANG COMPREHEN: CPT

## 2018-11-07 PROCEDURE — 1180000000 HC REHAB R&B

## 2018-11-07 RX ADMIN — THERA TABS 1 TABLET: TAB at 08:56

## 2018-11-07 RX ADMIN — Medication 1 CAPSULE: at 08:56

## 2018-11-07 RX ADMIN — CARVEDILOL 3.12 MG: 3.12 TABLET, FILM COATED ORAL at 08:56

## 2018-11-07 RX ADMIN — ASPIRIN 325 MG: 325 TABLET ORAL at 08:56

## 2018-11-07 RX ADMIN — CEPHALEXIN 250 MG: 250 CAPSULE ORAL at 17:23

## 2018-11-07 RX ADMIN — OXYCODONE HYDROCHLORIDE 5 MG: 5 TABLET ORAL at 22:05

## 2018-11-07 RX ADMIN — POTASSIUM CHLORIDE 20 MEQ: 20 TABLET, EXTENDED RELEASE ORAL at 08:57

## 2018-11-07 RX ADMIN — TIOTROPIUM BROMIDE 18 MCG: 18 CAPSULE ORAL; RESPIRATORY (INHALATION) at 06:21

## 2018-11-07 RX ADMIN — MICONAZOLE NITRATE: 2 POWDER TOPICAL at 09:00

## 2018-11-07 RX ADMIN — AMIODARONE HYDROCHLORIDE 200 MG: 200 TABLET ORAL at 08:56

## 2018-11-07 RX ADMIN — CEPHALEXIN 250 MG: 250 CAPSULE ORAL at 23:54

## 2018-11-07 RX ADMIN — FAMOTIDINE 20 MG: 20 TABLET ORAL at 08:57

## 2018-11-07 RX ADMIN — ATORVASTATIN CALCIUM 20 MG: 20 TABLET, FILM COATED ORAL at 22:04

## 2018-11-07 RX ADMIN — ALPRAZOLAM 0.5 MG: 0.5 TABLET ORAL at 22:05

## 2018-11-07 RX ADMIN — CEPHALEXIN 250 MG: 250 CAPSULE ORAL at 05:57

## 2018-11-07 RX ADMIN — FUROSEMIDE 40 MG: 40 TABLET ORAL at 08:56

## 2018-11-07 RX ADMIN — VITAMIN D, TAB 1000IU (100/BT) 3000 UNITS: 25 TAB at 08:55

## 2018-11-07 RX ADMIN — CARVEDILOL 3.12 MG: 3.12 TABLET, FILM COATED ORAL at 17:23

## 2018-11-07 RX ADMIN — DOCUSATE SODIUM 100 MG: 100 CAPSULE, LIQUID FILLED ORAL at 08:57

## 2018-11-07 RX ADMIN — CEPHALEXIN 250 MG: 250 CAPSULE ORAL at 12:28

## 2018-11-07 RX ADMIN — AMIODARONE HYDROCHLORIDE 200 MG: 200 TABLET ORAL at 22:04

## 2018-11-07 ASSESSMENT — PAIN DESCRIPTION - ORIENTATION: ORIENTATION: MID

## 2018-11-07 ASSESSMENT — PAIN DESCRIPTION - DESCRIPTORS: DESCRIPTORS: ACHING

## 2018-11-07 ASSESSMENT — PAIN DESCRIPTION - ONSET: ONSET: OTHER (COMMENT)

## 2018-11-07 ASSESSMENT — PAIN SCALES - GENERAL
PAINLEVEL_OUTOF10: 1
PAINLEVEL_OUTOF10: 4
PAINLEVEL_OUTOF10: 0
PAINLEVEL_OUTOF10: 4

## 2018-11-07 ASSESSMENT — PAIN DESCRIPTION - FREQUENCY: FREQUENCY: INTERMITTENT

## 2018-11-07 ASSESSMENT — PAIN DESCRIPTION - PAIN TYPE
TYPE: SURGICAL PAIN
TYPE: SURGICAL PAIN

## 2018-11-07 ASSESSMENT — PAIN DESCRIPTION - LOCATION: LOCATION: STERNUM

## 2018-11-07 ASSESSMENT — PAIN DESCRIPTION - PROGRESSION: CLINICAL_PROGRESSION: GRADUALLY IMPROVING

## 2018-11-07 NOTE — PROGRESS NOTES
term goals : 2 weeks  Long term goal 1: Complete BADL routine with S & 0-1 vcs for safe adaptative strategies  Long term goal 2: Complete simple meal prep tasks with S & 0-1 vcs for walker safety & sternal precautions

## 2018-11-07 NOTE — PROGRESS NOTES
[address] NOTE    Conference Date: 2018  Admit Date:  2018  6:55 PM  Patient Name: Sunshine Guthrie    MRN: [id number]    : 1965  (48 y.o.)  Rehabilitation Admitting Diagnosis:  Mitral valve replaced [Z95.2]  Referring Practitioner: Dr. Radha Don issues/needs regarding patient and family discharge status:  will work with patient as she explores community options available for a safe discharge. SW will continue to monitor progress and maintain contact with patient and patient's family regarding length of stay and recommendations. SW will continue to assist with ongoing discharge planning. PHYSICAL THERAPY    Assessment: Pt has made good progress, meeting 4 short term goals. Minimal core weakness still noted with pt requiring assist yet with bed mobility with the sternal precautions. Pt not yet consistent with sternal precautions recall and needed mod to max instruction yet with RPE scale and home walking program.    Equipment Needed: No (Pt has RW)    SPEECH THERAPY  Pt presents with moderate cognitive-linguistic impairment characterized by deficits in complex yes/no, verbal sequencing, delayed recall, divergent naming, problem solving, reasoning, thought organization, and mental math. During assessment pt demonstrated insight into her deficits, stating that she realized her responses were incorrect, but was unable to self-correct. Pt demonstrates excellent rehabilitation potential evidenced by her inquiries into reasoning for ST. 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 There are no barriers to therapy at this time. Due to this level of independence and desire to return to home and work environments, pt would greatly benefit from skilled [address] [place] [address] services to address the above cognitive impairments.      OCCUPATIONAL THERAPY  Pt. progressing with OT, meeting  support, Motivated, Cooperative and Pleasant  Barriers to the achievement of predicted outcomes: Pain, Confusion, Decreased endurance and Long standing deficits    Team Members Present at Conference:  [address] [place], [place]  Occupational Therapist: Rimma Peñaloza OTR/L [id number]. Physical Therapist: Austin Bullock PT [id number]  Speech Therapist: D[address], [address] Nurse: Silvia Vega RN   Psychologist: Pito Martin, PhD.    I approve the established interdisciplinary plan of care as documented within the medical record of [facility].     Franklin Bailey

## 2018-11-07 NOTE — PROGRESS NOTES
[address] [address]  SPEECH THERAPY  [facility]  Speech - Language - Cognitive Evaluation    SLP Individual Minutes  Time In: 0930  Time Out: 1000  Minutes: 30  Timed Code Treatment Minutes: 0 Minutes       Date: 2018  Patient Name: Justen Lima      MRN: [id number]    : 1965  (48 y.o.)  Gender: female   Referring Physician:  Dr. Davina Randall  Diagnosis: Mitral valve replaced  Secondary Diagnosis:  Cognitive impairment   Diet:  General  History of Present Illness/Injury: Justen Lima is a 48y.o. year-old female who presents for redo sternotomy, mitral valve repair, maze procedure, placement of left ventricular epicardial lead. See physician H&P for full report. Pt admitted to [facility], with other therapies reporting difficulty with remembering medical information. ST to assess cognitive function and determine POC. Past Medical History:   Diagnosis Date    [employer] dual pacer 2018    Chronic diastolic congestive heart failure ([facility] 2018    GERD (gastroesophageal reflux disease) 3/30/2018    Last Assessment & Plan:  Continue home protonix     History of atrial septal defect repair 2018    HLD (hyperlipidemia) 3/30/2018    Last Assessment & Plan:  Cardiac diet Continue statin    Nonischemic dilated cardiomyopathy ([facility] 2018    EF of 25%       Precautions: Fall  Pain:  0/10    Subjective:  Pt upright in chair upon completion of AM therapies. Pt stated she is tired from therapies, but knows she is \"getting better. \" Alert and cooperative for duration of session. Pt stated that she is \"slow learner\" and was anxious about her performance on the assessments. SOCIAL HISTORY: Pt lives in [place] with garland. Both her and garland manage their own fiances, cook, clean and drive. Pt is currently employed as a home health aid. She expressed a desire to return to this position upon discharge.  Her father, sister, and daughter also live in the area and are willing to assist her. When she does shopping she usually makes purchases with cash. Pt does not have hearing aids or dentures. ORAL MOTOR:  Labial / Facial:  WFL  Lingual: WFL  Soft Palate: DNT  Sensation: DNT  Dentition: WFL    SPEECH / VOICE:  No dysarthria, dysphonia, aphonia at this time. LANGUAGE:  Receptive:  1 step commands: 3/3  2 step commands: 3/3  Simple yes/no: 3/3  Complex yes/no: 2/3    Expressive:  Automatic speech:  (months)  Sentence Completion (automatic): 3/3  Confrontational naming: 3/3  Responsive naming: 3/3  Divergent naming (concrete): 8/minute, 11/minute normal   Conversational speech: Intact for basic conversational needs  Paraphasias: None present  Verbal sequencing: intact for basic tasks, breakdowns with higher level sequencing     COGNITION:  Kimball Cognitive Assessment (MOCA) version 7.1 completed. Pt scored 19/30. Normal is greater than or equal to 26/30. Orientation: 6/6  Immediate recall: 5/5  Delayed recall: 3/5  Divergent namin/minute, 11/minute normal   Problem solving: Impaired  Reasonin/5 visual, 1/2 verbal   Thought organization: Impaired   Insight: [facility]  Attention: 1/1, sustained attention   Numerical relations: 0/3 serial substraction     SWALLOWING:  Pt is on a general diet with no concerns at this time. IMPRESSIONS: Pt presents with moderate cognitive-linguistic impairment characterized by deficits in complex yes/no, verbal sequencing, delayed recall, divergent naming, problem solving, reasoning, thought organization, and mental math. During assessment pt demonstrated insight into her deficits, stating that she realized her responses were incorrect, but was unable to self-correct. Pt demonstrates excellent rehabilitation potential evidenced by her inquiries into reasoning for ST. 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 There are no barriers to therapy at this time.   Due to this level of independence and accuracy, given mod cues to improve safety of return to home environment and improve vocabulary     LONG TERM GOALS:  Long-term Goals  Timeframe for Long-term Goals: 4 weeks  Goal 1: Pt will improve cognitive function to a supervision/Mod Independent level to permit safe return to home and work environments       [employer], Speech Intern   Denise Mckeon.  [address] [place], [place], [address]

## 2018-11-07 NOTE — PROGRESS NOTES
[address] [place] [address]  INPATIENT PHYSICAL THERAPY  DAILYNOTE  [facility]IENT [facility] 7E - 7E-59/059-A    Time In: 1400  Time Out: 1430  Timed Code Treatment Minutes: 30 Minutes  Minutes: 30          Date: 2018  Patient Name: Melisa Lawton,  Gender:  female        MRN: [id number]  : 1965  (48 y.o.)     Referring Practitioner: Dr. Wilde Proper  Treatment Diagnosis: s/p mitral balve repair 10/15/2018, Lt atrial MAZE  Additional Pertinent Hx: 48y.o. year-old female who presents for redo sternotomy, mitral valve repair, maze procedure, placement of left ventricular epicardial lead. sx on 10/15/18. Pt reintubated on 10/17/18 with self extubation and reintubation on 10/19/18. Pt with chest tubes rmoved on 10/22. extubation on 10/24/18 with hgih flow O2 started on 10/25. Hemodialysis started wht runs on 10/24, 25 and . Past Medical History:   Diagnosis Date    [employer] dual pacer 2018    Chronic diastolic congestive heart failure ([address] 2018    GERD (gastroesophageal reflux disease) 3/30/2018    Last Assessment & Plan:  Continue home protonix     History of atrial septal defect repair 2018    HLD (hyperlipidemia) 3/30/2018    Last Assessment & Plan:  Cardiac diet Continue statin    Nonischemic dilated cardiomyopathy ([address] 2018    EF of 25%     Past Surgical History:   Procedure Laterality Date    CARDIAC CATHETERIZATION  2018    Dr. Matti Majano. No CAD.  CARDIAC SURGERY  1970    repair hole in heart pt states was 3 yrs old.     COLONOSCOPY      PACEMAKER INSERTION  2017    MO OFFICE/OUTPT VISIT,PROCEDURE ONLY N/A 10/15/2018    REDO STERNOTOMY, BI-ATRIAL MAZE PROCEDURE, MITRAL REPAIR, PLACEMENT OF INTRA AORTIC BALLOON PUMP performed by Renetta Almendarez MD at [address]       Restrictions/Precautions:  Surgical Protocols, Fall Risk                    Sternal Precautions: No Pushing, No Pulling, 5# Lifting Restrictions  Other position/activity restrictions: s/predo sternotomy, mitral valve repair, maze procedure, placement of left ventricular epicardial lead on 10/15/18       Prior Level of Function:  ADL Assistance: Independent  Homemaking Assistance: Independent  Ambulation Assistance: Independent  Transfer Assistance: Independent  Additional Comments: Pt reports living with SO PLOF in 2 story home-2nd story bed & bathroom. No AD used PLOF. Pt reports she plans to go home with her father at discharge. Subjective:     Subjective: Pt in recliner upon arrival.  Pleasant and cooperative. Son observed the session. Pain:   .  Pain Assessment  Pain Level:  (Not asked to rate. C/O upper thigh soreness with transfers.)       Social/Functional:  Lives With: Family (Going to stay with her father. Information is describing father's home.)  Type of Home: House  Home Layout: Two level, Able to Live on Main level with bedroom/bathroom (Bedroom available on 1st floor. Pt plans on using commode.)  Home Access: Stairs to enter with rails  Entrance Stairs - Number of Steps: 3  Entrance Stairs - Rails: Both  Home Equipment: Rolling walker     Objective:  Transfers  Sit to Stand: Stand by assistance (from [employer], w/c and recliner )  Stand to sit: Stand by assistance (Cues for safety to fully align up to the next seated surface, )       Ambulation 1  Surface: level tile;carpet;outdoors (gravel walk)  Device: Rolling Walker  Assistance: Stand by assistance  Quality of Gait: cues and demo for technique on gravel. Slow pace. Longer distance limited due to c/o thigh soreness. Distance: 20 ft x2 on gravel. 165 ft     EXERCISES:  The following exercises were completed to improve functional mobility: seated- step 2 cardiac ex performed x8 reps with S for technique prior to walking. Activity Tolerance:  Activity Tolerance: Patient Tolerated treatment well;    Assessment:  Assessment: Pt has made good progress, meeting 4 short term goals.

## 2018-11-07 NOTE — PROGRESS NOTES
admission. 1. One dose of Monurol given 11/6.  9. Nutrition:  Consultation to dietician for nutritional counseling and recommendations. 1. TotP 7.7, alb 3.5, Vitamin 25OH level of 23 on 11/6/2018. 2. Cholecalciferol 3000IU. 3. MVI. 10. Electrolytes. 1. Normal on 11/6  2. Hyperkalemia. 4.3 on 11/6. 11. Bladder: No issues. 12. Bowel: Senna, colace, MOM  13. Rehabilitation nursing will be involved for bowel, bladder, skin, and pain management. Nursing will also provide education and training to patient and family. 14. Prophylaxis:  DVT: SCDs. GI: Pepcid. 15.  and case management consultations for coordination of care and discharge planning. Chronic Problems:  1. Chronic diastolic congestive heart failure/Nonischemic dilated cardiomyopathy with EF of 25%. 2. Placement of [place] [employer] to the pacer on 5/23/2018. 3. Hyperlipidemia. 1. Lipitor. 4. History of atrial septal defect repair at age 3.  11. Gastroesophageal reflux disease. 1. Pepcid. Labs:  1. 11/6. Infectious Disease:  1. UTI.     Missed Therapy Time:  None     DME:    Discharge Plan:    Nena Mendoza MD

## 2018-11-07 NOTE — PLAN OF CARE
Problem: Pain:  Goal: Pain level will decrease  Pain level will decrease   Outcome: Ongoing    Goal: Control of acute pain  Control of acute pain   Outcome: Ongoing    Goal: Control of chronic pain  Control of chronic pain   Outcome: Ongoing      Problem: Falls - Risk of:  Goal: Will remain free from falls  Will remain free from falls   Outcome: Ongoing  Fall precautions in place. Alarms on both the bed and chair. Two hours bathroom checks. Falling star on door and fall armband on pt. Goal: Absence of physical injury  Absence of physical injury   Outcome: Ongoing  Working with therapies for maximin improvement      Problem: Nutrition  Goal: Optimal nutrition therapy  Outcome: Ongoing  Working with therapies for maximin improvement      Comments: Care plan reviewed with patient. Patient  verbalize understanding of the plan of care and contribute to goal setting.

## 2018-11-07 NOTE — PROGRESS NOTES
[facility]  INPATIENT PHYSICAL THERAPY  PROGRESSNOTE  [facility] INPATIENT REHAB 7E - 7E-59/059-A    Time In: 1000  Time Out: 1100  Timed Code Treatment Minutes: 60 Minutes  Minutes: 60          Date: 2018  Patient Name: Skye Haro,  Gender:  female        MRN: [id number]  : 1965  (48 y.o.)     Referring Practitioner: Dr. Alvarez [place]  Treatment Diagnosis: s/p mitral balve repair 10/15/2018, Lt atrial MAZE  Additional Pertinent Hx: 48y.o. year-old female who presents for redo sternotomy, mitral valve repair, maze procedure, placement of left ventricular epicardial lead. sx on 10/15/18. Pt reintubated on 10/17/18 with self extubation and reintubation on 10/19/18. Pt with chest tubes rmoved on 10/22. extubation on 10/24/18 with hgih flow O2 started on 10/25. Hemodialysis started wht runs on 10/24, 25 and . Past Medical History:   Diagnosis Date    [employer] dual pacer 2018    Chronic diastolic congestive [address] failure ([facility] [facility] 2018    GERD (gastroesophageal reflux disease) 3/30/2018    Last Assessment & Plan:  Continue home protonix     History of atrial septal defect repair 2018    HLD (hyperlipidemia) 3/30/2018    Last Assessment & Plan:  Cardiac diet Continue statin    Nonischemic dilated cardiomyopathy ([facility] 2018    EF of 25%     Past Surgical History:   Procedure Laterality Date    CARDIAC CATHETERIZATION  2018    Dr. Amira Cristobal. No CAD.  CARDIAC SURGERY  1970    repair hole in heart pt states was 3 yrs old.     COLONOSCOPY      PACEMAKER INSERTION  2017    OR OFFICE/OUTPT VISIT,PROCEDURE ONLY N/A 10/15/2018    REDO STERNOTOMY, BI-ATRIAL MAZE PROCEDURE, MITRAL REPAIR, PLACEMENT OF INTRA AORTIC BALLOON PUMP performed by Ismael Sol MD at [address]       Restrictions/Precautions:  Surgical Protocols, Fall Risk                    Sternal Precautions: No Pushing, No Pulling, 5# Lifting Restrictions  Other position/activity restrictions: s/predo sternotomy, mitral valve repair, maze procedure, placement of left ventricular epicardial lead on 10/15/18       Prior Level of Function:  ADL Assistance: Independent  Homemaking Assistance: Independent  Ambulation Assistance: Independent  Transfer Assistance: Independent  Additional Comments: Pt reports living with SO PLOF in 2 story home-2nd story bed & bathroom. No AD used PLOF. Pt reports she plans to go home with her father at discharge. Subjective:     Subjective: Pt in recliner upon arrival.  Pleasant and cooperative. Mother observed the session. Pain:   .  Pain Assessment  Pain Level: 0 (at rest.  min c/o wilma thigh pain with transfers )       Social/Functional:  Lives With: Family (Going to stay with her father. Information is describing father's home.)  Type of Home: House  Home Layout: Two level, Able to Live on Main level with bedroom/bathroom (Bedroom available on 1st floor. Pt plans on using commode.)  Home Access: Stairs to enter with rails  Entrance Stairs - Number of Steps: 3  Entrance Stairs - Rails: Both  Home Equipment: Rolling walker     Objective:  Rolling to Left: Modified independent  Supine to Sit: Minimal assistance (and verbal cues at trunk to elevate coming up from LT side. 2 trials )  Sit to Supine: Stand by assistance (cue to avoid stabilizing with LT elbow, going to LT side)    Transfers  Sit to Stand: Stand by assistance. Trials from recliner, kitchen chair at table, w/c. Pt using a combination of momentum to assist.    Stand to sit: Stand by assistance. Occasional verbal cue to maintain sternal precautions. Car transfer into back seat; SBA       Ambulation 1  Surface: level tile;carpet  Device: Rolling Walker  Assistance: Stand by assistance  Quality of Gait: slow pace, steady. minimal shortness of breath . Reviewed RPE scale and how to use it with walking to assist with determination of length of walk.   Pt needed verbal cues

## 2018-11-08 PROCEDURE — 97530 THERAPEUTIC ACTIVITIES: CPT

## 2018-11-08 PROCEDURE — 6370000000 HC RX 637 (ALT 250 FOR IP): Performed by: THORACIC SURGERY (CARDIOTHORACIC VASCULAR SURGERY)

## 2018-11-08 PROCEDURE — 6370000000 HC RX 637 (ALT 250 FOR IP): Performed by: PHYSICAL MEDICINE & REHABILITATION

## 2018-11-08 PROCEDURE — 1180000000 HC REHAB R&B

## 2018-11-08 PROCEDURE — 94640 AIRWAY INHALATION TREATMENT: CPT

## 2018-11-08 PROCEDURE — 97116 GAIT TRAINING THERAPY: CPT

## 2018-11-08 PROCEDURE — 97535 SELF CARE MNGMENT TRAINING: CPT

## 2018-11-08 PROCEDURE — 97110 THERAPEUTIC EXERCISES: CPT

## 2018-11-08 PROCEDURE — 97127 HC SP THER IVNTJ W/FOCUS COG FUNCJ: CPT | Performed by: SPEECH-LANGUAGE PATHOLOGIST

## 2018-11-08 RX ORDER — ZINC SULFATE 50(220)MG
220 CAPSULE ORAL DAILY
Status: DISCONTINUED | OUTPATIENT
Start: 2018-11-08 | End: 2018-11-15 | Stop reason: HOSPADM

## 2018-11-08 RX ORDER — CEFUROXIME AXETIL 250 MG/1
250 TABLET ORAL 2 TIMES DAILY
Status: COMPLETED | OUTPATIENT
Start: 2018-11-08 | End: 2018-11-13

## 2018-11-08 RX ADMIN — POTASSIUM CHLORIDE 20 MEQ: 20 TABLET, EXTENDED RELEASE ORAL at 08:53

## 2018-11-08 RX ADMIN — MICONAZOLE NITRATE: 2 POWDER TOPICAL at 08:54

## 2018-11-08 RX ADMIN — THERA TABS 1 TABLET: TAB at 08:53

## 2018-11-08 RX ADMIN — AMIODARONE HYDROCHLORIDE 200 MG: 200 TABLET ORAL at 08:53

## 2018-11-08 RX ADMIN — Medication 1 CAPSULE: at 08:53

## 2018-11-08 RX ADMIN — CEFUROXIME AXETIL 250 MG: 250 TABLET ORAL at 21:02

## 2018-11-08 RX ADMIN — ACETAMINOPHEN 650 MG: 500 TABLET, FILM COATED ORAL at 09:40

## 2018-11-08 RX ADMIN — CARVEDILOL 3.12 MG: 3.12 TABLET, FILM COATED ORAL at 08:53

## 2018-11-08 RX ADMIN — DOCUSATE SODIUM 100 MG: 100 CAPSULE, LIQUID FILLED ORAL at 08:53

## 2018-11-08 RX ADMIN — FUROSEMIDE 40 MG: 40 TABLET ORAL at 08:53

## 2018-11-08 RX ADMIN — ATORVASTATIN CALCIUM 20 MG: 20 TABLET, FILM COATED ORAL at 21:03

## 2018-11-08 RX ADMIN — FAMOTIDINE 20 MG: 20 TABLET ORAL at 08:53

## 2018-11-08 RX ADMIN — TIOTROPIUM BROMIDE 18 MCG: 18 CAPSULE ORAL; RESPIRATORY (INHALATION) at 06:40

## 2018-11-08 RX ADMIN — ASPIRIN 325 MG: 325 TABLET ORAL at 08:53

## 2018-11-08 RX ADMIN — CARVEDILOL 3.12 MG: 3.12 TABLET, FILM COATED ORAL at 17:20

## 2018-11-08 RX ADMIN — AMIODARONE HYDROCHLORIDE 200 MG: 200 TABLET ORAL at 21:02

## 2018-11-08 RX ADMIN — OXYCODONE HYDROCHLORIDE 5 MG: 5 TABLET ORAL at 21:03

## 2018-11-08 RX ADMIN — CEPHALEXIN 250 MG: 250 CAPSULE ORAL at 12:51

## 2018-11-08 RX ADMIN — ALPRAZOLAM 0.5 MG: 0.5 TABLET ORAL at 21:03

## 2018-11-08 RX ADMIN — VITAMIN D, TAB 1000IU (100/BT) 3000 UNITS: 25 TAB at 08:53

## 2018-11-08 RX ADMIN — CEPHALEXIN 250 MG: 250 CAPSULE ORAL at 05:52

## 2018-11-08 RX ADMIN — ZINC SULFATE 220 MG (50 MG) CAPSULE 220 MG: CAPSULE at 17:19

## 2018-11-08 ASSESSMENT — PAIN SCALES - GENERAL
PAINLEVEL_OUTOF10: 0
PAINLEVEL_OUTOF10: 3
PAINLEVEL_OUTOF10: 0
PAINLEVEL_OUTOF10: 4
PAINLEVEL_OUTOF10: 4
PAINLEVEL_OUTOF10: 0
PAINLEVEL_OUTOF10: 0
PAINLEVEL_OUTOF10: 6
PAINLEVEL_OUTOF10: 0
PAINLEVEL_OUTOF10: 0

## 2018-11-08 ASSESSMENT — PAIN DESCRIPTION - PAIN TYPE: TYPE: SURGICAL PAIN

## 2018-11-08 ASSESSMENT — PAIN DESCRIPTION - LOCATION: LOCATION: STERNUM

## 2018-11-08 NOTE — PROGRESS NOTES
[place] [address]  INPATIENT OCCUPATIONAL THERAPY  [facility] INPATIENT REHAB 7E  DAILY NOTE    Time:  Time In: 0700  Time Out: 0830  Timed Code Treatment Minutes: 90 Minutes  Minutes: 90        Date: 2018  Patient Name: Kori Sargent,   Gender: female      Room: [facility]/059-A  MRN: [id number]  : 1965  (48 y.o.)  Referring Practitioner: Dr. Sumanth Pereyra  Diagnosis: mitral valve replacement  Additional Pertinent Hx: 48y.o. year-old female who presents for redo sternotomy, mitral valve repair, maze procedure, placement of left ventricular epicardial lead. sx on 10/15/18. Pt reintubated on 10/17/18 with self extubation and reintubation on 10/19/18. Pt with chest tubes rmoved on 10/22. extubation on 10/24/18 with hgih flow O2 started on 10/25. Pt was admitted to [facility] on 18. Past Medical History:   Diagnosis Date    [employer] dual pacer 2018    Chronic diastolic congestive heart failure ([address] 2018    GERD (gastroesophageal reflux disease) 3/30/2018    Last Assessment & Plan:  Continue home protonix     History of atrial septal defect repair 2018    HLD (hyperlipidemia) 3/30/2018    Last Assessment & Plan:  Cardiac diet Continue statin    Nonischemic dilated cardiomyopathy ([facility] 2018    EF of 25%     Past Surgical History:   Procedure Laterality Date    CARDIAC CATHETERIZATION  2018    Dr. Noemi Villafuerte. No CAD.  CARDIAC SURGERY  1970    repair hole in heart pt states was 3 yrs old.     COLONOSCOPY      PACEMAKER INSERTION  2017    NE OFFICE/OUTPT VISIT,PROCEDURE ONLY N/A 10/15/2018    REDO STERNOTOMY, BI-ATRIAL MAZE PROCEDURE, MITRAL REPAIR, PLACEMENT OF INTRA AORTIC BALLOON PUMP performed by Pravin Monahan MD at [address]       Restrictions/Precautions:  Surgical Protocols, Fall Risk      Sternal Precautions: No Pushing, No Pulling, 5# Lifting Restrictions  Other position/activity restrictions: s/predo sternotomy, mitral valve repair,

## 2018-11-08 NOTE — PROGRESS NOTES
[facility]  INPATIENT PHYSICAL THERAPY  DAILY NOTE  [facility] INPATIENT REHAB 7E - 7O-36/278-E    Time In: 1130  Time Out: 1230  Timed Code Treatment Minutes: 60 Minutes  Minutes: 60          Date: 2018  Patient Name: Any Singh,  Gender:  female        MRN: [id number]  : 1965  (48 y.o.)     Referring Practitioner: Dr. Manda Davison  Treatment Diagnosis: s/p mitral balve repair 10/15/2018, Lt atrial MAZE  Additional Pertinent Hx: 48y.o. year-old female who presents for redo sternotomy, mitral valve repair, maze procedure, placement of left ventricular epicardial lead. sx on 10/15/18. Pt reintubated on 10/17/18 with self extubation and reintubation on 10/19/18. Pt with chest tubes rmoved on 10/22. extubation on 10/24/18 with hgih flow O2 started on 10/25. Hemodialysis started wht runs on 10/24,  and , . Past Medical History:   Diagnosis Date    [employer] dual pacer 2018    Chronic diastolic congestive heart failure ([facility] [facility] 2018    GERD (gastroesophageal reflux disease) 3/30/2018    Last Assessment & Plan:  Continue home protonix     History of atrial septal defect repair 2018    HLD (hyperlipidemia) 3/30/2018    Last Assessment & Plan:  Cardiac diet Continue statin    Nonischemic dilated cardiomyopathy ([facility] 2018    EF of 25%     Past Surgical History:   Procedure Laterality Date    CARDIAC CATHETERIZATION  2018    Dr. Elke Llanes. No CAD.  CARDIAC SURGERY  1970    repair hole in heart pt states was 3 yrs old.     COLONOSCOPY      PACEMAKER INSERTION  2017    IN OFFICE/OUTPT VISIT,PROCEDURE ONLY N/A 10/15/2018    REDO STERNOTOMY, BI-ATRIAL MAZE PROCEDURE, MITRAL REPAIR, PLACEMENT OF INTRA AORTIC BALLOON PUMP performed by Titus Granados MD at [address]       Restrictions/Precautions:  Surgical Protocols, Fall Risk                    Sternal Precautions: No Pushing, No Pulling, 5# Lifting Restrictions  Other

## 2018-11-08 NOTE — PROGRESS NOTES
between  feet (Patient performs 25-49% of locomotion effort or goes between  feet)  Distance Walked: 145 ft  Stairs: 2- Maximal Assistance Performs 25-49% of the effort to go up and down 4 to 6 stairs and requires the assistance of one person only, PT Equipment Recommendations  Equipment Needed: No (Pt has RW), Assessment: Patient tolerated session fairly well,  limited due to overall decreased strength and endurance in LE's and core. Patient still requires assist for bed mobility to get sitting up and also requires frequent cues for maintaining sternal precautions. Will need additional skilled PT to increase strength, endurance, balance and safety for improved functional mobiltiy. Occupational therapy: FIMS:  Eatin - Patient feeds self  Groomin - Requires setup/cues to do all tasks  Bathin - Able to bathe 8-9 areas (pt participated in shower this date with assist for incision care)  Dressing-Upper: 5 - Requires setup/supervision/cues and/or requires assist with presthesis/brace only  Dressing-Lower: 5 - Requires setup/supervision/cues and/or staff applies TEDS/prosthesis/brace only (donning socks and pants)  Toiletin - Requires setup/supervision/cues  Toilet Transfer: 5 - Requires setup/supervision/cues  Tub Transfer: 4 - Minimal contact assistance, pt. expends 75% or more effort (while stepping into/out of tub with use of grab bar for balance),  , Assessment: Pt. progressing with OT, meeting 2/4 goals. Pt. is completing ADLs with min A. Completing transfers with SBA and min cues for sternal precautions. Pt. has low endurance however it is improving.  Pt. would benefit from continued OT services on inpatient rehab for increased strength and endurance for IADLs/ADLs    Speech therapy: FIMS: Comprehension: 4 - Patient understands basic needs 75-90%+ of the time  Expression: 4 - Expresses basic ideas/needs 75-90%+ of the time  Social Interaction: 4 - Patient appropriate 75-90%+ of

## 2018-11-08 NOTE — FLOWSHEET NOTE
11/08/18 1748   Encounter Summary   Services provided to: Patient   Referral/Consult From: Shaan   Continue Visiting Yes  (11/08/18)   Complexity of Encounter Moderate   Length of Encounter 15 minutes   Spiritual/Scientologist   Type Spiritual support   Assessment Approachable; Hopeful   Intervention Active listening;Nurtured hope;Prayer   Outcome Expressed gratitude   11/08/18  S. Patient was sitting up in bed. Getting ready to eat dinner. Room was bright. O. Patient stated she was making good progress. Looking forward to going home. A. Patient will be here for a few more days. Prayed with her. P. Recommend spiritual care follow up to encourage this patient.

## 2018-11-08 NOTE — PLAN OF CARE
Problem: Discharge Planning:  Intervention: Involve patient/S. O. in Discharge Planning process  [facility]  Physical Medicine Case Management Assessment    [x] Inpatient Rehabilitation Unit  [] Transitional Care Unit    Patient Name: Dwayne Jaime        MRN: [id number]    : 1965  (48 y.o.)  Gender: female   Date of Admission: 2018  6:55 PM    Family/Social/Home Environment: Social/Functional History: The Patient was admitted to hospital for a Mitral valve replacement. Patient admitted to rehab for intensive therapies in the wake of her recent medical complications. Patient lives in a home with her significant other but will be discharging to her Father's home once released from Rehab to fully recover prior to going back to live at the home. Patient works full time as a home health aide and plans on returning to work once she has made a full recovery. Patient has a very supportive family,she has 4 children with whom she reports she has a very good relationship with and see's them often. The patient was engaged in conversation and able to express thoughts and answer questions appropriately. Sw explained role in discharge planning, informed patient of team conference day and community options that are available. Care plan reviewed with patient . Patient  verbalized understanding of the plan of care and contributed to goal setting. SW will continue to monitor progress and maintain contact with patient and patient's family regarding length of stay and recommendations. SW will continue to assist with ongoing discharge planning.   Lives With: Significant other  Type of Home: House  Home Layout: Two level, Able to Live on Main level with bedroom/bathroom  Home Access: Stairs to enter with rails  Entrance Stairs - Number of Steps: 3  Entrance Stairs - Rails: Both  Bathroom Shower/Tub: Tub/Shower unit (on 2nd level)  Bathroom Toilet: Bedside commode  Home Equipment: Rolling walker  ADL Assistance:

## 2018-11-09 LAB
ORGANISM: ABNORMAL
URINE CULTURE REFLEX: ABNORMAL

## 2018-11-09 PROCEDURE — 97127 HC SP THER IVNTJ W/FOCUS COG FUNCJ: CPT | Performed by: SPEECH-LANGUAGE PATHOLOGIST

## 2018-11-09 PROCEDURE — 6370000000 HC RX 637 (ALT 250 FOR IP): Performed by: PHYSICAL MEDICINE & REHABILITATION

## 2018-11-09 PROCEDURE — 94640 AIRWAY INHALATION TREATMENT: CPT

## 2018-11-09 PROCEDURE — 97110 THERAPEUTIC EXERCISES: CPT

## 2018-11-09 PROCEDURE — 97535 SELF CARE MNGMENT TRAINING: CPT

## 2018-11-09 PROCEDURE — 6370000000 HC RX 637 (ALT 250 FOR IP): Performed by: THORACIC SURGERY (CARDIOTHORACIC VASCULAR SURGERY)

## 2018-11-09 PROCEDURE — 97116 GAIT TRAINING THERAPY: CPT

## 2018-11-09 PROCEDURE — 2709999900 HC NON-CHARGEABLE SUPPLY

## 2018-11-09 PROCEDURE — 97530 THERAPEUTIC ACTIVITIES: CPT

## 2018-11-09 PROCEDURE — 1180000000 HC REHAB R&B

## 2018-11-09 RX ADMIN — POTASSIUM CHLORIDE 20 MEQ: 20 TABLET, EXTENDED RELEASE ORAL at 09:14

## 2018-11-09 RX ADMIN — CARVEDILOL 3.12 MG: 3.12 TABLET, FILM COATED ORAL at 18:12

## 2018-11-09 RX ADMIN — CEFUROXIME AXETIL 250 MG: 250 TABLET ORAL at 09:16

## 2018-11-09 RX ADMIN — ACETAMINOPHEN 650 MG: 500 TABLET, FILM COATED ORAL at 14:02

## 2018-11-09 RX ADMIN — ACETAMINOPHEN 650 MG: 500 TABLET, FILM COATED ORAL at 04:26

## 2018-11-09 RX ADMIN — ASPIRIN 325 MG: 325 TABLET ORAL at 09:14

## 2018-11-09 RX ADMIN — ALPRAZOLAM 0.5 MG: 0.5 TABLET ORAL at 20:49

## 2018-11-09 RX ADMIN — THERA TABS 1 TABLET: TAB at 09:14

## 2018-11-09 RX ADMIN — DOCUSATE SODIUM 100 MG: 100 CAPSULE, LIQUID FILLED ORAL at 09:15

## 2018-11-09 RX ADMIN — Medication 1 CAPSULE: at 09:18

## 2018-11-09 RX ADMIN — AMIODARONE HYDROCHLORIDE 200 MG: 200 TABLET ORAL at 09:19

## 2018-11-09 RX ADMIN — FAMOTIDINE 20 MG: 20 TABLET ORAL at 09:14

## 2018-11-09 RX ADMIN — MICONAZOLE NITRATE: 2 POWDER TOPICAL at 22:27

## 2018-11-09 RX ADMIN — FUROSEMIDE 40 MG: 40 TABLET ORAL at 09:18

## 2018-11-09 RX ADMIN — ZINC SULFATE 220 MG (50 MG) CAPSULE 220 MG: CAPSULE at 09:19

## 2018-11-09 RX ADMIN — CARVEDILOL 3.12 MG: 3.12 TABLET, FILM COATED ORAL at 09:16

## 2018-11-09 RX ADMIN — ATORVASTATIN CALCIUM 20 MG: 20 TABLET, FILM COATED ORAL at 20:50

## 2018-11-09 RX ADMIN — TIOTROPIUM BROMIDE 18 MCG: 18 CAPSULE ORAL; RESPIRATORY (INHALATION) at 06:04

## 2018-11-09 RX ADMIN — AMIODARONE HYDROCHLORIDE 200 MG: 200 TABLET ORAL at 20:50

## 2018-11-09 RX ADMIN — MICONAZOLE NITRATE: 2 POWDER TOPICAL at 09:15

## 2018-11-09 RX ADMIN — VITAMIN D, TAB 1000IU (100/BT) 3000 UNITS: 25 TAB at 09:17

## 2018-11-09 RX ADMIN — CEFUROXIME AXETIL 250 MG: 250 TABLET ORAL at 20:49

## 2018-11-09 RX ADMIN — OXYCODONE HYDROCHLORIDE 5 MG: 5 TABLET ORAL at 20:49

## 2018-11-09 ASSESSMENT — PAIN DESCRIPTION - ORIENTATION: ORIENTATION: MID

## 2018-11-09 ASSESSMENT — PAIN SCALES - GENERAL
PAINLEVEL_OUTOF10: 0
PAINLEVEL_OUTOF10: 2
PAINLEVEL_OUTOF10: 5
PAINLEVEL_OUTOF10: 5
PAINLEVEL_OUTOF10: 0
PAINLEVEL_OUTOF10: 7
PAINLEVEL_OUTOF10: 7
PAINLEVEL_OUTOF10: 0
PAINLEVEL_OUTOF10: 7
PAINLEVEL_OUTOF10: 0

## 2018-11-09 ASSESSMENT — PAIN DESCRIPTION - DESCRIPTORS: DESCRIPTORS: ACHING

## 2018-11-09 ASSESSMENT — PAIN DESCRIPTION - PAIN TYPE: TYPE: SURGICAL PAIN

## 2018-11-09 ASSESSMENT — PAIN DESCRIPTION - LOCATION: LOCATION: STERNUM

## 2018-11-09 NOTE — PROGRESS NOTES
Physical Medicine & Rehabilitation  Progress Note    Chief Complaint:  s/p mitral valve repair, LEFT atrial MAZE; and ligation of LEFT atrial appendage and cardioversion    Subjective:  Patient not happy she can't have chicken pot pie due to diet restrictions; mood better after approving this. No concerns at this time. Rehabilitation:  Physical therapy: FIMS:  Bed Mobility: Scooting: Stand by assistance (cues for maintaining sternal precautions)    Transfers: Sit to Stand: Stand by assistance (occasioanl hand placement cue)  Stand to sit: Stand by assistance, Contact guard assistance (occasional hand placement cue, cue to control descent)  Stand Pivot Transfers: Contact guard assistance (with and without RW)  Comment: Reviewed RPE scale, symptoms of exercise intolerance and format for walking program at home. Pt was unable to recall any aspects of the discussion of her home walking program reviewed in the am session.    Will issue handouts , Ambulation 1  Surface: level tile  Device: Rolling Walker  Assistance: Stand by assistance  Quality of Gait: slowed tati and velocity, short step lengths, downward gaze, cues to relax shoulders and for posture  Distance: 180'x2 and short distances in gym  Comments: pt c/o fatigue with gait, Stairs  # Steps : 4  Stairs Height: 6\"  Rails: Bilateral  Curbs: 6\"  Device: No Device  Assistance: Stand by assistance  Comment: good technique with 5' pauses and step to, cue to avoid pressure on UEs, mildly SOB    FIMS: Bed, Chair, Wheel Chair: 3 - Requires 25-49% assistance to transfer  Walk: 2 - Maximal Assistance Requires up to Maximal Assistance AND requires assistance of one person to walk between  feet (Patient performs 25-49% of locomotion effort or goes between  feet)  Distance Walked: 145 ft  Stairs: 2- Maximal Assistance Performs 25-49% of the effort to go up and down 4 to 6 stairs and requires the assistance of one person only, PT Equipment

## 2018-11-09 NOTE — PROGRESS NOTES
position/activity restrictions: s/predo sternotomy, mitral valve repair, maze procedure, placement of left ventricular epicardial lead on 10/15/18       Prior Level of Function:  ADL Assistance: Independent  Homemaking Assistance: Independent  Ambulation Assistance: Independent  Transfer Assistance: Independent  Additional Comments: Pt reports living with SO PLOF in 2 story home-2nd story bed & bathroom. No AD used PLOF. Pt reports she plans to go home with her father at discharge. Subjective:     Subjective: pt in bathroom upon arrival and agrees to therapy. pt needing encouragement throughout session. Monitored SpO2 throughout session:  remained >92% HR 75-82 throughout. Pain:  Yes (reporting soreness in BLEs did not quantify). Social/Functional:  Lives With: Significant other  Type of Home: House  Home Layout: Two level, Able to Live on Main level with bedroom/bathroom  Home Access: Stairs to enter with rails  Entrance Stairs - Number of Steps: 3  Entrance Stairs - Rails: Both  Home Equipment: Rolling walker     Objective:       Transfers  Sit to Stand: Stand by assistance (occasioanl cue for hand placement)  Stand to sit: Stand by assistance;Contact guard assistance (cues for avoid plopping down, occasional hand placement cue, cues 1x not to leave RW and to turn all the way around and line up LEs before just sitting down)       Ambulation 1  Surface: level tile  Device: Rolling Walker  Assistance: Stand by assistance  Quality of Gait: slowed tati and velocity, short step lengths, downward gaze, cues to relax shoulders and for posture  Distance: 180'x2 and short distances in gym  Comments: pt c/o fatigue with gait    Stairs  # Steps : 4  Stairs Height: 6\"  Rails: Bilateral  Device: No Device  Assistance: Stand by assistance  Comment: good technique with 5' pauses and step to, cue to avoid pressure on UEs, mildly SOB    Balance  Comments: functional tasks in bathroom with CGA/SBA.  able to complete clothing and pericare. pt stood at [facility] and reached for rings and tossed them at a target on floor. pt reached outside of BRIA in all directions with CGA. groslly steady. fatigued quickly and c/o SOB    Exercises:  Exercises  Comments: NuStep level 2 x5mins with multiple short rest breaks. pt pedaling with LEs only due to sternal rpecautions. pt also completed level 2 CABG UE therex and stanidng BLE therex in // bars for improved strength and mobility x15 reps heel raises, marching, hs curls, glute sets, hip ab/add. cues for technique and encouragement needed to cont. pt fatigued and needed multiple rest breaks throughout. Activity Tolerance:  Activity Tolerance: Patient Tolerated treatment well;Patient limited by fatigue;Patient limited by endurance    Assessment: Body structures, Functions, Activity limitations: Decreased functional mobility , Decreased strength, Decreased endurance, Decreased balance  Assessment: pt tolerated session fair. pt demos decreased strength, functional tolerance and will benefit from cont PT at this time to improve mobility and return to PLOF  Prognosis: Good        Discharge Recommendations:  Discharge Recommendations: Continue to assess pending progress, Patient would benefit from continued therapy after discharge    Patient Education:  Patient Education: POC, precautions, steps, gait, transfers    Equipment Recommendations:  Equipment Needed: No (Pt has RW)    Safety:  Type of devices:  All fall risk precautions in place, Call light within reach, Chair alarm in place, Left in chair, Gait belt, Patient at risk for falls    Plan:  Times per week: 5x/ wk 90 min, 1x/ wk 30 min  Specific instructions for Next Treatment: therex, bed mobility, transfers, sitting and standing alance, gait, steps, vehicle transfer  Current Treatment Recommendations: Strengthening, Functional Mobility Training, Transfer Training, Balance Training, Endurance Training, Gait Training, Stair training,

## 2018-11-09 NOTE — PROGRESS NOTES
Frame for Long term goals : 2 wks  Long term goal 1: Pt to go supine <->sit, Mod I, maintaining sternal precautions, to get in/out of bed. Long term goal 2: Pt to get up/down from various seated surfaces, Mod I to get up to walk. Long term goal 3: Pt to walk with RW >= 150 ft, RPE <= 13, to progress to home and community mobility. Mod I  Long term goal 4: Pt to ascend/descend 4+ steps with wilma rails, Mod I, for home entry. Long term goal 5: Pt to get in/out of car, Mod I, for transportation needs.

## 2018-11-09 NOTE — PROGRESS NOTES
additional therapy and follow up appointments for medical care and that she should consider use of a personal calendar for improved retention. SHORT TERM GOAL #3:  Goal 3: Pt will complete functional verbal sequencing and thought organization tasks (including word finding) with 70% accuracy, given mod cues to improve safety of return to home environment and improve vocabulary   INTERVENTIONS: Calendar task- Pt given a monthly calendar and 10 events with prompts to place all the events on the calendar- 10/10 independently with good success. Thought organization for task completion was [facility].      COMMUNICATION  Comprehension: 5 - Patient understands basic needs (hungry/hot/pain)  Expression: 5 - Expresses basic ideas/needs only (hungry/hot/pain)  SOCIAL COGNITION  Social Interaction: 5 - Patient is appropriate with supervision/cues  Problem Solvin - Patient solves simple/routine tasks 75-90%+   Memory: 4 - Patient remembers 75-90%+ of the time    ASSESSMENT:  Assessment: [x]Progressing towards goals          []Not Progressing towards goals       Patient Tolerance of Treatment:   [x]Tolerated well []Tolerated fair - pain []Required rest breaks []Fatigued  Plan for Next Session: reasoning and problem solving tasks, working memory task, review compensatory memory strategies, thought organization/sequencing tasks  Education:  Learner:  [x]Patient          []Significant Other          []Other  Education provided regarding:  [x]Goals and POC   []Diet and swallowing precautions    []Home Exercise Program  [x]Progress and/or discharge information  Method of Education:  [x]Discussion          [x]Demonstration          []Handout          []Other  Evaluation of Education:   [x]Verbalized understanding   []Demonstrates without assistance  []Demonstrates with assistance  [x]Needs further instruction     []No evidence of learning                  [x]No family present      Plan: [x]Continue with current plan of

## 2018-11-09 NOTE — PROGRESS NOTES
Alert and oriented to person, place, and time. EDUARDO 4mm to 3mm, brisk. Mucous membranes pink and moist. Hair shiny. Skin warm and dry. Speech raspy. Denies difficulty swallowing. Lung sounds clear anteriorly. Diminished in the posterior lower lobes. Wheezing heard on inhalation bilaterally posterior lobes. Respirations unlabored. Productive cough noted. Moderate amount of clear, thin sputum. Chest incision clean, dry, intact, edges approximated. No redness, swelling, or drainage. Open to air. Abdomen soft and rounded. Denies pain with palpation. Bowel sounds active in all four quadrants. Denies passing gas. Mid abdomen puncture site clean, dry intact. No redness or drainage. Radial pulses strong and equal. Hand grasp strong bilaterally. Negative arm drift. Capillary refill less than 3 seconds. Skin turgor brisk. Bilateral groin wounds redness, scant amount of tan drainage,  secured with ABD dressing. Perineal Edema +1 non pitting. Edema +2 pitting, bilateral left and right lower extremities. Pedal pulses weak bilaterally. Pedal push and pull strong and equal. Negative homans sign. Denies numbness and tingling. Up in chair with wheels locked. Call light and bedside table in reach. Denies further needs at this time.

## 2018-11-10 PROCEDURE — 97110 THERAPEUTIC EXERCISES: CPT

## 2018-11-10 PROCEDURE — 97127 HC SP THER IVNTJ W/FOCUS COG FUNCJ: CPT

## 2018-11-10 PROCEDURE — 97530 THERAPEUTIC ACTIVITIES: CPT

## 2018-11-10 PROCEDURE — 97535 SELF CARE MNGMENT TRAINING: CPT

## 2018-11-10 PROCEDURE — 6370000000 HC RX 637 (ALT 250 FOR IP): Performed by: THORACIC SURGERY (CARDIOTHORACIC VASCULAR SURGERY)

## 2018-11-10 PROCEDURE — 94640 AIRWAY INHALATION TREATMENT: CPT

## 2018-11-10 PROCEDURE — 6370000000 HC RX 637 (ALT 250 FOR IP): Performed by: PHYSICAL MEDICINE & REHABILITATION

## 2018-11-10 PROCEDURE — 1180000000 HC REHAB R&B

## 2018-11-10 PROCEDURE — 97116 GAIT TRAINING THERAPY: CPT

## 2018-11-10 PROCEDURE — 6360000002 HC RX W HCPCS: Performed by: PHYSICAL MEDICINE & REHABILITATION

## 2018-11-10 RX ORDER — OXYCODONE HYDROCHLORIDE 5 MG/1
5 TABLET ORAL ONCE
Status: COMPLETED | OUTPATIENT
Start: 2018-11-10 | End: 2018-11-10

## 2018-11-10 RX ADMIN — FAMOTIDINE 20 MG: 20 TABLET ORAL at 08:39

## 2018-11-10 RX ADMIN — CEFUROXIME AXETIL 250 MG: 250 TABLET ORAL at 20:57

## 2018-11-10 RX ADMIN — Medication 1 CAPSULE: at 08:39

## 2018-11-10 RX ADMIN — ALPRAZOLAM 0.5 MG: 0.5 TABLET ORAL at 20:55

## 2018-11-10 RX ADMIN — THERA TABS 1 TABLET: TAB at 08:39

## 2018-11-10 RX ADMIN — AMIODARONE HYDROCHLORIDE 200 MG: 200 TABLET ORAL at 20:57

## 2018-11-10 RX ADMIN — CARVEDILOL 3.12 MG: 3.12 TABLET, FILM COATED ORAL at 08:40

## 2018-11-10 RX ADMIN — FUROSEMIDE 40 MG: 40 TABLET ORAL at 08:39

## 2018-11-10 RX ADMIN — ONDANSETRON HYDROCHLORIDE 4 MG: 4 TABLET, FILM COATED ORAL at 08:39

## 2018-11-10 RX ADMIN — OXYCODONE HYDROCHLORIDE 5 MG: 5 TABLET ORAL at 20:55

## 2018-11-10 RX ADMIN — TIOTROPIUM BROMIDE 18 MCG: 18 CAPSULE ORAL; RESPIRATORY (INHALATION) at 06:20

## 2018-11-10 RX ADMIN — DOCUSATE SODIUM 100 MG: 100 CAPSULE, LIQUID FILLED ORAL at 08:40

## 2018-11-10 RX ADMIN — OXYCODONE HYDROCHLORIDE 5 MG: 5 TABLET ORAL at 00:21

## 2018-11-10 RX ADMIN — ATORVASTATIN CALCIUM 20 MG: 20 TABLET, FILM COATED ORAL at 20:57

## 2018-11-10 RX ADMIN — MICONAZOLE NITRATE: 2 POWDER TOPICAL at 08:42

## 2018-11-10 RX ADMIN — ZINC SULFATE 220 MG (50 MG) CAPSULE 220 MG: CAPSULE at 08:39

## 2018-11-10 RX ADMIN — CEFUROXIME AXETIL 250 MG: 250 TABLET ORAL at 08:40

## 2018-11-10 RX ADMIN — AMIODARONE HYDROCHLORIDE 200 MG: 200 TABLET ORAL at 08:39

## 2018-11-10 RX ADMIN — POTASSIUM CHLORIDE 20 MEQ: 20 TABLET, EXTENDED RELEASE ORAL at 08:39

## 2018-11-10 RX ADMIN — VITAMIN D, TAB 1000IU (100/BT) 3000 UNITS: 25 TAB at 08:39

## 2018-11-10 RX ADMIN — ALPRAZOLAM 0.5 MG: 0.5 TABLET ORAL at 08:39

## 2018-11-10 RX ADMIN — ASPIRIN 325 MG: 325 TABLET ORAL at 08:39

## 2018-11-10 ASSESSMENT — PAIN SCALES - GENERAL
PAINLEVEL_OUTOF10: 0
PAINLEVEL_OUTOF10: 10
PAINLEVEL_OUTOF10: 0
PAINLEVEL_OUTOF10: 6

## 2018-11-10 ASSESSMENT — PAIN DESCRIPTION - DESCRIPTORS: DESCRIPTORS: ACHING

## 2018-11-10 ASSESSMENT — PAIN DESCRIPTION - ORIENTATION: ORIENTATION: MID

## 2018-11-10 ASSESSMENT — PAIN DESCRIPTION - PAIN TYPE: TYPE: ACUTE PAIN;SURGICAL PAIN

## 2018-11-10 ASSESSMENT — PAIN DESCRIPTION - PROGRESSION: CLINICAL_PROGRESSION: GRADUALLY IMPROVING

## 2018-11-10 ASSESSMENT — PAIN DESCRIPTION - FREQUENCY: FREQUENCY: INTERMITTENT

## 2018-11-10 ASSESSMENT — PAIN DESCRIPTION - LOCATION: LOCATION: STERNUM

## 2018-11-10 NOTE — PROGRESS NOTES
Fair  Comments: Patient performed 5' on 2 inch foam ring toss with patient stating difficulty. No LOB, however patient occasionally utilizing // to stabilize self. Exercises:  Exercises  Comments: Patient performed NuStep x 5 min, verbal cues required for energy conservation. Patient in a hurry to know what activity she is going to do next. Activity Tolerance:  Activity Tolerance: Patient Tolerated treatment well;Patient limited by fatigue;Patient limited by endurance    Assessment: Body structures, Functions, Activity limitations: Decreased functional mobility , Decreased strength, Decreased endurance, Decreased balance  Assessment: Patient tolerated session well. Patient demos decreased strength, functional activity tolerance and would benefit from continued skilled therapy at this time to assist improve mobility and return to safe PLOF. Prognosis: Good    Discharge Recommendations:  Discharge Recommendations: Continue to assess pending progress, Patient would benefit from continued therapy after discharge    Patient Education:  Patient Education: POC, precautions, gait, transfers    Equipment Recommendations:  Equipment Needed: No (Pt has RW)    Safety:  Type of devices:  All fall risk precautions in place, Call light within reach, Chair alarm in place, Left in chair, Gait belt, Patient at risk for falls    Plan:  Times per week: 5x/ wk 90 min, 1x/ wk 30 min  Specific instructions for Next Treatment: therex, bed mobility, transfers, sitting and standing alance, gait, steps, vehicle transfer  Current Treatment Recommendations: Strengthening, Functional Mobility Training, Transfer Training, Balance Training, Endurance Training, Gait Training, Stair training, Pain Management, Patient/Caregiver Education & Training, Safety Education & Training, Home Exercise Program, Equipment Evaluation, Education, & procurement    Goals:  Patient goals : Get moving on my own     Short term goals  Time Frame for Short term

## 2018-11-10 NOTE — PROGRESS NOTES
[place] [facility]  INPATIENT OCCUPATIONAL THERAPY  STR INPATIENT REHAB 7E  DAILY NOTE    Time:  Time In: 1400  Time Out: 1430  Timed Code Treatment Minutes: 30 Minutes  Minutes: 30     Date: 11/10/2018  Patient Name: Mo Lemus,   Gender: female      Room: [facility]59/059-A  MRN: [id number]  : 1965  (48 y.o.)  Referring Practitioner: Dr. Rajwinder Romo  Diagnosis: mitral valve replacement  Additional Pertinent Hx: 48y.o. year-old female who presents for redo sternotomy, mitral valve repair, maze procedure, placement of left ventricular epicardial lead. sx on 10/15/18. Pt reintubated on 10/17/18 with self extubation and reintubation on 10/19/18. Pt with chest tubes rmoved on 10/22. extubation on 10/24/18 with hgih flow O2 started on 10/25. Pt was [facility] to [facility] on 18. Past Medical History:   Diagnosis Date    [employer] dual pacer 2018    Chronic diastolic congestive heart failure ([address] 2018    GERD (gastroesophageal reflux disease) 3/30/2018    Last Assessment & Plan:  Continue home protonix     History of atrial septal defect repair 2018    HLD (hyperlipidemia) 3/30/2018    Last Assessment & Plan:  Cardiac diet Continue statin    Nonischemic dilated cardiomyopathy ([facility] [address] 2018    EF of 25%     Past Surgical History:   Procedure Laterality Date    CARDIAC CATHETERIZATION  2018    Dr. Rui Barth. No CAD.  CARDIAC SURGERY  1970    repair hole in heart pt states was 3 yrs old.     COLONOSCOPY      PACEMAKER INSERTION  2017    [place] OFFICE/OUTPT VISIT,PROCEDURE ONLY N/A 10/15/2018    REDO STERNOTOMY, BI-ATRIAL MAZE PROCEDURE, MITRAL REPAIR, PLACEMENT OF INTRA AORTIC BALLOON PUMP performed by Ledy Stringer MD at [address]       Restrictions/Precautions:  Surgical Protocols, Fall Risk   Sternal Precautions: No Pushing, No Pulling, 5# Lifting Restrictions  Other position/activity restrictions: s/predo sternotomy, mitral valve repair, maze AE prn  Short term goal 5:     Long term goals  Time Frame for Long term goals : 2 weeks  Long term goal 1: Complete BADL routine with S & 0-1 vcs for safe adaptative strategies  Long term goal 2: Complete simple meal prep tasks with S & 0-1 vcs for walker safety & sternal precautions

## 2018-11-11 ENCOUNTER — APPOINTMENT (OUTPATIENT)
Dept: GENERAL RADIOLOGY | Age: 53
DRG: 163 | End: 2018-11-11
Attending: PHYSICAL MEDICINE & REHABILITATION
Payer: COMMERCIAL

## 2018-11-11 PROCEDURE — 6370000000 HC RX 637 (ALT 250 FOR IP): Performed by: THORACIC SURGERY (CARDIOTHORACIC VASCULAR SURGERY)

## 2018-11-11 PROCEDURE — 2709999900 HC NON-CHARGEABLE SUPPLY

## 2018-11-11 PROCEDURE — 94640 AIRWAY INHALATION TREATMENT: CPT

## 2018-11-11 PROCEDURE — 6370000000 HC RX 637 (ALT 250 FOR IP): Performed by: PHYSICAL MEDICINE & REHABILITATION

## 2018-11-11 PROCEDURE — 6370000000 HC RX 637 (ALT 250 FOR IP): Performed by: PHYSICAL THERAPY ASSISTANT

## 2018-11-11 PROCEDURE — 71046 X-RAY EXAM CHEST 2 VIEWS: CPT

## 2018-11-11 PROCEDURE — 1180000000 HC REHAB R&B

## 2018-11-11 RX ADMIN — MICONAZOLE NITRATE: 2 POWDER TOPICAL at 22:32

## 2018-11-11 RX ADMIN — DOCUSATE SODIUM 100 MG: 100 CAPSULE, LIQUID FILLED ORAL at 10:16

## 2018-11-11 RX ADMIN — TIOTROPIUM BROMIDE 18 MCG: 18 CAPSULE ORAL; RESPIRATORY (INHALATION) at 05:59

## 2018-11-11 RX ADMIN — OXYCODONE HYDROCHLORIDE 5 MG: 5 TABLET ORAL at 10:16

## 2018-11-11 RX ADMIN — Medication 1 CAPSULE: at 10:17

## 2018-11-11 RX ADMIN — VITAMIN D, TAB 1000IU (100/BT) 3000 UNITS: 25 TAB at 10:17

## 2018-11-11 RX ADMIN — FUROSEMIDE 40 MG: 40 TABLET ORAL at 10:16

## 2018-11-11 RX ADMIN — MICONAZOLE NITRATE: 2 POWDER TOPICAL at 10:17

## 2018-11-11 RX ADMIN — CEFUROXIME AXETIL 250 MG: 250 TABLET ORAL at 10:15

## 2018-11-11 RX ADMIN — THERA TABS 1 TABLET: TAB at 10:17

## 2018-11-11 RX ADMIN — CEFUROXIME AXETIL 250 MG: 250 TABLET ORAL at 20:44

## 2018-11-11 RX ADMIN — AMIODARONE HYDROCHLORIDE 200 MG: 200 TABLET ORAL at 10:16

## 2018-11-11 RX ADMIN — Medication 3.3 MG: at 03:55

## 2018-11-11 RX ADMIN — ZINC SULFATE 220 MG (50 MG) CAPSULE 220 MG: CAPSULE at 10:17

## 2018-11-11 RX ADMIN — POTASSIUM CHLORIDE 20 MEQ: 20 TABLET, EXTENDED RELEASE ORAL at 10:16

## 2018-11-11 RX ADMIN — AMIODARONE HYDROCHLORIDE 200 MG: 200 TABLET ORAL at 20:46

## 2018-11-11 RX ADMIN — ASPIRIN 325 MG: 325 TABLET ORAL at 10:16

## 2018-11-11 RX ADMIN — OXYCODONE HYDROCHLORIDE 5 MG: 5 TABLET ORAL at 03:54

## 2018-11-11 RX ADMIN — ATORVASTATIN CALCIUM 20 MG: 20 TABLET, FILM COATED ORAL at 20:44

## 2018-11-11 RX ADMIN — ALPRAZOLAM 0.5 MG: 0.5 TABLET ORAL at 20:44

## 2018-11-11 RX ADMIN — FAMOTIDINE 20 MG: 20 TABLET ORAL at 10:16

## 2018-11-11 RX ADMIN — OXYCODONE HYDROCHLORIDE 5 MG: 5 TABLET ORAL at 20:44

## 2018-11-11 RX ADMIN — ALPRAZOLAM 0.5 MG: 0.5 TABLET ORAL at 08:56

## 2018-11-11 ASSESSMENT — PAIN DESCRIPTION - LOCATION: LOCATION: STERNUM

## 2018-11-11 ASSESSMENT — PAIN DESCRIPTION - PAIN TYPE: TYPE: ACUTE PAIN;SURGICAL PAIN

## 2018-11-11 ASSESSMENT — PAIN SCALES - GENERAL
PAINLEVEL_OUTOF10: 7
PAINLEVEL_OUTOF10: 3
PAINLEVEL_OUTOF10: 6
PAINLEVEL_OUTOF10: 7
PAINLEVEL_OUTOF10: 0

## 2018-11-11 ASSESSMENT — PAIN DESCRIPTION - PROGRESSION: CLINICAL_PROGRESSION: GRADUALLY IMPROVING

## 2018-11-11 ASSESSMENT — PAIN DESCRIPTION - FREQUENCY: FREQUENCY: INTERMITTENT

## 2018-11-11 ASSESSMENT — PAIN DESCRIPTION - ORIENTATION: ORIENTATION: MID

## 2018-11-11 ASSESSMENT — PAIN DESCRIPTION - DESCRIPTORS: DESCRIPTORS: ACHING

## 2018-11-11 NOTE — PLAN OF CARE
Problem: Pain:  Goal: Pain level will decrease  Pain level will decrease   Outcome: Ongoing  Has prn oxycodone prescribed. Requested to have when next available dose is. Problem: Falls - Risk of:  Goal: Will remain free from falls  Will remain free from falls   Outcome: Ongoing  Patient was free from falls this shift. Is on falling star program, staff members hourly rounding. Bed and chair alarms on at all times. Problem: Nutrition  Goal: Optimal nutrition therapy  Outcome: Ongoing  See intake. Problem: Risk for Impaired Skin Integrity  Goal: Tissue integrity - skin and mucous membranes  Structural intactness and normal physiological function of skin and  mucous membranes. Outcome: Ongoing  Skin warm dry, mucous membranes pink and intact. Has groin incisions and sternal incision. Problem: Infection - Surgical Site:  Goal: Will show no infection signs and symptoms  Will show no infection signs and symptoms   Outcome: Ongoing  Patient has bilateral incisions on groin that are yellow and have scant drainage. Is on antibiotic. Problem: Discharge Planning:  Goal: Patients continuum of care needs are met  Patients continuum of care needs are met   Outcome: Ongoing  Daily care needs were met this shift. Comments: Care plan reviewed with patient and boyfriend. Patient and boyfriend verbalize understanding of the plan of care and contribute to goal setting.

## 2018-11-12 ENCOUNTER — APPOINTMENT (OUTPATIENT)
Dept: GENERAL RADIOLOGY | Age: 53
DRG: 163 | End: 2018-11-12
Attending: PHYSICAL MEDICINE & REHABILITATION
Payer: COMMERCIAL

## 2018-11-12 LAB
ANION GAP SERPL CALCULATED.3IONS-SCNC: 11 MEQ/L (ref 8–16)
BUN BLDV-MCNC: 28 MG/DL (ref 7–22)
CALCIUM SERPL-MCNC: 9.2 MG/DL (ref 8.5–10.5)
CHLORIDE BLD-SCNC: 100 MEQ/L (ref 98–111)
CO2: 25 MEQ/L (ref 23–33)
CREAT SERPL-MCNC: 2.6 MG/DL (ref 0.4–1.2)
ERYTHROCYTE [DISTWIDTH] IN BLOOD BY AUTOMATED COUNT: 19.9 % (ref 11.5–14.5)
ERYTHROCYTE [DISTWIDTH] IN BLOOD BY AUTOMATED COUNT: 67.6 FL (ref 35–45)
FLU A ANTIGEN: NEGATIVE
FLU B ANTIGEN: NEGATIVE
GFR SERPL CREATININE-BSD FRML MDRD: 19 ML/MIN/1.73M2
GLUCOSE BLD-MCNC: 93 MG/DL (ref 70–108)
HCT VFR BLD CALC: 29.4 % (ref 37–47)
HEMOGLOBIN: 9.1 GM/DL (ref 12–16)
MCH RBC QN AUTO: 29.2 PG (ref 26–33)
MCHC RBC AUTO-ENTMCNC: 31 GM/DL (ref 32.2–35.5)
MCV RBC AUTO: 94.2 FL (ref 81–99)
PLATELET # BLD: 182 THOU/MM3 (ref 130–400)
PMV BLD AUTO: 9.4 FL (ref 9.4–12.4)
POTASSIUM SERPL-SCNC: 5.5 MEQ/L (ref 3.5–5.2)
PROCALCITONIN: 0.18 NG/ML (ref 0.01–0.09)
RBC # BLD: 3.12 MILL/MM3 (ref 4.2–5.4)
SCAN OF BLOOD SMEAR: NORMAL
SODIUM BLD-SCNC: 136 MEQ/L (ref 135–145)
WBC # BLD: 6.4 THOU/MM3 (ref 4.8–10.8)

## 2018-11-12 PROCEDURE — 6370000000 HC RX 637 (ALT 250 FOR IP): Performed by: PHYSICAL MEDICINE & REHABILITATION

## 2018-11-12 PROCEDURE — 36415 COLL VENOUS BLD VENIPUNCTURE: CPT

## 2018-11-12 PROCEDURE — 97116 GAIT TRAINING THERAPY: CPT

## 2018-11-12 PROCEDURE — 87804 INFLUENZA ASSAY W/OPTIC: CPT

## 2018-11-12 PROCEDURE — 97530 THERAPEUTIC ACTIVITIES: CPT

## 2018-11-12 PROCEDURE — 84145 PROCALCITONIN (PCT): CPT

## 2018-11-12 PROCEDURE — 6360000002 HC RX W HCPCS: Performed by: PHYSICAL MEDICINE & REHABILITATION

## 2018-11-12 PROCEDURE — 1180000000 HC REHAB R&B

## 2018-11-12 PROCEDURE — 71045 X-RAY EXAM CHEST 1 VIEW: CPT

## 2018-11-12 PROCEDURE — 97110 THERAPEUTIC EXERCISES: CPT

## 2018-11-12 PROCEDURE — 97127 HC SP THER IVNTJ W/FOCUS COG FUNCJ: CPT

## 2018-11-12 PROCEDURE — 80048 BASIC METABOLIC PNL TOTAL CA: CPT

## 2018-11-12 PROCEDURE — 6370000000 HC RX 637 (ALT 250 FOR IP): Performed by: THORACIC SURGERY (CARDIOTHORACIC VASCULAR SURGERY)

## 2018-11-12 PROCEDURE — 97535 SELF CARE MNGMENT TRAINING: CPT

## 2018-11-12 PROCEDURE — 2709999900 HC NON-CHARGEABLE SUPPLY

## 2018-11-12 PROCEDURE — 94640 AIRWAY INHALATION TREATMENT: CPT

## 2018-11-12 PROCEDURE — 85027 COMPLETE CBC AUTOMATED: CPT

## 2018-11-12 RX ORDER — SODIUM POLYSTYRENE SULFONATE 4.1 MEQ/G
15 POWDER, FOR SUSPENSION ORAL; RECTAL ONCE
Status: COMPLETED | OUTPATIENT
Start: 2018-11-12 | End: 2018-11-12

## 2018-11-12 RX ADMIN — ZINC SULFATE 220 MG (50 MG) CAPSULE 220 MG: CAPSULE at 08:35

## 2018-11-12 RX ADMIN — DOCUSATE SODIUM 100 MG: 100 CAPSULE, LIQUID FILLED ORAL at 08:32

## 2018-11-12 RX ADMIN — ACETAMINOPHEN 650 MG: 500 TABLET, FILM COATED ORAL at 08:29

## 2018-11-12 RX ADMIN — ACETAMINOPHEN 650 MG: 500 TABLET, FILM COATED ORAL at 01:57

## 2018-11-12 RX ADMIN — THERA TABS 1 TABLET: TAB at 08:31

## 2018-11-12 RX ADMIN — ALPRAZOLAM 0.5 MG: 0.5 TABLET ORAL at 22:06

## 2018-11-12 RX ADMIN — CEFUROXIME AXETIL 250 MG: 250 TABLET ORAL at 08:32

## 2018-11-12 RX ADMIN — AMIODARONE HYDROCHLORIDE 200 MG: 200 TABLET ORAL at 22:08

## 2018-11-12 RX ADMIN — AMIODARONE HYDROCHLORIDE 200 MG: 200 TABLET ORAL at 08:31

## 2018-11-12 RX ADMIN — CARVEDILOL 3.12 MG: 3.12 TABLET, FILM COATED ORAL at 17:43

## 2018-11-12 RX ADMIN — FAMOTIDINE 20 MG: 20 TABLET ORAL at 08:32

## 2018-11-12 RX ADMIN — ONDANSETRON HYDROCHLORIDE 4 MG: 4 TABLET, FILM COATED ORAL at 10:46

## 2018-11-12 RX ADMIN — VITAMIN D, TAB 1000IU (100/BT) 3000 UNITS: 25 TAB at 08:29

## 2018-11-12 RX ADMIN — SODIUM POLYSTYRENE SULFONATE 15 G: 1 POWDER ORAL; RECTAL at 22:05

## 2018-11-12 RX ADMIN — ASPIRIN 325 MG: 325 TABLET ORAL at 08:38

## 2018-11-12 RX ADMIN — OXYCODONE HYDROCHLORIDE 5 MG: 5 TABLET ORAL at 22:06

## 2018-11-12 RX ADMIN — ATORVASTATIN CALCIUM 20 MG: 20 TABLET, FILM COATED ORAL at 22:08

## 2018-11-12 RX ADMIN — CARVEDILOL 3.12 MG: 3.12 TABLET, FILM COATED ORAL at 08:31

## 2018-11-12 RX ADMIN — CEFUROXIME AXETIL 250 MG: 250 TABLET ORAL at 22:08

## 2018-11-12 RX ADMIN — POTASSIUM CHLORIDE 20 MEQ: 20 TABLET, EXTENDED RELEASE ORAL at 08:38

## 2018-11-12 RX ADMIN — ALPRAZOLAM 0.5 MG: 0.5 TABLET ORAL at 01:57

## 2018-11-12 RX ADMIN — MICONAZOLE NITRATE: 2 POWDER TOPICAL at 08:33

## 2018-11-12 RX ADMIN — FUROSEMIDE 40 MG: 40 TABLET ORAL at 08:34

## 2018-11-12 RX ADMIN — TIOTROPIUM BROMIDE 18 MCG: 18 CAPSULE ORAL; RESPIRATORY (INHALATION) at 10:00

## 2018-11-12 ASSESSMENT — PAIN SCALES - GENERAL
PAINLEVEL_OUTOF10: 3
PAINLEVEL_OUTOF10: 5
PAINLEVEL_OUTOF10: 4
PAINLEVEL_OUTOF10: 4

## 2018-11-12 NOTE — PROGRESS NOTES
one person to walk between  feet (Patient performs 25-49% of locomotion effort or goes between  feet)  Distance Walked: 125 ft  Stairs: 2- Maximal Assistance Performs 25-49% of the effort to go up and down 4 to 6 stairs and requires the assistance of one person only, PT Equipment Recommendations  Equipment Needed: No (Pt has RW), Assessment: Pt. tolerated session fair. Pt. not feeling well this date and requires encouragement to participate. Pt. requests to lay back in bed at end of session. Pt. requires multiple rest breaks throughout session. Pt. would benefit from continued skilled PT to increase strength and endurance to enhance functional mobility. Occupational therapy: FIMS:  Eatin - Patient feeds self  Groomin - Requires setup/cues to do all tasks  Bathin - Able to bathe all 10 areas with setup/sup/cues (supervision . completed sponge bath per pt request as she wasn't feeling well.)  Dressing-Upper: 5 - Requires setup/supervision/cues and/or requires assist with presthesis/brace only  Dressing-Lower: 5 - Requires setup/supervision/cues and/or staff applies TEDS/prosthesis/brace only  Toiletin - Requires setup/supervision/cues  Toilet Transfer: 5 - Requires setup/supervision/cues  Tub Transfer: 5 - Supervision, set-up, cues (stepping over tub ),  , Assessment: Pt. progressing with OT, meeting 2/4 goals. Pt. is completing ADLs with min A. Completing transfers with SBA and min cues for sternal precautions. Pt. has low endurance however it is improving.  Pt. would benefit from continued OT services on inpatient rehab for increased strength and endurance for IADLs/ADLs    Speech therapy: FIMS: Comprehension: 5 - Patient understands basic needs (hungry/hot/pain)  Expression: 5 - Expresses basic ideas/needs only (hungry/hot/pain)  Social Interaction: 6 - Patient requires medication for mood and/or effect  Problem Solvin - Patient solves simple/routine tasks 75-90%+   Memory: 5 - Patient requires prompting with stress/unfamiliar situations    Review of Systems:  CONSTITUTIONAL:  positive for fatigue  EYES:  positive for  glasses  HEENT:  negative  RESPIRATORY: positive for dyspnea  CARDIOVASCULAR:  negative  GASTROINTESTINAL:  negative  GENITOURINARY:  negative  SKIN:  negative  HEMATOLOGIC/LYMPHATIC:  negative  MUSCULOSKELETAL:  positive for muscle weakness and bone pain  NEUROLOGICAL:  positive for weakness  BEHAVIOR/PSYCH:  negative  10 point system review otherwise negative    Objective:  /64   Pulse 71   Temp 97.9 °F (36.6 °C) Comment: 97.9  Resp 16 Comment: 16  Ht 5' 6\" (1.676 m)   Wt 187 lb 3.2 oz (84.9 kg)   SpO2 96%   BMI 30.21 kg/m²     awake  Orientation:   person, place, time, situation  Mood: within normal limits  Affect: calm  General appearance: in no acute distress, glasses, up in chair     Memory:   grossly normal  Attention/Concentration: normal  Language:  abnormal - hoarse quality    ROM:  abnormal - sternal precautions  Motor Exam:  Motor exam is 4 out of 5 all extremities with the exception of Sabd, EF, and EE not tested     Deep Tendon Reflexes:  Reflexes are intact and symmetrical bilaterally     Skin: warm and dry, no rash or erythema, sternal incision healed, groin wounds with noted slough  Peripheral vascular: Pulses: Normal upper and lower extremity pulses; Edema: 1+    Diagnostics:   Recent Results (from the past 24 hour(s))   Rapid influenza A/B antigens    Collection Time: 11/12/18  1:45 PM   Result Value Ref Range    Flu A Antigen NEGATIVE NEGATIVE    Flu B Antigen NEGATIVE NEGATIVE   CBC    Collection Time: 11/12/18  5:24 PM   Result Value Ref Range    WBC 6.4 4.8 - 10.8 thou/mm3    RBC 3.12 (L) 4.20 - 5.40 mill/mm3    Hemoglobin 9.1 (L) 12.0 - 16.0 gm/dl    Hematocrit 29.4 (L) 37.0 - 47.0 %    MCV 94.2 81.0 - 99.0 fL    MCH 29.2 26.0 - 33.0 pg    MCHC 31.0 (L) 32.2 - 35.5 gm/dl    RDW-CV 19.9 (H) 11.5 - 14.5 %    RDW-SD 67.6 (H) 35.0 - 45.0 fL

## 2018-11-12 NOTE — PROGRESS NOTES
[facility]  INPATIENT PHYSICAL THERAPY  DAILYNOTE  [facility] INPATIENT REHAB 7E - 1A-08/967-P    Time In: 1430  Time Out: 1500  Timed Code Treatment Minutes: 30 Minutes  Minutes: 30          Date: 2018  Patient Name: Severo So,  Gender:  female        MRN: [id number]  : 1965  (48 y.o.)     Referring Practitioner: Dr. Tom Alberto  Treatment Diagnosis: s/p mitral balve repair 10/15/2018, Lt atrial MAZE  Additional Pertinent Hx: 48y.o. year-old female who presents for redo sternotomy, mitral valve repair, maze procedure, placement of left ventricular epicardial lead. sx on 10/15/18. Pt reintubated on 10/17/18 with self extubation and reintubation on 10/19/18. Pt with chest tubes rmoved on 10/22. extubation on 10/24/18 with hgih flow O2 started on 10/25. Hemodialysis started wht runs on 10/24,  and . Past Medical History:   Diagnosis Date    [employer] dual pacer 2018    Chronic diastolic congestive heart failure ([address] 2018    GERD (gastroesophageal reflux disease) 3/30/2018    Last Assessment & Plan:  Continue home protonix     History of atrial septal defect repair 2018    HLD (hyperlipidemia) 3/30/2018    Last Assessment & Plan:  Cardiac diet Continue statin    Nonischemic dilated [address] [address] 2018    EF of 25%     Past Surgical History:   Procedure Laterality Date    CARDIAC CATHETERIZATION  2018    Dr. Archie Briseno. No CAD.  CARDIAC SURGERY  1970    repair hole in heart pt states was 3 yrs old.     COLONOSCOPY      PACEMAKER INSERTION  2017    [place] OFFICE/OUTPT VISIT,PROCEDURE ONLY N/A 10/15/2018    REDO STERNOTOMY, BI-ATRIAL MAZE PROCEDURE, MITRAL REPAIR, PLACEMENT OF INTRA AORTIC BALLOON PUMP performed by Saritha Bryant MD at [address]       Restrictions/Precautions:  Surgical Protocols, Fall Risk                    Sternal Precautions: No Pushing, No Pulling, 5# Lifting Restrictions  Other seated surfaces, SBA to get up to walk. MET see LTG  Short term goal 3: Pt to walk with RW >= 150 ft, RPE <= 13, to progress to home and community mobility, SBA MET see LTG  Short term goal 4: Pt to ascend/descend 4 steps with wilma rails, sBA, for home entry. MET see LTG  Short term goal 5: Pt to get in/out of car, SBA, for transportation needs. MET see LTG    Long term goals  Time Frame for Long term goals : 2 wks  Long term goal 1: Pt to go supine <->sit, Mod I, maintaining sternal precautions, to get in/out of bed. Long term goal 2: Pt to get up/down from various seated surfaces, Mod I to get up to walk. Long term goal 3: Pt to walk with RW >= 150 ft, RPE <= 13, to progress to home and community mobility. Mod I  Long term goal 4: Pt to ascend/descend 4+ steps with wilma rails, Mod I, for home entry. Long term goal 5: Pt to get in/out of car, Mod I, for transportation needs.

## 2018-11-12 NOTE — PROGRESS NOTES
see LTG    Long term goals  Time Frame for Long term goals : 2 wks  Long term goal 1: Pt to go supine <->sit, Mod I, maintaining sternal precautions, to get in/out of bed. Long term goal 2: Pt to get up/down from various seated surfaces, Mod I to get up to walk. Long term goal 3: Pt to walk with RW >= 150 ft, RPE <= 13, to progress to home and community mobility. Mod I  Long term goal 4: Pt to ascend/descend 4+ steps with wilma rails, Mod I, for home entry. Long term goal 5: Pt to get in/out of car, Mod I, for transportation needs.

## 2018-11-12 NOTE — PROGRESS NOTES
procedure, placement of left ventricular epicardial lead on 10/15/18    Prior Level of Function:  ADL Assistance: Independent  Homemaking Assistance: Independent  Ambulation Assistance: Independent  Transfer Assistance: Independent  Additional Comments: Pt reports living with SO PLOF in 2 story home-2nd story bed & bathroom. No AD used PLOF. Pt reports she plans to go home with her father at discharge. Subjective  Subjective: Patient supine upon arrival, agreeable to OT with mod encouragement . Reports feeling, \"blah\" and \"flu like\" - RN Saritha and RN Elyn Phalen aware. Pt declining shower, agreeable to sponge bath and 'light' activity. Pain:  Pain Assessment  Patient Currently in Pain: Denies    Objective  Overall Cognitive Status: WFL    Transfers  Sit to stand: Supervision  Stand to sit: Supervision    Balance  Sitting Balance: Modified independent   Standing Balance: Supervision   Time: in prep to ambulate, x2 min during LB ADLs      Functional Mobility  Functional - Mobility Device: No device  Assist Level: Stand by assistance  Functional Mobility Comments: Within room only. Declined to ambulate far distances d/t fatigue and nausea      Additional Activities: Discussed ECT within ADLs & IADLs - pt was given several functional tasks and asked to rate high vs low energy consuming - pt with 100% accuracy with these tasks. Pt was then able to correctly plan day with appropriate rest breaks and 0 VCs .      Type of ROM/Therapeutic Exercise: AROM  Comment: Following BUE AROM ex completed this date to improve UE endurance and challenge strength needed for ADLs    Exercises  Shoulder Flexion: x 10 reps to 90 degrees   Shoulder ABduction: x 10 reps to 90 degrees  Horizontal ABduction: x 10 reps   Horizontal ADduction: x 10 reps   Elbow Flexion: x 10 reps   Elbow Extension: x 10 reps   Wrist Flexion: x 10 reps   Wrist Extension: x 10 reps   Other: Chest Press x 10 reps

## 2018-11-13 LAB
ANION GAP SERPL CALCULATED.3IONS-SCNC: 13 MEQ/L (ref 8–16)
BUN BLDV-MCNC: 25 MG/DL (ref 7–22)
CALCIUM SERPL-MCNC: 8.9 MG/DL (ref 8.5–10.5)
CHLORIDE BLD-SCNC: 101 MEQ/L (ref 98–111)
CO2: 23 MEQ/L (ref 23–33)
CREAT SERPL-MCNC: 2.1 MG/DL (ref 0.4–1.2)
GFR SERPL CREATININE-BSD FRML MDRD: 25 ML/MIN/1.73M2
GLUCOSE BLD-MCNC: 94 MG/DL (ref 70–108)
POTASSIUM SERPL-SCNC: 4.4 MEQ/L (ref 3.5–5.2)
SODIUM BLD-SCNC: 137 MEQ/L (ref 135–145)

## 2018-11-13 PROCEDURE — 99253 IP/OBS CNSLTJ NEW/EST LOW 45: CPT | Performed by: INTERNAL MEDICINE

## 2018-11-13 PROCEDURE — 6370000000 HC RX 637 (ALT 250 FOR IP): Performed by: PHYSICAL MEDICINE & REHABILITATION

## 2018-11-13 PROCEDURE — 2709999900 HC NON-CHARGEABLE SUPPLY

## 2018-11-13 PROCEDURE — 97530 THERAPEUTIC ACTIVITIES: CPT

## 2018-11-13 PROCEDURE — 97535 SELF CARE MNGMENT TRAINING: CPT

## 2018-11-13 PROCEDURE — 97110 THERAPEUTIC EXERCISES: CPT

## 2018-11-13 PROCEDURE — 6370000000 HC RX 637 (ALT 250 FOR IP): Performed by: THORACIC SURGERY (CARDIOTHORACIC VASCULAR SURGERY)

## 2018-11-13 PROCEDURE — 94640 AIRWAY INHALATION TREATMENT: CPT

## 2018-11-13 PROCEDURE — 87086 URINE CULTURE/COLONY COUNT: CPT

## 2018-11-13 PROCEDURE — 97116 GAIT TRAINING THERAPY: CPT

## 2018-11-13 PROCEDURE — 6360000002 HC RX W HCPCS: Performed by: PHYSICAL MEDICINE & REHABILITATION

## 2018-11-13 PROCEDURE — APPSS30 APP SPLIT SHARED TIME 16-30 MINUTES: Performed by: PHYSICIAN ASSISTANT

## 2018-11-13 PROCEDURE — 80048 BASIC METABOLIC PNL TOTAL CA: CPT

## 2018-11-13 PROCEDURE — 1180000000 HC REHAB R&B

## 2018-11-13 PROCEDURE — 36415 COLL VENOUS BLD VENIPUNCTURE: CPT

## 2018-11-13 PROCEDURE — 6370000000 HC RX 637 (ALT 250 FOR IP): Performed by: INTERNAL MEDICINE

## 2018-11-13 PROCEDURE — 6370000000 HC RX 637 (ALT 250 FOR IP): Performed by: PHYSICIAN ASSISTANT

## 2018-11-13 PROCEDURE — 97127 HC SP THER IVNTJ W/FOCUS COG FUNCJ: CPT

## 2018-11-13 RX ORDER — MIDODRINE HYDROCHLORIDE 5 MG/1
5 TABLET ORAL 2 TIMES DAILY WITH MEALS
Status: DISCONTINUED | OUTPATIENT
Start: 2018-11-13 | End: 2018-11-15 | Stop reason: HOSPADM

## 2018-11-13 RX ADMIN — COLLAGENASE SANTYL: 250 OINTMENT TOPICAL at 17:39

## 2018-11-13 RX ADMIN — ZINC SULFATE 220 MG (50 MG) CAPSULE 220 MG: CAPSULE at 08:15

## 2018-11-13 RX ADMIN — OXYCODONE HYDROCHLORIDE 5 MG: 5 TABLET ORAL at 15:12

## 2018-11-13 RX ADMIN — FAMOTIDINE 20 MG: 20 TABLET ORAL at 08:15

## 2018-11-13 RX ADMIN — MIDODRINE HYDROCHLORIDE 5 MG: 5 TABLET ORAL at 17:39

## 2018-11-13 RX ADMIN — ONDANSETRON HYDROCHLORIDE 4 MG: 4 TABLET, FILM COATED ORAL at 21:15

## 2018-11-13 RX ADMIN — ATORVASTATIN CALCIUM 20 MG: 20 TABLET, FILM COATED ORAL at 21:02

## 2018-11-13 RX ADMIN — THERA TABS 1 TABLET: TAB at 08:15

## 2018-11-13 RX ADMIN — CEFUROXIME AXETIL 250 MG: 250 TABLET ORAL at 08:14

## 2018-11-13 RX ADMIN — VITAMIN D, TAB 1000IU (100/BT) 3000 UNITS: 25 TAB at 08:15

## 2018-11-13 RX ADMIN — DOCUSATE SODIUM 100 MG: 100 CAPSULE, LIQUID FILLED ORAL at 21:03

## 2018-11-13 RX ADMIN — FUROSEMIDE 40 MG: 40 TABLET ORAL at 08:15

## 2018-11-13 RX ADMIN — ASPIRIN 325 MG: 325 TABLET ORAL at 08:15

## 2018-11-13 RX ADMIN — TIOTROPIUM BROMIDE 18 MCG: 18 CAPSULE ORAL; RESPIRATORY (INHALATION) at 05:13

## 2018-11-13 RX ADMIN — AMIODARONE HYDROCHLORIDE 200 MG: 200 TABLET ORAL at 08:15

## 2018-11-13 RX ADMIN — ACETAMINOPHEN 650 MG: 500 TABLET, FILM COATED ORAL at 02:19

## 2018-11-13 RX ADMIN — AMIODARONE HYDROCHLORIDE 200 MG: 200 TABLET ORAL at 21:02

## 2018-11-13 ASSESSMENT — PAIN SCALES - GENERAL
PAINLEVEL_OUTOF10: 0
PAINLEVEL_OUTOF10: 0
PAINLEVEL_OUTOF10: 4
PAINLEVEL_OUTOF10: 8
PAINLEVEL_OUTOF10: 3
PAINLEVEL_OUTOF10: 4

## 2018-11-13 ASSESSMENT — PAIN DESCRIPTION - LOCATION: LOCATION: ABDOMEN

## 2018-11-13 ASSESSMENT — PAIN DESCRIPTION - DESCRIPTORS: DESCRIPTORS: ACHING

## 2018-11-13 ASSESSMENT — PAIN DESCRIPTION - PAIN TYPE: TYPE: ACUTE PAIN

## 2018-11-13 NOTE — CONSULTS
Nephrology Consult Note  Patient's Name: Shirley Escobar  9:49 AM  11/13/2018    Nephrologist: Denise Webber    Reason for Consult:  Hyperkalemia. Elevated serum creatinine level. Requesting Physician:  Ari Arce MD  PCP: ALDAIR Moon CNP    Chief Complaint:  None  Assessment  1. Acute kidney injury improved and likely due to renal hypoperfusion from hypotension as well as diuretic. 2. Hyperkalemia resolved. 3. Hypotension stable. 4. Status post mitral valve surgery. 5.  Deconditioning. 6. Normocytic anemia. 7. Cardiomyopathy with low ejection fraction    Plan    1. Discussed my thoughts with the patient and she understood  2. I addressed her questions  3. Labs reviewed  4. Medications reviewed  5. Add midodrine 5 mg twice a day for low blood pressure  6. Labs in the morning  7. Will follow      History Obtained From:  Patient, records and my personal knowledge of the patient  History of Present Ilness: Shirley Escobar is a 48 y.o. female with history of mitral regurgitation, childhood ASD, gastroesophageal reflux disease, dyslipidemia among other things who initially was admitted to the hospital for elective repair of mitral valve regurgitation and redo of childhood ASD. Postoperatively patient went into acute kidney injury associated with low blood pressure and oliguria. She also had volume overload. Will were asked to see her then for renal replacement therapy. She had there about 3 or 4 runs of hemodialysis treatment. Hemodialysis was put on hold as the volume overload was resolved. Her urine output also improved significantly. Blood pressure became more stable. Hemodialysis catheter  was removed. She was transferred from intensive care  to telemetry bed. From there she was ultimately transferred to rehab inpatient unit. Yesterday her potassium level was high. Her serum creatinine also went from baseline of 2 mg per deciliter to 2.6 mg/dL. As a result we were reconsulted to see her. tablet 200 mg BID   aspirin tablet 325 mg Daily   carvedilol (COREG) tablet 3.125 mg BID WC   diphenhydrAMINE (BENADRYL) injection 50 mg Nightly PRN   furosemide (LASIX) tablet 40 mg Daily   melatonin tablet 3 mg Nightly PRN   miconazole (MICOTIN) 2 % powder BID   multivitamin 1 tablet Daily   [START ON 10/23/2019] potassium (CARDIAC) replacement protocol RX Placeholder   tiotropium (SPIRIVA) inhalation capsule 18 mcg Daily   docusate sodium (COLACE) capsule 100 mg BID   ondansetron (ZOFRAN) tablet 4 mg Q8H PRN   senna (SENOKOT) tablet 17.2 mg Nightly PRN   magnesium hydroxide (MILK OF MAGNESIA) 400 MG/5ML suspension 30 mL Daily PRN       Review of Systems:   Pertinent positives stated above in HPI. All other systems were reviewed and were negative. ROS:Constitutional: negative  Eyes: negative  Ears, nose, mouth, throat, and face: negative  Respiratory: negative  Cardiovascular: positive for lower extremity edema  Gastrointestinal: negative  Genitourinary:negative  Integument/breast: negative  Hematologic/lymphatic: negative  Musculoskeletal:positive for arthralgias and stiff joints  Neurological: negative  Behavioral/Psych: negative  Endocrine: negative  Allergic/Immunologic: negative    Physical exam:   Constitutional:  Well-developed middle-aged lady in no distress. Vitals:   Vitals:    11/13/18 0745   BP: 107/60   Pulse: 73   Resp: 16   Temp:    SpO2: 96%       Skin: no rash, turgor wnl  Heent:  Pupils are reactive to light and accommodation. Throat is clear. Oral mucosa is moist.  Neck: no bruits or jvd noted  Cardiovascular:  S1, S2 without murmur or rubs  Respiratory: Clear to auscultation without wheezes, rhonchi or rales. Abdomen:  Soft. Good bowel sounds. No palpable mass. No tenderness with palpation. No abdominal bruit. Ext: 1+ lower extremity edema  Psychiatric: mood and affect appropriate  Musculoskeletal:  Rom, muscular strength intact  CNS: Very awake and very alert. Well-oriented.   Normal

## 2018-11-13 NOTE — PLAN OF CARE
Problem: Pain:  Goal: Pain level will decrease  Pain level will decrease   Outcome: Met This Shift  Pt states pain control has been adequate this shift. Goal: Control of acute pain  Control of acute pain   Outcome: Met This Shift    Goal: Control of chronic pain  Control of chronic pain   Outcome: Met This Shift      Problem: Falls - Risk of:  Goal: Will remain free from falls  Will remain free from falls   Outcome: Met This Shift  No falls in the last 24 hrs. Pt uses call light to ask for assistance. Fall prevention program in place. Chair and bed alarms are in use. Goal: Absence of physical injury  Absence of physical injury   Outcome: Met This Shift  No physical injury this shift. Problem: Risk for Impaired Skin Integrity  Goal: Tissue integrity - skin and mucous membranes  Structural intactness and normal physiological function of skin and  mucous membranes. Outcome: Met This Shift  Right groin area wound bed is covered with white/tan slough; decreasing in size. Left groin area is 3 mm deep, edges that are undermined. Mod amt of brown to tan drainage. Wound nurses examined the area and suggested betadine soaked gauze, covered with dry dsg and secured with underwear. Problem: Infection - Surgical Site:  Goal: Will show no infection signs and symptoms  Will show no infection signs and symptoms   Outcome: Ongoing  See above. Problem: Anxiety:  Goal: Level of anxiety will decrease  Level of anxiety will decrease   Outcome: Met This Shift  Does not voice any feelings of anxiety. Comments: Careplan reviewed with pt. Pt verbalizes understanding of the plan of care & contributes to goal setting.

## 2018-11-13 NOTE — PROGRESS NOTES
[facility]  INPATIENT SPEECH THERAPY  [facility] INPATIENT REHAB [facility]    TIME   SLP Individual Minutes  Time In: 0830  Time Out: 0900  Minutes: 30  Timed Code Treatment Minutes: 30 Minutes       [x]Daily Note  []Progress Note  []Discharge Note    Date: 2018  Patient Name: Rubi Cronin        MRN: [id number]    : 1965  (48 y.o.)  Gender: female   Primary Provider: Lauren Wellington MD  Admitting Diagnosis:  Mitral Valve Replacement  Secondary Diagnosis: Cognitive impairment  Precautions: fall risk  Swallowing Status/Diet: Regular with thin liquids  Swallowing Strategies: standard  DATE of last MBS:  n/a  Pain: None reported    Subjective: Pt alert and pleasant, sitting upright in recliner chair upon arrival. Positive participation throughout therapy session.      SHORT TERM GOAL #1:  Goal 1: Pt will complete reasoning, problem solving, and executive function tasks (including mental math) with 70% accuracy, given mod cues to improve return to ADLs and IADLs  INTERVENTIONS: Executive functioning task for interpreting 4-digit monetary amounts: 6/10 independent, 3/10 min cues, 1/10 mod cues  *decreased processing speed for mathematical computations  *positive use of self-talk to assist with working memory component of task    Raad [address] #2:  Goal 2: Pt will complete immediate, delayed, and working memory tasks with 70% accuracy, given mod cues to improve retention of newly learned medical information  INTERVENTIONS:  Orientation:  independent    Working memory task with pt directed to manipulate 3-unit word set into alphabetical order, auditory presentation: 6/10 independent, 2/10 min cues, 1/10 mod cues, 1/10 max cues    SHORT TERM GOAL #3:  Goal 3: Pt will complete functional verbal sequencing and thought organization tasks (including word finding) with 70% accuracy, given mod cues to improve safety of return to home environment and improve vocabulary   INTERVENTIONS: Thought organization Location:    Services/Supervision Recommended:     [x]Patient continues to require treatment by a licensed therapist to address functional deficits as outlined in the established plan of care.         Rachael Roberto M.A., [address] [place] [address]

## 2018-11-13 NOTE — PROGRESS NOTES
Requires 25-49% assistance to transfer  Walk: 2 - Maximal Assistance Requires up to Maximal Assistance AND requires assistance of one person to walk between  feet (Patient performs 25-49% of locomotion effort or goes between  feet)  Distance Walked: 125 ft  Stairs: 2- Maximal Assistance Performs 25-49% of the effort to go up and down 4 to 6 stairs and requires the assistance of one person only, PT Equipment Recommendations  Equipment Needed: No (Pt has RW), Assessment: Pt. tolerated session fairly well. Reports being ready to go home. Discussed importnce of hand placement for tfers. Pt. would benefit from continued skilled PT to increase strength and endurance to enhance functional mobility. Occupational therapy: FIMS:  Eatin - Patient feeds self  Groomin - Requires setup/cues to do all tasks (S hand hygiene)  Bathin - Able to bathe all 10 areas with setup/sup/cues (supervision . completed sponge bath per pt request as she wasn't feeling well.)  Dressing-Upper: 5 - Requires setup/supervision/cues and/or requires assist with presthesis/brace only  Dressing-Lower: 5 - Requires setup/supervision/cues and/or staff applies TEDS/prosthesis/brace only  Toiletin - Requires setup/supervision/cues  Toilet Transfer: 5 - Requires setup/supervision/cues  Tub Transfer: 5 - Supervision, set-up, cues (stepping over tub ),  , Assessment: Pt. progressing with OT, meeting 2/4 goals. Pt. is completing ADLs with min A. Completing transfers with SBA and min cues for sternal precautions. Pt. has low endurance however it is improving.  Pt. would benefit from continued OT services on inpatient rehab for increased strength and endurance for IADLs/ADLs    Speech therapy: FIMS: Comprehension: 5 - Patient understands basic needs (hungry/hot/pain)  Expression: 5 - Expresses basic ideas/needs only (hungry/hot/pain)  Social Interaction: 6 - Patient requires medication for mood and/or effect  Problem Solvin - bouts of short distances and did 4 6\" steps    Wheelchair Activities  Propulsion: No    Acute/Rehabilitation Problems:  1. S/p redo sternotomy; mitral valve repair; LEFT atrial maze procedure, with ablation of both the anterior and posterior mitral trigones; ligation of the LEFT atrial appendage; and insertion of RIGHT common femoral intra-aortic balloon pump. 1. Wound care. 1. Keflex x 5 days for groin wounds. Discontinued as pharmacy recommended 5 days of Ceftin BID to treat as well as UTI. 2. Lactobacillus daily x 5 days. 3. Groin wounds with slough and dehiscence. 1. Continue betadine. 2. Collagenase ordered. 3. CTS saw patient today. 4. Appointment with PA in the office in 1 week. 2. Sternal Precautions. 3. Pain control. 1. Oxycodone. 4. .  5. Lasix. 1. 20mEq K daily. 6. Amiodarone. 7. Coreg. 2. Postoperative anemia. 1. Preoperative level of 10.7 on 10/10/2018 down to 6.8 on 10/15 with current level of 8.8 on 11/4/2018 after transfusion of 6 units of red blood cells. 2. Hgb 9.1 on 11/12. Improved. 3. Physical deconditioning. 1. PT/OT. 4. Persistent atrial fibrillation with rapid ventricular response requiring cardioversion on 10/31/2018. 1. Amiodarone. 2. Coreg. 5. Acute renal failure due to renal hypoperfusion from hypotension. 1. Cr/BUN/GFR 2.5/29/20.  2.1/25/25 on 11/13 Improved. 2. Nephrology consulted since this is not improving. 3. Appreciate input. 4. Midodrine 5mg BID started. 6. Dysgeusia. 1. Daily zinc supplement on 11/8.  7. Acute respiratory failure requiring intubation. 1. Albuterol. 2. Spiriva. 8. Prior severe oropharyngeal dysphagia, now tolerating general diet consistency. 9. UTI with E. Coli present on admission. 1. One dose of Monurol given 11/6. 10. Nutrition:  Consultation to dietician for nutritional counseling and recommendations. 1. TotP 7.7, alb 3.5, Vitamin 25OH level of 23 on 11/6/2018.   2. Cholecalciferol

## 2018-11-13 NOTE — PROGRESS NOTES
Toiletin - Requires setup/supervision/cues                                                                               Toilet Transfer: 5 - Requires setup/supervision/cues                                                                            Activity Tolerance:  Activity Tolerance: Patient Tolerated treatment well    Assessment:  Performance deficits / Impairments: Decreased functional mobility , Decreased ADL status, Decreased safe awareness, Decreased balance, Decreased high-level IADLs, Decreased endurance  Prognosis: Good    Discharge Recommendations:  Discharge Recommendations: 24 hour supervision or assist, Home with Home health OT, Continue to assess pending progress, Home with nursing aide    Patient Education:  Patient Education: IADLs, endurance building, ADLs  Barriers to Learning: anxiety    Equipment Recommendations: Other:  Reports has a BSC & RW to use. Recommend shower chair. tub shower is on the second floor. Safety:  Safety Devices in place: Yes  Type of devices:  All fall risk precautions in place, Call light within reach, Chair alarm in place, Patient at risk for falls, Left in chair, Gait belt    Plan:  Times per week: 90 minutes 5x/wk, 30 minutes 1x/wk  Current Treatment Recommendations: Endurance Training, Safety Education & Training, Self-Care / ADL    Goals:  Patient goals : \"get walking like normal\"  Short term goals  Time Frame for Short term goals: 1 week  Short term goal 1: Complete various t/fs including toilet with & 0 vcs for sternal precautions  Short term goal 2: Complete 6-8 min dynamic standing task with S for increased ease of meal prep tasks  Short term goal 3: Complete ADL item retrieval with S & 0-1 vcs for walker safety  Short term goal 4: Complete LE dressing with S & LH AE prn  Short term goal 5:     Long term goals  Time Frame for Long term goals : 2 weeks  Long term goal

## 2018-11-13 NOTE — PROGRESS NOTES
[address] - [id number]/[id number]    Time In: 2802  Time Out: 200  Timed Code Treatment Minutes: 60 Minutes  Minutes: 60          Date: 2018  Patient Name: Dwayne Jaime,  Gender:  female        MRN: [id number]  : 1965  (48 y.o.)     Referring Practitioner: Dr. Tyrell Cooks  Treatment Diagnosis: s/p mitral balve repair 10/15/2018, Lt atrial MAZE  Additional Pertinent Hx: 48y.o. year-old female who presents for redo sternotomy, mitral valve repair, maze procedure, placement of left ventricular epicardial lead. sx on 10/15/18. Pt reintubated on 10/17/18 with self extubation and reintubation on 10/19/18. Pt with chest tubes rmoved on 10/22. extubation on 10/24/18 with hgih flow O2 started on 10/25. Hemodialysis started wht runs on 10/24,  and , . Past Medical History:   Diagnosis Date    [employer] dual pacer 2018    Chronic diastolic congestive heart failure ([address] 2018    GERD (gastroesophageal reflux disease) 3/30/2018    Last Assessment & Plan:  Continue home protonix     History of atrial septal defect repair 2018    HLD (hyperlipidemia) 3/30/2018    Last Assessment & Plan:  Cardiac diet Continue statin    Nonischemic dilated cardiomyopathy ([address] 2018    EF of 25%     Past Surgical History:   Procedure Laterality Date    CARDIAC CATHETERIZATION  2018    Dr. Humberto Salas. No CAD.  CARDIAC SURGERY  1970    repair hole in heart pt states was 3 yrs old.     COLONOSCOPY      PACEMAKER INSERTION  2017    MS OFFICE/OUTPT VISIT,PROCEDURE ONLY N/A 10/15/2018    REDO STERNOTOMY, BI-ATRIAL MAZE PROCEDURE, MITRAL REPAIR, PLACEMENT OF INTRA AORTIC BALLOON PUMP performed by Nolvia Elam MD at [address]       Restrictions/Precautions:  Surgical Protocols, Fall Risk           Sternal Precautions: No Pushing, No Pulling, 5# Lifting Restrictions  Other position/activity between bouts    Exercises:  Exercises  Comments: Pt. completed 5' on nu step L2 using B LEs only. Pt. also completed seated B LE ther ex 10x each consisting of ankle pumps, glut sets, long arc quads, marches, and hip abd/add. Pt. also completed step II CABG exercises consisting of horizontal abd/add, shoulder rolls, upper trunk rotations, shoulder flexion, and scapular retractions all to increase strength and improve functional mobility. Activity Tolerance:  Activity Tolerance: Patient limited by fatigue    Assessment: Body structures, Functions, Activity limitations: Decreased functional mobility , Decreased strength, Decreased endurance, Decreased balance  Assessment: Pt. tolerated session fairly well. Pt. feeling better this date but fatigues very easily. Pt. requring to use restroom 2x during session. Pt. would benefit from continued skilled PT to increase strength and endurance to enhance functional mobility.    Prognosis: Good          Discharge Recommendations:  Discharge Recommendations: Continue to assess pending progress, Patient would benefit from continued therapy after discharge    Patient Education:  Patient Education: ther ex, ambulation, transfers, sternal precautions, POC    Equipment Recommendations:  Equipment Needed: No (Pt has RW)    Safety:  Type of devices: Gait belt, Call light within reach, Nurse notified, Chair alarm in place, Left in chair    Plan:  Times per week: 5x/ wk 90 min, 1x/ wk 30 min  Specific instructions for Next Treatment: therex, bed mobility, transfers, sitting and standing alance, gait, steps, vehicle transfer  Current Treatment Recommendations: Strengthening, Functional Mobility Training, Transfer Training, Balance Training, Endurance Training, Gait Training, Stair training, Pain Management, Patient/Caregiver Education & Training, Safety Education & Training, Home Exercise Program, Equipment Evaluation, Education, & procurement    Goals:  Patient goals : Get moving

## 2018-11-13 NOTE — PROGRESS NOTES
assistance  Comment: good technique with 5' pauses and step to, no heavy pressure on UEs, slight SOB. steady    Balance  Comments: Pt stood in //bars with no UE on bars for support, on yellow foam for dynamic balance activity, x5 min of bean bag tossing, reaching multidirectionally for bags to toss. Pt also completed standing ther ex  on foam with one UE on //bar. All to increase ankle and hip strategy mm strength and function to decrease fall risks    Exercises:  Exercises  Comments: Pt. completed 12' on nu step L0 (refused higher level) using B LEs only. Pt. also completed standing B LE ther ex 10-12 reps each consisting of heel raises, marches, and hip abd/add. All to increase strength and improve functional mobility. Increased fatigue noted after ther ex       Activity Tolerance:  Activity Tolerance: Patient limited by fatigue;Patient limited by endurance    Assessment: Body structures, Functions, Activity limitations: Decreased functional mobility , Decreased strength, Decreased endurance, Decreased balance  Assessment: Pt. tolerated session fairly well. Reports being ready to go home. Discussed importnce of hand placement for tfers. Pt. would benefit from continued skilled PT to increase strength and endurance to enhance functional mobility.    Prognosis: Good          Discharge Recommendations:  Discharge Recommendations: Continue to assess pending progress, Patient would benefit from continued therapy after discharge    Patient Education:  Patient Education: ther ex, ambulation, transfers, sternal precautions, POC    Equipment Recommendations:  Equipment Needed: No (Pt has RW)    Safety:  Type of devices: Gait belt, Call light within reach, Nurse notified, Chair alarm in place, Left in chair, All fall risk precautions in place    Plan:  Times per week: 5x/ wk 90 min, 1x/ wk 30 min  Specific instructions for Next Treatment: therex, bed mobility, transfers, sitting and standing alance, gait, steps, vehicle

## 2018-11-13 NOTE — PROGRESS NOTES
wheezes, rales or rhonchi, normal air movement, no respiratory distress  Neurological: alert, oriented, normal speech, no focal findings or movement disorder noted      Assessment:   Patient Active Problem List   Diagnosis    PAF (paroxysmal atrial fibrillation) (HCC)    Chronic diastolic congestive heart failure ([address]    Nonischemic dilated cardiomyopathy ([address]    [employer] dual pacer    Persistent atrial fibrillation ([address]    Acute hypoxemic respiratory failure (HCC)    Atrial flutter ([address]    LILLI (acute kidney injury) ([address]    Hypervolemia    GERD (gastroesophageal reflux disease)    History of atrial septal defect repair    HLD (hyperlipidemia)    Left bundle-branch block    Acute systolic (congestive) heart failure ([address]    Mitral valve replaced       Plan: 11/13/18  1. Santyl for both groins  2. Appointment with me in the office in 1 week.      The plan of care was discussed in detail with Marti Daniels PA-C

## 2018-11-14 LAB
ANION GAP SERPL CALCULATED.3IONS-SCNC: 11 MEQ/L (ref 8–16)
BACTERIA: ABNORMAL /HPF
BILIRUBIN URINE: NEGATIVE
BLOOD, URINE: NEGATIVE
BUN BLDV-MCNC: 24 MG/DL (ref 7–22)
CALCIUM SERPL-MCNC: 9.2 MG/DL (ref 8.5–10.5)
CASTS 2: ABNORMAL /LPF
CASTS UA: ABNORMAL /LPF
CHARACTER, URINE: ABNORMAL
CHLORIDE BLD-SCNC: 100 MEQ/L (ref 98–111)
CO2: 27 MEQ/L (ref 23–33)
COLOR: YELLOW
CREAT SERPL-MCNC: 2 MG/DL (ref 0.4–1.2)
CRYSTALS, UA: ABNORMAL
EPITHELIAL CELLS, UA: ABNORMAL /HPF
FERRITIN: 304 NG/ML (ref 10–291)
GFR SERPL CREATININE-BSD FRML MDRD: 26 ML/MIN/1.73M2
GLUCOSE BLD-MCNC: 93 MG/DL (ref 70–108)
GLUCOSE URINE: NEGATIVE MG/DL
HCT VFR BLD CALC: 29.5 % (ref 37–47)
HEMOGLOBIN: 9.2 GM/DL (ref 12–16)
IRON SATURATION: 31 % (ref 20–50)
IRON: 82 UG/DL (ref 50–170)
KETONES, URINE: NEGATIVE
LEUKOCYTE ESTERASE, URINE: ABNORMAL
MISCELLANEOUS 2: ABNORMAL
MISCELLANEOUS LAB TEST RESULT: ABNORMAL
NITRITE, URINE: NEGATIVE
PH UA: 5.5
POTASSIUM SERPL-SCNC: 4.5 MEQ/L (ref 3.5–5.2)
PROTEIN UA: 30
RBC URINE: ABNORMAL /HPF
RENAL EPITHELIAL, UA: ABNORMAL
SODIUM BLD-SCNC: 138 MEQ/L (ref 135–145)
SPECIFIC GRAVITY, URINE: 1.02 (ref 1–1.03)
TOTAL IRON BINDING CAPACITY: 264 UG/DL (ref 171–450)
UROBILINOGEN, URINE: 0.2 EU/DL
VITAMIN D 25-HYDROXY: 24 NG/ML (ref 30–100)
WBC UA: ABNORMAL /HPF
YEAST: ABNORMAL

## 2018-11-14 PROCEDURE — 82728 ASSAY OF FERRITIN: CPT

## 2018-11-14 PROCEDURE — 81001 URINALYSIS AUTO W/SCOPE: CPT

## 2018-11-14 PROCEDURE — 83540 ASSAY OF IRON: CPT

## 2018-11-14 PROCEDURE — 6370000000 HC RX 637 (ALT 250 FOR IP): Performed by: PHYSICAL MEDICINE & REHABILITATION

## 2018-11-14 PROCEDURE — 82306 VITAMIN D 25 HYDROXY: CPT

## 2018-11-14 PROCEDURE — 85014 HEMATOCRIT: CPT

## 2018-11-14 PROCEDURE — 6370000000 HC RX 637 (ALT 250 FOR IP): Performed by: THORACIC SURGERY (CARDIOTHORACIC VASCULAR SURGERY)

## 2018-11-14 PROCEDURE — 97530 THERAPEUTIC ACTIVITIES: CPT

## 2018-11-14 PROCEDURE — 85018 HEMOGLOBIN: CPT

## 2018-11-14 PROCEDURE — 99232 SBSQ HOSP IP/OBS MODERATE 35: CPT | Performed by: NURSE PRACTITIONER

## 2018-11-14 PROCEDURE — 6370000000 HC RX 637 (ALT 250 FOR IP): Performed by: INTERNAL MEDICINE

## 2018-11-14 PROCEDURE — 1180000000 HC REHAB R&B

## 2018-11-14 PROCEDURE — 2709999900 HC NON-CHARGEABLE SUPPLY

## 2018-11-14 PROCEDURE — 80048 BASIC METABOLIC PNL TOTAL CA: CPT

## 2018-11-14 PROCEDURE — 97535 SELF CARE MNGMENT TRAINING: CPT

## 2018-11-14 PROCEDURE — 94640 AIRWAY INHALATION TREATMENT: CPT

## 2018-11-14 PROCEDURE — 97110 THERAPEUTIC EXERCISES: CPT

## 2018-11-14 PROCEDURE — 97116 GAIT TRAINING THERAPY: CPT

## 2018-11-14 PROCEDURE — 83550 IRON BINDING TEST: CPT

## 2018-11-14 PROCEDURE — 36415 COLL VENOUS BLD VENIPUNCTURE: CPT

## 2018-11-14 PROCEDURE — 6360000002 HC RX W HCPCS: Performed by: PHYSICAL MEDICINE & REHABILITATION

## 2018-11-14 PROCEDURE — 97127 HC SP THER IVNTJ W/FOCUS COG FUNCJ: CPT

## 2018-11-14 RX ORDER — POTASSIUM CHLORIDE 750 MG/1
10 TABLET, EXTENDED RELEASE ORAL DAILY
Qty: 90 TABLET | Refills: 0 | Status: CANCELLED | OUTPATIENT
Start: 2018-11-14

## 2018-11-14 RX ADMIN — COLLAGENASE SANTYL: 250 OINTMENT TOPICAL at 08:50

## 2018-11-14 RX ADMIN — FUROSEMIDE 40 MG: 40 TABLET ORAL at 08:50

## 2018-11-14 RX ADMIN — VITAMIN D, TAB 1000IU (100/BT) 3000 UNITS: 25 TAB at 08:50

## 2018-11-14 RX ADMIN — ZINC SULFATE 220 MG (50 MG) CAPSULE 220 MG: CAPSULE at 08:50

## 2018-11-14 RX ADMIN — ONDANSETRON HYDROCHLORIDE 4 MG: 4 TABLET, FILM COATED ORAL at 08:44

## 2018-11-14 RX ADMIN — ALPRAZOLAM 0.5 MG: 0.5 TABLET ORAL at 22:33

## 2018-11-14 RX ADMIN — OXYCODONE HYDROCHLORIDE 5 MG: 5 TABLET ORAL at 22:33

## 2018-11-14 RX ADMIN — THERA TABS 1 TABLET: TAB at 08:50

## 2018-11-14 RX ADMIN — AMIODARONE HYDROCHLORIDE 200 MG: 200 TABLET ORAL at 08:50

## 2018-11-14 RX ADMIN — DOCUSATE SODIUM 100 MG: 100 CAPSULE, LIQUID FILLED ORAL at 20:46

## 2018-11-14 RX ADMIN — MAGNESIUM HYDROXIDE 30 ML: 400 SUSPENSION ORAL at 15:38

## 2018-11-14 RX ADMIN — ATORVASTATIN CALCIUM 20 MG: 20 TABLET, FILM COATED ORAL at 20:46

## 2018-11-14 RX ADMIN — ASPIRIN 325 MG: 325 TABLET ORAL at 08:49

## 2018-11-14 RX ADMIN — DOCUSATE SODIUM 100 MG: 100 CAPSULE, LIQUID FILLED ORAL at 08:49

## 2018-11-14 RX ADMIN — MIDODRINE HYDROCHLORIDE 5 MG: 5 TABLET ORAL at 08:50

## 2018-11-14 RX ADMIN — FAMOTIDINE 20 MG: 20 TABLET ORAL at 08:49

## 2018-11-14 RX ADMIN — MIDODRINE HYDROCHLORIDE 5 MG: 5 TABLET ORAL at 17:19

## 2018-11-14 RX ADMIN — TIOTROPIUM BROMIDE 18 MCG: 18 CAPSULE ORAL; RESPIRATORY (INHALATION) at 05:30

## 2018-11-14 RX ADMIN — AMIODARONE HYDROCHLORIDE 200 MG: 200 TABLET ORAL at 20:46

## 2018-11-14 RX ADMIN — CARVEDILOL 3.12 MG: 3.12 TABLET, FILM COATED ORAL at 17:18

## 2018-11-14 RX ADMIN — ACETAMINOPHEN 650 MG: 500 TABLET, FILM COATED ORAL at 00:21

## 2018-11-14 RX ADMIN — BISACODYL 10 MG: 10 SUPPOSITORY RECTAL at 17:13

## 2018-11-14 RX ADMIN — SENNOSIDES 17.2 MG: 8.6 TABLET, FILM COATED ORAL at 20:46

## 2018-11-14 RX ADMIN — ACETAMINOPHEN 650 MG: 500 TABLET, FILM COATED ORAL at 10:10

## 2018-11-14 RX ADMIN — MICONAZOLE NITRATE: 2 POWDER TOPICAL at 10:11

## 2018-11-14 RX ADMIN — MICONAZOLE NITRATE: 2 POWDER TOPICAL at 20:48

## 2018-11-14 ASSESSMENT — PAIN DESCRIPTION - ORIENTATION: ORIENTATION: LEFT

## 2018-11-14 ASSESSMENT — PAIN SCALES - GENERAL
PAINLEVEL_OUTOF10: 5
PAINLEVEL_OUTOF10: 6
PAINLEVEL_OUTOF10: 4
PAINLEVEL_OUTOF10: 2
PAINLEVEL_OUTOF10: 5
PAINLEVEL_OUTOF10: 0

## 2018-11-14 ASSESSMENT — PAIN DESCRIPTION - PAIN TYPE
TYPE: ACUTE PAIN
TYPE: ACUTE PAIN

## 2018-11-14 ASSESSMENT — PAIN DESCRIPTION - LOCATION
LOCATION: HEAD
LOCATION: HAND

## 2018-11-14 ASSESSMENT — PAIN DESCRIPTION - DESCRIPTORS: DESCRIPTORS: TINGLING

## 2018-11-14 NOTE — PROGRESS NOTES
Physical Medicine & Rehabilitation  Progress Note    Chief Complaint:  s/p mitral valve repair, LEFT atrial MAZE; and ligation of LEFT atrial appendage and cardioversion    Subjective:  Looking forward to discharge tomorrow. Medications discussed and would like to  from outpatient pharmacy. No concerns at this time. Rehabilitation:  Physical therapy: FIMS:  Bed Mobility: Scooting: Stand by assistance    Transfers: Sit to Stand: Independent  Stand to sit: Independent  Bed to Chair: Contact guard assistance (From father)  Stand Pivot Transfers: Contact guard assistance (with and without RW)  Comment: Pt. picked up ring from floor after completing ring toss activity. Pt. steady with no LOB. CGA provided by father. , Ambulation 1  Surface: level tile  Device: No Device  Other Apparatus: Wheelchair follow  Assistance: Supervision  Quality of Gait: Decreased tati and velocity. Decreased step length and heel strike. Pt. steady with no LOB but does demo inreased SOB with exertion. Distance: 150' x 1, 5 min timed walk  Comments: pt with request to trial gait without AD this date stating she has been ambulating without AD throughout therapy sessions. Patient with c/o fatigue throughout gait and required occasional rest breaks throughout treatment. Patient very motivated to get better, however requires verbal cues to slow down for energy conservation. , Stairs  # Steps : 4  Stairs Height: 6\"  Rails: Left ascending  Curbs: 6\"  Device: No Device  Assistance: Stand by assistance  Comment: Good technique with 5\" pause between each step. Pt. steady with no LOB.      FIMS: Bed, Chair, Wheel Chair: 7 - Patient independently transfers  Walk: 5 - Supervision Requires standby supervision or cuing to walk at least 150 feet  Distance Walked: 150'  Stairs: 5- Supervision Requires supervision(e.g., standing by, cuing, or coaxing) to go up and down one flight of stairs, PT Equipment Recommendations  Equipment Needed: No (Pt trigones; ligation of the LEFT atrial appendage; and insertion of RIGHT common femoral intra-aortic balloon pump by Dr. Osborn on 10/15/2018.  2. S/p heart catheterization by Dr. Andreina Su on 9/20/2018 with finding of no CAD.    3. Postoperative anemia with preoperative level of 10.7 on 10/10/2018 down to 6.8 on 10/15 with current level of 8.8 on 11/4/2018 after transfusion of 6 units of red blood cells. 4. Physical deconditioning. 5. Persistent atrial fibrillation with rapid ventricular response requiring cardioversion on 10/31/2018.  6. Acute renal failure due to renal hypoperfusion from hypotension. 7. Acute respiratory failure requiring intubation. 8. Hyperkalemia. 9. Volume overload requiring hemodialysis. 10. Hypotension. 11. Prior severe oropharyngeal dysphagia, now tolerating general diet consistency. 12. Chronic diastolic congestive heart failure. 13. Placement of [employer] to the pacer on 5/23/2018. 14. Hyperlipidemia. 15. Nonischemic dilated cardiomyopathy with EF of 25%. 12. History of atrial septal defect repair at age 3.  16. Gastroesophageal reflux disease. 25. Former smoker. 19. UTI with E. Coli present on admission. 20. Hypovitaminosis D. 21. Dysgeusia. 22. Groin wound dehiscence. Plan:     Medical management: Per primary team.     Consultants:  Cardiothoracic Surgery, Pulmonology/Critical Care, Nephrology, Physical Medicine    Narcotic usage:  Oxycodone Last 24 hour usage None. Decreased. Last BM:  Stool Amount: Small (11/10/18 0437)  Ambulation/Mobility:  Quality of Gait: Decreased tati and velocity. Decreased step length and heel strike. Pt. steady with no LOB but does demo inreased SOB with exertion. Distance: 150' x 1, 5 min timed walk and did 4 6\" steps    Wheelchair Activities  Propulsion: No    Acute/Rehabilitation Problems:  1.  S/p redo sternotomy; mitral valve repair; LEFT atrial maze procedure, with ablation of both the anterior and posterior mitral

## 2018-11-14 NOTE — PROGRESS NOTES
[facility] [address]  INPATIENT OCCUPATIONAL THERAPY  STR INPATIENT REHAB 7E  DAILY NOTE    Time:  Time In: 4106  Time Out: 1501  Timed Code Treatment Minutes: 30 Minutes  Minutes: 30    Date: 2018  Patient Name: Nick Turner,   Gender: female      Room: [facility]/059-A  MRN: [id number]  : 1965  (48 y.o.)  Referring Practitioner: Dr. Wesley Young  Diagnosis: mitral valve replacement  Additional Pertinent Hx: 48y.o. year-old female who presents for redo sternotomy, mitral valve repair, maze procedure, placement of left ventricular epicardial lead. sx on 10/15/18. Pt reintubated on 10/17/18 with self extubation and reintubation on 10/19/18. Pt with chest tubes rmoved on 10/22. extubation on 10/24/18 with hgih flow O2 started on 10/25. Pt was admitted to [facility] on 18. Past Medical History:   Diagnosis Date    [employer] dual pacer 2018    Chronic diastolic congestive heart failure ([address] 2018    GERD (gastroesophageal reflux disease) 3/30/2018    Last Assessment & Plan:  Continue home protonix     History of atrial septal defect repair 2018    HLD (hyperlipidemia) 3/30/2018    Last Assessment & Plan:  Cardiac diet Continue statin    Nonischemic dilated cardiomyopathy ([facility] 2018    EF of 25%     Past Surgical History:   Procedure Laterality Date    CARDIAC CATHETERIZATION  2018    Dr. Rohit Graham. No CAD.  CARDIAC SURGERY  1970    repair hole in heart pt states was 3 yrs old.     COLONOSCOPY      PACEMAKER INSERTION  2017    [place] OFFICE/OUTPT VISIT,PROCEDURE ONLY N/A 10/15/2018    REDO STERNOTOMY, BI-ATRIAL MAZE PROCEDURE, MITRAL REPAIR, PLACEMENT OF INTRA AORTIC BALLOON PUMP performed by Gilberto Dillard MD at [address]       Restrictions/Precautions:  Surgical Protocols, Fall Risk     Sternal Precautions: No Pushing, No Pulling, 5# Lifting Restrictions  Other position/activity restrictions: s/predo sternotomy, mitral valve repair, maze Toiletin - Patient independent with all 3 tasks                                                                               Toilet Transfer: 7 -Patient independent on and off toilet/BSC                                                                            Activity Tolerance:  Activity Tolerance: Patient Tolerated treatment well    Assessment:  Assessment: Patient has made great progress on IP Rehab. She has progressed to an independent level with her functional transfers and with her ADL routine. She understands her sternal and cardiac precautions well. She continues to exhibit low endurance but this is improving. She would continue to benefit from skilled OT services for x 1 more day and then transiton to home health OT services. Performance deficits / Impairments: Decreased functional mobility , Decreased ADL status, Decreased safe awareness, Decreased balance, Decreased high-level IADLs, Decreased endurance  Prognosis: Good    Discharge Recommendations:  Discharge Recommendations: Home with Home health OT    Patient Education:  Patient Education: community re-entry IADLs  Barriers to Learning: anxiety    Equipment Recommendations:  Equipment Needed: No  Other: Pt has has all DME needed. Safety:  Safety Devices in place: Yes  Type of devices:  All fall risk precautions in place, Call light within reach, Patient at risk for falls, Left in chair, Gait belt    Plan:  Times per week: 90 minutes 5x/wk, 30 minutes 1x/wk  Current Treatment Recommendations: Endurance Training, Safety Education & Training, Self-Care / ADL    Goals:  Patient goals : \"get walking like normal\"    Short term goals  Time Frame for Short term goals: 1 week  Short term goal 1:     Short term goal 2:      Short term goal 3:      Short term goal 4:      Short term goal 5:     Long term goals  Time Frame for Long term goals : 1 session   Long term goal 1: Complete BADL routine

## 2018-11-14 NOTE — PROGRESS NOTES
mobility, transfers, sitting and standing alance, gait, steps, vehicle transfer  Current Treatment Recommendations: Strengthening, Functional Mobility Training, Transfer Training, Balance Training, Endurance Training, Gait Training, Stair training, Pain Management, Patient/Caregiver Education & Training, Safety Education & Training, Home Exercise Program, Equipment Evaluation, Education, & procurement    Goals:  Patient goals : Get moving on my own     Short term goals  Time Frame for Short term goals: 1 wk  Short term goal 1: Pt to go supine <->sit, CGA, maintaining sternal precautions, to get in/out of bed. MET see LTG  Short term goal 2: Pt to get up/down from various seated surfaces, SBA to get up to walk. MET see LTG  Short term goal 3: Pt to walk with RW >= 150 ft, RPE <= 13, to progress to home and community mobility, SBA MET see LTG  Short term goal 4: Pt to ascend/descend 4 steps with wilma rails, sBA, for home entry. MET see LTG  Short term goal 5: Pt to get in/out of car, SBA, for transportation needs. MET see LTG    Long term goals  Time Frame for Long term goals : 2 wks  Long term goal 1: Pt to go supine <->sit, Mod I, maintaining sternal precautions, to get in/out of bed. NOT MET, continue  Long term goal 2: Pt to get up/down from various seated surfaces, Mod I to get up to walk. MET  Long term goal 3: Pt to walk with RW >= 150 ft, RPE <= 13, to progress to home and community mobility. Mod I NOT MEt, continue  Long term goal 4: Pt to ascend/descend 4+ steps with wilma rails, Mod I, for home entry. NOT MET,continue  Long term goal 5: Pt to get in/out of car, Mod I, for transportation needs. NOT MET, continue      Treatment session and note completed by Lory Moulton PTA. Assessment and goal revision of Progress Note completed by Krunal Roldan, LAINE. Trinity Venegas

## 2018-11-14 NOTE — PROGRESS NOTES
continued education with family next date prior to discharge to target ST specific recommendations. Patient would greatly benefit from skilled ST services in order to continue to target cognitive deficits in order to successfully return to home, community and work setting upon discharge. OCCUPATIONAL THERAPY  Patient has made great progress on IP Rehab. She has progressed to an independent level with her functional transfers and with her ADL routine. She understands her sternal and cardiac precautions well. She continues to exhibit low endurance but this is improving. She would continue to benefit from skilled OT services for x 1 more day and then transiton to home health OT services. Equipment Needed: No  Other: Pt has has all DME needed. RECREATIONAL THERAPY  Patient has been offered participation in recreational therapy activities and participates as able. pt resting in bed this am and not feeling well-flat affect-nurse aware      NUTRITION  Weight: 185 lb 4.8 oz (84.1 kg) / Body mass index is 29.91 kg/m². Current diet: DIET GENERAL; Low Sodium (2 GM)  Dietary Nutrition Supplements: Other Oral Supplement (see comment)  Dietary Nutrition Supplements: Low Volume Supplement  Please see nutrition note for details.     NURSING  Continent of Bowel and Bladder: Yes  Pain is Managed:  Yes  Signs and Symptoms of Infection:  Yes  Signs and Symptoms of Skin Breakdown:  Yes  Injury and Fall Free during Inpatient Rehabilitation Admission: Yes    Consultations/Labs/X-rays: Cardiothoracic Surgery, Pulmonology/Critical Care, Nephrology, Physical Medicine    Family Education: Family available and participating in education   Fall Risk:  Falling star program initiated    Discharge Plan   Estimated Discharge Date: 11/15   Destination: home health and discharge home with supervision  Services at Discharge: [address] [place] [address] Rd, Occupational Therapy, Speech Therapy, Nursing, CHF Nurse, Wound Nurse, and an aide 3x

## 2018-11-14 NOTE — PROGRESS NOTES
[facility] [address]  INPATIENT OCCUPATIONAL THERAPY  STRZ INPATIENT REHAB 7E  PROGRESS NOTE    Time:  Time In: 1000  Time Out: 1100  Timed Code Treatment Minutes: 60 Minutes  Minutes: 60    Date: 2018  Patient Name: Hiwot Garcia,   Gender: female      MRN: [id number]  : 1965  (48 y.o.)  Referring Practitioner: Dr. Rupa Horne  Diagnosis: mitral valve replacement  Additional Pertinent Hx: 48y.o. year-old female who presents for redo sternotomy, mitral valve repair, maze procedure, placement of left ventricular epicardial lead. sx on 10/15/18. Pt reintubated on 10/17/18 with self extubation and reintubation on 10/19/18. Pt with chest tubes rmoved on 10/22. extubation on 10/24/18 with hgih flow O2 started on 10/25. Pt was admitted to [facility] on 18. Restrictions/Precautions:  Surgical Protocols, Fall Risk     Sternal Precautions: No Pushing, No Pulling, 5# Lifting Restrictions  Other position/activity restrictions: s/predo sternotomy, mitral valve repair, maze procedure, placement of left ventricular epicardial lead on 10/15/18       Past Medical History:   Diagnosis Date    [employer] dual pacer 2018    Chronic diastolic congestive heart failure ([address] 2018    GERD (gastroesophageal reflux disease) 3/30/2018    Last Assessment & Plan:  Continue home protonix     History of atrial septal defect repair 2018    HLD (hyperlipidemia) 3/30/2018    Last Assessment & Plan:  Cardiac diet Continue statin    Nonischemic dilated cardiomyopathy ([address] 2018    EF of 25%     Past Surgical History:   Procedure Laterality Date    CARDIAC CATHETERIZATION  2018    Dr. Cherelle Cordoba. No CAD.  CARDIAC SURGERY  1970    repair hole in heart pt states was 3 yrs old.     COLONOSCOPY      PACEMAKER INSERTION  2017    [place] OFFICE/OUTPT VISIT,PROCEDURE ONLY N/A 10/15/2018    REDO STERNOTOMY, BI-ATRIAL MAZE PROCEDURE, MITRAL REPAIR, PLACEMENT OF INTRA AORTIC BALLOON PUMP performed by

## 2018-11-14 NOTE — DISCHARGE INSTR - COC
Continuity of Care Form    Patient Name: Osiel Lindo   :  1965  MRN:  [id number]    Admit date:  2018  Discharge date:  ***    Code Status Order: Full Code   Advance Directives:   Advance Care Flowsheet Documentation     Date/Time Healthcare Directive Type of Healthcare Directive Copy in [address] Agent's Name Healthcare Agent's Phone Number    18 [phone]  No, patient does not have an advance directive for healthcare treatment -- -- -- -- --          Admitting Physician:  Maya Christine MD  PCP: Duarte Llanes, APRN - CNP    Discharging Nurse: [facility] Unit/Room#: 6E-56/200-A  Discharging Unit Phone Number: ***    Emergency Contact:   Extended Emergency Contact Information  Primary Emergency Contact: [address] [place] [address] Phone: [phone]  Relation: Parent  Secondary Emergency Contact: [address] Phone: [phone]  Mobile Phone: [phone]  Relation: Spouse    Past Surgical History:  Past Surgical History:   Procedure Laterality Date    CARDIAC CATHETERIZATION  2018    Dr. Natalia Lam. No CAD.  CARDIAC SURGERY  1970    repair hole in heart pt states was 3 yrs old.  COLONOSCOPY      PACEMAKER INSERTION  2017    [place] OFFICE/OUTPT VISIT,PROCEDURE ONLY N/A 10/15/2018    REDO STERNOTOMY, BI-ATRIAL MAZE PROCEDURE, MITRAL REPAIR, PLACEMENT OF INTRA AORTIC BALLOON PUMP performed by Bakari Carrillo MD at [address] [facility]       Immunization History: There is no immunization history on file for this patient.     Active Problems:  Patient Active Problem List   Diagnosis Code    PAF (paroxysmal atrial fibrillation) ([facility]) I48.0    Chronic diastolic congestive heart failure (HCC) I50.32    Nonischemic dilated cardiomyopathy ([facility] I42.0    [employer] dual pacer Z95.0    Persistent atrial fibrillation ([facility]) I48.1    Acute hypoxemic respiratory failure ([facility] J96.01    Atrial flutter ([facility] I48.92    LILLI (acute kidney injury) ([facility] N17.9    GERD (gastroesophageal reflux disease) K21.9    History of atrial septal defect repair Z87.74    HLD (hyperlipidemia) E78.5    Left bundle-branch block Y32.9    Acute systolic (congestive) heart failure (HCC) I50.21    Mitral valve replaced Z95.2    Postprocedural hypotension I95.81       Isolation/Infection:   Isolation          No Isolation            Nurse Assessment:  Last Vital Signs: /72   Pulse 81   Temp 97.4 °F (36.3 °C) (Oral)   Resp 18   Ht 5' 6\" (1.676 m)   Wt 185 lb 4.8 oz (84.1 kg)   SpO2 95%   BMI 29.91 kg/m²     Last documented pain score (0-10 scale): Pain Level: 5  Last Weight:   Wt Readings from Last 1 Encounters:   11/13/18 185 lb 4.8 oz (84.1 kg)     Mental Status:  {IP PT MENTAL STATUS:75973}    IV Access:  { JANELL IV ACCESS:207905293}    Nursing Mobility/ADLs:  Walking   {CHP DME UWEH:891598831}  Transfer  {CHP DME ECWI:996304653}  Bathing  {CHP DME XCYW:559998736}  Dressing  {CHP DME ZGEF:423204930}  Toileting  {CHP DME XSZS:205319292}  Feeding  {P DME YXIQ:694410085}  Med Admin  {P DME LJFM:285574500}  Med Delivery   {[facility] MED Delivery:942572335}    Wound Care Documentation and Therapy:  Wound 10/29/18 Groin Right Wound (Active)   Wound Image   11/13/2018 11:40 AM   Wound Type Wound 11/13/2018  8:58 PM   Wound Other 11/13/2018 11:40 AM   Dressing Status Changed 11/13/2018  8:58 PM   Dressing Changed Changed/New 11/13/2018  8:58 PM   Dressing/Treatment ABD 11/13/2018  8:58 PM   Wound Cleansed Other (Comment) 10/31/2018  8:00 AM   Wound Length (cm) 0.5 cm 11/13/2018 11:40 AM   Wound Width (cm) 2 cm 11/13/2018 11:40 AM   Calculated Wound Size (cm^2) (l*w) 1 cm^2 11/13/2018 11:40 AM   Change in Wound Size % (l*w) 79.59 11/13/2018 11:40 AM   Wound Assessment Intact; Slough; Yellow 11/13/2018 11:40 AM   Drainage Amount Small 11/12/2018  9:50 PM   Drainage Description Purulent;Yellow 2018  9:50 PM   Vileka-wound Assessment Red 11/10/2018  8:39 AM   Yellow%Wound Bed 100 10/29/2018 10:00 AM   Number of days: 16       Incision 10/15/18 Chest (Active)   Wound Assessment Clean;Dry; Intact;Edges well approximated 2018  8:58 PM   Vielka-wound Assessment Dry; Other (Comment) 2018  8:58 PM   Closure Surgical glue 2018  8:00 PM   Culture Taken No 10/19/2018  4:00 AM   Drainage Amount None 2018  8:58 PM   Drainage Description Serosanguinous 10/31/2018  4:00 PM   Odor None 2018  8:58 PM   Dressing/Treatment Open to air 2018  8:58 PM   Dressing Changed Changed/New 10/30/2018 12:02 PM   Dressing Status Changed 10/31/2018  4:00 PM   Number of days: 30       Incision 10/15/18 Groin Left (Active)   Wound Image   2018 11:40 AM   Wound Assessment Red 2018  8:58 PM   Vielka-wound Assessment Blanchable erythema 2018 11:40 AM   Wound Length (cm) 1 cm 2018 11:40 AM   Wound Width (cm) 4 cm 2018 11:40 AM   Wound Depth (cm)  .5 2018 11:40 AM   Closure Surgical glue 2018 12:00 AM   Culture Taken No 10/19/2018  4:00 AM   Drainage Amount Small 2018  8:58 PM   Drainage Description Purulent 2018  8:58 PM   Odor None 10/31/2018  4:00 PM   Dressing/Treatment Dry dressing 2018  8:58 PM   Dressing Changed Changed/New 2018 11:40 AM   Dressing Status Changed 2018 11:40 AM   Number of days: 30        Elimination:  Continence:   · Bowel: {YES / JS:46597}  · Bladder: {YES / E}  Urinary Catheter: {Urinary Catheter:343437268}   Colostomy/Ileostomy/Ileal Conduit: {YES / CR:53096}       Date of Last BM: ***    Intake/Output Summary (Last 24 hours) at 18 1122  Last data filed at 18 0800   Gross per 24 hour   Intake              120 ml   Output                0 ml   Net              120 ml     No intake/output data recorded.     Safety Concerns:     [address] JANELL Safety Concerns:130519419}    Impairments/Disabilities:      [address] JANELL Impairments/Disabilities:442606577}    Nutrition Therapy:  Current Nutrition Therapy:   [address] JANELL Diet List:538535362}    Routes of Feeding: {CHP DME Other Feedings:105181475}  Liquids: {Slp liquid thickness:46144}  Daily Fluid Restriction: {CHP DME Yes amt example:841476657}  Last Modified Barium Swallow with Video (Video Swallowing Test): {Done Not Done KBVN:211024660}    Treatments at the Time of Hospital Discharge:   Respiratory Treatments: ***  Oxygen Therapy:  {Therapy; copd oxygen:58349}  Ventilator:    { CC Vent DWZJ:533113784}    Rehab Therapies: {THERAPEUTIC INTERVENTION:3599285036}  Weight Bearing Status/Restrictions: 50Meche HARRISON Weight Bearin}  Other Medical Equipment (for information only, NOT a DME order):  {EQUIPMENT:476788029}  Other Treatments: ***    Patient's personal belongings (please select all that are sent with patient):  {[facility] DME Belongings:138169464}    RN SIGNATURE:  {Esignature:268347720}    CASE MANAGEMENT/SOCIAL WORK SECTION    Inpatient Status Date:2018 Rehab admission    Readmission Risk Assessment Score:  Readmission Risk              Risk of Unplanned Readmission:        18           Discharging to Facility/ Agency   · Name:[facility]   · [address]  · Phone:[phone]  · Fax:[phone]    Dialysis Facility (if applicable)   · Name:  · Address:  · Dialysis Schedule:  · Phone:  · Fax:    / signature: {GIANFRANCO Duran}    PHYSICIAN SECTION    Prognosis: {Prognosis:8190364664}    Condition at Discharge: [address] Patient Condition:815377750}    Rehab Potential (if transferring to Rehab): {Prognosis:5432011131}    Recommended Labs or Other Treatments After Discharge: ***    Physician Certification: I certify the above information and transfer of Hiwot Pollen  is necessary for the continuing treatment of the diagnosis listed and that she requires {Admit to Appropriate Level of Care:02992} for {GREATER/LESS:585709365} 30

## 2018-11-14 NOTE — PROGRESS NOTES
stay:   Your estimated length of stay is 14 days   Your insurance Coverage has been verified as follows:  Primary Insurance:[place] medicaid    Deductible:0  Coverage:100%  Secondary Insurance:none;secondary insurance policies often cover co-pay amounts, but to ensure payment please contact your insurance company.     Alternative Resources: Please ask the  for more information [phone]

## 2018-11-14 NOTE — PROGRESS NOTES
Nephrology Progress Note    Patient Maryann Torres   MRN -  [id number]   Acct # - [de-identified]      - 1965    48 y.o. Admit Date: 2018  Hospital Day: 9  Location: [address] [place] [address]  Date of evaluation -  2018    Subjective:   CC: atrial fib, Mitral valve repair  Denies sob. Room air   /-118/  Up in room    Objective:   VITALS:  /72   Pulse 81   Temp 97.4 °F (36.3 °C) (Oral)   Resp 18   Ht 5' 6\" (1.676 m)   Wt 185 lb 4.8 oz (84.1 kg)   SpO2 95%   BMI 29.91 kg/m²    Patient Vitals for the past 24 hrs:   BP Temp Temp src Pulse Resp SpO2 Weight   18 0847 118/72 97.4 °F (36.3 °C) Oral 81 18 95 % -   18 2319 - - - - - - 185 lb 4.8 oz (84.1 kg)   18 2057 (!) 105/58 97.9 °F (36.6 °C) Oral 73 20 96 % -   18 1739 (!) 103/56 - - 72 - - -        Patient Vitals for the past 96 hrs (Last 3 readings):   Weight   18 2319 185 lb 4.8 oz (84.1 kg)   18 0007 187 lb 3.2 oz (84.9 kg)       Intake/Output Summary (Last 24 hours) at 18 1029  Last data filed at 18 0800   Gross per 24 hour   Intake              120 ml   Output                0 ml   Net              120 ml       EXAM:  CONSTITUTIONAL:  No acute distress. Lying comfortable in bed. Pleasant  HEENT:  Head is normocephalic, Extraocular movement intact. Neck is supple. Voice is clear. CARDIOVASCULAR:  S1, S2  regular rate and rhythm. RESPIRATORY: Clear to ausculation bilaterally. Equal breath sounds. No wheezes. No shortness of breath noted at rest.  ABDOMEN: soft, non tender  NEUROLOGICAL: Patient is alert and oriented to person, place, and time. Recent and remote memory intact. Thought is coherant. SKIN: no rash, No significant bruises on exposed surfaces, Sternal incision  MUSCULOSKELETAL: Movement is coordinated. Moves all extremities   EXTREMITIES: Distal lower extremity temp is warm, No lower extremity edema. PSYCHIATRIC: mood and affect appropriate.     Medications:   Med reviewed  Scheduled

## 2018-11-14 NOTE — PROGRESS NOTES
Patient organized 1/5 medications into appropriate section of pill box; ST cued patient to organize addition 4 medications; however, patient refused by stating \"I know I would just put them in the appropriate spots. \"  Overall good success and knowledge re: basic medication management task; recommend supervision with medications upon discharge to home setting. Patient verbalized understanding; no family present. Previous tx: Executive functioning task for interpreting 4-digit monetary amounts: 6/10 independent, 3/10 min cues, 1/10 mod cues  *decreased processing speed for mathematical computations  *positive use of self-talk to assist with working memory component of task    [address] #2:  Goal 2: Pt will complete immediate, delayed, and working memory tasks with 70% accuracy, given mod cues to improve retention of newly learned medical information GOAL MET. New goal 2:  Goal 2: Pt will complete immediate, delayed, and working memory tasks with 90% accuracy, given min cues to improve retention of newly learned medical information   INTERVENTIONS: Patient with excellent recall of tasks completed in AM sessions during family education with Dad and discharge plans. Previous tx:   Orientation: 7/7 independent  Working memory task with pt directed to manipulate 3-unit word set into alphabetical order, auditory presentation: 6/10 independent, 2/10 min cues, 1/10 mod cues, 1/10 max cues    SHORT TERM GOAL #3:  Goal 3: Pt will complete functional verbal sequencing and thought organization tasks (including word finding) with 70% accuracy, given mod cues to improve safety of return to home environment and improve vocabulary. GOAL MET. New Goal 3: Pt will complete functional verbal sequencing and thought organization tasks (including word finding) with 90% accuracy, given min cues to improve safety of return to home environment and improve vocabulary.    INTERVENTIONS: Patient presented with functional calendar;

## 2018-11-15 VITALS
TEMPERATURE: 97.6 F | BODY MASS INDEX: 29.78 KG/M2 | DIASTOLIC BLOOD PRESSURE: 69 MMHG | OXYGEN SATURATION: 98 % | WEIGHT: 185.3 LBS | HEIGHT: 66 IN | SYSTOLIC BLOOD PRESSURE: 116 MMHG | HEART RATE: 82 BPM | RESPIRATION RATE: 16 BRPM

## 2018-11-15 LAB
ANION GAP SERPL CALCULATED.3IONS-SCNC: 12 MEQ/L (ref 8–16)
BUN BLDV-MCNC: 22 MG/DL (ref 7–22)
CALCIUM SERPL-MCNC: 9.3 MG/DL (ref 8.5–10.5)
CHLORIDE BLD-SCNC: 100 MEQ/L (ref 98–111)
CO2: 27 MEQ/L (ref 23–33)
CREAT SERPL-MCNC: 2.2 MG/DL (ref 0.4–1.2)
GFR SERPL CREATININE-BSD FRML MDRD: 23 ML/MIN/1.73M2
GLUCOSE BLD-MCNC: 88 MG/DL (ref 70–108)
HCT VFR BLD CALC: 29 % (ref 37–47)
HEMOGLOBIN: 9.1 GM/DL (ref 12–16)
POTASSIUM SERPL-SCNC: 4.3 MEQ/L (ref 3.5–5.2)
SODIUM BLD-SCNC: 139 MEQ/L (ref 135–145)

## 2018-11-15 PROCEDURE — 85014 HEMATOCRIT: CPT

## 2018-11-15 PROCEDURE — 97530 THERAPEUTIC ACTIVITIES: CPT

## 2018-11-15 PROCEDURE — 94640 AIRWAY INHALATION TREATMENT: CPT

## 2018-11-15 PROCEDURE — 6370000000 HC RX 637 (ALT 250 FOR IP): Performed by: PHYSICAL MEDICINE & REHABILITATION

## 2018-11-15 PROCEDURE — 85018 HEMOGLOBIN: CPT

## 2018-11-15 PROCEDURE — 6360000002 HC RX W HCPCS: Performed by: NURSE PRACTITIONER

## 2018-11-15 PROCEDURE — 97127 HC SP THER IVNTJ W/FOCUS COG FUNCJ: CPT | Performed by: SPEECH-LANGUAGE PATHOLOGIST

## 2018-11-15 PROCEDURE — 36415 COLL VENOUS BLD VENIPUNCTURE: CPT

## 2018-11-15 PROCEDURE — 6370000000 HC RX 637 (ALT 250 FOR IP): Performed by: THORACIC SURGERY (CARDIOTHORACIC VASCULAR SURGERY)

## 2018-11-15 PROCEDURE — 6370000000 HC RX 637 (ALT 250 FOR IP): Performed by: INTERNAL MEDICINE

## 2018-11-15 PROCEDURE — 80048 BASIC METABOLIC PNL TOTAL CA: CPT

## 2018-11-15 PROCEDURE — 97110 THERAPEUTIC EXERCISES: CPT

## 2018-11-15 PROCEDURE — 99232 SBSQ HOSP IP/OBS MODERATE 35: CPT | Performed by: NURSE PRACTITIONER

## 2018-11-15 PROCEDURE — 97116 GAIT TRAINING THERAPY: CPT

## 2018-11-15 RX ORDER — FUROSEMIDE 40 MG/1
40 TABLET ORAL DAILY
Qty: 90 TABLET | Refills: 0 | Status: SHIPPED | OUTPATIENT
Start: 2018-11-15 | End: 2018-12-20 | Stop reason: SDUPTHER

## 2018-11-15 RX ORDER — ASPIRIN 325 MG
325 TABLET ORAL DAILY
Qty: 30 TABLET | Refills: 0 | Status: SHIPPED | OUTPATIENT
Start: 2018-11-16 | End: 2018-12-12 | Stop reason: SDUPTHER

## 2018-11-15 RX ORDER — AMIODARONE HYDROCHLORIDE 200 MG/1
200 TABLET ORAL 2 TIMES DAILY
Qty: 60 TABLET | Refills: 0 | Status: SHIPPED | OUTPATIENT
Start: 2018-11-15 | End: 2018-11-21 | Stop reason: ALTCHOICE

## 2018-11-15 RX ORDER — MIDODRINE HYDROCHLORIDE 5 MG/1
5 TABLET ORAL 2 TIMES DAILY WITH MEALS
Qty: 60 TABLET | Refills: 0 | Status: SHIPPED | OUTPATIENT
Start: 2018-11-15 | End: 2018-12-12 | Stop reason: ALTCHOICE

## 2018-11-15 RX ORDER — CARVEDILOL 3.12 MG/1
3.12 TABLET ORAL 2 TIMES DAILY WITH MEALS
Qty: 60 TABLET | Refills: 3 | Status: SHIPPED | OUTPATIENT
Start: 2018-11-15 | End: 2018-12-12 | Stop reason: SDUPTHER

## 2018-11-15 RX ORDER — MULTIVITAMIN WITH FOLIC ACID 400 MCG
1 TABLET ORAL DAILY
Refills: 0 | COMMUNITY
Start: 2018-11-16 | End: 2018-12-20 | Stop reason: SDUPTHER

## 2018-11-15 RX ORDER — ATORVASTATIN CALCIUM 20 MG/1
20 TABLET, FILM COATED ORAL NIGHTLY
Qty: 30 TABLET | Refills: 3 | Status: SHIPPED | OUTPATIENT
Start: 2018-11-15 | End: 2018-11-26 | Stop reason: ALTCHOICE

## 2018-11-15 RX ORDER — CIPROFLOXACIN 500 MG/1
500 TABLET, FILM COATED ORAL 2 TIMES DAILY
Qty: 14 TABLET | Refills: 0 | Status: SHIPPED | OUTPATIENT
Start: 2018-11-15 | End: 2018-11-22

## 2018-11-15 RX ORDER — PANTOPRAZOLE SODIUM 40 MG/1
40 TABLET, DELAYED RELEASE ORAL DAILY
Qty: 90 TABLET | Refills: 3 | Status: SHIPPED | OUTPATIENT
Start: 2018-11-15 | End: 2018-12-12 | Stop reason: ALTCHOICE

## 2018-11-15 RX ORDER — POTASSIUM CHLORIDE 750 MG/1
10 TABLET, EXTENDED RELEASE ORAL DAILY
Qty: 30 TABLET | Refills: 0 | Status: SHIPPED | OUTPATIENT
Start: 2018-11-15 | End: 2018-12-12 | Stop reason: SDUPTHER

## 2018-11-15 RX ADMIN — ASPIRIN 325 MG: 325 TABLET ORAL at 09:40

## 2018-11-15 RX ADMIN — MIDODRINE HYDROCHLORIDE 5 MG: 5 TABLET ORAL at 09:40

## 2018-11-15 RX ADMIN — ACETAMINOPHEN 650 MG: 500 TABLET, FILM COATED ORAL at 05:56

## 2018-11-15 RX ADMIN — THERA TABS 1 TABLET: TAB at 09:40

## 2018-11-15 RX ADMIN — FAMOTIDINE 20 MG: 20 TABLET ORAL at 09:40

## 2018-11-15 RX ADMIN — DARBEPOETIN ALFA 60 MCG: 60 INJECTION, SOLUTION INTRAVENOUS; SUBCUTANEOUS at 11:36

## 2018-11-15 RX ADMIN — VITAMIN D, TAB 1000IU (100/BT) 3000 UNITS: 25 TAB at 09:40

## 2018-11-15 RX ADMIN — TIOTROPIUM BROMIDE 18 MCG: 18 CAPSULE ORAL; RESPIRATORY (INHALATION) at 05:47

## 2018-11-15 RX ADMIN — AMIODARONE HYDROCHLORIDE 200 MG: 200 TABLET ORAL at 09:40

## 2018-11-15 RX ADMIN — COLLAGENASE SANTYL: 250 OINTMENT TOPICAL at 09:41

## 2018-11-15 RX ADMIN — FUROSEMIDE 40 MG: 40 TABLET ORAL at 09:40

## 2018-11-15 RX ADMIN — ZINC SULFATE 220 MG (50 MG) CAPSULE 220 MG: CAPSULE at 09:40

## 2018-11-15 RX ADMIN — MICONAZOLE NITRATE: 2 POWDER TOPICAL at 09:41

## 2018-11-15 RX ADMIN — DOCUSATE SODIUM 100 MG: 100 CAPSULE, LIQUID FILLED ORAL at 09:40

## 2018-11-15 ASSESSMENT — PAIN SCALES - GENERAL
PAINLEVEL_OUTOF10: 0
PAINLEVEL_OUTOF10: 8

## 2018-11-15 NOTE — PROGRESS NOTES
Long-term Goals: 4 weeks  Goal 1: Pt will improve cognitive function to a supervision/Mod Independent level to permit safe return to home and work environments. GOAL MET      COMMUNICATION  Comprehension: 6 - Complex ideas 90% or device (hearing aid/glasses)  Expression: 6 - Device used to express complex ideas/needs  SOCIAL COGNITION  Social Interaction: 6 - Patient requires medication for mood and/or effect  Problem Solvin - Patient able to solve simple/routine tasks  Memory: 6 - Patient requires device to recall (e.g. memory book)    ASSESSMENT:  Patient has made great progess over the course of her rehab stay. Improvements noted in functional problem solving, reasoning and thought organization. Patient continues to demonstrate difficulty with functional math reasoning, short term memory and organizational skills to improve success with managing finances and coordinating a daily schedule. Patient  discharging to father's home with continued [facility] therapies. Patient would greatly benefit from skilled ST services in order to continue to target cognitive deficits in order to successfully return to home, community and work setting upon discharge.        Assessment: [x]Progressing towards goals          []Not Progressing towards goals       Patient Tolerance of Treatment:   [x]Tolerated well []Tolerated fair  []Required rest breaks []Fatigued    Education:  Learner:  [x]Patient          []Significant Other          []Other  Education provided regarding:  [x]Goals and POC   []Diet and swallowing precautions    [x]Home Exercise Program  [x]Progress and/or discharge information  Method of Education:  [x]Discussion          [x]Demonstration          []Handout          []Other  Evaluation of Education:   [x]Verbalized understanding   [x]Demonstrates without assistance  []Demonstrates with assistance  []Needs further instruction     []No evidence of learning                  [x]No family present      Plan: []Continue

## 2018-11-15 NOTE — PROGRESS NOTES
[place] [facility]  INPATIENT OCCUPATIONAL THERAPY  [facility]  DISCHARGE NOTE    Time:  Time In: 935  Time Out: 1000  Timed Code Treatment Minutes: 25 Minutes  Minutes: 25    Date: 11/15/2018  Patient Name: Zahra Barrera,   Gender: female      MRN: [id number]  : 1965  (48 y.o.)  Referring Practitioner: Dr. Hodan Salgado  Diagnosis: mitral valve replacement  Additional Pertinent Hx: 48y.o. year-old female who presents for redo sternotomy, mitral valve repair, maze procedure, placement of left ventricular epicardial lead. sx on 10/15/18. Pt reintubated on 10/17/18 with self extubation and reintubation on 10/19/18. Pt with chest tubes rmoved on 10/22. extubation on 10/24/18 with hgih flow O2 started on 10/25. Pt was admitted to IPR on 18. Restrictions/Precautions:  Surgical Protocols, Fall Risk     Sternal Precautions: No Pushing, No Pulling, 5# Lifting Restrictions  Other position/activity restrictions: s/predo sternotomy, mitral valve repair, maze procedure, placement of left ventricular epicardial lead on 10/15/18      Past Medical History:   Diagnosis Date    [employer] dual pacer 2018    Chronic diastolic congestive heart failure ([address] 2018    GERD (gastroesophageal reflux disease) 3/30/2018    Last Assessment & Plan:  Continue home protonix     History of atrial septal defect repair 2018    HLD (hyperlipidemia) 3/30/2018    Last Assessment & Plan:  Cardiac diet Continue statin    Nonischemic dilated cardiomyopathy ([address] 2018    EF of 25%     Past Surgical History:   Procedure Laterality Date    CARDIAC CATHETERIZATION  2018    Dr. Bharat Rutledge. No CAD.  CARDIAC SURGERY  1970    repair hole in heart pt states was 3 yrs old.     COLONOSCOPY      PACEMAKER INSERTION  2017    [place] OFFICE/OUTPT VISIT,PROCEDURE ONLY N/A 10/15/2018    REDO STERNOTOMY, BI-ATRIAL MAZE PROCEDURE, MITRAL REPAIR, PLACEMENT OF INTRA AORTIC BALLOON PUMP performed by walking like normal\"    Short term goals  Time Frame for Short term goals: 1 week  Short term goal 1:     Short term goal 2:      Short term goal 3:      Short term goal 4:      Short term goal 5:     Long term goals  Time Frame for Long term goals : 1 session   Long term goal 1: Complete BADL routine with MI & 0-1 vcs for safe adaptative strategies.  GOAL MET   Long term goal 2:

## 2018-11-15 NOTE — PROGRESS NOTES
position/activity restrictions: s/predo sternotomy, mitral valve repair, maze procedure, placement of left ventricular epicardial lead on 10/15/18       Prior Level of Function:  ADL Assistance: Independent  Homemaking Assistance: Independent  Ambulation Assistance: Independent  Transfer Assistance: Independent  Additional Comments: Pt reports living with SO PLOF in 2 story home-2nd story bed & bathroom. No AD used PLOF. Pt reports she plans to go home with her father at discharge. Subjective:     Subjective: Pt. laying in bed upon arrival and pleasantly agreeable to therapy session. Pt. eager to go home today. Pt. requested to use restroom at beginning of session. Pain:  Denies. Social/Functional:  Lives With: Significant other  Type of Home: House  Home Layout: Two level, Able to Live on Main level with bedroom/bathroom  Home Access: Stairs to enter with rails  Entrance Stairs - Number of Steps: 3  Entrance Stairs - Rails: Both  Home Equipment: Rolling walker     Objective:  Rolling to Left: Modified independent  Supine to Sit: Supervision (Cue required for sternal precautions.)  Sit to Supine: Modified independent    Transfers  Sit to Stand: Independent  Stand to sit: Independent  Car Transfer: Modified independent (Into back seat)       Ambulation 1  Surface: level tile  Device: No Device  Assistance: Modified Independent  Quality of Gait: Decreased tati and velocity. Pt. steady on feet with no LOB. Pt. rated RPE: 12 following ambulation. Distance: 150' x 2, multiple short distances in rehab gym    Stairs  # Steps : 4  Stairs Height: 6\"  Rails: Left ascending  Device: No Device  Assistance: Modified independent   Comment: Good technique with 5\" pause between each step. Pt. steady with no LOB.        Exercises:  Exercises  Comments: Reviewed and completed HEP consisting of ankle pumps, long arc quad, marches, hip abd/add, B UE shoulder flexion, horizontal abd/add, shoulder rolls, scapular MET  Long term goal 2: Pt to get up/down from various seated surfaces, Mod I to get up to walk. GOAL MET  Long term goal 3: Pt to walk with RW >= 150 ft, RPE <= 13, to progress to home and community mobility. Mod I  GOAL MET  Long term goal 4: Pt to ascend/descend 4+ steps with wilma rails, Mod I, for home entry. GOAL MET  Long term goal 5: Pt to get in/out of car, Mod I, for transportation needs. GOAL MET  Treatment session and note completed by Stephanie Moulton PTA. Assessment and goal revision of Discharge Note completed by Luke Salter, PT. Vishal Gibson

## 2018-11-15 NOTE — PROGRESS NOTES
Nephrology Progress Note    Patient Reji James   MRN -  [id number]   [facility]      - 1965    48 y.o. Admit Date: 2018  Hospital Day: 10  Location: [address] [place] [address]  Date of evaluation -  11/15/2018    Subjective:   CC: atrial fib, Mitral valve repair  Denies sob. Room air   /-130/  Up in room    Objective:   VITALS:  /69   Pulse 82   Temp 97.6 °F (36.4 °C)   Resp 16   Ht 5' 6\" (1.676 m)   Wt 185 lb 4.8 oz (84.1 kg)   SpO2 98%   BMI 29.91 kg/m²    Patient Vitals for the past 24 hrs:   BP Temp Temp src Pulse Resp SpO2   11/15/18 1007 - 97.6 °F (36.4 °C) - 82 16 -   11/15/18 0932 116/69 - - 82 - -   11/15/18 0931 116/69 97.6 °F (36.4 °C) Oral 82 16 98 %   18 2030 115/62 98.1 °F (36.7 °C) Oral 73 16 97 %   18 1717 (!) 132/92 - - 86 - -   18 1713 (!) 132/92 - - 86 - -        Patient Vitals for the past 96 hrs (Last 3 readings):   Weight   18 2319 185 lb 4.8 oz (84.1 kg)   18 0007 187 lb 3.2 oz (84.9 kg)       Intake/Output Summary (Last 24 hours) at 11/15/18 1049  Last data filed at 18 1717   Gross per 24 hour   Intake              240 ml   Output                0 ml   Net              240 ml       EXAM:  CONSTITUTIONAL:  No acute distress. Lying comfortable in bed. Pleasant  HEENT:  Head is normocephalic, Extraocular movement intact. Neck is supple. Voice is clear. CARDIOVASCULAR:  S1, S2  regular rate and rhythm. RESPIRATORY: Clear to ausculation bilaterally. Equal breath sounds. No wheezes. No shortness of breath noted at rest.  ABDOMEN: soft, non tender  NEUROLOGICAL: Patient is alert and oriented to person, place, and time. Recent and remote memory intact. Thought is coherant. SKIN: no rash, No significant bruises on exposed surfaces, Sternal incision  MUSCULOSKELETAL: Movement is coordinated. Moves all extremities   EXTREMITIES: Distal lower extremity temp is warm, No lower extremity edema.    PSYCHIATRIC: mood and affect tobacco use  9. Vit D deficiency. PO supplement  Planned discharge today. Pt from [facility] but wants to continue FU care in [facility].  Schedule FU appt in one month with BMP and H&H    BMP in AM  Active Problems:    Mitral valve replaced    Postprocedural hypotension  Resolved Problems:    Hyperkalemia     ALDAIR Avalos CNP 10:49 AM 11/15/2018

## 2018-11-17 LAB — URINE CULTURE REFLEX: NORMAL

## 2018-11-21 ENCOUNTER — TELEPHONE (OUTPATIENT)
Dept: FAMILY MEDICINE CLINIC | Age: 53
End: 2018-11-21

## 2018-11-21 ENCOUNTER — OFFICE VISIT (OUTPATIENT)
Dept: CARDIOTHORACIC SURGERY | Age: 53
End: 2018-11-21

## 2018-11-21 VITALS
WEIGHT: 177 LBS | HEART RATE: 88 BPM | HEIGHT: 66 IN | DIASTOLIC BLOOD PRESSURE: 92 MMHG | SYSTOLIC BLOOD PRESSURE: 129 MMHG | BODY MASS INDEX: 28.45 KG/M2

## 2018-11-21 DIAGNOSIS — I50.32 CHRONIC DIASTOLIC CONGESTIVE HEART FAILURE (HCC): Primary | ICD-10-CM

## 2018-11-21 DIAGNOSIS — J96.01 ACUTE HYPOXEMIC RESPIRATORY FAILURE (HCC): ICD-10-CM

## 2018-11-21 PROCEDURE — 99024 POSTOP FOLLOW-UP VISIT: CPT | Performed by: PHYSICIAN ASSISTANT

## 2018-11-21 RX ORDER — BLOOD PRESSURE TEST KIT-MEDIUM
1 KIT MISCELLANEOUS DAILY
Qty: 1 KIT | Refills: 0 | Status: SHIPPED | OUTPATIENT
Start: 2018-11-21 | End: 2018-12-20 | Stop reason: SDUPTHER

## 2018-11-21 NOTE — TELEPHONE ENCOUNTER
Patient's father Cydney Almeida (on [address]) called and is requesting an order for a home blood pressure machine and cuff and pulse ox to check home O2.    Pharmacy [facility]

## 2018-11-21 NOTE — PROGRESS NOTES
CT/CV Surgery Follow Up Office Visit      Patient's Name/Date of Birth: Rubi Cronin / 1965 (48 y.o.)      PCP: ALDAIR Yanes CNP      Date: November 21, 2018      We had the pleasure of seeing Rubi Cronin in the office today for a bilateral groin wound check (See PE pictures). Pt has been using santyl since d/c. She states she believes the wounds have improved. She denies fever, chills, chest pressure, SOB. PastMedical History: [place]  has a past medical history of [employer] dual pacer; Chronic diastolic congestive heart failure ([facility] GERD (gastroesophageal reflux disease); History of atrial septal defect repair; HLD (hyperlipidemia); and Nonischemic dilated cardiomyopathy ([facility] Past Surgical History:  The patient  has a past surgical history that includes Tubal ligation (1997); Cardiac surgery (1970); Pacemaker insertion (05/01/2017); Colonoscopy; Cardiac catheterization (09/20/2018); and pr office/outpt visit,procedure only (N/A, 10/15/2018). Allergies: The patient has No Known Allergies.     Medications:    Current Outpatient Prescriptions:     atorvastatin (LIPITOR) 20 MG tablet, Take 1 tablet by mouth nightly, Disp: 30 tablet, Rfl: 3    carvedilol (COREG) 3.125 MG tablet, Take 1 tablet by mouth 2 times daily (with meals), Disp: 60 tablet, Rfl: 3    furosemide (LASIX) 40 MG tablet, Take 1 tablet by mouth daily, Disp: 90 tablet, Rfl: 0    pantoprazole (PROTONIX) 40 MG tablet, Take 1 tablet by mouth daily, Disp: 90 tablet, Rfl: 3    Cholecalciferol 2000 units CAPS, Take 1 capsule by mouth daily, Disp: 30 capsule, Rfl: 2    potassium chloride (KLOR-CON M) 10 MEQ extended release tablet, Take 1 tablet by mouth daily, Disp: 30 tablet, Rfl: 0    ciprofloxacin (CIPRO) 500 MG tablet, Take 1 tablet by mouth 2 times daily for 7 days, Disp: 14 tablet, Rfl: 0    aspirin 325 MG tablet, Take 1 tablet by mouth daily, Disp: 30 tablet, Rfl: 0    collagenase 250 UNIT/GM ointment,

## 2018-11-26 ENCOUNTER — TELEPHONE (OUTPATIENT)
Dept: CARDIOLOGY CLINIC | Age: 53
End: 2018-11-26

## 2018-11-26 ENCOUNTER — OFFICE VISIT (OUTPATIENT)
Dept: CARDIOLOGY CLINIC | Age: 53
End: 2018-11-26
Payer: COMMERCIAL

## 2018-11-26 VITALS
HEART RATE: 84 BPM | WEIGHT: 175 LBS | SYSTOLIC BLOOD PRESSURE: 115 MMHG | DIASTOLIC BLOOD PRESSURE: 84 MMHG | BODY MASS INDEX: 28.25 KG/M2

## 2018-11-26 DIAGNOSIS — I42.0 DILATED CARDIOMYOPATHY (HCC): ICD-10-CM

## 2018-11-26 DIAGNOSIS — I10 ESSENTIAL HYPERTENSION: ICD-10-CM

## 2018-11-26 DIAGNOSIS — Z98.890 S/P LEFT ATRIAL APPENDAGE LIGATION: ICD-10-CM

## 2018-11-26 DIAGNOSIS — Z98.890 S/P MITRAL VALVE REPAIR: Primary | ICD-10-CM

## 2018-11-26 DIAGNOSIS — Z98.890 H/O MAZE PROCEDURE: ICD-10-CM

## 2018-11-26 PROCEDURE — 3017F COLORECTAL CA SCREEN DOC REV: CPT | Performed by: PHYSICIAN ASSISTANT

## 2018-11-26 PROCEDURE — 99213 OFFICE O/P EST LOW 20 MIN: CPT | Performed by: PHYSICIAN ASSISTANT

## 2018-11-26 PROCEDURE — G8417 CALC BMI ABV UP PARAM F/U: HCPCS | Performed by: PHYSICIAN ASSISTANT

## 2018-11-26 PROCEDURE — 1111F DSCHRG MED/CURRENT MED MERGE: CPT | Performed by: PHYSICIAN ASSISTANT

## 2018-11-26 PROCEDURE — G8484 FLU IMMUNIZE NO ADMIN: HCPCS | Performed by: PHYSICIAN ASSISTANT

## 2018-11-26 PROCEDURE — 1036F TOBACCO NON-USER: CPT | Performed by: PHYSICIAN ASSISTANT

## 2018-11-26 PROCEDURE — G8427 DOCREV CUR MEDS BY ELIG CLIN: HCPCS | Performed by: PHYSICIAN ASSISTANT

## 2018-11-26 RX ORDER — BLOOD PRESSURE TEST KIT
1 KIT MISCELLANEOUS 2 TIMES DAILY
Qty: 1 KIT | Refills: 0 | Status: SHIPPED | OUTPATIENT
Start: 2018-11-26 | End: 2018-12-20 | Stop reason: SDUPTHER

## 2018-11-26 NOTE — TELEPHONE ENCOUNTER
Called [facility] to prior [facility]. Fish said to call DME (Durable medical equipment) at [phone]. Called DME. All information given to them. BP kit ordered. Will be mailed to patient at home address in chart. DME will call her the day BP Kit ships. Usually takes about a week. As of today no charge.

## 2018-11-26 NOTE — PROGRESS NOTES
[facility] PROFESSIONAL SERVICES  [facility]   [address] [place] [address]   [address] [place] [address] [address] [address]   Dept: [phone]   Dept Fax: [phone]: [phone]      Chief Complaint   Patient presents with    Follow-Up from Hospital     Patient presents for follow-up appointment after recent mitral valve repair. Patient states overall since her procedure she has been doing fairly well. She has some occasional incisional pain. She denies any significant issues or shortness of breath or palpitations. She has not had any issues as far as she is aware of atrial fibrillation. She is concerned that she will not be a little back to work. Cardiologist:  Dr. Beach Gall:   No fever, no chills, No fatigue or weight loss  Pulmonary:    No dyspnea, no wheezing  Cardiac:   Occasional incisional pain  GI:     No nausea or vomiting, no abdominal pain  Neuro:    No dizziness or light headedness  Musculoskeletal:  No recent active issues  Extremities:   No edema, good peripheral pulses      Past Medical History:   Diagnosis Date    [place] [employer] dual pacer 5/23/2018    Chronic diastolic congestive heart failure ([address] 5/7/2018    GERD (gastroesophageal reflux disease) 3/30/2018    Last Assessment & Plan:  Continue home protonix     History of atrial septal defect repair 2/9/2018    HLD (hyperlipidemia) 3/30/2018    Last Assessment & Plan:  Cardiac diet Continue statin    Nonischemic dilated cardiomyopathy ([facility] 05/07/2018    EF of 25%       No Known Allergies    Current Outpatient Prescriptions   Medication Sig Dispense Refill    Blood Pressure KIT 1 Units by Does not apply route 2 times daily 1 kit 0    Blood Pressure Monitoring ([employer] BLOOD PRESSURE MONITOR) KIT 1 kit by Does not apply route daily 1 kit 0    Misc.  Devices ([employer] PULSE OXIMETER) MISC 1 each by Does not apply route daily 1 each 0    carvedilol (COREG) 3.125 MG tablet Take 1 tablet by mouth 2 times daily (with meals) 60 tablet 3    furosemide (LASIX) 40 MG tablet Take 1 tablet by mouth daily 90 tablet 0    pantoprazole (PROTONIX) 40 MG tablet Take 1 tablet by mouth daily 90 tablet 3    Cholecalciferol 2000 units CAPS Take 1 capsule by mouth daily 30 capsule 2    potassium chloride (KLOR-CON M) 10 MEQ extended release tablet Take 1 tablet by mouth daily 30 tablet 0    aspirin 325 MG tablet Take 1 tablet by mouth daily 30 tablet 0    Multiple Vitamin (MULTIVITAMIN) tablet Take 1 tablet by mouth daily  0    midodrine (PROAMATINE) 5 MG tablet Take 1 tablet by mouth 2 times daily (with meals) 60 tablet 0    Melatonin 10 MG TABS Take 1 tablet by mouth nightly as needed       No current facility-administered medications for this visit. Social History     Social History    Marital status:      Spouse name: N/A    Number of children: N/A    Years of education: N/A     Social History Main Topics    Smoking status: Former Smoker     Packs/day: 0.25     Years: 35.00     Types: Cigarettes     Start date: 5/7/1994     Quit date: 9/16/2018    Smokeless tobacco: Never Used    Alcohol use No    Drug use: No    Sexual activity: Not Asked     Other Topics Concern    None     Social History Narrative    None       Family History   Problem Relation Age of Onset    Emphysema Father     High Cholesterol Father        Blood pressure 115/84, pulse 84, weight 175 lb (79.4 kg). General:   Well developed, well nourished  Lungs:   Clear to auscultation  Heart:    Normal S1 S2, No murmur, rubs, or gallops  Abdomen:   Soft, non tender, no organomegalies, positive bowel sounds  Extremities:   No edema, no cyanosis, good peripheral pulses  Neurological:   Awake, alert, oriented.  No obvious focal deficits  Musculoskeletal:  No obvious deformities  Sternal incision:          Healing well      Operative note 10/15/2018  Redo sternotomy  Mitral valve repair with annuloplasty band  Left atrial maze procedure   Ligation of left

## 2018-11-26 NOTE — PROGRESS NOTES
Pt here for fu [facility] cath, open heart     Pt denies chest pain , sob, peripheral edema     Pt continues with chest soreness at site of sx, dizziness, no change     Pt states can not taste food very well

## 2018-11-27 ENCOUNTER — OFFICE VISIT (OUTPATIENT)
Dept: CARDIOLOGY CLINIC | Age: 53
End: 2018-11-27
Payer: COMMERCIAL

## 2018-11-27 ENCOUNTER — NURSE ONLY (OUTPATIENT)
Dept: CARDIOLOGY CLINIC | Age: 53
End: 2018-11-27
Payer: COMMERCIAL

## 2018-11-27 VITALS
HEIGHT: 66 IN | SYSTOLIC BLOOD PRESSURE: 122 MMHG | BODY MASS INDEX: 27.64 KG/M2 | WEIGHT: 172 LBS | DIASTOLIC BLOOD PRESSURE: 77 MMHG

## 2018-11-27 DIAGNOSIS — I48.0 PAF (PAROXYSMAL ATRIAL FIBRILLATION) (HCC): Primary | Chronic | ICD-10-CM

## 2018-11-27 DIAGNOSIS — I42.0 NONISCHEMIC DILATED CARDIOMYOPATHY (HCC): ICD-10-CM

## 2018-11-27 DIAGNOSIS — Z95.0 PACEMAKER: Primary | ICD-10-CM

## 2018-11-27 PROCEDURE — G8417 CALC BMI ABV UP PARAM F/U: HCPCS | Performed by: INTERNAL MEDICINE

## 2018-11-27 PROCEDURE — 3017F COLORECTAL CA SCREEN DOC REV: CPT | Performed by: INTERNAL MEDICINE

## 2018-11-27 PROCEDURE — G8484 FLU IMMUNIZE NO ADMIN: HCPCS | Performed by: INTERNAL MEDICINE

## 2018-11-27 PROCEDURE — 93000 ELECTROCARDIOGRAM COMPLETE: CPT | Performed by: INTERNAL MEDICINE

## 2018-11-27 PROCEDURE — 1036F TOBACCO NON-USER: CPT | Performed by: INTERNAL MEDICINE

## 2018-11-27 PROCEDURE — 1111F DSCHRG MED/CURRENT MED MERGE: CPT | Performed by: INTERNAL MEDICINE

## 2018-11-27 PROCEDURE — 99214 OFFICE O/P EST MOD 30 MIN: CPT | Performed by: INTERNAL MEDICINE

## 2018-11-27 PROCEDURE — G8427 DOCREV CUR MEDS BY ELIG CLIN: HCPCS | Performed by: INTERNAL MEDICINE

## 2018-11-27 PROCEDURE — 93280 PM DEVICE PROGR EVAL DUAL: CPT | Performed by: INTERNAL MEDICINE

## 2018-11-27 NOTE — PROGRESS NOTES
Patient denies any complaints
and insertion of RIGHT common femoral intra-aortic balloon pump by Dr. Osborn on 10/15/2018. Dr. Radha Yun attempted to place an epicardial LV lead, but her myocardium was too friable and the lead could not be anchored securely. The patient did have post procedure atrial fibrillation and underwent successful cardioversion on 10/31/2018. She did have acute renal failure requiring temporary hemodialysis. She was also treated with Abx for a UTI. She is having discomfort from her sternotomy. She overall is making slow progress. She is being treated for inguinal wounds following her procedure. She denies having any fever or chills. Past Medical History:      Diagnosis Date    [place] [employer] dual pacer 5/23/2018    Chronic diastolic congestive heart failure ([address] 5/7/2018    GERD (gastroesophageal reflux disease) 3/30/2018    Last Assessment & Plan:  Continue home protonix     History of atrial septal defect repair 2/9/2018    HLD (hyperlipidemia) 3/30/2018    Last Assessment & Plan:  Cardiac diet Continue statin    Nonischemic dilated cardiomyopathy ([address] 05/07/2018    EF of 25%     Past Surgical History:      Procedure Laterality Date    CARDIAC CATHETERIZATION  09/20/2018    Dr. Cristina Villatoro. No CAD.  CARDIAC SURGERY  1970    repair hole in heart pt states was 3 yrs old.     COLONOSCOPY      MITRAL VALVE SURGERY  10/15/2018    PACEMAKER INSERTION  05/01/2017    [place] OFFICE/OUTPT VISIT,PROCEDURE ONLY N/A 10/15/2018    REDO STERNOTOMY, BI-ATRIAL MAZE PROCEDURE, MITRAL REPAIR, PLACEMENT OF INTRA AORTIC BALLOON PUMP performed by Rashad Lord MD at [address]     Past Family History:   Family History   Problem Relation Age of Onset    Emphysema Father     High Cholesterol Father       Past Social History:     Social History     Social History    Marital status:      Spouse name: N/A    Number of children: N/A    Years of education: N/A     Social History Main Topics   

## 2018-12-07 LAB
BUN BLDV-MCNC: 28 MG/DL
CALCIUM SERPL-MCNC: 9.7 MG/DL
CHLORIDE BLD-SCNC: 98 MMOL/L
CO2: 25 MMOL/L
CREAT SERPL-MCNC: 2.1 MG/DL
GFR CALCULATED: 25
GLUCOSE BLD-MCNC: 114 MG/DL
POTASSIUM SERPL-SCNC: 4.5 MMOL/L
SODIUM BLD-SCNC: 133 MMOL/L

## 2018-12-10 ENCOUNTER — TELEPHONE (OUTPATIENT)
Dept: FAMILY MEDICINE CLINIC | Age: 53
End: 2018-12-10

## 2018-12-11 ENCOUNTER — TELEPHONE (OUTPATIENT)
Dept: CARDIOTHORACIC SURGERY | Age: 53
End: 2018-12-11

## 2018-12-12 ENCOUNTER — HOSPITAL ENCOUNTER (OUTPATIENT)
Dept: GENERAL RADIOLOGY | Age: 53
Discharge: HOME | End: 2018-12-12
Payer: COMMERCIAL

## 2018-12-12 ENCOUNTER — HOSPITAL ENCOUNTER (OUTPATIENT)
Age: 53
Discharge: HOME | End: 2018-12-12
Payer: COMMERCIAL

## 2018-12-12 ENCOUNTER — OFFICE VISIT (OUTPATIENT)
Dept: CARDIOTHORACIC SURGERY | Age: 53
End: 2018-12-12

## 2018-12-12 VITALS
BODY MASS INDEX: 27.26 KG/M2 | DIASTOLIC BLOOD PRESSURE: 78 MMHG | WEIGHT: 169.6 LBS | SYSTOLIC BLOOD PRESSURE: 112 MMHG | HEART RATE: 83 BPM | HEIGHT: 66 IN

## 2018-12-12 DIAGNOSIS — Z98.890 S/P MAZE OPERATION FOR ATRIAL FIBRILLATION: ICD-10-CM

## 2018-12-12 DIAGNOSIS — Z86.79 S/P MAZE OPERATION FOR ATRIAL FIBRILLATION: ICD-10-CM

## 2018-12-12 DIAGNOSIS — Z98.890 S/P MITRAL VALVE REPAIR: ICD-10-CM

## 2018-12-12 DIAGNOSIS — I48.0 PAF (PAROXYSMAL ATRIAL FIBRILLATION) (HCC): Primary | Chronic | ICD-10-CM

## 2018-12-12 DIAGNOSIS — I48.19 PERSISTENT ATRIAL FIBRILLATION (HCC): ICD-10-CM

## 2018-12-12 LAB
EKG ATRIAL RATE: 85 BPM
EKG P AXIS: 77 DEGREES
EKG P-R INTERVAL: 170 MS
EKG Q-T INTERVAL: 476 MS
EKG QRS DURATION: 146 MS
EKG QTC CALCULATION (BAZETT): 566 MS
EKG R AXIS: 14 DEGREES
EKG T AXIS: 139 DEGREES
EKG VENTRICULAR RATE: 85 BPM

## 2018-12-12 PROCEDURE — 99024 POSTOP FOLLOW-UP VISIT: CPT | Performed by: THORACIC SURGERY (CARDIOTHORACIC VASCULAR SURGERY)

## 2018-12-12 PROCEDURE — 71046 X-RAY EXAM CHEST 2 VIEWS: CPT

## 2018-12-12 PROCEDURE — 93005 ELECTROCARDIOGRAM TRACING: CPT | Performed by: THORACIC SURGERY (CARDIOTHORACIC VASCULAR SURGERY)

## 2018-12-12 RX ORDER — CARVEDILOL 3.12 MG/1
3.12 TABLET ORAL 2 TIMES DAILY WITH MEALS
Qty: 180 TABLET | Refills: 5 | Status: SHIPPED | OUTPATIENT
Start: 2018-12-12 | End: 2018-12-20 | Stop reason: SDUPTHER

## 2018-12-12 RX ORDER — POTASSIUM CHLORIDE 750 MG/1
10 TABLET, EXTENDED RELEASE ORAL DAILY
Qty: 90 TABLET | Refills: 3 | Status: SHIPPED | OUTPATIENT
Start: 2018-12-12 | End: 2018-12-20 | Stop reason: SDUPTHER

## 2018-12-12 RX ORDER — ASPIRIN 325 MG
325 TABLET ORAL DAILY
Qty: 90 TABLET | Refills: 5 | Status: SHIPPED | OUTPATIENT
Start: 2018-12-12 | End: 2018-12-20 | Stop reason: SDUPTHER

## 2018-12-12 NOTE — PROGRESS NOTES
Cardiovascular Surgery Office Note      Date: 12/12/2018    Review of Systems:  Pina Wakefield is a 48 y.o. female  whopresented for follow up from a redo sternotomy, mitral repair and maze procedure. She has maintained sinus rhythm off all AADs. She denies pain or SOB. She is ambulating and has no concerns orcomplications since surgery. Physical Exam:  /78 (Site: Left Upper Arm, Position: Sitting, Cuff Size: Medium Adult)   Pulse 83   Ht 5' 6\" (1.676 m)   Wt 169 lb 9.6 oz (76.9 kg)   BMI 27.37 kg/m²   Physical Exam    Her wounds are healing well with no erythema or drainage. The sternumis stable to compression. The lungs are clear to auscultation. The chest xray shows no effusions or infiltrates, and no cephalization. Assessment/Plan:  Midodrine d/c'd. Follow up with Dr. Candi Mckeon in January for pacemaker interrogation and repeat echo.       Electronically signed by Elke Avila MD on 12/12/18 at 2:26 PM

## 2018-12-13 ENCOUNTER — OFFICE VISIT (OUTPATIENT)
Dept: NEPHROLOGY | Age: 53
End: 2018-12-13
Payer: COMMERCIAL

## 2018-12-13 VITALS
BODY MASS INDEX: 27.44 KG/M2 | WEIGHT: 170 LBS | SYSTOLIC BLOOD PRESSURE: 103 MMHG | DIASTOLIC BLOOD PRESSURE: 71 MMHG | HEART RATE: 78 BPM | OXYGEN SATURATION: 99 %

## 2018-12-13 DIAGNOSIS — I95.0 IDIOPATHIC HYPOTENSION: ICD-10-CM

## 2018-12-13 DIAGNOSIS — I50.22 CHRONIC SYSTOLIC (CONGESTIVE) HEART FAILURE (HCC): ICD-10-CM

## 2018-12-13 DIAGNOSIS — N18.4 CKD (CHRONIC KIDNEY DISEASE), STAGE IV (HCC): Primary | ICD-10-CM

## 2018-12-13 DIAGNOSIS — E55.9 VITAMIN D DEFICIENCY: ICD-10-CM

## 2018-12-13 PROCEDURE — 93010 ELECTROCARDIOGRAM REPORT: CPT | Performed by: INTERNAL MEDICINE

## 2018-12-13 PROCEDURE — 99213 OFFICE O/P EST LOW 20 MIN: CPT | Performed by: NURSE PRACTITIONER

## 2018-12-13 PROCEDURE — 3017F COLORECTAL CA SCREEN DOC REV: CPT | Performed by: NURSE PRACTITIONER

## 2018-12-13 PROCEDURE — G8427 DOCREV CUR MEDS BY ELIG CLIN: HCPCS | Performed by: NURSE PRACTITIONER

## 2018-12-13 PROCEDURE — G8417 CALC BMI ABV UP PARAM F/U: HCPCS | Performed by: NURSE PRACTITIONER

## 2018-12-13 PROCEDURE — G8484 FLU IMMUNIZE NO ADMIN: HCPCS | Performed by: NURSE PRACTITIONER

## 2018-12-13 PROCEDURE — 1111F DSCHRG MED/CURRENT MED MERGE: CPT | Performed by: NURSE PRACTITIONER

## 2018-12-13 PROCEDURE — 1036F TOBACCO NON-USER: CPT | Performed by: NURSE PRACTITIONER

## 2018-12-13 NOTE — PROGRESS NOTES
Severo So is a 48 y. o.oldfemale came for follow up regarding her Acute Kidney Injury following MVR with redo sternotomy in Oct 2018. Requiring Hemodialysis at that time. Her Creatinine was 2.2 at discharge and is 2.1 recently. She is following with Dr Tre Sanchez and Dr Eliza Glass. . She quit smoking. . States doing well. State compliance with meds and denies adverse effects. The pt does check home BP. The BP was 115/. NELLIE was 122/77 at other recent outpt office vists. Denies dysuria. denies edema. Denies new issues with Diabetes. The pt denies use of NSAIDs. Has a good appetite and is remaining active. Wt Readings from Last 3 Encounters:   12/13/18 170 lb (77.1 kg)   12/12/18 169 lb 9.6 oz (76.9 kg)   11/27/18 172 lb (78 kg)     BP Readings from Last 3 Encounters:   12/13/18 103/71   12/12/18 112/78   11/27/18 122/77     Body mass index is 27.44 kg/m².   Diagnostic Data:    Lab Results   Component Value Date     12/07/2018    K 4.5 12/07/2018    K 3.7 10/27/2018    CL 98 12/07/2018    CO2 25 12/07/2018    BUN 28 12/07/2018    CREATININE 2.1 12/07/2018    LABGLOM 25 12/07/2018    LABGLOM 23 11/15/2018    GLUCOSE 114 12/07/2018     Lab Results   Component Value Date    CALCIUM 9.7 12/07/2018       Lab Results   Component Value Date    VITD25 24 11/14/2018     Lab Results   Component Value Date    HGB 11.8 (L) 12/07/2018    HCT 35.4 (L) 12/07/2018     Lab Results   Component Value Date    FERRITIN 304 11/14/2018    IRON 82 11/14/2018    LABIRON 31 11/14/2018     No results found for: RETICABS  Lab Results   Component Value Date    LABA1C 5.9 10/10/2018           PAST MEDICAL HISTORY:       Diagnosis Date    [place] [employer] [employer] dual pacer 5/23/2018    Chronic diastolic congestive heart failure ([address] 5/7/2018    GERD (gastroesophageal reflux disease) 3/30/2018    Last Assessment & Plan:  Continue home protonix     History of atrial septal defect repair 2/9/2018    HLD (hyperlipidemia) 3/30/2018    Last Assessment & Plan:

## 2018-12-17 ENCOUNTER — NURSE TRIAGE (OUTPATIENT)
Dept: ADMINISTRATIVE | Age: 53
End: 2018-12-17

## 2018-12-17 NOTE — TELEPHONE ENCOUNTER
Reason for Disposition   MODERATE rectal bleeding (small blood clots, passing blood without stool, or toilet water turns red)    Answer Assessment - Initial Assessment Questions  1. APPEARANCE of BLOOD: \"What color is it? \" \"Is it passed separately, on the surface of the stool, or mixed in with the stool? \"       Red.   2. AMOUNT: \"How much blood was passed? \"       The size of a quarter. 3. FREQUENCY: \"How many times has blood been passed with the stools? \"       Once. 4. ONSET: \"When was the blood first seen in the stools? \" (Days or weeks)       Today   5. DIARRHEA: \"Is there also some diarrhea? \" If so, ask: \"How many diarrhea stools were passed in past 24 hours? \"      No diarrhea. 6. CONSTIPATION: \"Do you have constipation? \" If so, \"How bad is it? \"      Yes, very hard stool today. 7. RECURRENT SYMPTOMS: \"Have you had blood in your stools before? \" If so, ask: \"When was the last time? \" and \"What happened that time? \"       Hemorrhoids in the past.    8. BLOOD THINNERS: \"Do you take any blood thinners? \" (e.g., Coumadin/warfarin, Pradaxa/dabigatran, aspirin)      Asprin. 9. OTHER SYMPTOMS: \"Do you have any other symptoms? \"  (e.g., abdominal pain, vomiting, dizziness, fever)      No rectal or abdominal pain. No other symptoms. 10. PREGNANCY: \"Is there any chance you are pregnant? \" \"When was your last menstrual period? \"        No    Protocols used: RECTAL BLEEDING-ADULT-

## 2018-12-18 ENCOUNTER — TELEPHONE (OUTPATIENT)
Dept: FAMILY MEDICINE CLINIC | Age: 53
End: 2018-12-18

## 2018-12-18 ENCOUNTER — TELEPHONE (OUTPATIENT)
Dept: CARDIOTHORACIC SURGERY | Age: 53
End: 2018-12-18

## 2018-12-18 ENCOUNTER — OFFICE VISIT (OUTPATIENT)
Dept: FAMILY MEDICINE CLINIC | Age: 53
End: 2018-12-18
Payer: COMMERCIAL

## 2018-12-18 VITALS
BODY MASS INDEX: 27.48 KG/M2 | SYSTOLIC BLOOD PRESSURE: 101 MMHG | HEIGHT: 66 IN | WEIGHT: 171 LBS | RESPIRATION RATE: 16 BRPM | DIASTOLIC BLOOD PRESSURE: 66 MMHG | TEMPERATURE: 97.7 F | HEART RATE: 88 BPM

## 2018-12-18 DIAGNOSIS — N18.4 CKD (CHRONIC KIDNEY DISEASE) STAGE 4, GFR 15-29 ML/MIN (HCC): ICD-10-CM

## 2018-12-18 DIAGNOSIS — Z98.890 S/P LEFT ATRIAL APPENDAGE LIGATION: ICD-10-CM

## 2018-12-18 DIAGNOSIS — K92.1 HEMATOCHEZIA: Primary | ICD-10-CM

## 2018-12-18 DIAGNOSIS — Z86.79 S/P MAZE OPERATION FOR ATRIAL FIBRILLATION: ICD-10-CM

## 2018-12-18 DIAGNOSIS — I42.8 NON-ISCHEMIC CARDIOMYOPATHY (HCC): ICD-10-CM

## 2018-12-18 DIAGNOSIS — Z86.79 HISTORY OF ATRIAL FIBRILLATION: ICD-10-CM

## 2018-12-18 DIAGNOSIS — Z23 NEED FOR PNEUMOCOCCAL VACCINATION: ICD-10-CM

## 2018-12-18 DIAGNOSIS — E78.5 DYSLIPIDEMIA: ICD-10-CM

## 2018-12-18 DIAGNOSIS — Z23 NEED FOR TDAP VACCINATION: ICD-10-CM

## 2018-12-18 DIAGNOSIS — I50.22 CHRONIC SYSTOLIC HEART FAILURE (HCC): ICD-10-CM

## 2018-12-18 DIAGNOSIS — Z98.890 S/P MAZE OPERATION FOR ATRIAL FIBRILLATION: ICD-10-CM

## 2018-12-18 PROBLEM — Z95.0 PACEMAKER: Chronic | Status: ACTIVE | Noted: 2018-05-23

## 2018-12-18 PROBLEM — Z87.74 HISTORY OF ATRIAL SEPTAL DEFECT REPAIR: Chronic | Status: ACTIVE | Noted: 2018-02-09

## 2018-12-18 PROBLEM — I50.32 CHRONIC DIASTOLIC CONGESTIVE HEART FAILURE (HCC): Status: RESOLVED | Noted: 2018-05-07 | Resolved: 2018-12-18

## 2018-12-18 PROBLEM — I44.7 LEFT BUNDLE-BRANCH BLOCK: Chronic | Status: ACTIVE | Noted: 2018-02-09

## 2018-12-18 PROBLEM — Z95.2 MITRAL VALVE REPLACED: Status: RESOLVED | Noted: 2018-11-05 | Resolved: 2018-12-18

## 2018-12-18 PROBLEM — K21.9 GERD (GASTROESOPHAGEAL REFLUX DISEASE): Status: RESOLVED | Noted: 2018-03-30 | Resolved: 2018-12-18

## 2018-12-18 PROBLEM — I42.0 NONISCHEMIC DILATED CARDIOMYOPATHY (HCC): Chronic | Status: ACTIVE | Noted: 2018-05-07

## 2018-12-18 PROBLEM — I48.19 PERSISTENT ATRIAL FIBRILLATION (HCC): Status: RESOLVED | Noted: 2018-10-15 | Resolved: 2018-12-18

## 2018-12-18 PROCEDURE — 90471 IMMUNIZATION ADMIN: CPT | Performed by: FAMILY MEDICINE

## 2018-12-18 PROCEDURE — G8484 FLU IMMUNIZE NO ADMIN: HCPCS | Performed by: FAMILY MEDICINE

## 2018-12-18 PROCEDURE — G8427 DOCREV CUR MEDS BY ELIG CLIN: HCPCS | Performed by: FAMILY MEDICINE

## 2018-12-18 PROCEDURE — 90472 IMMUNIZATION ADMIN EACH ADD: CPT | Performed by: FAMILY MEDICINE

## 2018-12-18 PROCEDURE — 99204 OFFICE O/P NEW MOD 45 MIN: CPT | Performed by: FAMILY MEDICINE

## 2018-12-18 PROCEDURE — G8417 CALC BMI ABV UP PARAM F/U: HCPCS | Performed by: FAMILY MEDICINE

## 2018-12-18 PROCEDURE — 90715 TDAP VACCINE 7 YRS/> IM: CPT | Performed by: FAMILY MEDICINE

## 2018-12-18 PROCEDURE — 90732 PPSV23 VACC 2 YRS+ SUBQ/IM: CPT | Performed by: FAMILY MEDICINE

## 2018-12-18 PROCEDURE — 1036F TOBACCO NON-USER: CPT | Performed by: FAMILY MEDICINE

## 2018-12-18 PROCEDURE — 3017F COLORECTAL CA SCREEN DOC REV: CPT | Performed by: FAMILY MEDICINE

## 2018-12-18 ASSESSMENT — PATIENT HEALTH QUESTIONNAIRE - PHQ9
SUM OF ALL RESPONSES TO PHQ QUESTIONS 1-9: 0
2. FEELING DOWN, DEPRESSED OR HOPELESS: 0
SUM OF ALL RESPONSES TO PHQ9 QUESTIONS 1 & 2: 0
1. LITTLE INTEREST OR PLEASURE IN DOING THINGS: 0
SUM OF ALL RESPONSES TO PHQ QUESTIONS 1-9: 0

## 2018-12-18 NOTE — PROGRESS NOTES
Chief Complaint   Patient presents with   Rebecca Roca Doctor     former pt Bethany Llanos,  other  dr. Apolinar Arevalo     x1. none since    Chronic Kidney Disease    Congestive Heart Failure    Hyperlipidemia       History obtained from the patient. SUBJECTIVE:  Juan Padgett is a 48 y.o. female that presents today for establishing care with new physician, etc. New patient, 1st time visit to [facility] @ [facility].    -01. Hematochezia: she passed some blood in her stool the other day. 1 clot. None since. [place] [facility] [place] Also had hemorroids banded. No abd pain. No blood since. No abd pain. No melena. Was a small amnt of blood, painless.     -02. CKD4:    HPI: new dx, LILLI in hospital, hasn't improved much yet. Follows closely with nephro. Has low BP on and off, so not on BP meds other than coreg for heart      -03. HF with reduced EF/non-ischemic cardiomyopathy/Afib: s/p MAZE OCT 2018. Had issues post-op, ended up intubated and renal failure. Not clear what happened. Follows with cardio. Hx of recent mitral valve repair and atrial appendage ligation. Follows with cardio at [facility] and [facility] surgery, Dr. Michael Toscano, at [facility] as well. No-longer on [address] for afib s/p MAZE. 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 On low salt diet. Denies CP, SOB, orthopnea or PND.       -04. HLD: diet controlled at present. Needs labs. Not on statin. No CAD.        Age/Gender Health Maintenance    Lipid - due, ordered  DM Screen -   Lab Results   Component Value Date    GLUCOSE 114 12/07/2018     Lab Results   Component Value Date    LABA1C 5.9 10/10/2018     No results found for: EAG      Colon Cancer Screening - [place] [facility]  Lung Cancer Screening (Age 54 to [de-identified] with 30 pack year hx, current smoker or quit within past 15 years) - age 54 if meets criteria    Tetanus - UTD DEC 2018  Influenza Vaccine - declines FALL 2018  Pneumonia Vaccine - PPV 23 DEC 2018  Zostavax - age

## 2018-12-18 NOTE — TELEPHONE ENCOUNTER
Pt called into CT/CV surgery office stating over the weekend she was out of town and left all her medications in the hotel room and pt is asking if Dr. Hardik Luna could giver her a refill of her heart medications.

## 2018-12-20 DIAGNOSIS — I50.22 CHRONIC SYSTOLIC HEART FAILURE (HCC): ICD-10-CM

## 2018-12-20 DIAGNOSIS — N18.4 CKD (CHRONIC KIDNEY DISEASE) STAGE 4, GFR 15-29 ML/MIN (HCC): ICD-10-CM

## 2018-12-20 DIAGNOSIS — I42.8 NON-ISCHEMIC CARDIOMYOPATHY (HCC): ICD-10-CM

## 2018-12-20 DIAGNOSIS — I48.0 PAF (PAROXYSMAL ATRIAL FIBRILLATION) (HCC): Chronic | ICD-10-CM

## 2018-12-20 DIAGNOSIS — Z98.890 S/P MAZE OPERATION FOR ATRIAL FIBRILLATION: ICD-10-CM

## 2018-12-20 DIAGNOSIS — E78.5 DYSLIPIDEMIA: ICD-10-CM

## 2018-12-20 DIAGNOSIS — I50.32 CHRONIC DIASTOLIC CONGESTIVE HEART FAILURE (HCC): ICD-10-CM

## 2018-12-20 DIAGNOSIS — Z98.890 S/P LEFT ATRIAL APPENDAGE LIGATION: ICD-10-CM

## 2018-12-20 DIAGNOSIS — Z86.79 HISTORY OF ATRIAL FIBRILLATION: ICD-10-CM

## 2018-12-20 DIAGNOSIS — Z86.79 S/P MAZE OPERATION FOR ATRIAL FIBRILLATION: ICD-10-CM

## 2018-12-20 RX ORDER — ASPIRIN 325 MG
325 TABLET ORAL DAILY
Qty: 90 TABLET | Refills: 5 | Status: SHIPPED | OUTPATIENT
Start: 2018-12-20 | End: 2018-12-20 | Stop reason: SDUPTHER

## 2018-12-20 RX ORDER — CARVEDILOL 3.12 MG/1
3.12 TABLET ORAL 2 TIMES DAILY WITH MEALS
Qty: 180 TABLET | Refills: 5 | Status: SHIPPED | OUTPATIENT
Start: 2018-12-20 | End: 2018-12-20 | Stop reason: SDUPTHER

## 2018-12-20 RX ORDER — BLOOD PRESSURE TEST KIT
1 KIT MISCELLANEOUS 2 TIMES DAILY
Qty: 1 KIT | Refills: 0 | Status: SHIPPED | OUTPATIENT
Start: 2018-12-20

## 2018-12-20 RX ORDER — POTASSIUM CHLORIDE 750 MG/1
10 TABLET, EXTENDED RELEASE ORAL DAILY
Qty: 90 TABLET | Refills: 3 | Status: SHIPPED | OUTPATIENT
Start: 2018-12-20 | End: 2018-12-20 | Stop reason: SDUPTHER

## 2018-12-20 RX ORDER — CARVEDILOL 3.12 MG/1
3.12 TABLET ORAL 2 TIMES DAILY WITH MEALS
Qty: 180 TABLET | Refills: 5 | Status: SHIPPED | OUTPATIENT
Start: 2018-12-20 | End: 2020-02-12

## 2018-12-20 RX ORDER — MULTIVITAMIN WITH FOLIC ACID 400 MCG
1 TABLET ORAL DAILY
Qty: 30 TABLET | Refills: 3 | Status: SHIPPED | OUTPATIENT
Start: 2018-12-20 | End: 2019-04-16 | Stop reason: SDUPTHER

## 2018-12-20 RX ORDER — PHENOL 1.4 %
1 AEROSOL, SPRAY (ML) MUCOUS MEMBRANE NIGHTLY PRN
Qty: 30 TABLET | Refills: 3 | Status: SHIPPED | OUTPATIENT
Start: 2018-12-20 | End: 2019-02-19

## 2018-12-20 RX ORDER — FUROSEMIDE 40 MG/1
40 TABLET ORAL DAILY
Qty: 90 TABLET | Refills: 0 | Status: SHIPPED | OUTPATIENT
Start: 2018-12-20 | End: 2019-11-26 | Stop reason: SDUPTHER

## 2018-12-20 RX ORDER — ASPIRIN 325 MG
325 TABLET ORAL DAILY
Qty: 90 TABLET | Refills: 5 | Status: SHIPPED | OUTPATIENT
Start: 2018-12-20 | End: 2020-01-10

## 2018-12-20 RX ORDER — BLOOD PRESSURE TEST KIT-MEDIUM
1 KIT MISCELLANEOUS DAILY
Qty: 1 KIT | Refills: 0 | Status: SHIPPED | OUTPATIENT
Start: 2018-12-20

## 2018-12-20 RX ORDER — POTASSIUM CHLORIDE 750 MG/1
10 TABLET, EXTENDED RELEASE ORAL DAILY
Qty: 90 TABLET | Refills: 3 | Status: SHIPPED | OUTPATIENT
Start: 2018-12-20 | End: 2020-02-20 | Stop reason: SDUPTHER

## 2019-02-14 ENCOUNTER — HOSPITAL ENCOUNTER (OUTPATIENT)
Dept: NON INVASIVE DIAGNOSTICS | Age: 54
Discharge: HOME | End: 2019-02-14
Payer: COMMERCIAL

## 2019-02-14 DIAGNOSIS — I42.0 NONISCHEMIC DILATED CARDIOMYOPATHY (HCC): ICD-10-CM

## 2019-02-14 LAB
LV EF: 38 %
LVEF MODALITY: NORMAL

## 2019-02-14 PROCEDURE — 93306 TTE W/DOPPLER COMPLETE: CPT

## 2019-02-14 PROCEDURE — 93306 TTE W/DOPPLER COMPLETE: CPT | Performed by: INTERNAL MEDICINE

## 2019-02-15 ENCOUNTER — TELEPHONE (OUTPATIENT)
Dept: CARDIOLOGY CLINIC | Age: 54
End: 2019-02-15

## 2019-02-19 ENCOUNTER — OFFICE VISIT (OUTPATIENT)
Dept: CARDIOLOGY CLINIC | Age: 54
End: 2019-02-19
Payer: COMMERCIAL

## 2019-02-19 VITALS
HEIGHT: 65 IN | BODY MASS INDEX: 28.32 KG/M2 | HEART RATE: 82 BPM | SYSTOLIC BLOOD PRESSURE: 132 MMHG | WEIGHT: 170 LBS | DIASTOLIC BLOOD PRESSURE: 67 MMHG

## 2019-02-19 DIAGNOSIS — I42.0 NONISCHEMIC DILATED CARDIOMYOPATHY (HCC): Primary | Chronic | ICD-10-CM

## 2019-02-19 PROCEDURE — 99213 OFFICE O/P EST LOW 20 MIN: CPT | Performed by: INTERNAL MEDICINE

## 2019-02-19 PROCEDURE — G8417 CALC BMI ABV UP PARAM F/U: HCPCS | Performed by: INTERNAL MEDICINE

## 2019-02-19 PROCEDURE — G8427 DOCREV CUR MEDS BY ELIG CLIN: HCPCS | Performed by: INTERNAL MEDICINE

## 2019-02-19 PROCEDURE — 1036F TOBACCO NON-USER: CPT | Performed by: INTERNAL MEDICINE

## 2019-02-19 PROCEDURE — 3017F COLORECTAL CA SCREEN DOC REV: CPT | Performed by: INTERNAL MEDICINE

## 2019-02-19 PROCEDURE — G8484 FLU IMMUNIZE NO ADMIN: HCPCS | Performed by: INTERNAL MEDICINE

## 2019-02-19 PROCEDURE — 93000 ELECTROCARDIOGRAM COMPLETE: CPT | Performed by: INTERNAL MEDICINE

## 2019-02-19 ASSESSMENT — ENCOUNTER SYMPTOMS
RIGHT EYE: 0
DOUBLE VISION: 0
DIARRHEA: 0
BACK PAIN: 0
WHEEZING: 0
ABDOMINAL PAIN: 0
SORE THROAT: 0
VOMITING: 0
SHORTNESS OF BREATH: 0
COUGH: 0
LEFT EYE: 0
CONSTIPATION: 0
BLURRED VISION: 0
NAUSEA: 0

## 2019-02-25 ENCOUNTER — TELEPHONE (OUTPATIENT)
Dept: FAMILY MEDICINE CLINIC | Age: 54
End: 2019-02-25

## 2019-02-28 ENCOUNTER — OFFICE VISIT (OUTPATIENT)
Dept: CARDIOLOGY CLINIC | Age: 54
End: 2019-02-28
Payer: COMMERCIAL

## 2019-02-28 VITALS
BODY MASS INDEX: 27.64 KG/M2 | HEIGHT: 66 IN | DIASTOLIC BLOOD PRESSURE: 74 MMHG | HEART RATE: 76 BPM | SYSTOLIC BLOOD PRESSURE: 122 MMHG | WEIGHT: 172 LBS

## 2019-02-28 DIAGNOSIS — I42.0 DILATED CARDIOMYOPATHY (HCC): ICD-10-CM

## 2019-02-28 DIAGNOSIS — I48.0 PAROXYSMAL ATRIAL FIBRILLATION (HCC): Primary | ICD-10-CM

## 2019-02-28 DIAGNOSIS — Z95.0 PACEMAKER: ICD-10-CM

## 2019-02-28 PROCEDURE — G8484 FLU IMMUNIZE NO ADMIN: HCPCS | Performed by: NUCLEAR MEDICINE

## 2019-02-28 PROCEDURE — G8417 CALC BMI ABV UP PARAM F/U: HCPCS | Performed by: NUCLEAR MEDICINE

## 2019-02-28 PROCEDURE — 1036F TOBACCO NON-USER: CPT | Performed by: NUCLEAR MEDICINE

## 2019-02-28 PROCEDURE — 3017F COLORECTAL CA SCREEN DOC REV: CPT | Performed by: NUCLEAR MEDICINE

## 2019-02-28 PROCEDURE — G8427 DOCREV CUR MEDS BY ELIG CLIN: HCPCS | Performed by: NUCLEAR MEDICINE

## 2019-02-28 PROCEDURE — 99213 OFFICE O/P EST LOW 20 MIN: CPT | Performed by: NUCLEAR MEDICINE

## 2019-03-12 ENCOUNTER — TELEPHONE (OUTPATIENT)
Dept: FAMILY MEDICINE CLINIC | Age: 54
End: 2019-03-12

## 2019-03-14 ENCOUNTER — OFFICE VISIT (OUTPATIENT)
Dept: NEPHROLOGY | Age: 54
End: 2019-03-14
Payer: COMMERCIAL

## 2019-03-14 VITALS
OXYGEN SATURATION: 100 % | BODY MASS INDEX: 26.99 KG/M2 | WEIGHT: 167.2 LBS | HEART RATE: 105 BPM | DIASTOLIC BLOOD PRESSURE: 68 MMHG | SYSTOLIC BLOOD PRESSURE: 123 MMHG

## 2019-03-14 DIAGNOSIS — I48.19 PERSISTENT ATRIAL FIBRILLATION (HCC): ICD-10-CM

## 2019-03-14 DIAGNOSIS — D50.8 IRON DEFICIENCY ANEMIA DUE TO DIETARY CAUSES: ICD-10-CM

## 2019-03-14 DIAGNOSIS — E55.9 VITAMIN D DEFICIENCY: ICD-10-CM

## 2019-03-14 DIAGNOSIS — N18.4 CKD (CHRONIC KIDNEY DISEASE), STAGE IV (HCC): Primary | ICD-10-CM

## 2019-03-14 DIAGNOSIS — I95.0 IDIOPATHIC HYPOTENSION: ICD-10-CM

## 2019-03-14 DIAGNOSIS — Z95.0 PACEMAKER: ICD-10-CM

## 2019-03-14 DIAGNOSIS — I50.22 CHRONIC SYSTOLIC (CONGESTIVE) HEART FAILURE (HCC): ICD-10-CM

## 2019-03-14 PROCEDURE — G8417 CALC BMI ABV UP PARAM F/U: HCPCS | Performed by: NURSE PRACTITIONER

## 2019-03-14 PROCEDURE — 1036F TOBACCO NON-USER: CPT | Performed by: NURSE PRACTITIONER

## 2019-03-14 PROCEDURE — 99213 OFFICE O/P EST LOW 20 MIN: CPT | Performed by: NURSE PRACTITIONER

## 2019-03-14 PROCEDURE — G8484 FLU IMMUNIZE NO ADMIN: HCPCS | Performed by: NURSE PRACTITIONER

## 2019-03-14 PROCEDURE — 3017F COLORECTAL CA SCREEN DOC REV: CPT | Performed by: NURSE PRACTITIONER

## 2019-03-14 PROCEDURE — G8427 DOCREV CUR MEDS BY ELIG CLIN: HCPCS | Performed by: NURSE PRACTITIONER

## 2019-04-04 ENCOUNTER — PROCEDURE VISIT (OUTPATIENT)
Dept: CARDIOLOGY CLINIC | Age: 54
End: 2019-04-04
Payer: COMMERCIAL

## 2019-04-04 DIAGNOSIS — Z95.0 PACEMAKER: Primary | ICD-10-CM

## 2019-04-04 PROCEDURE — 93296 REM INTERROG EVL PM/IDS: CPT | Performed by: INTERNAL MEDICINE

## 2019-04-04 PROCEDURE — 93294 REM INTERROG EVL PM/LDLS PM: CPT | Performed by: INTERNAL MEDICINE

## 2019-04-04 NOTE — PROGRESS NOTES
[address] dual pacemaker  Battery 11.5yrs  A paced 2%  V paced 2%  A fib 46%  p wave 3.5mV  r wave 14. 0mV  Atrial impedance 606 ohms  Ventricle impedance 628 ohms

## 2019-04-30 RX ORDER — CALCIUM CARBONATE/VITAMIN D3 600 MG-20
TABLET ORAL
Qty: 30 CAPSULE | Refills: 2 | Status: SHIPPED | OUTPATIENT
Start: 2019-04-30 | End: 2019-10-01

## 2019-04-30 RX ORDER — MULTIVITAMIN WITH FOLIC ACID 400 MCG
TABLET ORAL
Qty: 30 TABLET | Refills: 3 | Status: SHIPPED | OUTPATIENT
Start: 2019-04-30 | End: 2019-10-01

## 2019-05-24 ENCOUNTER — NURSE ONLY (OUTPATIENT)
Dept: CARDIOLOGY CLINIC | Age: 54
End: 2019-05-24
Payer: COMMERCIAL

## 2019-05-24 DIAGNOSIS — Z95.0 PACEMAKER: Primary | Chronic | ICD-10-CM

## 2019-05-24 PROCEDURE — 93280 PM DEVICE PROGR EVAL DUAL: CPT | Performed by: INTERNAL MEDICINE

## 2019-05-24 NOTE — PROGRESS NOTES
12 years on device   At imped 664    At threshold 0.8 @ 0.4  RV 0.8 @ 0.4  P waves 4.6 RV 19.5  2% atrial paced 2% RVP  Hx MAZE procedure, short mode switches

## 2019-07-11 ENCOUNTER — PROCEDURE VISIT (OUTPATIENT)
Dept: CARDIOLOGY CLINIC | Age: 54
End: 2019-07-11
Payer: COMMERCIAL

## 2019-07-11 DIAGNOSIS — Z95.0 PACEMAKER: Primary | ICD-10-CM

## 2019-07-11 PROCEDURE — 93296 REM INTERROG EVL PM/IDS: CPT | Performed by: INTERNAL MEDICINE

## 2019-07-11 PROCEDURE — 93284 PRGRMG EVAL IMPLANTABLE DFB: CPT | Performed by: INTERNAL MEDICINE

## 2019-09-18 ENCOUNTER — OFFICE VISIT (OUTPATIENT)
Dept: CARDIOLOGY CLINIC | Age: 54
End: 2019-09-18
Payer: COMMERCIAL

## 2019-09-18 VITALS
HEART RATE: 64 BPM | SYSTOLIC BLOOD PRESSURE: 118 MMHG | HEIGHT: 66 IN | DIASTOLIC BLOOD PRESSURE: 68 MMHG | BODY MASS INDEX: 25.71 KG/M2 | WEIGHT: 160 LBS

## 2019-09-18 DIAGNOSIS — I48.0 PAROXYSMAL ATRIAL FIBRILLATION (HCC): Primary | ICD-10-CM

## 2019-09-18 DIAGNOSIS — I10 ESSENTIAL HYPERTENSION: ICD-10-CM

## 2019-09-18 DIAGNOSIS — Z95.0 PACEMAKER: ICD-10-CM

## 2019-09-18 PROCEDURE — G8417 CALC BMI ABV UP PARAM F/U: HCPCS | Performed by: NUCLEAR MEDICINE

## 2019-09-18 PROCEDURE — 3017F COLORECTAL CA SCREEN DOC REV: CPT | Performed by: NUCLEAR MEDICINE

## 2019-09-18 PROCEDURE — 99213 OFFICE O/P EST LOW 20 MIN: CPT | Performed by: NUCLEAR MEDICINE

## 2019-09-18 PROCEDURE — G8427 DOCREV CUR MEDS BY ELIG CLIN: HCPCS | Performed by: NUCLEAR MEDICINE

## 2019-09-18 PROCEDURE — 1036F TOBACCO NON-USER: CPT | Performed by: NUCLEAR MEDICINE

## 2019-09-18 NOTE — PROGRESS NOTES
Systems  General:   No fever, no chills, some fatigue or weight loss  Pulmonary:    some dyspnea, no wheezing  Cardiac:    Denies recent chest pain,   GI:     No nausea or vomiting, no abdominal pain  Neuro:    No dizziness or light headedness,   Musculoskeletal:  No recent active issues  Extremities:   No edema, no obvious claudication       Objective:  Physical Exam  /68   Pulse 64   Ht 5' 6\" (1.676 m)   Wt 160 lb (72.6 kg)   BMI 25.82 kg/m²   General:   Well developed, well nourished  Lungs:    Clear to auscultation  Heart:    Normal S1 S2, Slight murmur. no rubs, no gallops  Abdomen:   Soft, non tender, no organomegalies, positive bowel sounds  Extremities:   No edema, no cyanosis, good peripheral pulses  Neurological:   Awake, alert, oriented. No obvious focal deficits  Musculoskelatal:  No obvious deformities    Assessment:      Diagnosis Orders   1. Paroxysmal atrial fibrillation (HCC)     2. Pacemaker     3. Essential hypertension     cardiac fair for now     Plan:  No follow-ups on file. As above  ? ? Need for pacer any more ? ? She is asking  Will defer that question to Dr Ramila Vazquez risk factor modification and medical management  Thank you for allowing me to participate in the care of your patient. Please don't hesitate to contact me regarding any further issues related to the patient care    Orders Placed:  No orders of the defined types were placed in this encounter. Medications Prescribed:  No orders of the defined types were placed in this encounter. Discussed use, benefit, and side effects of prescribed medications. All patient questions answered. Pt voicedunderstanding. Instructed to continue current medications, diet and exercise. Continue risk factor modification and medical management. Patient agreed with treatment plan. Follow up as directed.     Electronically signedby Yunior Barba MD on 9/18/2019 at 3:48 PM

## 2019-10-01 ENCOUNTER — OFFICE VISIT (OUTPATIENT)
Dept: FAMILY MEDICINE CLINIC | Age: 54
End: 2019-10-01
Payer: COMMERCIAL

## 2019-10-01 VITALS
DIASTOLIC BLOOD PRESSURE: 88 MMHG | RESPIRATION RATE: 15 BRPM | WEIGHT: 159 LBS | HEART RATE: 75 BPM | TEMPERATURE: 98.3 F | HEIGHT: 65 IN | SYSTOLIC BLOOD PRESSURE: 106 MMHG | BODY MASS INDEX: 26.49 KG/M2

## 2019-10-01 DIAGNOSIS — Z86.79 S/P MAZE OPERATION FOR ATRIAL FIBRILLATION: ICD-10-CM

## 2019-10-01 DIAGNOSIS — N18.4 CKD (CHRONIC KIDNEY DISEASE) STAGE 4, GFR 15-29 ML/MIN (HCC): ICD-10-CM

## 2019-10-01 DIAGNOSIS — K92.1 HEMATOCHEZIA: ICD-10-CM

## 2019-10-01 DIAGNOSIS — J01.90 ACUTE RHINOSINUSITIS: Primary | ICD-10-CM

## 2019-10-01 DIAGNOSIS — F17.210 CIGARETTE NICOTINE DEPENDENCE WITHOUT COMPLICATION: Chronic | ICD-10-CM

## 2019-10-01 DIAGNOSIS — Z98.890 S/P MAZE OPERATION FOR ATRIAL FIBRILLATION: ICD-10-CM

## 2019-10-01 DIAGNOSIS — Z12.31 ENCOUNTER FOR SCREENING MAMMOGRAM FOR MALIGNANT NEOPLASM OF BREAST: ICD-10-CM

## 2019-10-01 DIAGNOSIS — E78.5 DYSLIPIDEMIA: ICD-10-CM

## 2019-10-01 DIAGNOSIS — I50.22 CHRONIC SYSTOLIC HEART FAILURE (HCC): ICD-10-CM

## 2019-10-01 DIAGNOSIS — I42.8 NON-ISCHEMIC CARDIOMYOPATHY (HCC): ICD-10-CM

## 2019-10-01 DIAGNOSIS — Z86.79 HISTORY OF ATRIAL FIBRILLATION: ICD-10-CM

## 2019-10-01 DIAGNOSIS — Z98.890 S/P LEFT ATRIAL APPENDAGE LIGATION: ICD-10-CM

## 2019-10-01 PROCEDURE — 3017F COLORECTAL CA SCREEN DOC REV: CPT | Performed by: FAMILY MEDICINE

## 2019-10-01 PROCEDURE — G8417 CALC BMI ABV UP PARAM F/U: HCPCS | Performed by: FAMILY MEDICINE

## 2019-10-01 PROCEDURE — 99214 OFFICE O/P EST MOD 30 MIN: CPT | Performed by: FAMILY MEDICINE

## 2019-10-01 PROCEDURE — G8484 FLU IMMUNIZE NO ADMIN: HCPCS | Performed by: FAMILY MEDICINE

## 2019-10-01 PROCEDURE — G8427 DOCREV CUR MEDS BY ELIG CLIN: HCPCS | Performed by: FAMILY MEDICINE

## 2019-10-01 PROCEDURE — 4004F PT TOBACCO SCREEN RCVD TLK: CPT | Performed by: FAMILY MEDICINE

## 2019-10-01 RX ORDER — DOXYCYCLINE HYCLATE 100 MG/1
100 CAPSULE ORAL 2 TIMES DAILY
Qty: 20 CAPSULE | Refills: 0 | Status: SHIPPED | OUTPATIENT
Start: 2019-10-01 | End: 2019-10-09

## 2019-10-01 RX ORDER — BENZONATATE 100 MG/1
CAPSULE ORAL
Qty: 60 CAPSULE | Refills: 0 | Status: SHIPPED | OUTPATIENT
Start: 2019-10-01 | End: 2019-10-11

## 2019-10-01 RX ORDER — FLUTICASONE PROPIONATE 50 MCG
1 SPRAY, SUSPENSION (ML) NASAL DAILY
Qty: 1 BOTTLE | Refills: 0 | Status: SHIPPED | OUTPATIENT
Start: 2019-10-01 | End: 2019-10-24 | Stop reason: SDUPTHER

## 2019-10-01 ASSESSMENT — PATIENT HEALTH QUESTIONNAIRE - PHQ9
SUM OF ALL RESPONSES TO PHQ QUESTIONS 1-9: 0
1. LITTLE INTEREST OR PLEASURE IN DOING THINGS: 0
2. FEELING DOWN, DEPRESSED OR HOPELESS: 0
SUM OF ALL RESPONSES TO PHQ QUESTIONS 1-9: 0
SUM OF ALL RESPONSES TO PHQ9 QUESTIONS 1 & 2: 0

## 2019-10-02 ENCOUNTER — NURSE ONLY (OUTPATIENT)
Dept: LAB | Age: 54
End: 2019-10-02

## 2019-10-02 DIAGNOSIS — K92.1 HEMATOCHEZIA: ICD-10-CM

## 2019-10-02 DIAGNOSIS — D50.8 IRON DEFICIENCY ANEMIA DUE TO DIETARY CAUSES: ICD-10-CM

## 2019-10-02 DIAGNOSIS — E78.5 DYSLIPIDEMIA: ICD-10-CM

## 2019-10-02 DIAGNOSIS — N18.4 CKD (CHRONIC KIDNEY DISEASE), STAGE IV (HCC): ICD-10-CM

## 2019-10-02 LAB
ANION GAP SERPL CALCULATED.3IONS-SCNC: 14 MEQ/L (ref 8–16)
BASOPHILS # BLD: 0.4 %
BASOPHILS ABSOLUTE: 0 THOU/MM3 (ref 0–0.1)
BUN BLDV-MCNC: 28 MG/DL (ref 7–22)
CALCIUM SERPL-MCNC: 9.7 MG/DL (ref 8.5–10.5)
CHLORIDE BLD-SCNC: 100 MEQ/L (ref 98–111)
CHOLESTEROL, TOTAL: 265 MG/DL (ref 100–199)
CO2: 25 MEQ/L (ref 23–33)
CREAT SERPL-MCNC: 1.4 MG/DL (ref 0.4–1.2)
CREATININE URINE: 80.7 MG/DL
EOSINOPHIL # BLD: 3 %
EOSINOPHILS ABSOLUTE: 0.2 THOU/MM3 (ref 0–0.4)
ERYTHROCYTE [DISTWIDTH] IN BLOOD BY AUTOMATED COUNT: 13.2 % (ref 11.5–14.5)
ERYTHROCYTE [DISTWIDTH] IN BLOOD BY AUTOMATED COUNT: 46.5 FL (ref 35–45)
FERRITIN: 40 NG/ML (ref 10–291)
GFR SERPL CREATININE-BSD FRML MDRD: 39 ML/MIN/1.73M2
GLUCOSE BLD-MCNC: 97 MG/DL (ref 70–108)
HCT VFR BLD CALC: 39.6 % (ref 37–47)
HDLC SERPL-MCNC: 54 MG/DL
HEMOGLOBIN: 12.6 GM/DL (ref 12–16)
IMMATURE GRANS (ABS): 0.01 THOU/MM3 (ref 0–0.07)
IMMATURE GRANULOCYTES: 0.2 %
IRON: 75 UG/DL (ref 50–170)
LDL CHOLESTEROL CALCULATED: 186 MG/DL
LYMPHOCYTES # BLD: 33.4 %
LYMPHOCYTES ABSOLUTE: 1.7 THOU/MM3 (ref 1–4.8)
MCH RBC QN AUTO: 30.9 PG (ref 26–33)
MCHC RBC AUTO-ENTMCNC: 31.8 GM/DL (ref 32.2–35.5)
MCV RBC AUTO: 97.1 FL (ref 81–99)
MONOCYTES # BLD: 7.6 %
MONOCYTES ABSOLUTE: 0.4 THOU/MM3 (ref 0.4–1.3)
NUCLEATED RED BLOOD CELLS: 0 /100 WBC
PHOSPHORUS: 3.7 MG/DL (ref 2.4–4.7)
PLATELET # BLD: 197 THOU/MM3 (ref 130–400)
PMV BLD AUTO: 11.2 FL (ref 9.4–12.4)
POTASSIUM SERPL-SCNC: 4.6 MEQ/L (ref 3.5–5.2)
PROT/CREAT RATIO, UR: 0.15
PROTEIN, URINE: 11.9 MG/DL
PTH INTACT: 58.6 PG/ML (ref 15–65)
RBC # BLD: 4.08 MILL/MM3 (ref 4.2–5.4)
SEG NEUTROPHILS: 55.4 %
SEGMENTED NEUTROPHILS ABSOLUTE COUNT: 2.8 THOU/MM3 (ref 1.8–7.7)
SODIUM BLD-SCNC: 139 MEQ/L (ref 135–145)
TRIGL SERPL-MCNC: 126 MG/DL (ref 0–199)
TSH SERPL DL<=0.05 MIU/L-ACNC: 1.07 UIU/ML (ref 0.4–4.2)
VITAMIN D 25-HYDROXY: 39 NG/ML (ref 30–100)
WBC # BLD: 5 THOU/MM3 (ref 4.8–10.8)

## 2019-10-03 ENCOUNTER — TELEPHONE (OUTPATIENT)
Dept: FAMILY MEDICINE CLINIC | Age: 54
End: 2019-10-03

## 2019-10-03 ENCOUNTER — TELEPHONE (OUTPATIENT)
Dept: NEPHROLOGY | Age: 54
End: 2019-10-03

## 2019-10-03 DIAGNOSIS — E78.5 DYSLIPIDEMIA: Primary | ICD-10-CM

## 2019-10-03 LAB
IRON SATURATION: 27 % (ref 20–50)
TOTAL IRON BINDING CAPACITY: 283 UG/DL (ref 171–450)

## 2019-10-03 RX ORDER — ATORVASTATIN CALCIUM 20 MG/1
20 TABLET, FILM COATED ORAL DAILY
Qty: 90 TABLET | Refills: 3 | Status: SHIPPED | OUTPATIENT
Start: 2019-10-03 | End: 2020-07-15

## 2019-10-07 ENCOUNTER — TELEPHONE (OUTPATIENT)
Dept: FAMILY MEDICINE CLINIC | Age: 54
End: 2019-10-07

## 2019-10-09 ENCOUNTER — OFFICE VISIT (OUTPATIENT)
Dept: FAMILY MEDICINE CLINIC | Age: 54
End: 2019-10-09
Payer: COMMERCIAL

## 2019-10-09 VITALS
WEIGHT: 162.6 LBS | HEART RATE: 94 BPM | RESPIRATION RATE: 10 BRPM | TEMPERATURE: 98.1 F | DIASTOLIC BLOOD PRESSURE: 62 MMHG | SYSTOLIC BLOOD PRESSURE: 100 MMHG | BODY MASS INDEX: 27.09 KG/M2 | HEIGHT: 65 IN

## 2019-10-09 DIAGNOSIS — F17.210 CIGARETTE NICOTINE DEPENDENCE WITHOUT COMPLICATION: Chronic | ICD-10-CM

## 2019-10-09 DIAGNOSIS — E78.5 DYSLIPIDEMIA: Chronic | ICD-10-CM

## 2019-10-09 DIAGNOSIS — Z88.3 ALLERGY TO ANTIBACTERIAL DRUG: ICD-10-CM

## 2019-10-09 DIAGNOSIS — J01.90 ACUTE RHINOSINUSITIS: ICD-10-CM

## 2019-10-09 DIAGNOSIS — L50.9 HIVES: Primary | ICD-10-CM

## 2019-10-09 PROCEDURE — G8417 CALC BMI ABV UP PARAM F/U: HCPCS | Performed by: FAMILY MEDICINE

## 2019-10-09 PROCEDURE — 4004F PT TOBACCO SCREEN RCVD TLK: CPT | Performed by: FAMILY MEDICINE

## 2019-10-09 PROCEDURE — G8484 FLU IMMUNIZE NO ADMIN: HCPCS | Performed by: FAMILY MEDICINE

## 2019-10-09 PROCEDURE — G8427 DOCREV CUR MEDS BY ELIG CLIN: HCPCS | Performed by: FAMILY MEDICINE

## 2019-10-09 PROCEDURE — 3017F COLORECTAL CA SCREEN DOC REV: CPT | Performed by: FAMILY MEDICINE

## 2019-10-09 PROCEDURE — 99214 OFFICE O/P EST MOD 30 MIN: CPT | Performed by: FAMILY MEDICINE

## 2019-10-09 RX ORDER — DIPHENHYDRAMINE HCL 25 MG
25 TABLET ORAL
COMMUNITY
Start: 2019-10-05 | End: 2019-10-10

## 2019-10-09 RX ORDER — PREDNISONE 20 MG/1
TABLET ORAL
COMMUNITY
Start: 2019-10-05 | End: 2019-10-10

## 2019-10-09 RX ORDER — AMOXICILLIN AND CLAVULANATE POTASSIUM 875; 125 MG/1; MG/1
1 TABLET, FILM COATED ORAL 2 TIMES DAILY
Qty: 20 TABLET | Refills: 0 | Status: SHIPPED | OUTPATIENT
Start: 2019-10-09 | End: 2019-10-19

## 2019-10-16 ENCOUNTER — PROCEDURE VISIT (OUTPATIENT)
Dept: CARDIOLOGY CLINIC | Age: 54
End: 2019-10-16
Payer: COMMERCIAL

## 2019-10-16 DIAGNOSIS — Z95.0 PACEMAKER: Primary | ICD-10-CM

## 2019-10-16 PROCEDURE — 93296 REM INTERROG EVL PM/IDS: CPT | Performed by: INTERNAL MEDICINE

## 2019-10-16 PROCEDURE — 93295 DEV INTERROG REMOTE 1/2/MLT: CPT | Performed by: INTERNAL MEDICINE

## 2019-10-24 DIAGNOSIS — J01.90 ACUTE RHINOSINUSITIS: ICD-10-CM

## 2019-10-24 RX ORDER — FLUTICASONE PROPIONATE 50 MCG
1 SPRAY, SUSPENSION (ML) NASAL DAILY
Qty: 1 BOTTLE | Refills: 0 | Status: SHIPPED | OUTPATIENT
Start: 2019-10-24 | End: 2020-02-19

## 2019-11-26 DIAGNOSIS — I48.0 PAF (PAROXYSMAL ATRIAL FIBRILLATION) (HCC): Chronic | ICD-10-CM

## 2019-11-27 RX ORDER — FUROSEMIDE 40 MG/1
TABLET ORAL
Qty: 90 TABLET | Refills: 0 | Status: SHIPPED | OUTPATIENT
Start: 2019-11-27 | End: 2020-02-20 | Stop reason: SDUPTHER

## 2019-12-10 ENCOUNTER — TELEPHONE (OUTPATIENT)
Dept: FAMILY MEDICINE CLINIC | Age: 54
End: 2019-12-10

## 2020-01-10 RX ORDER — CALCIUM CARBONATE/VITAMIN D3 600 MG-20
TABLET ORAL
Qty: 30 TABLET | Refills: 17 | Status: SHIPPED | OUTPATIENT
Start: 2020-01-10 | End: 2021-02-01 | Stop reason: SDUPTHER

## 2020-01-21 ENCOUNTER — PROCEDURE VISIT (OUTPATIENT)
Dept: CARDIOLOGY CLINIC | Age: 55
End: 2020-01-21
Payer: COMMERCIAL

## 2020-01-21 PROCEDURE — 93294 REM INTERROG EVL PM/LDLS PM: CPT | Performed by: INTERNAL MEDICINE

## 2020-01-21 PROCEDURE — 93296 REM INTERROG EVL PM/IDS: CPT | Performed by: INTERNAL MEDICINE

## 2020-02-12 RX ORDER — CARVEDILOL 3.12 MG/1
TABLET ORAL
Qty: 180 TABLET | Refills: 5 | Status: SHIPPED | OUTPATIENT
Start: 2020-02-12 | End: 2021-03-29 | Stop reason: SDUPTHER

## 2020-02-19 NOTE — PROGRESS NOTES
daily     Dispense:  90 tablet     Refill:  3         All medications reviewed and reconciled, including OTC and herbal medications. Updated list given to patient. Patient Active Problem List    Diagnosis Date Noted    Nicotine dependence     Hematochezia 12/18/2018    Dyslipidemia     S/P Maze operation for atrial fibrillation     S/P left atrial appendage ligation     CKD (chronic kidney disease) stage 3, GFR 30-59 ml/min (HCC)     Heart failure with reduced ejection fraction ([address]     Vitamin D deficiency     S/P mitral valve repair 11/05/2018    [employer] dual pacer 05/23/2018    PAF (paroxysmal atrial fibrillation) ([address] 05/07/2018    Nonischemic dilated cardiomyopathy ([address] 05/07/2018    History of atrial septal defect repair 02/09/2018    Left bundle-branch block 02/09/2018       Past Medical History:   Diagnosis Date    [employer] dual pacer 5/23/2018    CKD (chronic kidney disease) stage 3, GFR 30-59 ml/min (HCC)     Dyslipidemia     Heart failure with reduced ejection fraction (HCC)     History of atrial septal defect repair 2/9/2018    Left bundle-branch block 2/9/2018    Nicotine dependence     Nonischemic dilated cardiomyopathy ([address] 05/07/2018    EF of 25%    PAF (paroxysmal atrial fibrillation) ([address] 5/7/2018    S/P left atrial appendage ligation     S/P Maze operation for atrial fibrillation     S/P mitral valve repair 11/05/2018    Vitamin D deficiency        Past Surgical History:   Procedure Laterality Date    CARDIAC CATHETERIZATION  09/20/2018    Dr. Dontrell Carter. No CAD.  CARDIAC SURGERY  1970    repair hole in heart pt states was 3 yrs old.     COLONOSCOPY      MITRAL VALVE SURGERY  10/15/2018    PACEMAKER INSERTION  05/01/2017    DC OFFICE/OUTPT VISIT,PROCEDURE ONLY N/A 10/15/2018    REDO STERNOTOMY, BI-ATRIAL MAZE PROCEDURE, MITRAL REPAIR, PLACEMENT OF INTRA AORTIC BALLOON PUMP performed by Juan David Mcdonald MD at [facility] heart failure (HCC)    Stable  Low EF, \"Ejection fraction is visually estimated in the range of 35% to 40%. con't cardio f/u  con't BB  Low salt diet  Daily wts    - furosemide (LASIX) 40 MG tablet; Take 0.5 tablets by mouth daily  Dispense: 90 tablet; Refill: 3  - potassium chloride (KLOR-CON M) 10 MEQ extended release tablet; Take 1 tablet by mouth daily  Dispense: 90 tablet; Refill: 3  - CBC Auto Differential; Future  - Comprehensive Metabolic Panel; Future  - Lipid Panel; Future  - TSH with Reflex; Future    3. Non-ischemic cardiomyopathy (HCC)    - furosemide (LASIX) 40 MG tablet; Take 0.5 tablets by mouth daily  Dispense: 90 tablet; Refill: 3  - potassium chloride (KLOR-CON M) 10 MEQ extended release tablet; Take 1 tablet by mouth daily  Dispense: 90 tablet; Refill: 3  - CBC Auto Differential; Future  - Comprehensive Metabolic Panel; Future  - Lipid Panel; Future  - TSH with Reflex; Future    4. Paroxysmal atrial fibrillation (HCC)    As above  S/p MAZE  Off OAC  con't asa    - furosemide (LASIX) 40 MG tablet; Take 0.5 tablets by mouth daily  Dispense: 90 tablet; Refill: 3  - potassium chloride (KLOR-CON M) 10 MEQ extended release tablet; Take 1 tablet by mouth daily  Dispense: 90 tablet; Refill: 3  - CBC Auto Differential; Future  - Comprehensive Metabolic Panel; Future  - Lipid Panel; Future  - TSH with Reflex; Future    5. History of atrial fibrillation    - furosemide (LASIX) 40 MG tablet; Take 0.5 tablets by mouth daily  Dispense: 90 tablet; Refill: 3  - CBC Auto Differential; Future  - Comprehensive Metabolic Panel; Future  - Lipid Panel; Future  - TSH with Reflex; Future    6. S/P Maze operation for atrial fibrillation    - furosemide (LASIX) 40 MG tablet; Take 0.5 tablets by mouth daily  Dispense: 90 tablet; Refill: 3  - CBC Auto Differential; Future  - Comprehensive Metabolic Panel; Future  - Lipid Panel; Future  - TSH with Reflex; Future    7.  S/P left atrial appendage ligation    - furosemide (LASIX) 40 MG tablet; Take 0.5 tablets by mouth daily  Dispense: 90 tablet; Refill: 3  - CBC Auto Differential; Future  - Comprehensive Metabolic Panel; Future  - Lipid Panel; Future  - TSH with Reflex; Future    8. Dyslipidemia    Con't lipitor  Labs today    - CBC Auto Differential; Future  - Comprehensive Metabolic Panel; Future  - Lipid Panel; Future  - TSH with Reflex; Future    9. Screening for cervical cancer    Pap completed    - PAP SMEAR; Future    10. Cigarette nicotine dependence without complication    In precontemplation stage and not ready to quit. Declines cessation, aware of risks of continued smoking as well as resources available to help quit. These include tobacco cessation classes as well as 1-800-QUIT NOW. No barriers other than lack of desire. 3+ min spent counseling.     - TSH with Reflex; Future    11. Need for pneumococcal vaccination    - PREVNAR 13 IM (Pneumococcal conjugate vaccine 13-valent)      DISPOSITION    Return in about 6 months (around 8/20/2020) for follow-up on chronic medical conditions, sooner as needed. Kate Prader released without restrictions. Future Appointments   Date Time Provider Marbella Fang   2/20/2020 11:20 AM [facility] MAMMOGRAPHY [address] [facility] Radiolog   4/16/2020  1:00 PM [facility]  [address]   6/5/2020 11:30 AM SCHEDULE, [facility] PAC[facility]  [place] [address]   8/20/2020 10:20 AM Brant Rodríguez DO [facility] [address] [facility] [address] received counseling on the following healthy behaviors: nutrition, exercise and medication adherence    Patient given educational materials on: See Attached    I have instructed Kate Prader to complete a self tracking handout on smoking and instructed them to bring it with them to her next appointment. Barriers to learning and self management: none    Discussed use, benefit, and side effects of prescribed medications. Barriers to medication compliance addressed.   All patient

## 2020-02-20 ENCOUNTER — NURSE ONLY (OUTPATIENT)
Dept: LAB | Age: 55
End: 2020-02-20

## 2020-02-20 ENCOUNTER — HOSPITAL ENCOUNTER (OUTPATIENT)
Dept: WOMENS IMAGING | Age: 55
Discharge: HOME | End: 2020-02-20

## 2020-02-20 ENCOUNTER — OFFICE VISIT (OUTPATIENT)
Dept: FAMILY MEDICINE CLINIC | Age: 55
End: 2020-02-20
Payer: COMMERCIAL

## 2020-02-20 ENCOUNTER — HOSPITAL ENCOUNTER (OUTPATIENT)
Dept: WOMENS IMAGING | Age: 55
Discharge: HOME | End: 2020-02-20
Payer: COMMERCIAL

## 2020-02-20 VITALS
HEART RATE: 68 BPM | DIASTOLIC BLOOD PRESSURE: 70 MMHG | TEMPERATURE: 97.8 F | HEIGHT: 65 IN | RESPIRATION RATE: 14 BRPM | BODY MASS INDEX: 28.32 KG/M2 | WEIGHT: 170 LBS | SYSTOLIC BLOOD PRESSURE: 114 MMHG

## 2020-02-20 LAB
ALBUMIN SERPL-MCNC: 4.3 G/DL (ref 3.5–5.1)
ALP BLD-CCNC: 89 U/L (ref 38–126)
ALT SERPL-CCNC: 11 U/L (ref 11–66)
ANION GAP SERPL CALCULATED.3IONS-SCNC: 11 MEQ/L (ref 8–16)
AST SERPL-CCNC: 13 U/L (ref 5–40)
BASOPHILS # BLD: 0.4 %
BASOPHILS ABSOLUTE: 0 THOU/MM3 (ref 0–0.1)
BILIRUB SERPL-MCNC: 0.2 MG/DL (ref 0.3–1.2)
BUN BLDV-MCNC: 24 MG/DL (ref 7–22)
CALCIUM SERPL-MCNC: 9.3 MG/DL (ref 8.5–10.5)
CHLORIDE BLD-SCNC: 103 MEQ/L (ref 98–111)
CHOLESTEROL, TOTAL: 149 MG/DL (ref 100–199)
CO2: 24 MEQ/L (ref 23–33)
CREAT SERPL-MCNC: 1.2 MG/DL (ref 0.4–1.2)
CREATININE URINE: 13.9 MG/DL
EOSINOPHIL # BLD: 2.6 %
EOSINOPHILS ABSOLUTE: 0.1 THOU/MM3 (ref 0–0.4)
ERYTHROCYTE [DISTWIDTH] IN BLOOD BY AUTOMATED COUNT: 13 % (ref 11.5–14.5)
ERYTHROCYTE [DISTWIDTH] IN BLOOD BY AUTOMATED COUNT: 46.1 FL (ref 35–45)
GFR SERPL CREATININE-BSD FRML MDRD: 47 ML/MIN/1.73M2
GLUCOSE BLD-MCNC: 98 MG/DL (ref 70–108)
HCT VFR BLD CALC: 38.4 % (ref 37–47)
HDLC SERPL-MCNC: 52 MG/DL
HEMOGLOBIN: 11.9 GM/DL (ref 12–16)
IMMATURE GRANS (ABS): 0.02 THOU/MM3 (ref 0–0.07)
IMMATURE GRANULOCYTES: 0.4 %
LDL CHOLESTEROL CALCULATED: 76 MG/DL
LYMPHOCYTES # BLD: 29.9 %
LYMPHOCYTES ABSOLUTE: 1.7 THOU/MM3 (ref 1–4.8)
MCH RBC QN AUTO: 30.5 PG (ref 26–33)
MCHC RBC AUTO-ENTMCNC: 31 GM/DL (ref 32.2–35.5)
MCV RBC AUTO: 98.5 FL (ref 81–99)
MONOCYTES # BLD: 7.6 %
MONOCYTES ABSOLUTE: 0.4 THOU/MM3 (ref 0.4–1.3)
NUCLEATED RED BLOOD CELLS: 0 /100 WBC
PLATELET # BLD: 208 THOU/MM3 (ref 130–400)
PMV BLD AUTO: 11.1 FL (ref 9.4–12.4)
POTASSIUM SERPL-SCNC: 4.9 MEQ/L (ref 3.5–5.2)
PROT/CREAT RATIO, UR: 0.29
PROTEIN, URINE: < 4 MG/DL
RBC # BLD: 3.9 MILL/MM3 (ref 4.2–5.4)
SEG NEUTROPHILS: 59.1 %
SEGMENTED NEUTROPHILS ABSOLUTE COUNT: 3.4 THOU/MM3 (ref 1.8–7.7)
SODIUM BLD-SCNC: 138 MEQ/L (ref 135–145)
TOTAL PROTEIN: 7.4 G/DL (ref 6.1–8)
TRIGL SERPL-MCNC: 106 MG/DL (ref 0–199)
TSH SERPL DL<=0.05 MIU/L-ACNC: 1.38 UIU/ML (ref 0.4–4.2)
WBC # BLD: 5.7 THOU/MM3 (ref 4.8–10.8)

## 2020-02-20 PROCEDURE — G8427 DOCREV CUR MEDS BY ELIG CLIN: HCPCS | Performed by: FAMILY MEDICINE

## 2020-02-20 PROCEDURE — 3017F COLORECTAL CA SCREEN DOC REV: CPT | Performed by: FAMILY MEDICINE

## 2020-02-20 PROCEDURE — 3209999900 MAM COMPARISON OF OUTSIDE IMAGES

## 2020-02-20 PROCEDURE — 99214 OFFICE O/P EST MOD 30 MIN: CPT | Performed by: FAMILY MEDICINE

## 2020-02-20 PROCEDURE — G8484 FLU IMMUNIZE NO ADMIN: HCPCS | Performed by: FAMILY MEDICINE

## 2020-02-20 PROCEDURE — G8417 CALC BMI ABV UP PARAM F/U: HCPCS | Performed by: FAMILY MEDICINE

## 2020-02-20 PROCEDURE — 77067 SCR MAMMO BI INCL CAD: CPT

## 2020-02-20 PROCEDURE — 90670 PCV13 VACCINE IM: CPT | Performed by: FAMILY MEDICINE

## 2020-02-20 PROCEDURE — 4004F PT TOBACCO SCREEN RCVD TLK: CPT | Performed by: FAMILY MEDICINE

## 2020-02-20 PROCEDURE — 90471 IMMUNIZATION ADMIN: CPT | Performed by: FAMILY MEDICINE

## 2020-02-20 RX ORDER — FUROSEMIDE 40 MG/1
20 TABLET ORAL DAILY
Qty: 90 TABLET | Refills: 3 | Status: SHIPPED | OUTPATIENT
Start: 2020-02-20 | End: 2021-02-05 | Stop reason: SDUPTHER

## 2020-02-20 RX ORDER — POTASSIUM CHLORIDE 750 MG/1
10 TABLET, EXTENDED RELEASE ORAL DAILY
Qty: 90 TABLET | Refills: 3 | Status: SHIPPED | OUTPATIENT
Start: 2020-02-20 | End: 2020-08-26 | Stop reason: SDUPTHER

## 2020-02-20 ASSESSMENT — PATIENT HEALTH QUESTIONNAIRE - PHQ9
SUM OF ALL RESPONSES TO PHQ9 QUESTIONS 1 & 2: 0
SUM OF ALL RESPONSES TO PHQ QUESTIONS 1-9: 0
SUM OF ALL RESPONSES TO PHQ QUESTIONS 1-9: 0
2. FEELING DOWN, DEPRESSED OR HOPELESS: 0
1. LITTLE INTEREST OR PLEASURE IN DOING THINGS: 0

## 2020-02-20 NOTE — PROGRESS NOTES
Immunization(s) given during visit:    Immunizations Administered     Name Date Dose Route    Pneumococcal Conjugate 13-valent ([id number]) 2/20/2020 0.5 mL Intramuscular    Site: Deltoid- Right    Lot: [id number]    NDC: 9862-2470-80          Most recent Vaccine Information Sheet dated 10/30/19 given to karel

## 2020-02-21 ENCOUNTER — NURSE ONLY (OUTPATIENT)
Dept: LAB | Age: 55
End: 2020-02-21

## 2020-02-21 ENCOUNTER — TELEPHONE (OUTPATIENT)
Dept: FAMILY MEDICINE CLINIC | Age: 55
End: 2020-02-21

## 2020-03-03 LAB — CYTOLOGY THIN PREP PAP: NORMAL

## 2020-03-03 RX ORDER — METRONIDAZOLE 500 MG/1
500 TABLET ORAL 2 TIMES DAILY
Qty: 14 TABLET | Refills: 0 | Status: SHIPPED | OUTPATIENT
Start: 2020-03-03 | End: 2020-03-10

## 2020-03-04 ENCOUNTER — TELEPHONE (OUTPATIENT)
Dept: FAMILY MEDICINE CLINIC | Age: 55
End: 2020-03-04

## 2020-03-04 NOTE — TELEPHONE ENCOUNTER
Pt informed Eye Protection Verbiage: Before proceeding with the stage, a plastic scleral shield was inserted. The globe was anesthetized with a few drops of 1% lidocaine with 1:100,000 epinephrine. Then, an appropriate sized scleral shield was chosen and coated with lacrilube ointment. The shield was gently inserted and left in place for the duration of each stage. After the stage was completed, the shield was gently removed.

## 2020-03-04 NOTE — TELEPHONE ENCOUNTER
----- Message from Danna Wolff, DO sent at 3/3/2020 10:00 PM EST -----  Please let pt know that PAP is negative. Repeat 5 years. Does show suggestion of bacterial infection called trichomonas. I've sent an ATB to the pharmacy to treat that. Let me know if questions, thanks!

## 2020-03-10 ENCOUNTER — TELEPHONE (OUTPATIENT)
Dept: FAMILY MEDICINE CLINIC | Age: 55
End: 2020-03-10

## 2020-03-10 NOTE — TELEPHONE ENCOUNTER
Pt is having severe hot flashes 6-10 per day including the night. Pt is not sleeping well. LMP 10/19.  Would like to be put on something to reduce symtoms    Future Appointments   Date Time Provider Marbella Fang   3/10/2020 11:20 AM Danna Wolff, [address] [place] [address]   4/16/2020  1:00 PM Louis Hubbard MD [address] [facility] - [facility] [place] AM [place] [address]   6/5/2020 11:30 AM SCHEDULE, [facility] PACER NURSE [facility] - [facility] [place] AM [address] [address]   8/20/2020 10:20 AM [address] [facility]

## 2020-03-19 ENCOUNTER — TELEPHONE (OUTPATIENT)
Dept: FAMILY MEDICINE CLINIC | Age: 55
End: 2020-03-19

## 2020-03-19 NOTE — TELEPHONE ENCOUNTER
Yes  She should keep taking  The reason she doesn't void as much is that her body has reached a new steady state with her fluid balance  This is to be expected. Thanks!

## 2020-04-23 ENCOUNTER — TELEPHONE (OUTPATIENT)
Dept: CARDIOLOGY CLINIC | Age: 55
End: 2020-04-23

## 2020-04-24 ENCOUNTER — TELEPHONE (OUTPATIENT)
Dept: CARDIOLOGY CLINIC | Age: 55
End: 2020-04-24

## 2020-04-24 ENCOUNTER — VIRTUAL VISIT (OUTPATIENT)
Dept: CARDIOLOGY CLINIC | Age: 55
End: 2020-04-24

## 2020-04-24 ENCOUNTER — PROCEDURE VISIT (OUTPATIENT)
Dept: CARDIOLOGY CLINIC | Age: 55
End: 2020-04-24

## 2020-04-24 PROCEDURE — 99214 OFFICE O/P EST MOD 30 MIN: CPT | Performed by: NUCLEAR MEDICINE

## 2020-04-24 PROCEDURE — 93296 REM INTERROG EVL PM/IDS: CPT | Performed by: INTERNAL MEDICINE

## 2020-04-24 PROCEDURE — 93294 REM INTERROG EVL PM/LDLS PM: CPT | Performed by: INTERNAL MEDICINE

## 2020-04-24 NOTE — PROGRESS NOTES
DR Codey Yap PT/KNOWN AFIB/ MG   [employer] DUAL PACEMAKER REMOTE   BATTERY 11 YRS REMAINING  ATRIAL IMPEDENCE 655  VENT IMPEDENCE 625  P WAVES 4  RV WAVES 16.5  ATRIAL THRESHOLD PER THE DEVICE 0.7 @ 0.4  A PACED 2%  V PACED 0%  DDDR   PT DOES HAVE PMT AND THERE IS A NOTE ON HER APPT DESK TO CHANGE PVARP

## 2020-04-24 NOTE — PROGRESS NOTES
of time was spent reviewing the chart and educating the patient about cardiac status, medications, diet, exercise, and discussing the plan of care. I personally spent more then 50% of the appt time face to face with the patient counseling/coordinating patient's care. Orders Placed:  No orders of the defined types were placed in this encounter. Medications Prescribed:  No orders of the defined types were placed in this encounter. Discussed use, benefit, and side effects of prescribed medications. All patient questions answered. Pt voicedunderstanding. Instructed to continue current medications, diet and exercise. Continue risk factor modification and medical management. Patient agreed with treatment plan. Follow up as directed.     Electronically signedby Maxwell Camara MD on 4/24/2020 at 11:31 AM

## 2020-07-15 RX ORDER — ATORVASTATIN CALCIUM 20 MG/1
TABLET, FILM COATED ORAL
Qty: 90 TABLET | Refills: 3 | Status: SHIPPED | OUTPATIENT
Start: 2020-07-15 | End: 2021-03-29 | Stop reason: SDUPTHER

## 2020-08-19 ENCOUNTER — NURSE ONLY (OUTPATIENT)
Dept: CARDIOLOGY CLINIC | Age: 55
End: 2020-08-19

## 2020-08-19 PROBLEM — K92.1 HEMATOCHEZIA: Status: RESOLVED | Noted: 2018-12-18 | Resolved: 2020-08-19

## 2020-08-19 PROCEDURE — 93280 PM DEVICE PROGR EVAL DUAL: CPT | Performed by: NUCLEAR MEDICINE

## 2020-08-19 NOTE — PROGRESS NOTES
DR Yandy Cunha PT   [employer] DUAL PACEMAKER CHECK IN OFFICE   BATTERY 10.5 YRS REMAINING  DDDR   A PACED 2%  V PACED <1%    ATRIAL IMPEDENCE 656  VENT IMPEDENCE 614  P WAVES 3.9  RV WAVES 18.5    ATRIAL THRESHOLD 1 @ 0.4  VENT THRESHOLD 1.2 @ 0.4  ATRIAL AMPLITUDE PROGRAMMED AUTO   VENT AMPLITUDE PROGRAMMED 2.5 @ 0.4    PT WAS HAVING ALOT OF PVCS UPON PRESENTATION  WAS DIFFICULT TO TRY TO OBTAIN THE ATRIAL THRESHOLD D/T THIS. PT HAD 1.1 MILLION PVCS SINCE MAY 9, 2019/ SHE HAS AN APPT WITH DR Yandy Cunha ON 10/29/2020.  SHE IS AWARE THAT SHE IS HAVING EXTRA BEATS

## 2020-08-26 RX ORDER — POTASSIUM CHLORIDE 750 MG/1
10 TABLET, EXTENDED RELEASE ORAL DAILY
Qty: 90 TABLET | Refills: 0 | Status: SHIPPED | OUTPATIENT
Start: 2020-08-26 | End: 2021-04-16 | Stop reason: SDUPTHER

## 2020-08-26 RX ORDER — FLUTICASONE PROPIONATE 50 MCG
SPRAY, SUSPENSION (ML) NASAL
Qty: 1 BOTTLE | Refills: 0 | Status: SHIPPED | OUTPATIENT
Start: 2020-08-26 | End: 2020-09-01 | Stop reason: SDUPTHER

## 2020-09-01 RX ORDER — FLUTICASONE PROPIONATE 50 MCG
SPRAY, SUSPENSION (ML) NASAL
Qty: 1 BOTTLE | Refills: 0 | Status: SHIPPED | OUTPATIENT
Start: 2020-09-01 | End: 2020-09-04 | Stop reason: SDUPTHER

## 2020-09-04 RX ORDER — FLUTICASONE PROPIONATE 50 MCG
SPRAY, SUSPENSION (ML) NASAL
Qty: 3 BOTTLE | Refills: 0 | Status: SHIPPED | OUTPATIENT
Start: 2020-09-04 | End: 2020-09-10

## 2020-09-09 NOTE — PROGRESS NOTES
Chief Complaint   Patient presents with    6 Month Follow-Up     Chronic issues as noted below       History obtained from the patient. SUBJECTIVE:  Lata Kennedy is a 50 y.o. female that presents today for       -CKD3:  Previous stage 4   Labs better  CKD 3 now  Released by nephro  Due for labs. BP <140/90      -HF with reduced EF/non-ischemic cardiomyopathy/Afib PRIOR VISIT: s/p MAZE OCT 2018. Had issues post-op, ended up intubated and renal failure. Not clear what happened. Follows with cardio. Hx of recent mitral valve repair and atrial appendage ligation. Follows with cardio at [facility] and [facility] surgery, Dr. Jacquelin Vera, at [facility] as well. No-longer on [facility] for afib s/p MAZE. 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 On low salt diet. Denies CP, SOB, orthopnea or PND.      UPDATE PRIOR VISIT:   Current with cardio  No cP/SOB  No orthpnea or PND  Echo stable with low EF     UPDATE TODAY:   Current with cardio  Denies CP/SOB  No orthpnea or PND  Echo stable.    Seeing cardio next month      -HLD:    HPI:     Taking meds as prescribed ?: yes  Tolerating well ?: yes  Side Effects ?: denies  Muscle Pain?: denies  Working on TLCS ?: yes      -Smoking:  Smoking again  10 cig per wk  Declines cessation      Age/Gender Health Maintenance    Lipid -   Lab Results   Component Value Date    CHOL 149 02/20/2020    CHOL 265 (H) 10/02/2019     Lab Results   Component Value Date    TRIG 106 02/20/2020    TRIG 126 10/02/2019     Lab Results   Component Value Date    HDL 52 02/20/2020    HDL 54 10/02/2019     Lab Results   Component Value Date    LDLCALC 76 02/20/2020    LDLCALC 186 10/02/2019     No results found for: LABVLDL, VLDL  No results found for: CHOLHDLRATIO      DM Screen -   Lab Results   Component Value Date    GLUCOSE 98 02/20/2020    GLUCOSE 99 03/07/2019     Lab Results   Component Value Date    LABA1C 5.9 10/10/2018     No results found for: EAG      Colon Cancer Screening - NEG DEC 2017, repeat dual pacer 05/23/2018    PAF (paroxysmal atrial fibrillation) ([facility] 05/07/2018    Nonischemic dilated cardiomyopathy ([facility] 05/07/2018    History of atrial septal defect repair 02/09/2018    Left bundle-branch block 02/09/2018       Past Medical History:   Diagnosis Date    [employer] dual pacer 5/23/2018    CKD (chronic kidney disease) stage 3, GFR 30-59 ml/min ([facility])     Dyslipidemia     Heart failure with reduced ejection fraction (HCC)     History of atrial septal defect repair 2/9/2018    Left bundle-branch block 2/9/2018    Nicotine dependence     Nonischemic dilated cardiomyopathy ([facility] 05/07/2018    EF of 25%    PAF (paroxysmal atrial fibrillation) ([facility] [facility] 5/7/2018    S/P left atrial appendage ligation     S/P Maze operation for atrial fibrillation     S/P mitral valve repair 11/05/2018    Vitamin D deficiency        Past Surgical History:   Procedure Laterality Date    CARDIAC CATHETERIZATION  09/20/2018    Dr. Braulio Salas. No CAD.  CARDIAC SURGERY  1970    repair hole in heart  states was 3 yrs old.     COLONOSCOPY      MITRAL VALVE SURGERY  10/15/2018    PACEMAKER INSERTION  05/01/2017    NC OFFICE/OUTPT VISIT,PROCEDURE ONLY N/A 10/15/2018    REDO STERNOTOMY, BI-ATRIAL MAZE PROCEDURE, MITRAL REPAIR, PLACEMENT OF INTRA AORTIC BALLOON PUMP performed by Tonny Mcgill MD at [facility]     TUBAL LIGATION         Allergies   Allergen Reactions    Doxycycline Hives         Social History     Tobacco Use    Smoking status: Current Some Day Smoker     Packs/day: 0.10     Years: 35.00     Pack years: 3.50     Types: Cigarettes     Start date: 5/7/1994    Smokeless tobacco: Never Used   Substance Use Topics    Alcohol use: No       Family History   Problem Relation Age of Onset    Emphysema Father     High Cholesterol Father          I have reviewed the patient's past medical history, past surgical history, allergies, medications, social and family history and I have made updates where appropriate. Review of Systems  Positive responses are highlighted in bold    Constitutional:  Fever, Chills, Night Sweats, Fatigue, Unexpected changes in weight  HENT:  Ear pain, Tinnitus, Nosebleeds, Trouble swallowing, Hearing loss, Sore throat  Cardiovascular:  Chest Pain, Palpitations, Orthopnea, Paroxysmal Nocturnal Dyspnea  Respiratory:  Cough, Wheezing, Shortness of breath, Chest tightness, Apnea  Gastrointestinal:  Nausea, Vomiting, Diarrhea, Constipation, Heartburn, Blood in stool  Genitourinary:  Difficulty or painful urination, Flank pain, Change in frequency, Urgency  Skin:  Color change, Rash, Itching, Wound  Musculoskeletal:  Joint pain, Back pain, Gait problems, Joint swelling, Myalgias  Neurological:  Dizziness, Headaches, Presyncope, Numbness, Seizures, Tremors  Endocrine:  Heat Intolerance, Cold Intolerance, Polydipsia, Polyphagia, Polyuria      PHYSICAL EXAM:  Vitals:    09/10/20 1636   BP: 117/60   Pulse: 83   Resp: 14   Temp: 97.4 °F (36.3 °C)   TempSrc: Temporal   SpO2: 97%   Weight: 174 lb (78.9 kg)   Height: 5' 4\" (1.626 m)     Body mass index is 29.87 kg/m². Pain Score:   0 - No pain    VS Reviewed  General Appearance: A&O x 3, No acute distress,well developed and well- nourished  Eyes: pupils equal, round, and reactive to light, extraocular eye movements intact, conjunctivae and eye lids without erythema  ENT: external ear and ear canal clear bilaterally, TMs intact and regular, nose without deformity, nasal mucosa and turbinates normal without polyps, oropharynx normal, dentition is normal for age  Neck: supple and non-tender without mass, no thyromegaly or thyroid nodules, no cervical lymphadenopathy  Pulmonary/Chest: clear to auscultation bilaterally- no wheezes, rales or rhonchi, normal air movement, no respiratory distress or retractions  Cardiovascular: S1 and S2 auscultated w/ RRR.  No murmurs, rubs, clicks, or gallops, distal pulses intact. Abdomen: soft, non-tender, non-distended, bowel sounds physiologic,  no rebound or guarding, no masses or hernias noted. Liver and spleen without enlargement. Extremities: no cyanosis, clubbing or edema of the lower extremities. +2 PT/DP bilaterally. Musculoskeletal: No joint swelling or gross deformity   Skin: warm and dry, no rash or erythema. ASSESSMENT & PLAN  1. CKD (chronic kidney disease) stage 3, GFR 30-59 ml/min ([facility])    Doing well clinically  Due for f/u labs  Get when able  con't current meds  Good bp control    - Basic Metabolic Panel; Future    2. Chronic systolic heart failure (HCC)    Stable  Low EF, \"Ejection fraction is visually estimated in the range of 35% to 40%. con't cardio f/u  con't BB  Low salt diet  Daily wts    - Basic Metabolic Panel; Future    3. Non-ischemic cardiomyopathy (HCC)      4. Paroxysmal atrial fibrillation ([address]    As above  S/p MAZE  Off [address]  con't asa    5. History of atrial fibrillation      6. S/P Maze operation for atrial fibrillation      7. S/P left atrial appendage ligation      8. Dyslipidemia    Con't lipitor  Labs stable    9. Cigarette nicotine dependence without complication    In precontemplation stage and not ready to quit. Declines cessation, aware of risks of continued smoking as well as resources available to help quit. These include tobacco cessation classes as well as 1-800-QUIT NOW. No barriers other than lack of desire. 3+ min spent counseling. DISPOSITION    Return in about 6 months (around 3/10/2021) for follow-up on chronic medical conditions, sooner as needed. Kt Adan released without restrictions.     Future Appointments   Date Time Provider Marbella Fang   10/29/2020 11:15 AM Agapito Jones MD [facility] - [facility]   3/11/2021  4:20 PM Dulce Lockwood, [address] [place] [address]   8/26/2021  4:00 PM SCHEDULE, [facility] PACER NURSE [facility] [place]     PATIENT COUNSELING    Kt Adan received counseling on the following healthy behaviors: nutrition, exercise and medication adherence    Patient given educational materials on: See Attached    I have instructed Elmer Paul to complete a self tracking handout on smoking and instructed them to bring it with them to her next appointment. Barriers to learning and self management: none    Discussed use, benefit, and side effects of prescribed medications. Barriers to medication compliance addressed. All patient questions answered. Pt voiced understanding.        Electronically signed by Irven Kawasaki, DO on 9/10/2020 at 5:14 PM

## 2020-09-10 ENCOUNTER — OFFICE VISIT (OUTPATIENT)
Dept: FAMILY MEDICINE CLINIC | Age: 55
End: 2020-09-10

## 2020-09-10 VITALS
HEART RATE: 83 BPM | RESPIRATION RATE: 14 BRPM | OXYGEN SATURATION: 97 % | BODY MASS INDEX: 29.71 KG/M2 | HEIGHT: 64 IN | DIASTOLIC BLOOD PRESSURE: 60 MMHG | SYSTOLIC BLOOD PRESSURE: 117 MMHG | WEIGHT: 174 LBS | TEMPERATURE: 97.4 F

## 2020-09-10 PROCEDURE — 99214 OFFICE O/P EST MOD 30 MIN: CPT | Performed by: FAMILY MEDICINE

## 2020-09-10 NOTE — PATIENT INSTRUCTIONS
Patient Education        Heart Failure: Care Instructions  Your Care Instructions     Heart failure occurs when your heart does not pump as much blood as the body needs. Failure does not mean that the heart has stopped pumping but rather that it is not pumping as well as it should. Over time, this causes fluid buildup in your lungs and other parts of your body. Fluid buildup can cause shortness of breath, fatigue, swollen ankles, and other problems. By taking medicines regularly, reducing sodium (salt) in your diet, checking your weight every day, and making lifestyle changes, you can feel better and live longer. Follow-up care is a key part of your treatment and safety. Be sure to make and go to all appointments, and call your doctor if you are having problems. It's also a good idea to know your test results and keep a list of the medicines you take. How can you care for yourself at home? Medicines  · Be safe with medicines. Take your medicines exactly as prescribed. Call your doctor if you think you are having a problem with your medicine. · Do not take any vitamins, over-the-counter medicine, or herbal products without talking to your doctor first. Arben Jarad not take ibuprofen (Advil or Motrin) and naproxen (Aleve) without talking to your doctor first. They could make your heart failure worse. · You may take some of the following medicine. ? Angiotensin-converting enzyme inhibitors (ACEIs) or angiotensin II receptor blockers (ARBs) reduce the heart's workload, lower blood pressure, and reduce swelling. Taking an ACEI or ARB may lower your chance of needing to be hospitalized. ? Beta-blockers can slow heart rate, decrease blood pressure, and improve your condition. Taking a beta-blocker may lower your chance of needing to be hospitalized. ? Diuretics, also called water pills, reduce swelling. You will get more details on the specific medicines your doctor prescribes.   Diet  · Your doctor may suggest that you limit sodium. Your doctor can tell you how much sodium is right for you. An example is less than 3,000 mg a day. This includes all the salt you eat in cooking or in packaged foods. People get most of their sodium from processed foods. Fast food and restaurant meals also tend to be very high in sodium. · Ask your doctor how much liquid you can drink each day. You may have to limit liquids. Weight  · Weigh yourself without clothing at the same time each day. Record your weight. Call your doctor if you have a sudden weight gain, such as more than 2 to 3 pounds in a day or 5 pounds in a week. (Your doctor may suggest a different range of weight gain.) A sudden weight gain may mean that your heart failure is getting worse. Activity level  · Start light exercise (if your doctor says it is okay). Even if you can only do a small amount, exercise will help you get stronger, have more energy, and manage your weight and your stress. Walking is an easy way to get exercise. Start out by walking a little more than you did before. Bit by bit, increase the amount you walk. · When you exercise, watch for signs that your heart is working too hard. You are pushing yourself too hard if you cannot talk while you are exercising. If you become short of breath or dizzy or have chest pain, stop, sit down, and rest.  · If you feel \"wiped out\" the day after you exercise, walk slower or for a shorter distance until you can work up to a better pace. · Get enough rest at night. Sleeping with 1 or 2 pillows under your upper body and head may help you breathe easier. Lifestyle changes  · Do not smoke. Smoking can make a heart condition worse. If you need help quitting, talk to your doctor about stop-smoking programs and medicines. These can increase your chances of quitting for good. Quitting smoking may be the most important step you can take to protect your heart. · Limit alcohol to 2 drinks a day for men and 1 drink a day for women.  Too much alcohol can cause health problems. · Avoid getting sick from colds and the flu. Get a pneumococcal vaccine shot. If you have had one before, ask your doctor whether you need another dose. Get a flu shot each year. If you must be around people with colds or the flu, wash your hands often. When should you call for help? Call 911 if you have symptoms of sudden heart failure such as:  · You have severe trouble breathing. · You cough up pink, foamy mucus. · You have a new irregular or rapid heartbeat. Call your doctor now or seek immediate medical care if:  · You have new or increased shortness of breath. · You are dizzy or lightheaded, or you feel like you may faint. · You have sudden weight gain, such as more than 2 to 3 pounds in a day or 5 pounds in a week. (Your doctor may suggest a different range of weight gain.)  · You have increased swelling in your legs, ankles, or feet. · You are suddenly so tired or weak that you cannot do your usual activities. Watch closely for changes in your health, and be sure to contact your doctor if you develop new symptoms. Where can you learn more? Go to [email or web address] org and sign in to your Sharelook account. Enter H132 in the [employer] box to learn more about \"Heart Failure: Care Instructions. \"     If you do not have an account, please click on the \"Sign Up Now\" link. Current as of: December 16, 2019               Content Version: 12.5  © 9795-1472 Healthwise, Incorporated. Care instructions adapted under license by [facility] ([facility]). If you have questions about a medical condition or this instruction, always ask your healthcare professional. [place] [address] any warranty or liability for your use of this information.

## 2020-11-30 ENCOUNTER — PROCEDURE VISIT (OUTPATIENT)
Dept: CARDIOLOGY CLINIC | Age: 55
End: 2020-11-30

## 2020-11-30 PROCEDURE — 93294 REM INTERROG EVL PM/LDLS PM: CPT | Performed by: NUCLEAR MEDICINE

## 2020-11-30 PROCEDURE — 93296 REM INTERROG EVL PM/IDS: CPT | Performed by: NUCLEAR MEDICINE

## 2021-02-01 RX ORDER — ASPIRIN 325 MG
TABLET ORAL
Qty: 30 TABLET | Refills: 17 | Status: SHIPPED | OUTPATIENT
Start: 2021-02-01 | End: 2021-03-29 | Stop reason: SDUPTHER

## 2021-02-05 ENCOUNTER — TELEPHONE (OUTPATIENT)
Dept: FAMILY MEDICINE CLINIC | Age: 56
End: 2021-02-05

## 2021-02-05 DIAGNOSIS — I48.0 PAROXYSMAL ATRIAL FIBRILLATION (HCC): ICD-10-CM

## 2021-02-05 DIAGNOSIS — Z86.79 HISTORY OF ATRIAL FIBRILLATION: ICD-10-CM

## 2021-02-05 DIAGNOSIS — Z98.890 S/P MAZE OPERATION FOR ATRIAL FIBRILLATION: ICD-10-CM

## 2021-02-05 DIAGNOSIS — Z86.79 S/P MAZE OPERATION FOR ATRIAL FIBRILLATION: ICD-10-CM

## 2021-02-05 DIAGNOSIS — N18.30 STAGE 3 CHRONIC KIDNEY DISEASE, UNSPECIFIED WHETHER STAGE 3A OR 3B CKD (HCC): Primary | ICD-10-CM

## 2021-02-05 DIAGNOSIS — I42.8 NON-ISCHEMIC CARDIOMYOPATHY (HCC): ICD-10-CM

## 2021-02-05 DIAGNOSIS — Z98.890 S/P LEFT ATRIAL APPENDAGE LIGATION: ICD-10-CM

## 2021-02-05 DIAGNOSIS — I50.22 CHRONIC SYSTOLIC HEART FAILURE (HCC): ICD-10-CM

## 2021-02-05 RX ORDER — FUROSEMIDE 40 MG/1
20 TABLET ORAL DAILY
Qty: 90 TABLET | Refills: 3 | Status: CANCELLED | OUTPATIENT
Start: 2021-02-05

## 2021-02-05 RX ORDER — FUROSEMIDE 40 MG/1
20 TABLET ORAL DAILY
Qty: 90 TABLET | Refills: 3 | Status: SHIPPED | OUTPATIENT
Start: 2021-02-05 | End: 2021-03-29 | Stop reason: SDUPTHER

## 2021-02-05 NOTE — TELEPHONE ENCOUNTER
Recent Visits  Date Type Provider Dept   09/10/20 Office Visit David Mireles, DO [facility]   02/20/20 Office Visit David Mireles, [facility]   10/09/19 Office Visit David Mireles, DO [facility]   10/01/19 Office Visit David Mireles, DO [facility]   Showing recent visits within past 540 days with a meds authorizing provider and meeting all other requirements     Future Appointments  Date Type Provider Dept   03/11/21 Appointment David Mireles, [address] [place] [address]   Showing future appointments within next 150 days with a meds authorizing provider and meeting all other requirements     Future Appointments   Date Time Provider Marbella Fang   3/11/2021  4:20 PM David Mireles, [address] [place] [address]   8/26/2021  4:00 PM SCHEDULE, SHANTE PACER NURSE N [facility]ER [facility] - [address] [facility]

## 2021-02-05 NOTE — TELEPHONE ENCOUNTER
Pt Is unable to do the labwork right now because of not having insurance right now. She doesn't know when she will have insurance. Wants to keep her OV visit in march. Is requesting lasix to be filled. Med pending.

## 2021-02-05 NOTE — TELEPHONE ENCOUNTER
ECC received a call from:    Name of Caller: George Pradhan     Relationship to patient: Self     Best contact number: [phone]    Reason for call: Patient called in today returning call about labs. Patient stated she does not have insurance at this time and will not be able to get lab work completed. Scheduled for March, would like an idea of how much that visit will cost out of pocket. Patient may cancel appointment due to financial hardship. Stated she is feeling fine and does not have any concerns.

## 2021-02-05 NOTE — TELEPHONE ENCOUNTER
Pt called back requesting labs be mailed to her, she will call [facility] to get the cost of the labs  Labs mailed to pt's home address.

## 2021-02-05 NOTE — TELEPHONE ENCOUNTER
Pt due for fasting labs prior to next apt on 3/11/2021. Please call to have pt complete this. Thanks! ASSESSMENT & PLAN   Diagnosis Orders   1. Stage 3 chronic kidney disease, unspecified whether stage 3a or 3b CKD  CBC Auto Differential    Comprehensive Metabolic Panel    Lipid Panel    Microalbumin / Creatinine Urine Ratio    TSH with Reflex   2. Paroxysmal atrial fibrillation (HCC)  CBC Auto Differential    Comprehensive Metabolic Panel    Lipid Panel    Microalbumin / Creatinine Urine Ratio    TSH with Reflex   3.  Non-ischemic cardiomyopathy ([address]  CBC Auto Differential    Comprehensive Metabolic Panel    Lipid Panel    Microalbumin / Creatinine Urine Ratio    TSH with Reflex     Future Appointments   Date Time Provider Marbella Fang   3/11/2021  4:20 PM Luz Anton [address] [place] [address]   8/26/2021  4:00 PM SCHEDULE, [facility] PACER NURSE [facility] [facility]

## 2021-03-03 ENCOUNTER — PROCEDURE VISIT (OUTPATIENT)
Dept: CARDIOLOGY CLINIC | Age: 56
End: 2021-03-03

## 2021-03-03 DIAGNOSIS — Z95.0 PACEMAKER: Primary | Chronic | ICD-10-CM

## 2021-03-03 PROCEDURE — 93296 REM INTERROG EVL PM/IDS: CPT | Performed by: INTERNAL MEDICINE

## 2021-03-03 PROCEDURE — 93294 REM INTERROG EVL PM/LDLS PM: CPT | Performed by: INTERNAL MEDICINE

## 2021-03-03 NOTE — PROGRESS NOTES
[facility] sci dual pacer   No f/u in office     . Faye Mckeon Battery longevity:  10 years   Presenting rhythm  AS VS     Atrial impedance 690  RV impedance 691    P wave sensing 4.3  R wave sensing 17.4    3 % atrial paced  0 % RV paced   Mode switches   9/14/20 short episode at [facility]utter

## 2021-03-05 LAB
ALBUMIN SERPL-MCNC: 4 G/DL (ref 3.5–5)
ALBUMIN/GLOBULIN RATIO: 1.2 (ref 1.2–1.5)
ALK PHOSPHATASE: 85 U/L (ref 38–126)
ALT SERPL-CCNC: 14 U/L (ref 7–35)
AST SERPL-CCNC: 16 U/L (ref 10–42)
BASOPHILS # BLD: 0.5 % (ref 0–3)
BILIRUB SERPL-MCNC: 0.7 MG/DL (ref 0.3–1.2)
BUN BLDV-MCNC: 37 MG/DL (ref 7–22)
CALCIUM SERPL-MCNC: 8.8 MG/DL (ref 8.4–10.2)
CHLORIDE BLD-SCNC: 103 MEQ/L (ref 98–107)
CHOLESTEROL: 177 MG/DL (ref 0–200)
CO2: 26 MEQ/L (ref 22–31)
CREAT SERPL-MCNC: 1.5 MG/DL (ref 0.4–1.1)
CREATININE, URINE: 180.5 MG/DL
EOSINOPHIL # BLD: 2.9 % (ref 0–4)
GFR SERPL CREATININE-BSD FRML MDRD: 36 ML/MIN/{1.73_M2}
GLOBULIN: 3.4 G/DL (ref 2.9–3.3)
GLUCOSE: 88 MG/DL (ref 70–126)
HCT VFR BLD CALC: 36.7 % (ref 37–47)
HDLC SERPL-MCNC: 53 MG/DL (ref 40–60)
HEMOGLOBIN: 12.4 G/DL (ref 12–16)
LDL CHOLESTEROL: 91 MG/DL (ref 0–100)
LYMPHOCYTES # BLD: 28.5 % (ref 17.6–49.6)
MCH RBC QN AUTO: 30.4 PG (ref 28–32)
MCHC RBC AUTO-ENTMCNC: 33.9 G/DL (ref 33–37)
MCV RBC AUTO: 89.7 FL (ref 81–99)
MICROALBUMIN UR-MCNC: 0.53 MG/DL
MICROALBUMIN/CREAT UR-RTO: 3 MG/G (ref 0–30)
MONOCYTES # BLD: 8.8 % (ref 4.1–12.4)
NON-HDL CHOLESTEROL: 124 MG/DL (ref 0–130)
PDW BLD-RTO: 12.9 % (ref 11.5–14.5)
PLATELET # BLD: 180 THOU/CUMM (ref 130–400)
POTASSIUM SERPL-SCNC: 4.3 MEQ/L (ref 3.5–5.1)
RBC: 4.09 MIL/CUMM (ref 4.2–5.4)
SCAN OF BLOOD SMEAR: NO
SEG NEUTROPHILS: 59.3 % (ref 39.4–72.5)
SODIUM BLD-SCNC: 135 MEQ/L (ref 136–145)
TOTAL PROTEIN: 7.4 G/DL (ref 6.4–8.3)
TRIGL SERPL-MCNC: 164 MG/DL (ref 40–150)
TSH REFLEX FT4: 1.31 UIU/ML (ref 0.49–4.67)
WBC: 5.7 THOU/CUMM (ref 4.8–10.8)

## 2021-03-07 DIAGNOSIS — N18.30 STAGE 3 CHRONIC KIDNEY DISEASE, UNSPECIFIED WHETHER STAGE 3A OR 3B CKD (HCC): Primary | ICD-10-CM

## 2021-03-08 ENCOUNTER — TELEPHONE (OUTPATIENT)
Dept: FAMILY MEDICINE CLINIC | Age: 56
End: 2021-03-08

## 2021-03-29 DIAGNOSIS — Z86.79 S/P MAZE OPERATION FOR ATRIAL FIBRILLATION: ICD-10-CM

## 2021-03-29 DIAGNOSIS — I48.0 PAROXYSMAL ATRIAL FIBRILLATION (HCC): ICD-10-CM

## 2021-03-29 DIAGNOSIS — E78.5 DYSLIPIDEMIA: ICD-10-CM

## 2021-03-29 DIAGNOSIS — Z98.890 S/P MAZE OPERATION FOR ATRIAL FIBRILLATION: ICD-10-CM

## 2021-03-29 DIAGNOSIS — I50.22 CHRONIC SYSTOLIC HEART FAILURE (HCC): ICD-10-CM

## 2021-03-29 DIAGNOSIS — Z98.890 S/P LEFT ATRIAL APPENDAGE LIGATION: ICD-10-CM

## 2021-03-29 DIAGNOSIS — I42.8 NON-ISCHEMIC CARDIOMYOPATHY (HCC): ICD-10-CM

## 2021-03-29 DIAGNOSIS — Z86.79 HISTORY OF ATRIAL FIBRILLATION: ICD-10-CM

## 2021-03-29 RX ORDER — CARVEDILOL 3.12 MG/1
TABLET ORAL
Qty: 180 TABLET | Refills: 3 | Status: SHIPPED | OUTPATIENT
Start: 2021-03-29 | End: 2021-08-26 | Stop reason: CLARIF

## 2021-03-29 RX ORDER — ASPIRIN 325 MG
TABLET ORAL
Qty: 90 TABLET | Refills: 3 | Status: SHIPPED | OUTPATIENT
Start: 2021-03-29 | End: 2022-03-10 | Stop reason: SDUPTHER

## 2021-03-29 RX ORDER — FUROSEMIDE 40 MG/1
20 TABLET ORAL DAILY
Qty: 90 TABLET | Refills: 3 | Status: SHIPPED | OUTPATIENT
Start: 2021-03-29 | End: 2022-03-10 | Stop reason: SDUPTHER

## 2021-03-29 RX ORDER — ATORVASTATIN CALCIUM 20 MG/1
TABLET, FILM COATED ORAL
Qty: 90 TABLET | Refills: 3 | Status: SHIPPED | OUTPATIENT
Start: 2021-03-29 | End: 2022-01-25 | Stop reason: SDUPTHER

## 2021-03-29 NOTE — TELEPHONE ENCOUNTER
Lee Ann Dupree called requesting a refill on the following medications:  Requested Prescriptions     Pending Prescriptions Disp Refills    carvedilol (COREG) 3.125 MG tablet 180 tablet 5    furosemide (LASIX) 40 MG tablet 90 tablet 3     Sig: Take 0.5 tablets by mouth daily    aspirin ([facility] ASPIRIN) 325 MG tablet 30 tablet 17     Sig: TAKE 1 TABLET BY MOUTH EVERY DAY    atorvastatin (LIPITOR) 20 MG tablet 90 tablet 3     Pharmacy verified: yes      Date of last visit:   Date of next visit (if applicable): 6/9/9435

## 2021-03-29 NOTE — TELEPHONE ENCOUNTER
Please approve or refuse Rx request:  Requested Prescriptions     Pending Prescriptions Disp Refills    atorvastatin (LIPITOR) 20 MG tablet 90 tablet 3     Refused Prescriptions Disp Refills    carvedilol (COREG) 3.125 MG tablet 180 tablet 5    furosemide (LASIX) 40 MG tablet 90 tablet 3     Sig: Take 0.5 tablets by mouth daily    aspirin ([facility] ASPIRIN) 325 MG tablet 30 tablet 17     Sig: TAKE 1 TABLET BY MOUTH EVERY DAY       Next appointment:  4/8/2021

## 2021-04-16 DIAGNOSIS — I42.8 NON-ISCHEMIC CARDIOMYOPATHY (HCC): ICD-10-CM

## 2021-04-16 DIAGNOSIS — I50.22 CHRONIC SYSTOLIC HEART FAILURE (HCC): ICD-10-CM

## 2021-04-16 DIAGNOSIS — I48.0 PAROXYSMAL ATRIAL FIBRILLATION (HCC): ICD-10-CM

## 2021-04-16 RX ORDER — POTASSIUM CHLORIDE 750 MG/1
10 TABLET, EXTENDED RELEASE ORAL DAILY
Qty: 90 TABLET | Refills: 3 | Status: SHIPPED | OUTPATIENT
Start: 2021-04-16 | End: 2021-09-22

## 2021-04-16 NOTE — TELEPHONE ENCOUNTER
Patient requesting a medication refill.   Medication: potassium chloride (KLOR-CON M) 10 MEQ extended release tablet  Pharmacy: [address] [place] [address], [address] [place] [address]   Last office visit: 9/10/2020  Next office visit: 4/20/2021

## 2021-06-09 ENCOUNTER — PROCEDURE VISIT (OUTPATIENT)
Dept: CARDIOLOGY CLINIC | Age: 56
End: 2021-06-09

## 2021-06-09 DIAGNOSIS — Z95.0 PACEMAKER: Primary | ICD-10-CM

## 2021-06-09 PROCEDURE — 93294 REM INTERROG EVL PM/LDLS PM: CPT | Performed by: INTERNAL MEDICINE

## 2021-06-09 PROCEDURE — 93296 REM INTERROG EVL PM/IDS: CPT | Performed by: INTERNAL MEDICINE

## 2021-06-09 NOTE — PROGRESS NOTES
[facility] sci dual pacer     . Leighann [facility] Battery longevity:  10 years on device   Presenting rhythm  AS VS     Atrial impedance 647  RV impedance 653    P wave sensing 3.4  R wave sensing 17.8    4 % atrial paced  0 % RV paced     Mode switches   2

## 2021-06-29 ENCOUNTER — TELEPHONE (OUTPATIENT)
Dept: FAMILY MEDICINE CLINIC | Age: 56
End: 2021-06-29

## 2021-06-29 NOTE — TELEPHONE ENCOUNTER
----- Message from Chase Monday sent at 6/29/2021 10:15 AM EDT -----  Subject: Message to Provider    QUESTIONS  Information for Provider? patient wants to know if she can stop taking the   klorcon and atorvastatin.   ---------------------------------------------------------------------------  --------------  CALL BACK INFO  What is the best way for the office to contact you? OK to leave message on   voicemail  Preferred Call Back Phone Number? [phone]  ---------------------------------------------------------------------------  --------------  SCRIPT ANSWERS  Relationship to Patient?  Self

## 2021-07-15 ENCOUNTER — TELEPHONE (OUTPATIENT)
Dept: FAMILY MEDICINE CLINIC | Age: 56
End: 2021-07-15

## 2021-07-15 DIAGNOSIS — I48.0 PAROXYSMAL ATRIAL FIBRILLATION (HCC): ICD-10-CM

## 2021-07-15 DIAGNOSIS — I50.22 CHRONIC SYSTOLIC HEART FAILURE (HCC): ICD-10-CM

## 2021-07-15 DIAGNOSIS — I42.8 NON-ISCHEMIC CARDIOMYOPATHY (HCC): ICD-10-CM

## 2021-07-15 NOTE — TELEPHONE ENCOUNTER
----- Message from Mushtaq Warren sent at 7/15/2021  9:03 AM EDT -----  Subject: Medication Problem    QUESTIONS  Name of Medication? potassium chloride (KLOR-CON M) 10 MEQ extended   release tablet  Patient-reported dosage and instructions? once daily  What question or problem do you have with the medication? Patient is   running out of potassium and does have a prescription waiting for her at   the pharmacy but will not be able to afford it until next week, she is   wondering if it will be harmful for her to wait the 8 days without taking   it. Please advise. Preferred Pharmacy? [id number] [place], [address] [facility]  Pharmacy phone number (if available)? [phone]  Additional Information for Provider?   ---------------------------------------------------------------------------  --------------  CALL BACK INFO  What is the best way for the office to contact you? OK to leave message on   voicemail  Preferred Call Back Phone Number? [phone]  ---------------------------------------------------------------------------  --------------  SCRIPT ANSWERS  Relationship to Patient?  Self

## 2021-07-15 NOTE — TELEPHONE ENCOUNTER
Should be ok to wait the 8 days  [employer] foods with K+ in it, including fruits and bananas  Let me know if questions, thanks!

## 2021-07-22 ENCOUNTER — TELEPHONE (OUTPATIENT)
Dept: FAMILY MEDICINE CLINIC | Age: 56
End: 2021-07-22

## 2021-08-25 ENCOUNTER — PROCEDURE VISIT (OUTPATIENT)
Dept: CARDIOLOGY CLINIC | Age: 56
End: 2021-08-25

## 2021-08-25 DIAGNOSIS — Z95.0 PACEMAKER: Primary | ICD-10-CM

## 2021-08-25 PROCEDURE — 93294 REM INTERROG EVL PM/LDLS PM: CPT | Performed by: NUCLEAR MEDICINE

## 2021-08-25 PROCEDURE — 93296 REM INTERROG EVL PM/IDS: CPT | Performed by: NUCLEAR MEDICINE

## 2021-08-25 NOTE — PROGRESS NOTES
DR Joce Cannon PT / KNOWN AFIB/  MG ONLY  PRESENTS IN AFIB WITH RVR / SHE HAS HAD 10 EPISODES OF AFIB WITH RVR SINCE MAY 12,2021 AND NO ANTI COAGS   AFIB BURDEN IS GOING UP   NOW UP TO 3% AND NO ANTI COAGS /SINCE 8/19/20 TO 8/25/21        [employer] SCI DUAL PACEMAKER REMOTE  PT WAS SUPPOSED TO COME IN TO HAVE HER DEVICE CHECKED BUT SHE DOES NOT HAVE ANY HEALTH INSURANCE AND SHE SAID SHE WILL COME IN IN 3 MONTHS   P WAVES 3.3  RV WAVES 12.4  ATRIAL THRESHOLD 612  VENT THRESHOLD 605  NO THRESHOLDS OBTAINED PER THE DEVICE     DDDR   A PACED 3%  V PACED 0%      PAC AND PVC COUNTERS SINCE 8/19/20 TO 8/25/21   PACS [id number]  PVCS [id number]     DOWNLOAD UNDER THE MEDIA TAB      YOU HAVE NOT SEEN THIS PT SINCE 9/18/2019

## 2021-08-26 ENCOUNTER — TELEPHONE (OUTPATIENT)
Dept: CARDIOLOGY CLINIC | Age: 56
End: 2021-08-26

## 2021-08-26 DIAGNOSIS — I50.22 CHRONIC SYSTOLIC HEART FAILURE (HCC): ICD-10-CM

## 2021-08-26 DIAGNOSIS — I48.0 PAROXYSMAL ATRIAL FIBRILLATION (HCC): ICD-10-CM

## 2021-08-26 DIAGNOSIS — I42.8 NON-ISCHEMIC CARDIOMYOPATHY (HCC): ICD-10-CM

## 2021-08-26 RX ORDER — CARVEDILOL 6.25 MG/1
6.25 TABLET ORAL 2 TIMES DAILY WITH MEALS
COMMUNITY
End: 2021-08-26 | Stop reason: SDUPTHER

## 2021-08-26 RX ORDER — CARVEDILOL 6.25 MG/1
6.25 TABLET ORAL 2 TIMES DAILY WITH MEALS
Qty: 180 TABLET | Refills: 3 | Status: SHIPPED | OUTPATIENT
Start: 2021-08-26 | End: 2021-09-22 | Stop reason: SDUPTHER

## 2021-08-26 NOTE — TELEPHONE ENCOUNTER
Notified to increase coreg to 6.25 bid, will finish the bottle she has, aware new Rx will be 6.25 tabs to go back to 1 tab bid   Verbalized understanding

## 2021-08-26 NOTE — TELEPHONE ENCOUNTER
Maryanne Márquez MD at 8/25/2021 11:24 AM    Status: Signed      Noted.  Increase coreg to 6.25 bid  She has ROMAN clip after MAZE       Cassidy Lopez LPN at 2/33/0652 14:06 AM    Status: Signed      DR Zhang Sensor PT / KNOWN AFIB/  MG ONLY  PRESENTS IN AFIB WITH RVR / SHE HAS HAD 10 EPISODES OF AFIB WITH RVR SINCE MAY 12,2021 AND NO ANTI COAGS   AFIB BURDEN IS GOING UP   NOW UP TO 3% AND NO ANTI COAGS /SINCE 8/19/20 TO 8/25/21

## 2021-09-22 ENCOUNTER — TELEPHONE (OUTPATIENT)
Dept: FAMILY MEDICINE CLINIC | Age: 56
End: 2021-09-22

## 2021-09-22 ENCOUNTER — HOSPITAL ENCOUNTER (OUTPATIENT)
Age: 56
Discharge: HOME | End: 2021-09-22

## 2021-09-22 DIAGNOSIS — I42.8 NON-ISCHEMIC CARDIOMYOPATHY (HCC): ICD-10-CM

## 2021-09-22 DIAGNOSIS — I48.0 PAROXYSMAL ATRIAL FIBRILLATION (HCC): ICD-10-CM

## 2021-09-22 DIAGNOSIS — N18.30 STAGE 3 CHRONIC KIDNEY DISEASE, UNSPECIFIED WHETHER STAGE 3A OR 3B CKD (HCC): ICD-10-CM

## 2021-09-22 DIAGNOSIS — I50.20 HEART FAILURE WITH REDUCED EJECTION FRACTION (HCC): Primary | Chronic | ICD-10-CM

## 2021-09-22 DIAGNOSIS — I50.22 CHRONIC SYSTOLIC HEART FAILURE (HCC): ICD-10-CM

## 2021-09-22 DIAGNOSIS — I50.20 HEART FAILURE WITH REDUCED EJECTION FRACTION (HCC): Chronic | ICD-10-CM

## 2021-09-22 LAB
ANION GAP SERPL CALCULATED.3IONS-SCNC: 11 MEQ/L (ref 8–16)
BUN BLDV-MCNC: 28 MG/DL (ref 7–22)
CALCIUM SERPL-MCNC: 9 MG/DL (ref 8.5–10.5)
CHLORIDE BLD-SCNC: 104 MEQ/L (ref 98–111)
CO2: 23 MEQ/L (ref 23–33)
CREAT SERPL-MCNC: 1.2 MG/DL (ref 0.4–1.2)
GFR SERPL CREATININE-BSD FRML MDRD: 47 ML/MIN/1.73M2
GLUCOSE BLD-MCNC: 101 MG/DL (ref 70–108)
POTASSIUM SERPL-SCNC: 5 MEQ/L (ref 3.5–5.2)
SODIUM BLD-SCNC: 138 MEQ/L (ref 135–145)

## 2021-09-22 PROCEDURE — 80048 BASIC METABOLIC PNL TOTAL CA: CPT

## 2021-09-22 PROCEDURE — 36415 COLL VENOUS BLD VENIPUNCTURE: CPT

## 2021-09-22 RX ORDER — CARVEDILOL 6.25 MG/1
6.25 TABLET ORAL 2 TIMES DAILY WITH MEALS
Qty: 180 TABLET | Refills: 3 | Status: SHIPPED | OUTPATIENT
Start: 2021-09-22 | End: 2021-09-29 | Stop reason: ALTCHOICE

## 2021-09-22 RX ORDER — POTASSIUM CHLORIDE 750 MG/1
10 TABLET, EXTENDED RELEASE ORAL DAILY
Qty: 90 TABLET | Refills: 3 | Status: CANCELLED | OUTPATIENT
Start: 2021-09-22

## 2021-09-22 RX ORDER — POTASSIUM CHLORIDE 750 MG/1
TABLET, EXTENDED RELEASE ORAL
Qty: 90 TABLET | Refills: 3 | Status: SHIPPED | OUTPATIENT
Start: 2021-09-22 | End: 2022-03-10 | Stop reason: SDUPTHER

## 2021-09-22 RX ORDER — CARVEDILOL 6.25 MG/1
6.25 TABLET ORAL 2 TIMES DAILY WITH MEALS
Qty: 180 TABLET | Refills: 3 | Status: CANCELLED | OUTPATIENT
Start: 2021-09-22

## 2021-09-22 RX ORDER — POTASSIUM CHLORIDE 750 MG/1
10 TABLET, EXTENDED RELEASE ORAL DAILY
Qty: 90 TABLET | Refills: 3 | Status: SHIPPED | OUTPATIENT
Start: 2021-09-22 | End: 2022-03-10 | Stop reason: SDUPTHER

## 2021-09-22 NOTE — TELEPHONE ENCOUNTER
Received a fax for new rx request from [employer] for atorvastatin 20 mg po qd   New rx sent to [facility] for atorvastatin on 03.29.2021 D-90 R-3 patient should not need a new rx for this medication until around the end of December 2021   I spoke with Martina Leslie (pharmacist) from [employer] who states that he is not sure why the request has been faxed but noticed that the patient ha transferred her prescription to [facility].     Called the patient [facility] for patient to return a call back to verify the above information regarding pharmacy change

## 2021-09-22 NOTE — TELEPHONE ENCOUNTER
Patient called stating she mixed up her potassium with coreg. She was  taking potassium 2 tablets daily instead of taking coreg 3.125 mg 2 tablets twice a day. Is any blood work needing ordered? Patient will starting taking coreg 6.25 mg twice a day. Potassium is back to 10 meq daily. HR has been 130's.

## 2021-09-22 NOTE — TELEPHONE ENCOUNTER
Pt called in for a refill on her Potassium. It was sent in on 4/16/21 for 90 days with 3 refills. Pt stated that she has been taking 2 potassium a day for about 3 weeks because she thought somebody had called and told her to take it that way. Should be taking 1 tablet daily. Wanted to know if this is damaging her heart.  She is calling her cardiologist.

## 2021-09-23 ENCOUNTER — TELEPHONE (OUTPATIENT)
Dept: FAMILY MEDICINE CLINIC | Age: 56
End: 2021-09-23

## 2021-09-23 NOTE — TELEPHONE ENCOUNTER
----- Message from Gunjan Gutierrez DO sent at 9/22/2021  7:11 PM EDT -----  Please let pt know that BMP WNL  Let me know if questions, thanks!

## 2021-09-23 NOTE — TELEPHONE ENCOUNTER
Called pt and she states that she changed her pharmacy to [address] in [address] [facility]. I changed the pharmacy in her chart. She state she is currently good on prescriptions. She voiced understanding.

## 2021-09-29 ENCOUNTER — TELEPHONE (OUTPATIENT)
Dept: CARDIOLOGY CLINIC | Age: 56
End: 2021-09-29

## 2021-09-29 RX ORDER — METOPROLOL TARTRATE 50 MG/1
50 TABLET, FILM COATED ORAL 2 TIMES DAILY
COMMUNITY
End: 2021-09-29 | Stop reason: SDUPTHER

## 2021-09-29 RX ORDER — METOPROLOL TARTRATE 50 MG/1
50 TABLET, FILM COATED ORAL 2 TIMES DAILY
Qty: 180 TABLET | Refills: 0 | Status: SHIPPED | OUTPATIENT
Start: 2021-09-29 | End: 2021-11-08 | Stop reason: SDUPTHER

## 2021-09-29 NOTE — TELEPHONE ENCOUNTER
Patient called requesting that the script be e-scribed d/t the phones being down at [facility] in [facility]. She said it can go to either [facility] or [facility] in [address] would be fine.

## 2021-09-29 NOTE — TELEPHONE ENCOUNTER
Pt called the office and told me she feels her heart racing. She denies any sob or lt headed or dizziness    She just sent a download and she has been in an episode of afib with rvr since 9/28/21    You recently increased this pts coreg to 6.25 bid on 9/22/21. She has been on that for over a week and she says its not helping    I told pt I would check with ladonna to see what he wants pt to do.  Told her if she becomes symptomatic, sob, lt headed or dizzy before I call her back she is to go to ER immediately  Pt verbalizes understanding

## 2021-10-01 NOTE — TELEPHONE ENCOUNTER
RECEIVED ANOTHER DOWNLOAD ON THIS PT TODAY SHOWING PT IS IN AFIB WITH RVR  SHOWN TO DR Alex Polanco. HE WOULD LIKE PT TO INCREASE HER METOPROLOL FROM 50 MG BID TO 75 MG BID AND HE WILL REVIEW STRIPS TO SEE IF PT IS A CANDIDATE FOR A CARDIOVERSION.      LM FOR PT TO CALL THIS OFFICE

## 2021-10-01 NOTE — TELEPHONE ENCOUNTER
PT CALLED THE OFFICE AND I TOLD HER TO INCREASE HER METOPROLOL TO 75 MG BID. I DID MENTION TO HER POSSIBLE CARDIOVERSION BUT PT SAID SHE DOES NOT WANT TO HAVE THAT DONE EVER AGAIN. SAID SHE HAD IT BEFORE.   PT SAID THERE HAS TO BE ANOTHER WAY

## 2021-10-08 ENCOUNTER — TELEPHONE (OUTPATIENT)
Dept: CARDIOLOGY CLINIC | Age: 56
End: 2021-10-08

## 2021-10-08 NOTE — TELEPHONE ENCOUNTER
Pt called the office today. She asked me if I could tell on the download she sent today if her heart rate is better since DR [facility] increased her metoprolol to 75 mg bid? She presents in afib and is still in RVR on this download  SHE HAS AN APPT WITH YOU NEXT WEEK FRIDAY / DO YOU WANT TO CHANGE ANY MEDS FOR NOW OR JUST WAIT UNTIL HER APPT NEXT WEEK. SHE DENIES ANY SOB, PALPITATIONS    DOWNLOAD UNDER MEDIA TAB.  THANKS

## 2021-10-15 ENCOUNTER — OFFICE VISIT (OUTPATIENT)
Dept: CARDIOLOGY CLINIC | Age: 56
End: 2021-10-15

## 2021-10-15 VITALS
BODY MASS INDEX: 27.9 KG/M2 | DIASTOLIC BLOOD PRESSURE: 86 MMHG | WEIGHT: 173.6 LBS | HEIGHT: 66 IN | HEART RATE: 72 BPM | SYSTOLIC BLOOD PRESSURE: 130 MMHG

## 2021-10-15 DIAGNOSIS — I48.0 PAROXYSMAL ATRIAL FIBRILLATION (HCC): Primary | ICD-10-CM

## 2021-10-15 DIAGNOSIS — I10 PRIMARY HYPERTENSION: ICD-10-CM

## 2021-10-15 DIAGNOSIS — I50.22 CHRONIC SYSTOLIC HEART FAILURE (HCC): ICD-10-CM

## 2021-10-15 PROCEDURE — 93000 ELECTROCARDIOGRAM COMPLETE: CPT | Performed by: NUCLEAR MEDICINE

## 2021-10-15 PROCEDURE — 99214 OFFICE O/P EST MOD 30 MIN: CPT | Performed by: NUCLEAR MEDICINE

## 2021-10-15 RX ORDER — CARVEDILOL 3.12 MG/1
TABLET ORAL
COMMUNITY
Start: 2021-09-22 | End: 2021-10-15

## 2021-10-15 NOTE — PROGRESS NOTES
[address] [facility] [place] [address]  [place] [address]  Dept: [phone]  Dept Fax: [phone]  Loc: [phone]    Visit Date: 10/15/2021    Raffi Bhagat is a 49 y.o. female who presents todayfor:  Chief Complaint   Patient presents with    Follow-up    Atrial Fibrillation    Hypertension   had a MAZE and ROMAN clip  Now with some more palpitation   A fib burden 3%  Some palpitation   Pacer is followed  No chest pain   No changes in breathing  Bp is stable   No dizziness  No syncope  Some smoking still   On statins for hyperlipidemia        HPI:  HPI  Past Medical History:   Diagnosis Date   [facility]/23/2018    CKD (chronic kidney disease) stage 3, GFR 30-59 ml/min ([facility])     Dyslipidemia     Heart failure with reduced ejection fraction ([facility]     History of atrial septal defect repair 2/9/2018    Left bundle-branch block 2/9/2018    Nicotine dependence     [facility] 05/07/2018    EF of 25%    PAF (paroxysmal atrial fibrillation) [facility] 5/7/2018    S/P left atrial appendage ligation     S/P Maze operation for atrial fibrillation     S/P mitral valve repair 11/05/2018    Vitamin D deficiency       Past Surgical History:   Procedure Laterality Date    CARDIAC CATHETERIZATION  09/20/2018    Dr. Jez Alvarado. No CAD.  CARDIAC SURGERY  1970    repair hole in heart pt states was 3 yrs old.     COLONOSCOPY      MITRAL VALVE SURGERY  10/15/2018    PACEMAKER INSERTION  05/01/2017    [place] OFFICE/OUTPT VISIT,PROCEDURE ONLY N/A 10/15/2018    REDO STERNOTOMY, BI-ATRIAL MAZE PROCEDURE, MITRAL REPAIR, PLACEMENT OF INTRA AORTIC BALLOON PUMP performed by Jose Jackson MD at [facility]     TUBAL LIGATION       Family History   Problem Relation Age of Onset    Emphysema Father     High Cholesterol Father      Social History     Tobacco Use    Smoking status: Current Some Day Smoker     Packs/day: 0.10     Years: 35.00     Pack years: 3.50     Types: Cigarettes     Start date: 5/7/1994    Smokeless tobacco: Never Used   Substance Use Topics    Alcohol use: No      Current Outpatient Medications   Medication Sig Dispense Refill    metoprolol tartrate (LOPRESSOR) 50 MG tablet Take 1 tablet by mouth 2 times daily (Patient taking differently: Take 75 mg by mouth 2 times daily Increased per dr Dharmesh Stewart d/t afib with rvr) 180 tablet 0    KLOR-CON M10 10 MEQ extended release tablet TAKE 1 TABLET BY MOUTH EVERY DAY 90 tablet 3    potassium chloride (KLOR-CON M) 10 MEQ extended release tablet Take 1 tablet by mouth daily 90 tablet 3    furosemide (LASIX) 40 MG tablet Take 0.5 tablets by mouth daily 90 tablet 3    aspirin ([facility] ASPIRIN) 325 MG tablet TAKE 1 TABLET BY MOUTH EVERY DAY 90 tablet 3    atorvastatin (LIPITOR) 20 MG tablet TAKE 1 TABLET BY MOUTH EVERY DAY 90 tablet 3    Handicap Placard MISC by Does not apply route Expires 18 DEC 2023 1 each 0    Blood Pressure KIT 1 Units by Does not apply route 2 times daily 1 kit 0    Blood Pressure Monitoring ([facility]S BLOOD PRESSURE MONITOR) KIT 1 kit by Does not apply route daily 1 kit 0    Misc. Devices ([facility] PULSE OXIMETER) MISC 1 each by Does not apply route daily 1 each 0     No current facility-administered medications for this visit.      Allergies   Allergen Reactions    Doxycycline Hives     Health Maintenance   Topic Date Due    Hepatitis C screen  Never done    COVID-19 Vaccine (1) Never done    HIV screen  Never done    Shingles Vaccine (1 of 2) Never done    Flu vaccine (1) 09/01/2021    A1C test (Diabetic or Prediabetic)  03/10/2022 (Originally 10/10/2019)    Breast cancer screen  02/20/2022    Lipid screen  03/05/2022    Potassium monitoring  09/22/2022    Creatinine monitoring  09/22/2022    Colon cancer screen colonoscopy  12/13/2022    Cervical cancer screen  02/20/2023    DTaP/Tdap/Td vaccine (2 - Td or Tdap) 12/18/2028    Pneumococcal 0-64 years Vaccine (2 of 2 - PPSV23) 04/27/2032    Hepatitis A vaccine  Aged Out    Hepatitis B vaccine  Aged Out    Hib vaccine  Aged Out    Meningococcal (ACWY) vaccine  Aged Out       Subjective:  Review of Systems  General:   No fever, no chills, some fatigue or weight loss  Pulmonary:    some dyspnea, no wheezing  Cardiac:    Denies recent chest pain,   GI:     No nausea or vomiting, no abdominal pain  Neuro:    No dizziness or light headedness,   Musculoskeletal:  No recent active issues  Extremities:   No edema, no obvious claudication       Objective:  Physical Exam  /86   Pulse 72   Ht 5' 6\" (1.676 m)   Wt 173 lb 9.6 oz (78.7 kg)   BMI 28.02 kg/m²   General:   Well developed, well nourished  Lungs:   Clear to auscultation  Heart:    Normal S1 S2, Slight murmur. no rubs, no gallops  Abdomen:   Soft, non tender, no organomegalies, positive bowel sounds  Extremities:   No edema, no cyanosis, good peripheral pulses  Neurological:   Awake, alert, oriented. No obvious focal deficits  Musculoskelatal:  No obvious deformities    Assessment:      Diagnosis Orders   1. Paroxysmal atrial fibrillation (HCC)  EKG 12 lead   2. Chronic systolic heart failure (HCC)  EKG 12 lead   3. Primary hypertension     recurrent A fib   Known MAZE and ROMAN clip   Following the pacer  Smoking: discussed with the patient the importance of smoke cessation especially with the risk of CAD. ECG in office was done today. I reviewed the ECG. No acute findings      Plan:  No follow-ups on file. Discussed  Need to do beta blockers  Consider adding rhythm control meds  Continue risk factor modification and medical management  ,Thank you for allowing me to participate in the care of your patient.  Please don't hesitate to contact me regarding any further issues related to the patient care    Orders Placed:  Orders Placed This Encounter   Procedures    EKG 12 lead     Order Specific Question:   Reason for Exam?     Answer: Other       Medications Prescribed:  No orders of the defined types were placed in this encounter. Discussed use, benefit, and side effects of prescribed medications. All patient questions answered. Pt voicedunderstanding. Instructed to continue current medications, diet and exercise. Continue risk factor modification and medical management. Patient agreed with treatment plan. Follow up as directed.     Electronically signedby Nirali Alvarado MD on 10/15/2021 at 12:40 PM

## 2021-11-08 RX ORDER — METOPROLOL TARTRATE 50 MG/1
75 TABLET, FILM COATED ORAL 2 TIMES DAILY
Qty: 270 TABLET | Refills: 0 | Status: SHIPPED | OUTPATIENT
Start: 2021-11-08 | End: 2021-12-30

## 2021-11-08 NOTE — TELEPHONE ENCOUNTER
Mark Channel called requesting a refill on the following medications:  Requested Prescriptions     Pending Prescriptions Disp Refills    metoprolol tartrate (LOPRESSOR) 50 MG tablet 180 tablet 0     Sig: Take 1 tablet by mouth 2 times daily     [facility]   .       Date of last visit:  10/15/2021  Date of next visit (if applicable): 9/42/8771    Pt is requesting a 75mg tablet

## 2021-12-20 ENCOUNTER — NURSE ONLY (OUTPATIENT)
Dept: CARDIOLOGY CLINIC | Age: 56
End: 2021-12-20

## 2021-12-20 DIAGNOSIS — Z95.0 PACEMAKER: Primary | ICD-10-CM

## 2021-12-20 PROCEDURE — 93280 PM DEVICE PROGR EVAL DUAL: CPT | Performed by: INTERNAL MEDICINE

## 2021-12-20 NOTE — PROGRESS NOTES
In [employer] Dual Pacemaker --has Latitude at home  Patient of Fatoumata -- OOT    Battery 8.5 years    Presenting rhythm AFLutter VS     A Impedance 643  RV Impedance 630    P wave sensing 4.1  R wave sensing 16.7    A Threshold - @ -  In aflutter  RV Thresholds 0.9 @ 0.40      A Paced 4%  V Paced <1%    Programmed Mode DDDR       Afib Notus 18% (8/2020 to today)  // has history - Dr Magalie Huertas monitoring burden - no OAC - only ASA daily    Episodes   afib

## 2021-12-30 RX ORDER — METOPROLOL TARTRATE 50 MG/1
75 TABLET, FILM COATED ORAL 2 TIMES DAILY
Qty: 90 TABLET | Refills: 1 | Status: SHIPPED | OUTPATIENT
Start: 2021-12-30 | End: 2022-01-25

## 2022-01-25 DIAGNOSIS — I48.0 PAROXYSMAL ATRIAL FIBRILLATION (HCC): ICD-10-CM

## 2022-01-25 DIAGNOSIS — Z98.890 S/P MAZE OPERATION FOR ATRIAL FIBRILLATION: ICD-10-CM

## 2022-01-25 DIAGNOSIS — I42.8 NON-ISCHEMIC CARDIOMYOPATHY (HCC): ICD-10-CM

## 2022-01-25 DIAGNOSIS — E78.5 DYSLIPIDEMIA: ICD-10-CM

## 2022-01-25 DIAGNOSIS — Z98.890 S/P LEFT ATRIAL APPENDAGE LIGATION: ICD-10-CM

## 2022-01-25 DIAGNOSIS — Z86.79 S/P MAZE OPERATION FOR ATRIAL FIBRILLATION: ICD-10-CM

## 2022-01-25 DIAGNOSIS — Z86.79 HISTORY OF ATRIAL FIBRILLATION: ICD-10-CM

## 2022-01-25 DIAGNOSIS — I50.22 CHRONIC SYSTOLIC HEART FAILURE (HCC): ICD-10-CM

## 2022-01-25 RX ORDER — METOPROLOL TARTRATE 50 MG/1
75 TABLET, FILM COATED ORAL 2 TIMES DAILY
Qty: 90 TABLET | Refills: 0 | Status: SHIPPED | OUTPATIENT
Start: 2022-01-25 | End: 2022-03-01

## 2022-01-25 RX ORDER — ATORVASTATIN CALCIUM 20 MG/1
TABLET, FILM COATED ORAL
Qty: 90 TABLET | Refills: 3 | Status: SHIPPED | OUTPATIENT
Start: 2022-01-25 | End: 2022-03-10 | Stop reason: SDUPTHER

## 2022-03-01 ENCOUNTER — TELEPHONE (OUTPATIENT)
Dept: CARDIOLOGY CLINIC | Age: 57
End: 2022-03-01

## 2022-03-01 RX ORDER — METOPROLOL TARTRATE 50 MG/1
TABLET, FILM COATED ORAL
Qty: 90 TABLET | Refills: 0 | Status: SHIPPED | OUTPATIENT
Start: 2022-03-01 | End: 2022-03-10 | Stop reason: SDUPTHER

## 2022-03-01 RX ORDER — FLECAINIDE ACETATE 100 MG/1
100 TABLET ORAL 2 TIMES DAILY
Qty: 180 TABLET | Refills: 1 | Status: SHIPPED | OUTPATIENT
Start: 2022-03-01 | End: 2022-05-09 | Stop reason: SDUPTHER

## 2022-03-01 NOTE — TELEPHONE ENCOUNTER
notified to start flecainide 100mg bid  For fast HR.    [facility] [place]  Also has Rx for metoprolol tartrate to

## 2022-03-01 NOTE — TELEPHONE ENCOUNTER
Started flecainide for AF 3/1/22, needs to send download 3/15/22 and send to [facility] 192 control and AF %

## 2022-03-01 NOTE — TELEPHONE ENCOUNTER
Unscheduled [facility] download  We have been watching her afib burden / she continues to have afib with rvr/ average vent rate 100 to 113   afib burden since 12/20/21 64%

## 2022-03-09 ENCOUNTER — TELEPHONE (OUTPATIENT)
Dept: CARDIOLOGY CLINIC | Age: 57
End: 2022-03-09

## 2022-03-09 NOTE — TELEPHONE ENCOUNTER
Pt called the office and said you ordered flecainide for her for her afib with rvr. Pt said he cannot afford the flecainide. She said she does not have any insurance right now. She told me she tracks her blood pressures and they have been running 115/80's . She is asking if Dr Mindy Odonnell will allow her to take metoprolol 100 mg bid instead of taking flecainide.  I reviewed her dose of metoprolol with her and she is currently taking 75 mg bid

## 2022-03-10 ENCOUNTER — TELEPHONE (OUTPATIENT)
Dept: CARDIOLOGY CLINIC | Age: 57
End: 2022-03-10

## 2022-03-10 ENCOUNTER — CARE COORDINATION (OUTPATIENT)
Dept: CARE COORDINATION | Age: 57
End: 2022-03-10

## 2022-03-10 DIAGNOSIS — I42.8 NON-ISCHEMIC CARDIOMYOPATHY (HCC): ICD-10-CM

## 2022-03-10 DIAGNOSIS — Z98.890 S/P MAZE OPERATION FOR ATRIAL FIBRILLATION: ICD-10-CM

## 2022-03-10 DIAGNOSIS — I48.0 PAROXYSMAL ATRIAL FIBRILLATION (HCC): ICD-10-CM

## 2022-03-10 DIAGNOSIS — E78.5 DYSLIPIDEMIA: ICD-10-CM

## 2022-03-10 DIAGNOSIS — Z98.890 S/P LEFT ATRIAL APPENDAGE LIGATION: ICD-10-CM

## 2022-03-10 DIAGNOSIS — I50.22 CHRONIC SYSTOLIC HEART FAILURE (HCC): ICD-10-CM

## 2022-03-10 DIAGNOSIS — Z86.79 S/P MAZE OPERATION FOR ATRIAL FIBRILLATION: ICD-10-CM

## 2022-03-10 DIAGNOSIS — Z86.79 HISTORY OF ATRIAL FIBRILLATION: ICD-10-CM

## 2022-03-10 RX ORDER — ASPIRIN 325 MG
TABLET ORAL
Qty: 90 TABLET | Refills: 3 | Status: SHIPPED | OUTPATIENT
Start: 2022-03-10

## 2022-03-10 RX ORDER — ATORVASTATIN CALCIUM 20 MG/1
TABLET, FILM COATED ORAL
Qty: 90 TABLET | Refills: 3 | Status: SHIPPED | OUTPATIENT
Start: 2022-03-10

## 2022-03-10 RX ORDER — POTASSIUM CHLORIDE 750 MG/1
10 TABLET, EXTENDED RELEASE ORAL DAILY
Qty: 90 TABLET | Refills: 3 | Status: SHIPPED | OUTPATIENT
Start: 2022-03-10

## 2022-03-10 RX ORDER — FUROSEMIDE 40 MG/1
20 TABLET ORAL DAILY
Qty: 90 TABLET | Refills: 3 | Status: SHIPPED | OUTPATIENT
Start: 2022-03-10

## 2022-03-10 RX ORDER — METOPROLOL TARTRATE 50 MG/1
TABLET, FILM COATED ORAL
Qty: 270 TABLET | Refills: 3 | Status: SHIPPED | OUTPATIENT
Start: 2022-03-10 | End: 2022-04-11

## 2022-03-10 NOTE — TELEPHONE ENCOUNTER
Sushma Points,   Pt is having issues affording her flecainide. I do have a note out to Casimiro Del Valle possibly changing med to something cheaper for her (Rhythmol? Amiodarone? )    Any chance you can help her out?   States no insurance  Thanks, [facility] office

## 2022-03-10 NOTE — TELEPHONE ENCOUNTER
Zenaida Marroquin says she might be able to afford the flecainide (3 months) next Friday when she gets paid     [facility] she gets her Rx's filled just an FYI:     Rhythmol 150mg TID is roughly $97.35 (? Would she be able to take bid?~would be more affordable, still)  Rhythmol 225mg TID $146.68/3 months    And just because I was asking:   Amiodarone 200mg/daily is 70.39/3 months (no loading dose figured into this number)     Your call       I will also send a request to Hortencia Villegas (pt assistance) in separate encounter to see if she can help her     Merlinda Poot also aware no need to change her metoprolol dose

## 2022-03-10 NOTE — CARE COORDINATION
I spoke with Shahana Staples and talked with her about the [facility] program in our outpatient pharmacy in [facility]. She does qualify for the program and I will be calling the pharmacy to get her enrolled. I will also be reaching out to her providers for new scripts to be sent into the pharmacy.         [facility]   Medication Assistance  [facility], and [employer]    (A) [phone] (L) [phone]

## 2022-03-10 NOTE — TELEPHONE ENCOUNTER
Camryn Douglas    , in the end, she qualifies for pt assistance, will be getting the below meds free. She will be able to stay on the flecainide now but is not able to start it until next week Friday when she gets paid (flecainide not available on the program). 3/10/2022  1:29 PM Robina Alba DO Patient Calls       P [facility] Clinical Support     Comment: Patient was just enrolled into the [facility] program, please fax new scripts to the outpatient pharmacy in [facility] for the following medications to be filled at no charge. Atrovastatin, Potassium, Furosemide, and Aspirin.      Thank you!!

## 2022-03-10 NOTE — TELEPHONE ENCOUNTER
Anju Rodriguez okay with any of the above as long as she understands the amiodarone has a lot of long term side effects and is not preferred for a young patient on the long run

## 2022-03-10 NOTE — TELEPHONE ENCOUNTER
Okay. She doesn't need to change the dose of metoprolol  There is no alternative for flecainide and it not for her BP it is for her rhythm   Check out rhythmol price

## 2022-03-10 NOTE — TELEPHONE ENCOUNTER
Pt getting assistance with medication costs. New script needs sent to [address] [place] [address] pharmacy.

## 2022-03-10 NOTE — TELEPHONE ENCOUNTER
LMOM, is she ok with staying on flecainide since patient assistance was able to get other meds for her for free?

## 2022-03-11 NOTE — TELEPHONE ENCOUNTER
2 encounters for this, she qualifies for pt assistance, because she is getting other meds free she should be able to afford the flecainide.   States she will start in next Friday when she gets paid

## 2022-04-11 RX ORDER — ATENOLOL 25 MG/1
25 TABLET ORAL 2 TIMES DAILY
Qty: 60 TABLET | Refills: 0 | Status: SHIPPED | OUTPATIENT
Start: 2022-04-11 | End: 2022-05-09 | Stop reason: SDUPTHER

## 2022-04-11 NOTE — TELEPHONE ENCOUNTER
Pt called in, pt feels her Metoprolol is causing her to wake-up in the middle of the night with hallucinations. She is scared and has not been sleeping well. States they have been getting worse of late. She feels they started around the time she started the metoprolol. Per Chart, pt has been on metoprolol tartrate since 9/2021  Currently takes 75 mg BID.

## 2022-04-18 RX ORDER — METOPROLOL TARTRATE 50 MG/1
TABLET, FILM COATED ORAL
Qty: 90 TABLET | Refills: 0 | OUTPATIENT
Start: 2022-04-18

## 2022-05-03 RX ORDER — ATENOLOL 25 MG/1
TABLET ORAL
Qty: 60 TABLET | Refills: 0 | OUTPATIENT
Start: 2022-05-03

## 2022-05-09 RX ORDER — ATENOLOL 25 MG/1
25 TABLET ORAL 2 TIMES DAILY
Qty: 60 TABLET | Refills: 0 | OUTPATIENT
Start: 2022-05-09

## 2022-05-09 RX ORDER — FLECAINIDE ACETATE 100 MG/1
100 TABLET ORAL 2 TIMES DAILY
Qty: 60 TABLET | Refills: 0 | Status: SHIPPED | OUTPATIENT
Start: 2022-05-09 | End: 2022-06-06

## 2022-05-09 RX ORDER — ATENOLOL 25 MG/1
25 TABLET ORAL 2 TIMES DAILY
Qty: 60 TABLET | Refills: 0 | Status: SHIPPED | OUTPATIENT
Start: 2022-05-09 | End: 2022-06-06

## 2022-05-09 NOTE — TELEPHONE ENCOUNTER
Venkat Isidro called requesting a refill on the following medications:  Requested Prescriptions     Pending Prescriptions Disp Refills    atenolol (TENORMIN) 25 MG tablet 60 tablet 0     Sig: Take 1 tablet by mouth in the morning and at bedtime     Pharmacy verified:[facility]   . pv      Date of last visit:   Date of next visit (if applicable): Visit date not found

## 2022-05-16 ENCOUNTER — OFFICE VISIT (OUTPATIENT)
Dept: CARDIOLOGY CLINIC | Age: 57
End: 2022-05-16

## 2022-05-16 VITALS
WEIGHT: 174 LBS | DIASTOLIC BLOOD PRESSURE: 70 MMHG | SYSTOLIC BLOOD PRESSURE: 132 MMHG | BODY MASS INDEX: 28.99 KG/M2 | HEIGHT: 65 IN | HEART RATE: 62 BPM

## 2022-05-16 DIAGNOSIS — I10 PRIMARY HYPERTENSION: ICD-10-CM

## 2022-05-16 DIAGNOSIS — I48.20 ATRIAL FIBRILLATION, CHRONIC (HCC): Primary | ICD-10-CM

## 2022-05-16 PROCEDURE — 99213 OFFICE O/P EST LOW 20 MIN: CPT | Performed by: NUCLEAR MEDICINE

## 2022-05-16 NOTE — PROGRESS NOTES
[id number] [facility] [place] [address]  [facility] [place] [address]  Dept: [phone]  Dept Fax: [phone]  Loc: [phone]    Visit Date: 5/16/2022    Jamaal Dolan is a 56 y.o. female who presents todayfor:  Chief Complaint   Patient presents with    Check-Up    Atrial Fibrillation    Hypertension     Still smoking  Likely COPD  Known MAZE and ROMAN clip  No chest pain   No changes in breathing  Bp is stable   No dizziness  No syncope  Pacer is followed   HPI:  HPI  Past Medical History:   Diagnosis Date   [facility] dual pacer 5/23/2018    CKD (chronic kidney disease) stage 3, GFR 30-59 ml/min ([facility]     Dyslipidemia     Heart failure with reduced [facility] fraction ([facility]     History of atrial septal defect repair 2/9/2018    Left bundle-branch block 2/9/2018    Nicotine dependence     Nonischemic dilated cardiomyopathy ([facility] 05/07/2018    EF of 25%    PAF (paroxysmal atrial fibrillation) ([facility] 5/7/2018    S/P left atrial appendage ligation     S/P Maze operation for atrial fibrillation     S/P mitral valve repair 11/05/2018    Vitamin D deficiency       Past Surgical History:   Procedure Laterality Date    CARDIAC CATHETERIZATION  09/20/2018    Dr. Nallely Mars. No CAD.  CARDIAC SURGERY  1970    repair hole in heart pt states was 3 yrs old.     COLONOSCOPY      MITRAL VALVE SURGERY  10/15/2018    PACEMAKER INSERTION  05/01/2017    [place] OFFICE/OUTPT VISIT,PROCEDURE ONLY N/A 10/15/2018    REDO STERNOTOMY, BI-ATRIAL MAZE PROCEDURE, MITRAL REPAIR, PLACEMENT OF INTRA AORTIC BALLOON PUMP performed by Peggy Del Valle MD at [facility]     TUBAL LIGATION       Family History   Problem Relation Age of Onset    Emphysema Father     High Cholesterol Father      Social History     Tobacco Use    Smoking status: Current Some Day Smoker     Packs/day: 0.10     Years: 35.00     Pack years: 3.50     Types: Cigarettes     Start date: 5/7/1994    Smokeless tobacco: Never Used   Substance Use Topics    Alcohol use: No      Current Outpatient Medications   Medication Sig Dispense Refill    atenolol (TENORMIN) 25 MG tablet Take 1 tablet by mouth in the morning and at bedtime 60 tablet 0    flecainide (TAMBOCOR) 100 MG tablet Take 1 tablet by mouth 2 times daily 60 tablet 0    atorvastatin (LIPITOR) 20 MG tablet TAKE 1 TABLET BY MOUTH EVERY DAY 90 tablet 3    potassium chloride (KLOR-CON M) 10 MEQ extended release tablet Take 1 tablet by mouth daily 90 tablet 3    furosemide (LASIX) 40 MG tablet Take 0.5 tablets by mouth daily 90 tablet 3    aspirin (CVS ASPIRIN) 325 MG tablet TAKE 1 TABLET BY MOUTH EVERY DAY 90 tablet 3    Handicap Placard MISC by Does not apply route Expires 18 DEC 2023 1 each 0    Blood Pressure KIT 1 Units by Does not apply route 2 times daily 1 kit 0    Blood Pressure Monitoring ([facility]S BLOOD PRESSURE MONITOR) KIT 1 kit by Does not apply route daily 1 kit 0    Misc. Devices ([facility] PULSE OXIMETER) MISC 1 each by Does not apply route daily 1 each 0     No current facility-administered medications for this visit.      Allergies   Allergen Reactions    Doxycycline Hives     Health Maintenance   Topic Date Due    COVID-19 Vaccine (1) Never done    Depression Screen  Never done    HIV screen  Never done    Hepatitis C screen  Never done    Shingles vaccine (1 of 2) Never done    A1C test (Diabetic or Prediabetic)  10/10/2019    Breast cancer screen  02/20/2022    Lipids  03/05/2022    Flu vaccine (Season Ended) 09/01/2022    Colorectal Cancer Screen  12/13/2022    Cervical cancer screen  02/20/2023    DTaP/Tdap/Td vaccine (2 - Td or Tdap) 12/18/2028    Pneumococcal 0-64 years Vaccine (3 - PPSV23 or PCV20) 04/27/2032    Hepatitis A vaccine  Aged Out    Hepatitis B vaccine  Aged Out    Hib vaccine  Aged Out    Meningococcal (ACWY) vaccine  Aged Out       Subjective:  Review of Systems  General:   No fever, no chills, No fatigue or weight loss  Pulmonary:    No dyspnea, no wheezing  Cardiac:    Denies recent chest pain,   GI:     No nausea or vomiting, no abdominal pain  Neuro:    No dizziness or light headedness,   Musculoskeletal:  No recent active issues  Extremities:   No edema, no obvious claudication       Objective:  Physical Exam  /70   Pulse 62   Ht 5' 5\" (1.651 m)   Wt 174 lb (78.9 kg)   BMI 28.96 kg/m²   General:   Well developed, well nourished  Lungs:   Clear to auscultation  Heart:    Normal S1 S2, Slight murmur. no rubs, no gallops  Abdomen:   Soft, non tender, no organomegalies, positive bowel sounds  Extremities:   No edema, no cyanosis, good peripheral pulses  Neurological:   Awake, alert, oriented. No obvious focal deficits  Musculoskelatal:  No obvious deformities    Assessment:      Diagnosis Orders   1. Atrial fibrillation, chronic ([address]     2. Primary hypertension     as above  Cardiac fair for now   Following the pacer and the a fib burden   Plan:  No follow-ups on file. As above  Continue risk factor modification and medical management'  Thank you for allowing me to participate in the care of your patient. Please don't hesitate to contact me regarding any further issues related to the patient care      Orders Placed:  No orders of the defined types were placed in this encounter. Medications Prescribed:  No orders of the defined types were placed in this encounter. Discussed use, benefit, and side effects of prescribed medications. All patient questions answered. Pt voicedunderstanding. Instructed to continue current medications, diet and exercise. Continue risk factor modification and medical management. Patient agreed with treatment plan. Follow up as directed.     Electronically signedby Ashley Sanders MD on 5/16/2022 at 12:39 PM

## 2022-06-01 ENCOUNTER — PROCEDURE VISIT (OUTPATIENT)
Dept: CARDIOLOGY CLINIC | Age: 57
End: 2022-06-01

## 2022-06-01 DIAGNOSIS — Z95.0 PACEMAKER: Primary | ICD-10-CM

## 2022-06-01 PROCEDURE — 93296 REM INTERROG EVL PM/IDS: CPT | Performed by: NUCLEAR MEDICINE

## 2022-06-01 PROCEDURE — 93294 REM INTERROG EVL PM/LDLS PM: CPT | Performed by: NUCLEAR MEDICINE

## 2022-06-01 NOTE — PROGRESS NOTES
Dr Jaycee Malhotra pt / known afib/ tiffani clipped after maze procedure   No anti coags  Presents in afib on download  afib burden 35%    [employer]xt [employer] dual pacer remote     Battery 6.5 yrs remaining    p waves 6.4  rv waves 17.8    Atrial impedence 612  Vent impedence 647    No thresholds obtained per the device     A paced 6%    V paced 5%  dddr

## 2022-06-06 RX ORDER — ATENOLOL 25 MG/1
25 TABLET ORAL 2 TIMES DAILY
Qty: 60 TABLET | Refills: 11 | Status: SHIPPED | OUTPATIENT
Start: 2022-06-06

## 2022-06-06 RX ORDER — FLECAINIDE ACETATE 100 MG/1
TABLET ORAL
Qty: 60 TABLET | Refills: 11 | Status: SHIPPED | OUTPATIENT
Start: 2022-06-06

## 2022-06-07 ENCOUNTER — TELEPHONE (OUTPATIENT)
Dept: FAMILY MEDICINE CLINIC | Age: 57
End: 2022-06-07

## 2022-06-07 NOTE — TELEPHONE ENCOUNTER
[facility] pharmacy wants to know if it I okay to carmen ASA to [address] ASA they are out of regular ASA at Pharmacy.

## 2022-09-05 PROCEDURE — 93294 REM INTERROG EVL PM/LDLS PM: CPT | Performed by: NUCLEAR MEDICINE

## 2022-09-05 PROCEDURE — 93296 REM INTERROG EVL PM/IDS: CPT | Performed by: NUCLEAR MEDICINE

## 2022-09-06 ENCOUNTER — PROCEDURE VISIT (OUTPATIENT)
Dept: CARDIOLOGY CLINIC | Age: 57
End: 2022-09-06

## 2022-09-06 DIAGNOSIS — Z95.0 PACEMAKER: Primary | ICD-10-CM

## 2022-09-06 NOTE — PROGRESS NOTES
DR Valdes Bound PT   NXT [employer] DUAL PACER REMOTE   BATTERY 9 YRS REMAINING      P WAVES 3  RV WAVES 14.1    ATRIAL IMPEDENCE 619  VENT IMPEDENCE 626    ATRIAL THRESHOLD 0.6 @ 0.4    A PACED 10%  V PACED 4%      KNOWN AFIB/ HX OF ROMAN CLIP AND MAZE

## 2022-11-14 DIAGNOSIS — Z98.890 S/P MAZE OPERATION FOR ATRIAL FIBRILLATION: ICD-10-CM

## 2022-11-14 DIAGNOSIS — I42.8 NON-ISCHEMIC CARDIOMYOPATHY (HCC): ICD-10-CM

## 2022-11-14 DIAGNOSIS — E78.5 DYSLIPIDEMIA: ICD-10-CM

## 2022-11-14 DIAGNOSIS — Z86.79 HISTORY OF ATRIAL FIBRILLATION: ICD-10-CM

## 2022-11-14 DIAGNOSIS — I48.0 PAROXYSMAL ATRIAL FIBRILLATION (HCC): ICD-10-CM

## 2022-11-14 DIAGNOSIS — Z98.890 S/P LEFT ATRIAL APPENDAGE LIGATION: ICD-10-CM

## 2022-11-14 DIAGNOSIS — Z86.79 S/P MAZE OPERATION FOR ATRIAL FIBRILLATION: ICD-10-CM

## 2022-11-14 DIAGNOSIS — I50.22 CHRONIC SYSTOLIC HEART FAILURE (HCC): ICD-10-CM

## 2022-11-14 RX ORDER — ASPIRIN 325 MG
TABLET ORAL
Qty: 90 TABLET | Refills: 3 | Status: SHIPPED | OUTPATIENT
Start: 2022-11-14

## 2022-11-14 NOTE — TELEPHONE ENCOUNTER
Patient requesting to go to [facility] OP RX  Please approve or refuse Rx request:  Requested Prescriptions     Pending Prescriptions Disp Refills    aspirin ([facility] ASPIRIN) 325 MG tablet 90 tablet 3     Sig: TAKE 1 TABLET BY MOUTH EVERY DAY       Next appointment:  Visit date not found

## 2022-12-27 ENCOUNTER — TELEPHONE (OUTPATIENT)
Dept: CARDIOLOGY CLINIC | Age: 57
End: 2022-12-27

## 2022-12-27 NOTE — TELEPHONE ENCOUNTER
Perico for pt to call this office to r/s her [facility] device check in office / pt was a no show today

## 2022-12-28 NOTE — TELEPHONE ENCOUNTER
PT NOTIFIED  SHE SAID SHE IS WORKING ALOT AND SHE CANNOT COME INTO THE OFFICE FOR A FEW MONTHS.  TOLD HER TO SEND A DOWNLOAD AND THEN I WOULD MAKE HER AN IN OFFICE DIVICE CHECK THE END OF MARCH  APPT MADE AND MAILED TO THE PT

## 2023-01-04 ENCOUNTER — PROCEDURE VISIT (OUTPATIENT)
Dept: CARDIOLOGY CLINIC | Age: 58
End: 2023-01-04

## 2023-01-04 DIAGNOSIS — Z95.0 PACEMAKER: Primary | ICD-10-CM

## 2023-01-04 NOTE — PROGRESS NOTES
Dr brown pt   Nxt [employer] dual pacer remote   Battery 7 yrs remaining  Known afib/ / hx of maze procedure and tiffani clip  Afib burden 20%      A paced 9%  V paced 9%    Dddr     P waves 2.2  Rv waves 19.1    Atrial impedence 541  Vent impedence 613    Atrial threshold 0.9 @ 0.4  Vent amplitude 2.5 @ 0.4

## 2023-02-28 DIAGNOSIS — I42.8 NON-ISCHEMIC CARDIOMYOPATHY (HCC): ICD-10-CM

## 2023-02-28 DIAGNOSIS — I50.22 CHRONIC SYSTOLIC HEART FAILURE (HCC): ICD-10-CM

## 2023-02-28 DIAGNOSIS — I48.0 PAROXYSMAL ATRIAL FIBRILLATION (HCC): ICD-10-CM

## 2023-02-28 RX ORDER — ATORVASTATIN CALCIUM 10 MG/1
TABLET, FILM COATED ORAL
Qty: 120 TABLET | Refills: 0 | Status: SHIPPED | OUTPATIENT
Start: 2023-02-28

## 2023-02-28 RX ORDER — POTASSIUM CHLORIDE 750 MG/1
TABLET, FILM COATED, EXTENDED RELEASE ORAL
Qty: 90 TABLET | Refills: 3 | Status: SHIPPED | OUTPATIENT
Start: 2023-02-28

## 2023-02-28 NOTE — TELEPHONE ENCOUNTER
Recent Visits  No visits were found meeting these conditions. Showing recent visits within past 540 days with a meds authorizing provider and meeting all other requirements  Future Appointments  No visits were found meeting these conditions.   Showing future appointments within next 150 days with a meds authorizing provider and meeting all other requirements    Future Appointments   Date Time Provider Marbella Annalisa   3/30/2023  2:00 PM EMANUEL De Luna PACER NURSE N [facility] PACER [facility] [place]   5/22/2023 11:15 AM Wing Gonzalez MD N [address] [facility]

## 2023-03-01 DIAGNOSIS — I42.8 NON-ISCHEMIC CARDIOMYOPATHY (HCC): ICD-10-CM

## 2023-03-01 DIAGNOSIS — I48.0 PAROXYSMAL ATRIAL FIBRILLATION (HCC): ICD-10-CM

## 2023-03-01 DIAGNOSIS — I50.22 CHRONIC SYSTOLIC HEART FAILURE (HCC): ICD-10-CM

## 2023-03-01 RX ORDER — POTASSIUM CHLORIDE 750 MG/1
TABLET, FILM COATED, EXTENDED RELEASE ORAL
Qty: 90 TABLET | Refills: 3 | OUTPATIENT
Start: 2023-03-01

## 2023-03-01 RX ORDER — ATORVASTATIN CALCIUM 10 MG/1
TABLET, FILM COATED ORAL
Qty: 120 TABLET | Refills: 0 | OUTPATIENT
Start: 2023-03-01

## 2023-03-30 ENCOUNTER — TELEPHONE (OUTPATIENT)
Dept: CARDIOLOGY CLINIC | Age: 58
End: 2023-03-30

## 2023-03-30 NOTE — TELEPHONE ENCOUNTER
No show to OV. Call to patient. She did get our VM yesterday appoitment. She thought we were cancelling it. Was able to r/s to next week.

## 2023-04-30 DIAGNOSIS — I48.0 PAROXYSMAL ATRIAL FIBRILLATION (HCC): ICD-10-CM

## 2023-04-30 DIAGNOSIS — Z98.890 S/P LEFT ATRIAL APPENDAGE LIGATION: ICD-10-CM

## 2023-04-30 DIAGNOSIS — Z98.890 S/P MAZE OPERATION FOR ATRIAL FIBRILLATION: ICD-10-CM

## 2023-04-30 DIAGNOSIS — Z86.79 S/P MAZE OPERATION FOR ATRIAL FIBRILLATION: ICD-10-CM

## 2023-04-30 DIAGNOSIS — Z86.79 HISTORY OF ATRIAL FIBRILLATION: ICD-10-CM

## 2023-04-30 DIAGNOSIS — E78.5 DYSLIPIDEMIA: ICD-10-CM

## 2023-04-30 DIAGNOSIS — I42.8 NON-ISCHEMIC CARDIOMYOPATHY (HCC): ICD-10-CM

## 2023-04-30 DIAGNOSIS — I50.22 CHRONIC SYSTOLIC HEART FAILURE (HCC): ICD-10-CM

## 2023-05-01 RX ORDER — ATENOLOL 25 MG/1
25 TABLET ORAL 2 TIMES DAILY
Qty: 60 TABLET | Refills: 0 | Status: SHIPPED | OUTPATIENT
Start: 2023-05-01

## 2023-05-01 RX ORDER — ATORVASTATIN CALCIUM 20 MG/1
20 TABLET, FILM COATED ORAL DAILY
Qty: 90 TABLET | Refills: 0 | Status: SHIPPED | OUTPATIENT
Start: 2023-05-01

## 2023-05-01 NOTE — TELEPHONE ENCOUNTER
Recent Visits  No visits were found meeting these conditions. Showing recent visits within past 540 days with a meds authorizing provider and meeting all other requirements  Future Appointments  No visits were found meeting these conditions.   Showing future appointments within next 150 days with a meds authorizing provider and meeting all other requirements     Future Appointments   Date Time Provider Marbella Fang   5/22/2023 11:15 AM Lety Buckley MD N [facility] [place]   4/17/2024  1:00 PM SCHEDULE, SRPX PACER NURSE N [facility] PACER [facility] - [facility] [place]

## 2023-05-25 RX ORDER — ATENOLOL 25 MG/1
TABLET ORAL
Qty: 60 TABLET | Refills: 4 | Status: SHIPPED | OUTPATIENT
Start: 2023-05-25

## 2023-05-25 RX ORDER — FLECAINIDE ACETATE 100 MG/1
TABLET ORAL
Qty: 60 TABLET | Refills: 4 | Status: SHIPPED | OUTPATIENT
Start: 2023-05-25

## 2023-06-28 ENCOUNTER — PROCEDURE VISIT (OUTPATIENT)
Dept: CARDIOLOGY CLINIC | Age: 58
End: 2023-06-28

## 2023-06-28 DIAGNOSIS — Z95.0 PACEMAKER: Primary | ICD-10-CM

## 2023-07-31 DIAGNOSIS — Z86.79 HISTORY OF ATRIAL FIBRILLATION: ICD-10-CM

## 2023-07-31 DIAGNOSIS — Z86.79 S/P MAZE OPERATION FOR ATRIAL FIBRILLATION: ICD-10-CM

## 2023-07-31 DIAGNOSIS — Z98.890 S/P MAZE OPERATION FOR ATRIAL FIBRILLATION: ICD-10-CM

## 2023-07-31 DIAGNOSIS — E78.5 DYSLIPIDEMIA: ICD-10-CM

## 2023-07-31 DIAGNOSIS — Z98.890 S/P LEFT ATRIAL APPENDAGE LIGATION: ICD-10-CM

## 2023-07-31 DIAGNOSIS — I48.0 PAROXYSMAL ATRIAL FIBRILLATION (HCC): ICD-10-CM

## 2023-07-31 DIAGNOSIS — I50.22 CHRONIC SYSTOLIC HEART FAILURE (HCC): ICD-10-CM

## 2023-07-31 DIAGNOSIS — I42.8 NON-ISCHEMIC CARDIOMYOPATHY (HCC): ICD-10-CM

## 2023-07-31 RX ORDER — ATORVASTATIN CALCIUM 20 MG/1
TABLET, FILM COATED ORAL
Qty: 90 TABLET | Refills: 3 | Status: SHIPPED | OUTPATIENT
Start: 2023-07-31

## 2023-07-31 NOTE — TELEPHONE ENCOUNTER
Future Appointments   Date Time Provider [address]   9/7/2023  2:00 PM [address], MD N SR[facility] Heart [facility] [place] [facility]   4/17/2024  1:00 PM SCHEDULE, EMANUEL PACER NURSE N [facility] PACER [facility]    Recent Visits  No visits were found meeting these conditions. Showing recent visits within past 540 days with a meds authorizing provider and meeting all other requirements  Future Appointments  No visits were found meeting these conditions.   Showing future appointments within next 150 days with a meds authorizing provider and meeting all other requirements

## 2023-08-10 RX ORDER — ATENOLOL 25 MG/1
TABLET ORAL
Qty: 60 TABLET | Refills: 0 | Status: SHIPPED | OUTPATIENT
Start: 2023-08-10

## 2023-08-10 RX ORDER — FLECAINIDE ACETATE 100 MG/1
100 TABLET ORAL 2 TIMES DAILY
Qty: 60 TABLET | Refills: 0 | Status: SHIPPED | OUTPATIENT
Start: 2023-08-10

## 2023-08-10 NOTE — TELEPHONE ENCOUNTER
Andrae Harrison called requesting a refill on the following medications:  Requested Prescriptions     Pending Prescriptions Disp Refills    atenolol (TENORMIN) 25 MG tablet 60 tablet 4    flecainide (TAMBOCOR) 100 MG tablet 60 tablet 4     Sig: Take 1 tablet by mouth 2 times daily     5 [facility]  .       Date of last visit: 5-16-22  Date of next visit (if applicable): 3/0/4627

## 2023-09-07 ENCOUNTER — OFFICE VISIT (OUTPATIENT)
Dept: CARDIOLOGY CLINIC | Age: 58
End: 2023-09-07

## 2023-09-07 VITALS
DIASTOLIC BLOOD PRESSURE: 70 MMHG | WEIGHT: 171.2 LBS | BODY MASS INDEX: 28.52 KG/M2 | HEART RATE: 69 BPM | SYSTOLIC BLOOD PRESSURE: 124 MMHG | HEIGHT: 65 IN

## 2023-09-07 DIAGNOSIS — I48.20 ATRIAL FIBRILLATION, CHRONIC (HCC): Primary | ICD-10-CM

## 2023-09-07 DIAGNOSIS — Z95.0 PACEMAKER: ICD-10-CM

## 2023-09-07 DIAGNOSIS — I10 PRIMARY HYPERTENSION: ICD-10-CM

## 2023-09-07 PROCEDURE — 3078F DIAST BP <80 MM HG: CPT | Performed by: NUCLEAR MEDICINE

## 2023-09-07 PROCEDURE — 3074F SYST BP LT 130 MM HG: CPT | Performed by: NUCLEAR MEDICINE

## 2023-09-07 PROCEDURE — 99213 OFFICE O/P EST LOW 20 MIN: CPT | Performed by: NUCLEAR MEDICINE

## 2023-09-07 PROCEDURE — 93000 ELECTROCARDIOGRAM COMPLETE: CPT | Performed by: NUCLEAR MEDICINE

## 2023-09-07 RX ORDER — FLECAINIDE ACETATE 100 MG/1
100 TABLET ORAL 2 TIMES DAILY
Qty: 60 TABLET | Refills: 11 | Status: SHIPPED | OUTPATIENT
Start: 2023-09-07

## 2023-09-07 RX ORDER — ATENOLOL 25 MG/1
TABLET ORAL
Qty: 60 TABLET | Refills: 11 | Status: SHIPPED | OUTPATIENT
Start: 2023-09-07

## 2023-09-07 NOTE — PROGRESS NOTES
The patient presents for a 1-year follow-up. EKG completed today. She denies cardiac concerns. She doesn't want her potassium or diuretic anymore; she has not been taking this.
directed.     Electronically signedby Tracy Oscar MD on 9/7/2023 at 2:14 PM

## 2023-09-29 ENCOUNTER — PROCEDURE VISIT (OUTPATIENT)
Dept: CARDIOLOGY CLINIC | Age: 58
End: 2023-09-29

## 2023-09-29 DIAGNOSIS — Z95.0 PACEMAKER: Primary | Chronic | ICD-10-CM

## 2023-09-29 NOTE — PROGRESS NOTES
[facility] dual pacer     . Clarisse Martínez Battery longevity:  7.5 years on device   Presenting rhythm  AP VS     Atrial impedance 631  RV impedance 628    P wave sensing 2.9  R wave sensing 19.2    32 % atrial paced  3 % RV paced     Atrial threshold 0.5 V  at 0.4ms  RV threshold not measured   Mode switches   <1

## 2023-11-02 RX ORDER — FLECAINIDE ACETATE 100 MG/1
100 TABLET ORAL 2 TIMES DAILY
Qty: 60 TABLET | Refills: 4 | Status: SHIPPED | OUTPATIENT
Start: 2023-11-02

## 2023-11-02 RX ORDER — ATENOLOL 25 MG/1
TABLET ORAL
Qty: 60 TABLET | Refills: 4 | Status: SHIPPED | OUTPATIENT
Start: 2023-11-02

## 2024-01-10 ENCOUNTER — PROCEDURE VISIT (OUTPATIENT)
Dept: CARDIOLOGY CLINIC | Age: 59
End: 2024-01-10

## 2024-01-10 DIAGNOSIS — Z95.0 PACEMAKER: Primary | ICD-10-CM

## 2024-01-10 PROCEDURE — 93294 REM INTERROG EVL PM/LDLS PM: CPT | Performed by: INTERNAL MEDICINE

## 2024-01-10 PROCEDURE — 93296 REM INTERROG EVL PM/IDS: CPT | Performed by: INTERNAL MEDICINE

## 2024-01-11 NOTE — PROGRESS NOTES
[place] [employer] Remote Dual Pacemaker   Patient of Nallu    Battery 7 years    Presenting rhythm AP VS    A Impedance 647  RV Impedance 677    P wave sensing 2.9  R wave sensing 18.2    A Threshold 0.5 @ 0.4  RV Thresholds NR    A Paced 34%  V Paced 2%    Programmed Mode DDDR     Afib Upton <1%    Episodes none   
Noted  
Admission

## 2024-01-30 DIAGNOSIS — I42.8 NON-ISCHEMIC CARDIOMYOPATHY (HCC): ICD-10-CM

## 2024-01-30 DIAGNOSIS — I50.22 CHRONIC SYSTOLIC HEART FAILURE (HCC): ICD-10-CM

## 2024-01-30 DIAGNOSIS — I48.0 PAROXYSMAL ATRIAL FIBRILLATION (HCC): ICD-10-CM

## 2024-01-30 RX ORDER — POTASSIUM CHLORIDE 750 MG/1
10 TABLET, FILM COATED, EXTENDED RELEASE ORAL DAILY
Qty: 90 TABLET | Refills: 3 | Status: SHIPPED | OUTPATIENT
Start: 2024-01-30

## 2024-01-30 NOTE — TELEPHONE ENCOUNTER
Future Appointments   Date Time Provider Department Center   4/17/2024  1:00 PM SCHEDULE, SRPX PACER NURSE N [facility] PACER [facility] [place]   9/26/2024  1:45 PM Harry Whaley MD N SR[facility] Heart [facility] [place]    Recent Visits  No visits were found meeting these conditions.  Showing recent visits within past 540 days with a meds authorizing provider and meeting all other requirements  Future Appointments  No visits were found meeting these conditions.  Showing future appointments within next 150 days with a meds authorizing provider and meeting all other requirements

## 2024-04-02 RX ORDER — FLECAINIDE ACETATE 100 MG/1
100 TABLET ORAL 2 TIMES DAILY
Qty: 180 TABLET | Refills: 3 | Status: SHIPPED | OUTPATIENT
Start: 2024-04-02

## 2024-04-02 RX ORDER — ATENOLOL 50 MG/1
TABLET ORAL
Qty: 90 TABLET | Refills: 3 | Status: SHIPPED | OUTPATIENT
Start: 2024-04-02

## 2024-07-24 DIAGNOSIS — Z86.79 S/P MAZE OPERATION FOR ATRIAL FIBRILLATION: ICD-10-CM

## 2024-07-24 DIAGNOSIS — I42.8 NON-ISCHEMIC CARDIOMYOPATHY (HCC): ICD-10-CM

## 2024-07-24 DIAGNOSIS — I50.22 CHRONIC SYSTOLIC HEART FAILURE (HCC): ICD-10-CM

## 2024-07-24 DIAGNOSIS — Z86.79 HISTORY OF ATRIAL FIBRILLATION: ICD-10-CM

## 2024-07-24 DIAGNOSIS — E78.5 DYSLIPIDEMIA: ICD-10-CM

## 2024-07-24 DIAGNOSIS — Z98.890 S/P MAZE OPERATION FOR ATRIAL FIBRILLATION: ICD-10-CM

## 2024-07-24 DIAGNOSIS — Z98.890 S/P LEFT ATRIAL APPENDAGE LIGATION: ICD-10-CM

## 2024-07-24 DIAGNOSIS — I48.0 PAROXYSMAL ATRIAL FIBRILLATION (HCC): ICD-10-CM

## 2024-07-24 NOTE — TELEPHONE ENCOUNTER
Future Appointments   Date Time Provider Department Center   9/26/2024  1:45 PM [facility] [place]    Recent Visits  No visits were found meeting these conditions.  Showing recent visits within past 540 days with a meds authorizing provider and meeting all other requirements  Future Appointments  No visits were found meeting these conditions.  Showing future appointments within next 150 days with a meds authorizing provider and meeting all other requirements

## 2024-08-02 RX ORDER — ATORVASTATIN CALCIUM 20 MG/1
20 TABLET, FILM COATED ORAL DAILY
Qty: 90 TABLET | Refills: 3 | Status: SHIPPED | OUTPATIENT
Start: 2024-08-02

## 2024-08-07 ENCOUNTER — TELEPHONE (OUTPATIENT)
Dept: CARDIOLOGY CLINIC | Age: 59
End: 2024-08-07

## 2024-08-07 NOTE — TELEPHONE ENCOUNTER
I called and spoke to this pt . She cancelled her in office appt for April and she did not r/s . Her latitude monitor is not working. I told pt she needs to get her latitude monitor working and we need to get her appt that she missed in the office r/s. She told me she works 3 shifts and she does not have her schedule with her and she will call our office tomorrow to make an appt . She said she will try to get her monitor working tomorrow as well.

## 2024-08-28 ENCOUNTER — NURSE ONLY (OUTPATIENT)
Dept: CARDIOLOGY CLINIC | Age: 59
End: 2024-08-28

## 2024-08-28 DIAGNOSIS — Z95.0 PACEMAKER: Primary | ICD-10-CM

## 2024-08-28 PROCEDURE — 93280 PM DEVICE PROGR EVAL DUAL: CPT | Performed by: NUCLEAR MEDICINE

## 2024-08-28 NOTE — PROGRESS NOTES
Dr dougherty pt     [employer] dual pacer check in office / has latitude but it is not plugged it. I told her again she needs to plug it in and get it working or she needs to call [employer]       Presents in aflutter/ vs   No oac's/ had an ROMAN CLIP AFTER MAZE   AFIB BURDEN 45%    SEVERAL EPISODES OF AFLUTTER WITH [facility]/ PT TOLD ME SHE IS NEVER GETTING ANOTHER CARDIOVERSION AGAIN     FLUTTER WAVES 3.2    RV WAVES 19.9    ATRIAL IMPEDENCE 640  VENT IMPEDENCE 648    RV THRESHOLD 0.9 @ 0.4 AUTO

## 2024-10-16 ENCOUNTER — TELEPHONE (OUTPATIENT)
Dept: CARDIOLOGY CLINIC | Age: 59
End: 2024-10-16

## 2024-10-16 DIAGNOSIS — I50.9 OTHER CONGESTIVE HEART FAILURE (HCC): Primary | ICD-10-CM

## 2024-10-16 NOTE — TELEPHONE ENCOUNTER
Please review chart regarding     CHF GDMT Ace/Arb/Arni and BB    Last seen 9/7/23  Next appointment 6/18/25    Last echo 2/14/19    Summary   Left Ventricular size is Mildly increased .   Normal left ventricular wall thickness.   There was moderate global hypokinesis of the left ventricle.   Systolic function was moderately reduced.   Ejection fraction is visually estimated in the range of 35% to 40%.   The left atrium is Moderately dilated.   Status - post mitral annular ring insertion.   Evidence of mitral repair noted   Mild mitral regurgitation is present.   Mitral valve area by doppler pressure half-time 1.8 cm2 .   Mild tricuspid regurgitation visualized.   Right ventricular systolic pressure measures 55 mmhg.       Last labs 9/22/21        Bps

## 2024-10-17 NOTE — TELEPHONE ENCOUNTER
Call to pt, echo scheduled 10-30-24, arrive 11:30am  Follow up with mirta 11-07-24 @ 1:15pm  Pt informed also sent a text msg to pt with appts     Pt to call with insurance information

## 2024-10-30 ENCOUNTER — HOSPITAL ENCOUNTER (OUTPATIENT)
Age: 59
Discharge: HOME | End: 2024-11-01
Attending: NUCLEAR MEDICINE
Payer: COMMERCIAL

## 2024-10-30 DIAGNOSIS — I50.9 OTHER CONGESTIVE HEART FAILURE (HCC): ICD-10-CM

## 2024-10-30 LAB
ECHO AO ASC DIAM: 3.2 CM
ECHO AV CUSP MM: 1.7 CM
ECHO AV PEAK GRADIENT: 8 MMHG
ECHO AV PEAK VELOCITY: 1.4 M/S
ECHO AV VELOCITY RATIO: 0.57
ECHO LA AREA 2C: 14.4 CM2
ECHO LA AREA 4C: 14 CM2
ECHO LA DIAMETER: 4.2 CM
ECHO LA MAJOR AXIS: 4.4 CM
ECHO LA MINOR AXIS: 4.3 CM
ECHO LA VOL BP: 38 ML (ref 22–52)
ECHO LA VOL MOD A2C: 40 ML (ref 22–52)
ECHO LA VOL MOD A4C: 36 ML (ref 22–52)
ECHO LV EDV A2C: 68 ML
ECHO LV EDV A4C: 127 ML
ECHO LV EJECTION FRACTION A2C: 41 %
ECHO LV EJECTION FRACTION A4C: 35 %
ECHO LV EJECTION FRACTION BIPLANE: 35 % (ref 55–100)
ECHO LV ESV A2C: 41 ML
ECHO LV ESV A4C: 83 ML
ECHO LV FRACTIONAL SHORTENING: 19 % (ref 28–44)
ECHO LV INTERNAL DIMENSION DIASTOLIC: 5.3 CM (ref 3.9–5.3)
ECHO LV INTERNAL DIMENSION SYSTOLIC: 4.3 CM
ECHO LV ISOVOLUMETRIC RELAXATION TIME (IVRT): 70 MS
ECHO LV IVSD: 0.9 CM (ref 0.6–0.9)
ECHO LV MASS 2D: 174.5 G (ref 67–162)
ECHO LV POSTERIOR WALL DIASTOLIC: 0.9 CM (ref 0.6–0.9)
ECHO LV RELATIVE WALL THICKNESS RATIO: 0.34
ECHO LVOT PEAK GRADIENT: 3 MMHG
ECHO LVOT PEAK VELOCITY: 0.8 M/S
ECHO MV E DECELERATION TIME (DT): 428 MS
ECHO MV E VELOCITY: 1.3 M/S
ECHO MV MAX VELOCITY: 1.9 M/S
ECHO MV MEAN GRADIENT: 4 MMHG
ECHO MV MEAN VELOCITY: 0.8 M/S
ECHO MV PEAK GRADIENT: 14 MMHG
ECHO MV REGURGITANT PEAK GRADIENT: 64 MMHG
ECHO MV REGURGITANT PEAK VELOCITY: 4 M/S
ECHO MV VTI: 53.9 CM
ECHO PULMONARY ARTERY END DIASTOLIC PRESSURE: 7 MMHG
ECHO PV MAX VELOCITY: 0.8 M/S
ECHO PV PEAK GRADIENT: 2 MMHG
ECHO PV REGURGITANT MAX VELOCITY: 1.3 M/S
ECHO RV INTERNAL DIMENSION: 2.6 CM
ECHO RV TAPSE: 1.3 CM (ref 1.7–?)
ECHO TV E WAVE: 0.5 M/S
ECHO TV REGURGITANT MAX VELOCITY: 2.59 M/S
ECHO TV REGURGITANT PEAK GRADIENT: 27 MMHG

## 2024-10-30 PROCEDURE — 93306 TTE W/DOPPLER COMPLETE: CPT

## 2024-10-31 DIAGNOSIS — I42.9 CARDIOMYOPATHY, UNSPECIFIED TYPE (HCC): Primary | ICD-10-CM

## 2024-10-31 DIAGNOSIS — R06.02 SHORTNESS OF BREATH: ICD-10-CM

## 2024-10-31 NOTE — TELEPHONE ENCOUNTER
Stop flecainide   Change atenolol to coreg 6.25 bid  Start entresto 24/26 bid   Life vest after she sees René  Limited echo in 3 months   See me after that or if rené wants me to see her earlier

## 2024-11-01 RX ORDER — CARVEDILOL 6.25 MG/1
6.25 TABLET ORAL 2 TIMES DAILY
Qty: 60 TABLET | Refills: 3 | OUTPATIENT
Start: 2024-11-01

## 2024-11-01 NOTE — TELEPHONE ENCOUNTER
Patient notified.  All questions and concerns answered.      Limited ECHO ordered.  Meds called in to [facility] [facility] at patient request.

## 2024-11-01 NOTE — TELEPHONE ENCOUNTER
Call to pt, echo scheduled 02-03-25, arrie 10:00am  Follow up scheduled 02-12-25  Pt informed, appt dates and times text to pt

## 2024-11-04 NOTE — PROGRESS NOTES
[facility] [place] [facility]  [facility] CARDIOLOGY  [address]  [address]  [place] [address]  Dept: [phone]  Dept Fax: [phone]  Loc: [phone]    Visit Date: 11/7/2024    Manjula Hein is a 59 y.o. female who presents todayfor:  Chief Complaint   Patient presents with    Follow-up    Results     Echo     Cardiologist: Fatoumata Baptiste of [facility], MAZE, pacer, ROMAN clip, smoker, ? COPD, HTN    HPI: 1 year f/u   Having some sob with exertion. Not any different lately. Been about the same for a while. BP and HR are stable. Her symptoms are stable. She is nervous about lifevest and/or icd. She does not want the lifevest despite r/b/I/a discussion. She doesn't thinks he would want the ICD either.     Not sure about what meds she is taking except for coreg. Not taking entresto due to cost.    Past Surgical History:   Procedure Laterality Date    CARDIAC CATHETERIZATION  09/20/2018    Dr. Whaley. No CAD.    CARDIAC SURGERY  1970    repair hole in heart pt states was 5 yrs old.    COLONOSCOPY      MITRAL VALVE SURGERY  10/15/2018    PACEMAKER INSERTION  05/01/2017    [place] OFFICE/OUTPT VISIT,PROCEDURE ONLY N/A 10/15/2018    REDO STERNOTOMY, BI-ATRIAL MAZE PROCEDURE, MITRAL REPAIR, PLACEMENT OF INTRA AORTIC BALLOON PUMP performed by Shree Osborn MD at [facility] OR    TONSILLECTOMY      TUBAL LIGATION  1997    TUBAL LIGATION       Family History   Problem Relation Age of Onset    Emphysema Father     High Cholesterol Father      Social History     Tobacco Use    Smoking status: Some Days     Current packs/day: 0.10     Average packs/day: 0.1 packs/day for 35.0 years (3.5 ttl pk-yrs)     Types: Cigarettes     Start date: 5/7/1994    Smokeless tobacco: Never   Substance Use Topics    Alcohol use: No      Current Outpatient Medications   Medication Sig Dispense Refill    atorvastatin (LIPITOR) 20 MG tablet TAKE ONE TABLET BY MOUTH DAILY 90 tablet 3    aspirin ([facility] ASPIRIN) 325 MG tablet TAKE 1 TABLET BY

## 2024-11-07 ENCOUNTER — OFFICE VISIT (OUTPATIENT)
Dept: CARDIOLOGY CLINIC | Age: 59
End: 2024-11-07
Payer: COMMERCIAL

## 2024-11-07 VITALS
WEIGHT: 158.6 LBS | DIASTOLIC BLOOD PRESSURE: 86 MMHG | SYSTOLIC BLOOD PRESSURE: 136 MMHG | BODY MASS INDEX: 26.42 KG/M2 | HEART RATE: 63 BPM | HEIGHT: 65 IN

## 2024-11-07 DIAGNOSIS — I48.0 PAF (PAROXYSMAL ATRIAL FIBRILLATION) (HCC): Primary | ICD-10-CM

## 2024-11-07 DIAGNOSIS — I42.0 NONISCHEMIC DILATED CARDIOMYOPATHY (HCC): Chronic | ICD-10-CM

## 2024-11-07 DIAGNOSIS — Z98.890 S/P MITRAL VALVE REPAIR: Chronic | ICD-10-CM

## 2024-11-07 DIAGNOSIS — Z95.0 PACEMAKER: Chronic | ICD-10-CM

## 2024-11-07 DIAGNOSIS — Z86.79 S/P MAZE OPERATION FOR ATRIAL FIBRILLATION: Chronic | ICD-10-CM

## 2024-11-07 DIAGNOSIS — Z98.890 S/P MAZE OPERATION FOR ATRIAL FIBRILLATION: Chronic | ICD-10-CM

## 2024-11-07 DIAGNOSIS — Z98.890 S/P LEFT ATRIAL APPENDAGE LIGATION: Chronic | ICD-10-CM

## 2024-11-07 PROCEDURE — 93000 ELECTROCARDIOGRAM COMPLETE: CPT | Performed by: STUDENT IN AN ORGANIZED HEALTH CARE EDUCATION/TRAINING PROGRAM

## 2024-11-07 PROCEDURE — 99214 OFFICE O/P EST MOD 30 MIN: CPT | Performed by: STUDENT IN AN ORGANIZED HEALTH CARE EDUCATION/TRAINING PROGRAM

## 2024-11-07 NOTE — PROGRESS NOTES
Follow-up.   She is unsure of her meds and I advised her to call us back with her med list.     EKG completed.

## 2024-11-08 ENCOUNTER — TELEPHONE (OUTPATIENT)
Dept: CARDIOLOGY CLINIC | Age: 59
End: 2024-11-08

## 2024-11-08 RX ORDER — FLECAINIDE ACETATE 100 MG/1
100 TABLET ORAL 2 TIMES DAILY
COMMUNITY
End: 2024-11-08

## 2024-11-08 RX ORDER — ATENOLOL 50 MG/1
25 TABLET ORAL 2 TIMES DAILY
COMMUNITY
End: 2024-11-08

## 2024-11-08 NOTE — TELEPHONE ENCOUNTER
Pt was seen by René 11-7-24.  Pt is asking if there is assistance for Entresto.  Pt states Entresto is over $600.

## 2024-11-11 NOTE — TELEPHONE ENCOUNTER
Called and LM on VM for pt to return call.     It appears patient has a commercial insurance plan and would be eligible to use a $10 copay card. The copay card can be obtained through the Entresto website. Once pt has card the information would need to be provided to pharmacy.

## 2024-11-15 NOTE — TELEPHONE ENCOUNTER
Pt calling back, she is saying [facility] [place] told her that her insurance is not covering the Entresto. It will still be $600 and she couldn't use the co-pay card. Per medlist review, there is no active Entresto script at the pharmacy-only the Entresto samples are listed.  New Rx pended in separate refill for the Entresto. Follow to see if it triggers a PA to be completed.

## 2024-11-18 NOTE — TELEPHONE ENCOUNTER
Looks like Entresto was just sent in on 11/15/2024.   I have the $10 copay card with BIN :[id number], PCN:[id number].    for [facility] [place] to call our office back.

## 2024-11-26 ENCOUNTER — TELEPHONE (OUTPATIENT)
Dept: CARDIOLOGY CLINIC | Age: 59
End: 2024-11-26

## 2024-11-26 NOTE — TELEPHONE ENCOUNTER
Lmom for pt to call this office. Lm for this pt to call [facility] to get her latitude monitor working. Told her she is 12 days past due and she needs to get the monitor working

## 2024-11-27 NOTE — TELEPHONE ENCOUNTER
I CALLED THIS PT AND SHE SAID SHE CALLED [place] AND THEY TOLD HER THEY ARE SENDING HER A NEW ANTENNA FOR HER MONITOR AND IT MAY TAKE 7-10 BUSINESS DAYS BEFORE SHE GETS IT AND THEN SHE WILL SEND

## 2024-12-09 SDOH — HEALTH STABILITY: PHYSICAL HEALTH: ON AVERAGE, HOW MANY DAYS PER WEEK DO YOU ENGAGE IN MODERATE TO STRENUOUS EXERCISE (LIKE A BRISK WALK)?: 7 DAYS

## 2024-12-10 NOTE — PROGRESS NOTES
Future  - Hemoglobin A1C; Future  - Lipid Panel; Future  - TSH with Reflex; Future  - Microalbumin / Creatinine Urine Ratio; Future    4. S/P Maze operation for atrial fibrillation    - CBC with Auto Differential; Future  - Comprehensive Metabolic Panel; Future  - Hemoglobin A1C; Future  - Lipid Panel; Future  - TSH with Reflex; Future  - Microalbumin / Creatinine Urine Ratio; Future    5. Stage 3a chronic kidney disease (HCC)    Con't Entresto  Labs ordered    - CBC with Auto Differential; Future  - Comprehensive Metabolic Panel; Future  - Hemoglobin A1C; Future  - Lipid Panel; Future  - TSH with Reflex; Future  - Microalbumin / Creatinine Urine Ratio; Future    6. Dyslipidemia    Stable  Con't Lipitor  Labs ordered    - CBC with Auto Differential; Future  - Comprehensive Metabolic Panel; Future  - Hemoglobin A1C; Future  - Lipid Panel; Future  - TSH with Reflex; Future  - Microalbumin / Creatinine Urine Ratio; Future    7. Cigarette nicotine dependence without complication    In precontemplation stage and not ready to quit. Declines cessation, aware of risks of continued smoking as well as resources available to help quit. These include tobacco cessation classes as well as 1-800-QUIT NOW. No barriers other than lack of desire. 3+ min spent counseling.     8. Encounter for screening mammogram for malignant neoplasm of breast    - [facility] DIGITAL SCREEN BILATERAL; Future    9. Screening for colon cancer    - [facility] - Serina Rios MD, Gastroenterology, [place]      DISPOSITION    Return in about 1 year (around 12/11/2025) for follow-up on chronic medical conditions, sooner as needed.    Manjula released without restrictions.    Future Appointments   Date Time Provider Department Center   2/3/2025 10:15 AM STR ECHO 2 [facility] STR Rad/Card   2/12/2025 10:30 AM Mortimer, Connor, PA-C N [facility]X Heart [facility] [place]   6/18/2025  1:30 PM Harry Whaley MD N [facility] Heart [facility] [place]   9/4/2025  8:30 AM SCHEDULE, SRPX PACER NURSE N

## 2024-12-11 ENCOUNTER — OFFICE VISIT (OUTPATIENT)
Dept: FAMILY MEDICINE CLINIC | Age: 59
End: 2024-12-11
Payer: COMMERCIAL

## 2024-12-11 VITALS
TEMPERATURE: 97.7 F | HEART RATE: 78 BPM | WEIGHT: 155.6 LBS | RESPIRATION RATE: 18 BRPM | BODY MASS INDEX: 25.92 KG/M2 | HEIGHT: 65 IN | OXYGEN SATURATION: 98 % | SYSTOLIC BLOOD PRESSURE: 130 MMHG | DIASTOLIC BLOOD PRESSURE: 82 MMHG

## 2024-12-11 DIAGNOSIS — Z12.31 ENCOUNTER FOR SCREENING MAMMOGRAM FOR MALIGNANT NEOPLASM OF BREAST: ICD-10-CM

## 2024-12-11 DIAGNOSIS — I48.0 PAF (PAROXYSMAL ATRIAL FIBRILLATION) (HCC): Chronic | ICD-10-CM

## 2024-12-11 DIAGNOSIS — I50.20 HEART FAILURE WITH REDUCED EJECTION FRACTION (HCC): Primary | Chronic | ICD-10-CM

## 2024-12-11 DIAGNOSIS — Z86.79 S/P MAZE OPERATION FOR ATRIAL FIBRILLATION: Chronic | ICD-10-CM

## 2024-12-11 DIAGNOSIS — F17.210 CIGARETTE NICOTINE DEPENDENCE WITHOUT COMPLICATION: Chronic | ICD-10-CM

## 2024-12-11 DIAGNOSIS — I42.0 NONISCHEMIC DILATED CARDIOMYOPATHY (HCC): Chronic | ICD-10-CM

## 2024-12-11 DIAGNOSIS — Z12.11 SCREENING FOR COLON CANCER: ICD-10-CM

## 2024-12-11 DIAGNOSIS — Z98.890 S/P MAZE OPERATION FOR ATRIAL FIBRILLATION: Chronic | ICD-10-CM

## 2024-12-11 DIAGNOSIS — E78.5 DYSLIPIDEMIA: Chronic | ICD-10-CM

## 2024-12-11 DIAGNOSIS — N18.31 STAGE 3A CHRONIC KIDNEY DISEASE (HCC): Chronic | ICD-10-CM

## 2024-12-11 PROCEDURE — 99204 OFFICE O/P NEW MOD 45 MIN: CPT | Performed by: FAMILY MEDICINE

## 2024-12-11 SDOH — ECONOMIC STABILITY: FOOD INSECURITY: WITHIN THE PAST 12 MONTHS, YOU WORRIED THAT YOUR FOOD WOULD RUN OUT BEFORE YOU GOT MONEY TO BUY MORE.: NEVER TRUE

## 2024-12-11 SDOH — ECONOMIC STABILITY: FOOD INSECURITY: WITHIN THE PAST 12 MONTHS, THE FOOD YOU BOUGHT JUST DIDN'T LAST AND YOU DIDN'T HAVE MONEY TO GET MORE.: NEVER TRUE

## 2024-12-11 SDOH — ECONOMIC STABILITY: INCOME INSECURITY: HOW HARD IS IT FOR YOU TO PAY FOR THE VERY BASICS LIKE FOOD, HOUSING, MEDICAL CARE, AND HEATING?: NOT HARD AT ALL

## 2024-12-11 ASSESSMENT — PATIENT HEALTH QUESTIONNAIRE - PHQ9
SUM OF ALL RESPONSES TO PHQ QUESTIONS 1-9: 0
SUM OF ALL RESPONSES TO PHQ QUESTIONS 1-9: 0
SUM OF ALL RESPONSES TO PHQ9 QUESTIONS 1 & 2: 0
2. FEELING DOWN, DEPRESSED OR HOPELESS: NOT AT ALL
SUM OF ALL RESPONSES TO PHQ QUESTIONS 1-9: 0
SUM OF ALL RESPONSES TO PHQ QUESTIONS 1-9: 0
1. LITTLE INTEREST OR PLEASURE IN DOING THINGS: NOT AT ALL

## 2024-12-11 NOTE — PATIENT INSTRUCTIONS
LAB INSTRUCTIONS:    Please complete labs within 4 week(s).    Please fast for 8 hours prior to lab collection.    The clinic will call you within 1 week of collection. If you have not heard from us within that amount of time, please call us at [phone].

## 2025-01-31 LAB
ALBUMIN: 4.3 G/DL
ALP BLD-CCNC: 69 U/L
ALT SERPL-CCNC: 11 U/L
ANION GAP SERPL CALCULATED.3IONS-SCNC: NORMAL MMOL/L
AST SERPL-CCNC: 14 U/L
BASOPHILS ABSOLUTE: 19 /ΜL
BASOPHILS RELATIVE PERCENT: 0.4 %
BILIRUB SERPL-MCNC: 0.4 MG/DL (ref 0.1–1.4)
BUN BLDV-MCNC: 22 MG/DL
CALCIUM SERPL-MCNC: 9.3 MG/DL
CHLORIDE BLD-SCNC: 106 MMOL/L
CHOLESTEROL, TOTAL: 185 MG/DL
CHOLESTEROL/HDL RATIO: 3.8
CO2: 27 MMOL/L
CREAT SERPL-MCNC: 1.09 MG/DL
CREATININE URINE: NORMAL
EOSINOPHILS ABSOLUTE: 120 /ΜL
EOSINOPHILS RELATIVE PERCENT: 2.5 %
ESTIMATED AVERAGE GLUCOSE: NORMAL
GFR, ESTIMATED: 59
GLUCOSE BLD-MCNC: 103 MG/DL
HBA1C MFR BLD: 5.8 %
HCT VFR BLD CALC: 40.9 % (ref 36–46)
HDLC SERPL-MCNC: 49 MG/DL (ref 35–70)
HEMOGLOBIN: 13.3 G/DL (ref 12–16)
LDL CHOLESTEROL: 116
LYMPHOCYTES ABSOLUTE: 1210 /ΜL
LYMPHOCYTES RELATIVE PERCENT: 25.2 %
MCH RBC QN AUTO: 31.1 PG
MCHC RBC AUTO-ENTMCNC: 32.5 G/DL
MCV RBC AUTO: 95.8 FL
MICROALBUMIN/CREAT 24H UR: 1.1 MG/DL
MICROALBUMIN/CREAT UR-RTO: 7 MG/G
MONOCYTES ABSOLUTE: 336 /ΜL
MONOCYTES RELATIVE PERCENT: 7 %
NEUTROPHILS ABSOLUTE: 3115 /ΜL
NEUTROPHILS RELATIVE PERCENT: 64.9 %
NONHDLC SERPL-MCNC: 136 MG/DL
PDW BLD-RTO: 12.7 %
PLATELET # BLD: 198 K/ΜL
PMV BLD AUTO: 11.1 FL
POTASSIUM SERPL-SCNC: 4.2 MMOL/L
RBC # BLD: 4.27 10^6/ΜL
SODIUM BLD-SCNC: 141 MMOL/L
TOTAL PROTEIN: 7.1 G/DL (ref 6.4–8.2)
TRIGL SERPL-MCNC: 95 MG/DL
TSH SERPL DL<=0.05 MIU/L-ACNC: 1.99 UIU/ML
VLDLC SERPL CALC-MCNC: NORMAL MG/DL
WBC # BLD: 4.8 10^3/ML

## 2025-02-03 ENCOUNTER — HOSPITAL ENCOUNTER (OUTPATIENT)
Age: 60
Discharge: HOME | End: 2025-02-05
Attending: NUCLEAR MEDICINE
Payer: COMMERCIAL

## 2025-02-03 DIAGNOSIS — R06.02 SHORTNESS OF BREATH: ICD-10-CM

## 2025-02-03 DIAGNOSIS — I42.9 CARDIOMYOPATHY, UNSPECIFIED TYPE (HCC): ICD-10-CM

## 2025-02-03 LAB
ECHO AO ASC DIAM: 3.3 CM
ECHO AV CUSP MM: 1.9 CM
ECHO LA AREA 2C: 20.1 CM2
ECHO LA AREA 4C: 18 CM2
ECHO LA DIAMETER: 4.2 CM
ECHO LA MAJOR AXIS: 5 CM
ECHO LA MINOR AXIS: 5 CM
ECHO LA VOL BP: 59 ML (ref 22–52)
ECHO LA VOL MOD A2C: 65 ML (ref 22–52)
ECHO LA VOL MOD A4C: 53 ML (ref 22–52)
ECHO LV EDV A2C: 108 ML
ECHO LV EDV A4C: 110 ML
ECHO LV EJECTION FRACTION A2C: 45 %
ECHO LV EJECTION FRACTION A4C: 40 %
ECHO LV EJECTION FRACTION BIPLANE: 40 % (ref 55–100)
ECHO LV ESV A2C: 60 ML
ECHO LV ESV A4C: 67 ML
ECHO LV FRACTIONAL SHORTENING: 16 % (ref 28–44)
ECHO LV INTERNAL DIMENSION DIASTOLIC: 5.5 CM (ref 3.9–5.3)
ECHO LV INTERNAL DIMENSION SYSTOLIC: 4.6 CM
ECHO LV IVSD: 1.2 CM (ref 0.6–0.9)
ECHO LV MASS 2D: 257 G (ref 67–162)
ECHO LV POSTERIOR WALL DIASTOLIC: 1.1 CM (ref 0.6–0.9)
ECHO LV RELATIVE WALL THICKNESS RATIO: 0.4
ECHO RV INTERNAL DIMENSION: 2.8 CM
ECHO RV TAPSE: 1.6 CM (ref 1.7–?)

## 2025-02-03 PROCEDURE — 93307 TTE W/O DOPPLER COMPLETE: CPT

## 2025-02-03 PROCEDURE — 93307 TTE W/O DOPPLER COMPLETE: CPT | Performed by: NUCLEAR MEDICINE

## 2025-02-05 ENCOUNTER — TELEPHONE (OUTPATIENT)
Dept: FAMILY MEDICINE CLINIC | Age: 60
End: 2025-02-05

## 2025-02-05 NOTE — TELEPHONE ENCOUNTER
----- Message from Dr. Christiano Peoples, DO sent at 2/5/2025  6:53 AM EST -----  Please let pt know that labs look good. No concerning findings. Let me know if questions, thanks!

## 2025-02-06 NOTE — PROGRESS NOTES
[facility] [place] [facility]  [facility] CARDIOLOGY  [address]  [address]  [place] [address]  Dept: [phone]  Dept Fax: [phone]  Loc: [phone]    Visit Date: 2/12/2025    Manjula Hein is a 59 y.o. female who presents todayfor:  Chief Complaint   Patient presents with    Results     Echo     Cardiologist: Fatoumata    Hx of PAF [facility], MAZE, pacer, ROMAN clip, smoker, ? COPD, HTN  35-40%    HPI: 3 month f/u from repeat echo   Her sob is improved. No chest pains. No dizzy or lightheaded sypmtoms or palpitations. Weight is stable. Nos welling. She is here to discuss reepat echo findings. She is still smoking 5 cigarettes per day, would like to quit.     Past Surgical History:   Procedure Laterality Date    CARDIAC CATHETERIZATION  09/20/2018    Dr. Whaley. No CAD.    CARDIAC SURGERY  1970    repair hole in heart pt states was 5 yrs old.    COLONOSCOPY      MITRAL VALVE SURGERY  10/15/2018    PACEMAKER INSERTION  05/01/2017    [place] OFFICE/OUTPT VISIT,PROCEDURE ONLY N/A 10/15/2018    REDO STERNOTOMY, BI-ATRIAL MAZE PROCEDURE, MITRAL REPAIR, PLACEMENT OF INTRA AORTIC BALLOON PUMP performed by Shree Osborn MD at [facility] OR    TONSILLECTOMY      TUBAL LIGATION  1997    TUBAL LIGATION       Family History   Problem Relation Age of Onset    Emphysema Father     High Cholesterol Father     Colon Cancer Neg Hx     Prostate Cancer Neg Hx      Social History     Tobacco Use    Smoking status: Every Day     Current packs/day: 0.10     Average packs/day: 0.4 packs/day for 44.1 years (17.6 ttl pk-yrs)     Types: Cigarettes     Start date: 1981    Smokeless tobacco: Never   Substance Use Topics    Alcohol use: No      Current Outpatient Medications   Medication Sig Dispense Refill    sacubitril-valsartan (ENTRESTO) 24-26 MG per tablet Take 1 tablet by mouth 2 times daily 180 tablet 3    carvedilol (COREG) 6.25 MG tablet Take 1 tablet by mouth 2 times daily 60 tablet 3    atorvastatin (LIPITOR) 20 MG

## 2025-02-12 ENCOUNTER — OFFICE VISIT (OUTPATIENT)
Dept: CARDIOLOGY CLINIC | Age: 60
End: 2025-02-12
Payer: COMMERCIAL

## 2025-02-12 ENCOUNTER — TELEPHONE (OUTPATIENT)
Dept: CARDIOLOGY CLINIC | Age: 60
End: 2025-02-12

## 2025-02-12 ENCOUNTER — NURSE ONLY (OUTPATIENT)
Dept: CARDIOLOGY CLINIC | Age: 60
End: 2025-02-12

## 2025-02-12 VITALS
HEART RATE: 75 BPM | BODY MASS INDEX: 25.21 KG/M2 | HEIGHT: 65 IN | OXYGEN SATURATION: 97 % | DIASTOLIC BLOOD PRESSURE: 80 MMHG | SYSTOLIC BLOOD PRESSURE: 118 MMHG | WEIGHT: 151.3 LBS

## 2025-02-12 DIAGNOSIS — I42.8 NONISCHEMIC CARDIOMYOPATHY (HCC): Primary | ICD-10-CM

## 2025-02-12 DIAGNOSIS — I10 ESSENTIAL HYPERTENSION: ICD-10-CM

## 2025-02-12 DIAGNOSIS — Z71.6 TOBACCO ABUSE COUNSELING: ICD-10-CM

## 2025-02-12 DIAGNOSIS — I48.0 PAF (PAROXYSMAL ATRIAL FIBRILLATION) (HCC): ICD-10-CM

## 2025-02-12 DIAGNOSIS — Z95.0 PACEMAKER: Primary | Chronic | ICD-10-CM

## 2025-02-12 DIAGNOSIS — Z95.0 PACEMAKER: Chronic | ICD-10-CM

## 2025-02-12 PROCEDURE — 3074F SYST BP LT 130 MM HG: CPT | Performed by: STUDENT IN AN ORGANIZED HEALTH CARE EDUCATION/TRAINING PROGRAM

## 2025-02-12 PROCEDURE — 99214 OFFICE O/P EST MOD 30 MIN: CPT | Performed by: STUDENT IN AN ORGANIZED HEALTH CARE EDUCATION/TRAINING PROGRAM

## 2025-02-12 PROCEDURE — 3079F DIAST BP 80-89 MM HG: CPT | Performed by: STUDENT IN AN ORGANIZED HEALTH CARE EDUCATION/TRAINING PROGRAM

## 2025-02-12 NOTE — TELEPHONE ENCOUNTER
Pt LM to see if her Latitude monitor is connected in the website. Communicator is not set up, download scheduled to come in 2-14-25 to pair monitor to her device. LMOM asking pt to send download.

## 2025-03-04 DIAGNOSIS — Z86.79 S/P MAZE OPERATION FOR ATRIAL FIBRILLATION: ICD-10-CM

## 2025-03-04 DIAGNOSIS — I42.8 NON-ISCHEMIC CARDIOMYOPATHY (HCC): ICD-10-CM

## 2025-03-04 DIAGNOSIS — Z98.890 S/P LEFT ATRIAL APPENDAGE LIGATION: ICD-10-CM

## 2025-03-04 DIAGNOSIS — Z98.890 S/P MAZE OPERATION FOR ATRIAL FIBRILLATION: ICD-10-CM

## 2025-03-04 DIAGNOSIS — E78.5 DYSLIPIDEMIA: ICD-10-CM

## 2025-03-04 DIAGNOSIS — I48.0 PAROXYSMAL ATRIAL FIBRILLATION (HCC): ICD-10-CM

## 2025-03-04 DIAGNOSIS — Z86.79 HISTORY OF ATRIAL FIBRILLATION: ICD-10-CM

## 2025-03-04 DIAGNOSIS — I50.22 CHRONIC SYSTOLIC HEART FAILURE (HCC): ICD-10-CM

## 2025-03-04 RX ORDER — CARVEDILOL 6.25 MG/1
6.25 TABLET ORAL 2 TIMES DAILY
Qty: 180 TABLET | Refills: 3 | Status: SHIPPED | OUTPATIENT
Start: 2025-03-04

## 2025-03-04 RX ORDER — ATORVASTATIN CALCIUM 20 MG/1
20 TABLET, FILM COATED ORAL DAILY
Qty: 90 TABLET | Refills: 3 | Status: SHIPPED | OUTPATIENT
Start: 2025-03-04

## 2025-04-28 DIAGNOSIS — I50.22 CHRONIC SYSTOLIC HEART FAILURE (HCC): Primary | ICD-10-CM

## 2025-06-11 ENCOUNTER — TELEPHONE (OUTPATIENT)
Dept: CARDIOLOGY CLINIC | Age: 60
End: 2025-06-11

## 2025-06-11 NOTE — TELEPHONE ENCOUNTER
Pt LM on nurse line requesting to r/s her appt to a morning appt.  LM for patient to return call.  Is she okay to r/s to midlevel?

## 2025-06-17 NOTE — PROGRESS NOTES
tablet Take 1 tablet by mouth daily 90 tablet 3    sacubitril-valsartan (ENTRESTO) 24-26 MG per tablet Take 1 tablet by mouth 2 times daily 180 tablet 3    aspirin ([facility] ASPIRIN) 325 MG tablet TAKE 1 TABLET BY MOUTH EVERY DAY 90 tablet 3     No current facility-administered medications for this visit.     Allergies   Allergen Reactions    Doxycycline Hives     Health Maintenance   Topic Date Due    HIV screen  Never done    Hepatitis C screen  Never done    Shingles vaccine (1 of 2) Never done    Breast cancer screen  02/20/2022    Colorectal Cancer Screen  12/13/2022    COVID-19 Vaccine (1 - 2024-25 season) Never done    Pneumococcal 50+ years Vaccine (3 of 3 - PCV20 or PCV21) 02/20/2025    Cervical cancer screen  02/20/2025    Hepatitis B vaccine (1 of 3 - 19+ 3-dose series) 12/10/2025 (Originally 4/27/1984)    Flu vaccine (Season Ended) 08/01/2025    Depression Screen  12/11/2025    A1C test (Diabetic or Prediabetic)  01/31/2026    Lipids  01/31/2026    GFR test (Diabetes, CKD 3-4, OR last GFR 15-59)  01/31/2026    DTaP/Tdap/Td vaccine (2 - Td or Tdap) 12/18/2028    Hepatitis A vaccine  Aged Out    Hib vaccine  Aged Out    Polio vaccine  Aged Out    Meningococcal (ACWY) vaccine  Aged Out    Meningococcal B vaccine  Aged Out    Pneumococcal 0-49 years Vaccine  Discontinued        Objective:  General:   Well developed, well nourished  Lungs:   Clear to auscultation  Heart:    Normal S1 S2, no murmur. no rubs, no gallops  Abdomen:   Soft, non tender, no organomegalies, positive bowel sounds  Extremities:   No edema, no cyanosis, good peripheral pulses  Neurological:   Awake, alert, oriented. No obvious focal deficits  Musculoskelatal:  No obvious deformities   /85   Pulse 72   Ht 1.651 m (5' 5\")   Wt 65.3 kg (144 lb)   BMI 23.96 kg/m²     Assessment:  Assessment & Plan    Diagnosis Orders   1. PAF (paroxysmal atrial fibrillation) (HCC)        2. Nonischemic dilated cardiomyopathy (HCC)        3. [place]

## 2025-06-19 ENCOUNTER — OFFICE VISIT (OUTPATIENT)
Dept: CARDIOLOGY CLINIC | Age: 60
End: 2025-06-19
Payer: COMMERCIAL

## 2025-06-19 VITALS
WEIGHT: 144 LBS | HEART RATE: 72 BPM | BODY MASS INDEX: 23.99 KG/M2 | HEIGHT: 65 IN | SYSTOLIC BLOOD PRESSURE: 135 MMHG | DIASTOLIC BLOOD PRESSURE: 85 MMHG

## 2025-06-19 DIAGNOSIS — I42.0 NONISCHEMIC DILATED CARDIOMYOPATHY (HCC): Chronic | ICD-10-CM

## 2025-06-19 DIAGNOSIS — Z71.6 TOBACCO ABUSE COUNSELING: ICD-10-CM

## 2025-06-19 DIAGNOSIS — I48.0 PAF (PAROXYSMAL ATRIAL FIBRILLATION) (HCC): Primary | Chronic | ICD-10-CM

## 2025-06-19 DIAGNOSIS — Z98.890 S/P MITRAL VALVE REPAIR: Chronic | ICD-10-CM

## 2025-06-19 DIAGNOSIS — Z95.0 PACEMAKER: Chronic | ICD-10-CM

## 2025-06-19 PROCEDURE — 99214 OFFICE O/P EST MOD 30 MIN: CPT | Performed by: STUDENT IN AN ORGANIZED HEALTH CARE EDUCATION/TRAINING PROGRAM

## 2025-06-19 RX ORDER — DAPAGLIFLOZIN 10 MG/1
10 TABLET, FILM COATED ORAL EVERY MORNING
Qty: 30 TABLET | Refills: 0 | Status: SHIPPED | OUTPATIENT
Start: 2025-06-19 | End: 2025-06-19

## 2025-06-19 RX ORDER — DAPAGLIFLOZIN 10 MG/1
10 TABLET, FILM COATED ORAL EVERY MORNING
Qty: 30 TABLET | Refills: 3 | Status: SHIPPED | OUTPATIENT
Start: 2025-06-19

## (undated) DEVICE — GLOVE SURG SZ 65 THK91MIL LTX FREE SYN POLYISOPRENE

## (undated) DEVICE — CANNULA PERF 24FR CONN SITE 3/8IN SGL STG VEN W/ R ANG MTL

## (undated) DEVICE — THORACIC CATHETER,STRAIGHT: Brand: ARGYLE

## (undated) DEVICE — STRIP,CLOSURE,WOUND,MEDI-STRIP,1/2X4: Brand: MEDLINE

## (undated) DEVICE — SUTURE PROL SZ 5-0 L36IN NONABSORBABLE BLU L13MM C-1 3/8 8720H

## (undated) DEVICE — CANNULA PVC RCSP 15FR SLD STYL W/ HNDL OVERALL LEN 11 IN

## (undated) DEVICE — GLOVE ORANGE PI 8   MSG9080

## (undated) DEVICE — DRESSING TRNSPAR W5XL4.5IN FLM SHT SEMIPERMEABLE WIND

## (undated) DEVICE — SUTURE NONABSORBABLE MONOFILAMENT 4-0 RB-1 36 IN BLU PROLENE 8557H

## (undated) DEVICE — PROBE CRYOABLATION L10CM ALUM SMOOTH MAL RETRCT HND FLX TB

## (undated) DEVICE — HEMOCONCENTRATOR AUTOTRNS L6IN OD0.25IN W/ TBNG CONN DHF 0.6

## (undated) DEVICE — CANNULA INJ L2.5IN BLNT TIP 3MM CLR BODY W/ 1 W VLV DLP

## (undated) DEVICE — SENSOR OXMTR L AD WT GREATER THAN 40KG FORE-SIGHT ELITE

## (undated) DEVICE — CLIP LIG M TI 6 SIL HNDL FOR OPN AND ENDOSCP SGL APPL

## (undated) DEVICE — Z DUP USE 2582811 CATHETER IABP 8FR 50CC FBROPT CONN W/ INSRT KT TWO STATLOK

## (undated) DEVICE — WAX SURG 2.5GM HEMSTAT BNE BEESWAX PARAFFIN ISO PALMITATE

## (undated) DEVICE — 500ML,PRESSURE INFUSER W/STOPCOCK: Brand: MEDLINE

## (undated) DEVICE — CATHETER,URETHRAL,REDRUBBER,STERILE,20FR: Brand: MEDLINE

## (undated) DEVICE — TUBING PRSS 36 M F

## (undated) DEVICE — LOOP VES W13MM THK09MM MINI RED SIL FLD REPELLENT

## (undated) DEVICE — KIT CANN 19FR TIP L18CM VENT CONN 3/8IN ART POLYUR PERC

## (undated) DEVICE — CANNULA PERF L5.5IN DIA9FR AORT ROOT AG STD TIP W/ VENT LN

## (undated) DEVICE — GLOVE SURG SZ 6 THK91MIL LTX FREE SYN POLYISOPRENE ANTI

## (undated) DEVICE — Device

## (undated) DEVICE — SET MULT PERF FEM LUER OVERALL LEN 15 IN 4 LEG AND 3 1 W

## (undated) DEVICE — THORACIC CATHETER,RIGHT ANGLE: Brand: ARGYLE

## (undated) DEVICE — TUBING, SUCTION, 1/4" X 20', STRAIGHT: Brand: MEDLINE INDUSTRIES, INC.

## (undated) DEVICE — SOLUTION IV 250ML 0.9% SOD CHL PH 5 INJ USP VIAFLX PLAS

## (undated) DEVICE — Device: Brand: MOUTHPIECE

## (undated) DEVICE — CLAMP ABLAT JAW L53MM L CRV 2 ELECTRD BPLR

## (undated) DEVICE — RETRACTOR SURG INSRT SUT HLD OCTOBASE

## (undated) DEVICE — INTENDED FOR TISSUE SEPARATION, AND OTHER PROCEDURES THAT REQUIRE A SHARP SURGICAL BLADE TO PUNCTURE OR CUT.: Brand: BARD-PARKER ® CARBON RIB-BACK BLADES

## (undated) DEVICE — PRESSURE MONITORING SET: Brand: TRUWAVE

## (undated) DEVICE — EXCEL 10FT (3.05 M) INSUFFLATION TUBING SET WITH 0.1 MICRON FILTER: Brand: EXCEL

## (undated) DEVICE — FOGARTY - HYDRAGRIP SURGICAL - CLAMP INSERTS: Brand: FOGARTY SOFTJAW

## (undated) DEVICE — TUBING PRESSURE MONITORING FIX M TO M LUER

## (undated) DEVICE — SUTURE PROL SZ 6-0 L24IN NONABSORBABLE BLU L13MM C-1 3/8 8726H

## (undated) DEVICE — GLOVE ORANGE PI 7 1/2   MSG9075

## (undated) DEVICE — SUTURE ABSORBABLE BRAIDED 2-0 CT-1 27 IN UD VICRYL J259H

## (undated) DEVICE — SUTURE SZ 7 L18IN NONABSORBABLE SIL CCS L48MM 1/2 CIR STRNM M655G

## (undated) DEVICE — DRESSING TRNSPAR W4XL10IN FLM MIC POR SURESITE 123

## (undated) DEVICE — SINGLE STAGE VENOUS RETURN CANNULA: Brand: THIN-FLEX SINGLE STAGE VENOUS DRAINAGE CANNULA

## (undated) DEVICE — DRESSING FOAM DISK DIA1IN H 7MM HYDRPHLC CHG IMPREG IN SL

## (undated) DEVICE — GOWN,SIRUS,NONRNF,SETINSLV,XL,20/CS: Brand: MEDLINE

## (undated) DEVICE — PACK SUCT CATHETER 14FR OPN WHSTL W NO VLV

## (undated) DEVICE — IMPLANTABLE DEVICE
Type: IMPLANTABLE DEVICE | Status: NON-FUNCTIONAL
Removed: 2018-10-15

## (undated) DEVICE — CONNECTOR STR 3/8IN ST 050506000 375STRCONN

## (undated) DEVICE — EZ GLIDE AORTIC CANNULA: Brand: EDWARDS LIFESCIENCES EZ GLIDE AORTIC CANNULA

## (undated) DEVICE — SUTURE ETHBND 2-0 L30IN NONABSORBABLE GRN WHT V-5 L17IN 1/2 10X52

## (undated) DEVICE — SKIN AFFIX SURG ADHESIVE 72/CS 0.55ML: Brand: MEDLINE

## (undated) DEVICE — DRESSING GERM DIA1IN CNTR H DIA7MM BLU CHG ANTIMIC PROTCT

## (undated) DEVICE — GLOVE SURG SZ 65 L12IN THK75MIL DK GRN LTX FREE

## (undated) DEVICE — SHEATH SYS 8.5FRX13CM TEAR AWAY HEMSTAT VLV
Type: IMPLANTABLE DEVICE | Status: NON-FUNCTIONAL
Removed: 2018-10-15

## (undated) DEVICE — Device: Brand: SUCTION TIP

## (undated) DEVICE — SUTURE SOFSILK SZ 1 L30IN NABSORBABLE BLK C-17 L39MM 3/8 SS646

## (undated) DEVICE — ATTACHMENT POSER 360DEG ARTC STBL FOR POS THE APEX OF THE

## (undated) DEVICE — ROYAL SILK SURGICAL GOWN, XXL: Brand: CONVERTORS

## (undated) DEVICE — SET TRNQT STD 12FR TB LEN 7 IN 2 RED 2 BLU 2 CLR 16FR 2 L

## (undated) DEVICE — SUTURE SILK PERMAHAND PRECUT 6 X 30 IN SZ 1 BLK BRAID A307H

## (undated) DEVICE — LINER SUCT CANSTR 1500CC SEMI RIG W/ POR HYDROPHOBIC SHUT

## (undated) DEVICE — SOLUTION IV 1000ML 0.9% SOD CHL PH 5 INJ USP VIAFLX PLAS

## (undated) DEVICE — SUTURE VCRL SZ 0 L36IN ABSRB VLT L36MM CT-1 1/2 CIR J346H

## (undated) DEVICE — CONNECTOR PERF 3/8X3/8X3/8IN EQL WYE

## (undated) DEVICE — KIT VEN DRNGE VAC ACCSRY PERF VAVD STOCK W/ SPEC TRAP

## (undated) DEVICE — DRESSING TRNSPAR W2XL2.75IN FLM SHT SEMIPERMEABLE WIND

## (undated) DEVICE — SET PRSS DISPOSABLE LAY L45IN COIL TBNG SIL DIAPH PLAS DOME

## (undated) DEVICE — CANNULA PERF 17FR L10IN SIL COR ART OSTIAL SFT BLB SHP TIP

## (undated) DEVICE — SOLUTION IV 500ML 0.9% SOD CHL PH 5 INJ USP VIAFLX PLAS

## (undated) DEVICE — SUTURE SOFSILK SZ 2 L30IN NONABSORBABLE WHT V-26 L37MM 1/2 CS746

## (undated) DEVICE — SPONGE LAP W18XL18IN WHT COT 4 PLY FLD STRUNG RADPQ DISP ST

## (undated) DEVICE — 48" PROBE COVER W/GEL, ULTRASOUND, STERILE: Brand: SITE-RITE

## (undated) DEVICE — ADULT (STERILE), RADIOTRANSLUCENT ELEMENT: Brand: DEFIBRILLATION ELECTRODES

## (undated) DEVICE — SURGICAL PROCEDURE PACK OXGNTR ST MARYS

## (undated) DEVICE — GOWN,SIRUS,NON REINFRCD,LARGE,SET IN SL: Brand: MEDLINE

## (undated) DEVICE — CANNULA PERF 19FR L30IN MULTISTAGE FEM VEN W/ INSRT KT

## (undated) DEVICE — SUTURE MCRYL SZ 4-0 L27IN ABSRB UD L19MM PS-2 1/2 CIR PRIM Y426H

## (undated) DEVICE — SUTURE PROL SZ 4-0 L36IN NONABSORBABLE BLU L26MM SH 1/2 CIR 8521H

## (undated) DEVICE — Z INACTIVE USE 2662641 SOLUTION IV 1000ML 140MEQ/L SOD 5MEQ/L K 3MEQ/L MG 27MEQ/L

## (undated) DEVICE — BLOOD TRANSFUSION FILTER: Brand: HAEMONETICS

## (undated) DEVICE — GLOVE ORANGE PI 8 1/2   MSG9085

## (undated) DEVICE — SET AUTOTRNS C175ML BOWL BTM OUTLT RESERVOIRXTRA

## (undated) DEVICE — CHLORAPREP 26ML ORANGE

## (undated) DEVICE — 2108 SERIES SAGITTAL BLADE (53.0 X 0.38 X 40.0MM)

## (undated) DEVICE — CLIP LIG SM TI 6 BLU HNDL FOR OPN AND ENDOSCP SGL APPL